# Patient Record
Sex: FEMALE | Race: BLACK OR AFRICAN AMERICAN | NOT HISPANIC OR LATINO | Employment: FULL TIME | URBAN - METROPOLITAN AREA
[De-identification: names, ages, dates, MRNs, and addresses within clinical notes are randomized per-mention and may not be internally consistent; named-entity substitution may affect disease eponyms.]

---

## 2017-11-06 ENCOUNTER — GENERIC CONVERSION - ENCOUNTER (OUTPATIENT)
Dept: OTHER | Facility: OTHER | Age: 52
End: 2017-11-06

## 2017-11-06 DIAGNOSIS — Z12.31 ENCOUNTER FOR SCREENING MAMMOGRAM FOR MALIGNANT NEOPLASM OF BREAST: ICD-10-CM

## 2017-11-06 DIAGNOSIS — I10 ESSENTIAL (PRIMARY) HYPERTENSION: ICD-10-CM

## 2017-11-06 DIAGNOSIS — E07.89 OTHER SPECIFIED DISORDERS OF THYROID: ICD-10-CM

## 2017-11-06 DIAGNOSIS — E78.5 HYPERLIPIDEMIA: ICD-10-CM

## 2017-11-06 DIAGNOSIS — Z12.4 ENCOUNTER FOR SCREENING FOR MALIGNANT NEOPLASM OF CERVIX: ICD-10-CM

## 2017-12-27 ENCOUNTER — ALLSCRIPTS OFFICE VISIT (OUTPATIENT)
Dept: OTHER | Facility: OTHER | Age: 52
End: 2017-12-27

## 2017-12-28 NOTE — PROGRESS NOTES
Assessment   1  Acute maxillary sinusitis (461 0) (J01 00)    Plan   Acute maxillary sinusitis    · Amoxicillin-Pot Clavulanate 875-125 MG Oral Tablet; TAKE 1 TABLET EVERY 12    HOURS DAILY    Discussion/Summary      Sinusitis- start antibiotics  Do not take medication on an empty stomach  Saline nasal spray  The patient was counseled regarding  Possible side effects of new medications were reviewed with the patient/guardian today  The treatment plan was reviewed with the patient/guardian  The patient/guardian understands and agrees with the treatment plan       Self Referrals: No      Chief Complaint   Patient is here for sinus infection  History of Present Illness   HPI: Pt is here because she is having sinus congestion for 3 weeks  She is reporting thick green mucous with an odor  She has been taking dayquil  Review of Systems        Constitutional: feeling poorly, but-- as noted in HPI       ENT: nasal congestion, but-- as noted in HPI  Respiratory: cough, but-- as noted in HPI  Gastrointestinal: no complaints of abdominal pain, no constipation, no nausea or diarrhea, no vomiting, no bloody stools  Active Problems   1  Benign essential hypertension (401 1) (I10)   2  Bipolar 1 disorder, depressed, moderate (296 52) (F31 32)   3  Depression (311) (F32 9)   4  Hyperlipidemia (272 4) (E78 5)   5  Lumbar radiculopathy (724 4) (M54 16)   6  Migraine headache (346 90) (G43 909)   7  Perimenopausal symptoms (627 2) (N95 1)   8  Thyroid pain (246 8) (E07 89)   9  Visit for screening mammogram (V76 12) (Z12 31)    Past Medical History   1  History of Acute pharyngitis (462) (J02 9)   2  History of Acute pharyngitis (462) (J02 9)   3  History of Dysfunctional uterine bleeding (626 8) (N93 8)   4  History of Galactorrhea (611 6) (O92 6)   5  History of Gastric ulcer (531 90) (K25 9)   6  History of Herpes zoster (053 9) (B02 9)   7  History of Hyperprolactinemia (253 1) (E22 1)   8   History of Irregular menstrual cycle (626 4) (N92 6)   9  History of Menorrhagia (626 2) (N92 0)   10  History of Routine Gynecological Exam With Cervical Pap Smear (V72 31)   11  History of Screening for human papillomavirus (HPV) (V73 81) (Z11 51)   12  History of Sorethroat (462) (J02 9)  Active Problems And Past Medical History Reviewed: The active problems and past medical history were reviewed and updated today  Family History   Mother    1  Family history of Ovarian cancer  Sister    2  Family history of malignant neoplasm of thyroid (V16 8) (Z80 8)   3  Family history of Ovarian cancer  Family History    4  Family history of Breast Cancer (V16 3)  Family History Reviewed: The family history was reviewed and updated today  Social History    · Denied: Alcohol Use (History)   · Caffeine Use   · Exercising Regularly   · Never a smoker   · Denied: History of Never Used Drugs  The social history was reviewed and updated today  The social history was reviewed and is unchanged  Surgical History   Surgical History Reviewed: The surgical history was reviewed and updated today  Current Meds    1  ARIPiprazole 15 MG Oral Tablet; 0 5 TABLET DAILY Recorded   2  ClonazePAM 0 5 MG Oral Tablet; Take 1 tablet daily Recorded   3  DULoxetine HCl - 60 MG Oral Capsule Delayed Release Particles Recorded   4  OXcarbazepine 300 MG Oral Tablet; Take 1 tablet twice daily Recorded   5  Propranolol HCl ER 80 MG Oral Capsule Extended Release 24 Hour; take 1 capsule by     mouth once daily; Therapy: [id number] to (Evaluate:[id number])  Requested for: 23IAK0099; Last     Rx:[id number] Ordered     The medication list was reviewed and updated today  Allergies   1  BuSpar TABS   2   Cymbalta CPEP    Vitals    Recorded: 21Lmf2852 01:04PM   Temperature 98 5 F   Heart Rate 68   Systolic 671   Diastolic 82   Height 5 ft 5 5 in   Weight 192 lb 4 00 oz   BMI Calculated 31 51   BSA Calculated 1 95   O2 Saturation 96 Physical Exam        Constitutional      General appearance: No acute distress, well appearing and well nourished  Eyes      Conjunctiva and lids: No swelling, erythema or discharge  Pupils and irises: Equal, round and reactive to light  Ears, Nose, Mouth, and Throat      External inspection of ears and nose: Normal        Otoscopic examination: Tympanic membranes translucent with normal light reflex  Canals patent without erythema  Nasal mucosa, septum, and turbinates: Abnormal  -- moderate sinus congestion and thick mucoid discharge  Oropharynx: Normal with no erythema, edema, exudate or lesions  Pulmonary      Respiratory effort: No increased work of breathing or signs of respiratory distress  Auscultation of lungs: Clear to auscultation  Cardiovascular      Auscultation of heart: Normal rate and rhythm, normal S1 and S2, without murmurs  Lymphatic      Palpation of lymph nodes in neck: No lymphadenopathy         Musculoskeletal      Gait and station: Normal        Psychiatric      Orientation to person, place, and time: Normal        Mood and affect: Normal           Signatures    Electronically signed by : Mouna Mohr NP; Dec 27 2017  1:30PM EST                       (Author)     Electronically signed by : Ludin Oliver MD; Dec 27 2017  1:36PM EST                       (Co-author)

## 2018-01-22 VITALS
BODY MASS INDEX: 30.9 KG/M2 | WEIGHT: 192.25 LBS | HEIGHT: 66 IN | HEART RATE: 68 BPM | TEMPERATURE: 98.5 F | DIASTOLIC BLOOD PRESSURE: 82 MMHG | SYSTOLIC BLOOD PRESSURE: 124 MMHG | OXYGEN SATURATION: 96 %

## 2018-01-22 VITALS
HEART RATE: 70 BPM | TEMPERATURE: 97.3 F | OXYGEN SATURATION: 94 % | BODY MASS INDEX: 30.57 KG/M2 | WEIGHT: 190.2 LBS | DIASTOLIC BLOOD PRESSURE: 86 MMHG | HEIGHT: 66 IN | SYSTOLIC BLOOD PRESSURE: 126 MMHG

## 2018-06-01 ENCOUNTER — OFFICE VISIT (OUTPATIENT)
Dept: FAMILY MEDICINE CLINIC | Facility: CLINIC | Age: 53
End: 2018-06-01
Payer: COMMERCIAL

## 2018-06-01 ENCOUNTER — TELEPHONE (OUTPATIENT)
Dept: FAMILY MEDICINE CLINIC | Facility: CLINIC | Age: 53
End: 2018-06-01

## 2018-06-01 VITALS
DIASTOLIC BLOOD PRESSURE: 92 MMHG | TEMPERATURE: 96.5 F | OXYGEN SATURATION: 100 % | SYSTOLIC BLOOD PRESSURE: 136 MMHG | HEART RATE: 82 BPM | HEIGHT: 66 IN | WEIGHT: 191.2 LBS | BODY MASS INDEX: 30.73 KG/M2

## 2018-06-01 DIAGNOSIS — Z11.59 NEED FOR HEPATITIS C SCREENING TEST: ICD-10-CM

## 2018-06-01 DIAGNOSIS — F31.70 BIPOLAR AFFECTIVE DISORDER IN REMISSION (HCC): ICD-10-CM

## 2018-06-01 DIAGNOSIS — E66.9 OBESITY (BMI 30.0-34.9): ICD-10-CM

## 2018-06-01 DIAGNOSIS — Z13.220 NEED FOR LIPID SCREENING: ICD-10-CM

## 2018-06-01 DIAGNOSIS — R23.2 HOT FLASHES: ICD-10-CM

## 2018-06-01 DIAGNOSIS — M54.31 SCIATICA, RIGHT SIDE: Primary | ICD-10-CM

## 2018-06-01 DIAGNOSIS — Z13.1 DIABETES MELLITUS SCREENING: ICD-10-CM

## 2018-06-01 DIAGNOSIS — M25.50 ARTHRALGIA, UNSPECIFIED JOINT: ICD-10-CM

## 2018-06-01 PROBLEM — E66.811 OBESITY (BMI 30.0-34.9): Status: ACTIVE | Noted: 2018-06-01

## 2018-06-01 PROCEDURE — 99214 OFFICE O/P EST MOD 30 MIN: CPT | Performed by: FAMILY MEDICINE

## 2018-06-01 RX ORDER — MELOXICAM 7.5 MG/1
7.5 TABLET ORAL DAILY
Qty: 30 TABLET | Refills: 2 | Status: SHIPPED | OUTPATIENT
Start: 2018-06-01 | End: 2019-03-26 | Stop reason: SDUPTHER

## 2018-06-01 RX ORDER — OXCARBAZEPINE 300 MG/1
300 TABLET, FILM COATED ORAL 2 TIMES DAILY
Refills: 0 | COMMUNITY
Start: 2018-05-23 | End: 2018-08-13 | Stop reason: SDUPTHER

## 2018-06-01 RX ORDER — LIDOCAINE 50 MG/G
1 PATCH TOPICAL DAILY
Qty: 30 PATCH | Refills: 0 | Status: SHIPPED | OUTPATIENT
Start: 2018-06-01 | End: 2019-04-10

## 2018-06-01 RX ORDER — DULOXETIN HYDROCHLORIDE 30 MG/1
30 CAPSULE, DELAYED RELEASE ORAL DAILY
Qty: 30 CAPSULE | Refills: 1 | Status: SHIPPED | OUTPATIENT
Start: 2018-06-01 | End: 2018-08-13 | Stop reason: SDUPTHER

## 2018-06-01 RX ORDER — PROPRANOLOL HYDROCHLORIDE 80 MG/1
CAPSULE, EXTENDED RELEASE ORAL
Refills: 0 | COMMUNITY
Start: 2018-03-24 | End: 2018-10-10

## 2018-06-01 RX ORDER — ALPRAZOLAM 0.25 MG/1
0.25 TABLET ORAL DAILY PRN
Refills: 0 | COMMUNITY
Start: 2018-05-23 | End: 2018-10-10

## 2018-06-01 RX ORDER — DULOXETIN HYDROCHLORIDE 60 MG/1
60 CAPSULE, DELAYED RELEASE ORAL
Refills: 0 | COMMUNITY
Start: 2018-05-23 | End: 2018-08-13

## 2018-06-01 RX ORDER — ARIPIPRAZOLE 5 MG/1
2.5 TABLET ORAL
Refills: 0 | COMMUNITY
Start: 2018-05-23 | End: 2019-03-26 | Stop reason: SDUPTHER

## 2018-06-01 NOTE — TELEPHONE ENCOUNTER
Cecil Winter calls her rx of BELVIQ 10mg needs a prior [facility]   Call [phone] #[phone] no substitutions were provided after approval call [email or web address]

## 2018-06-01 NOTE — PROGRESS NOTES
Assessment/Plan:    No problem-specific Assessment & Plan notes found for this encounter  Diagnoses and all orders for this visit:    Sciatica, right side  after discussing risks and benefits along with side effects will start meloxicam at this time  advised to re-start cymbalta at 30 mg po once daily dose and physical therapy and lidocaine patch  -     meloxicam (MOBIC) 7 5 mg tablet; Take 1 tablet (7 5 mg total) by mouth daily for 30 days  -     DULoxetine (CYMBALTA) 30 mg delayed release capsule; Take 1 capsule (30 mg total) by mouth daily for 30 days  -     Ambulatory referral to Physical Therapy; Future  -     lidocaine (LIDODERM) 5 %; Place 1 patch on the skin daily Remove & Discard patch within 12 hours or as directed by MD  -     Rheumatoid Arthritis Factor; Future  -     XR hip/pelv 2-3 vws right if performed; Future    Diabetes mellitus screening  -     Comprehensive metabolic panel; Future  -     HEMOGLOBIN A1C W/ EAG ESTIMATION; Future    Need for lipid screening  -     Lipid panel; Future    Need for hepatitis C screening test  -     Hepatitis C antibody; Future    Arthralgia, unspecified joint  -     meloxicam (MOBIC) 7 5 mg tablet; Take 1 tablet (7 5 mg total) by mouth daily for 30 days  -     DULoxetine (CYMBALTA) 30 mg delayed release capsule; Take 1 capsule (30 mg total) by mouth daily for 30 days  -     Ambulatory referral to Physical Therapy; Future  -     lidocaine (LIDODERM) 5 %; Place 1 patch on the skin daily Remove & Discard patch within 12 hours or as directed by MD  -     CBC and differential; Future  -     BERNARDINO Screen w/ Reflex to Titer/Pattern; Future  -     Rheumatoid Arthritis Factor; Future  -     XR hip/pelv 2-3 vws right if performed; Future    Obesity (BMI 30 0-34 9)  after discussing risks and benefits of medicating along with side effects will start Lorcaserin at this time    Also counseled in regards to diet and given a handout   -     Lorcaserin HCl 10 MG TABS; Take 1 tablet (10 mg total) by mouth 2 (two) times a day for 30 days    Bipolar affective disorder in remission [facility])  She was advised to resume her psychiatrist mood stabilizing medications and following up with Psychiatrist   She was counseled in regards to medication adherence and compliance  Other orders  -     ALPRAZolam (XANAX) 0 25 mg tablet; Take 0 25 mg by mouth daily as needed for anxiety  -     ARIPiprazole (ABILIFY) 5 mg tablet; Take 2 5 mg by mouth daily at bedtime  -     DULoxetine (CYMBALTA) 60 mg delayed release capsule; Take 60 mg by mouth daily at bedtime  -     propranolol (INDERAL LA) 80 mg 24 hr capsule;   -     OXcarbazepine (TRILEPTAL) 300 mg tablet; Take 300 mg by mouth 2 (two) times a day      Follow up in 1 month    Subjective:      Patient ID: Fabian Richards is a 46 y o  female  Patient is here due to low back pain that goes down her right leg she has daily symptoms pain is sharp and shooting  She has been taking ibuprofen 4-6 times daily without significant relief  Current is not doing physical therapy for it  She also has a history of Bipolar disorder and sees a psychiatrist who has patient on Cymbalta, Abilify, Xanax and Trileptal  She says that she stopped taking all her Psych meds recently given that she was gaining a lot of weigh  She is interested in losing weight at this time  In addition she has been having hot flashes symptoms she says that the last time she had a period was 1 year ago  The following portions of the patient's history were reviewed and updated as appropriate:   She  has no past medical history on file    She   Patient Active Problem List    Diagnosis Date Noted    Sciatica, right side 06/01/2018    Diabetes mellitus screening 06/01/2018    Need for lipid screening 06/01/2018    Need for hepatitis C screening test 06/01/2018    Arthralgia 06/01/2018    Obesity (BMI 30 0-34 9) 06/01/2018    Bipolar affective disorder in remission ([facility] [address] ) 06/01/2018     She  has no past surgical history on file  Her family history is not on file  She  reports that she has never smoked  She has never used smokeless tobacco  She reports that she does not drink alcohol or use drugs  Current Outpatient Prescriptions   Medication Sig Dispense Refill    propranolol (INDERAL LA) 80 mg 24 hr capsule   0    ALPRAZolam (XANAX) 0 25 mg tablet Take 0 25 mg by mouth daily as needed for anxiety  0    ARIPiprazole (ABILIFY) 5 mg tablet Take 2 5 mg by mouth daily at bedtime  0    DULoxetine (CYMBALTA) 30 mg delayed release capsule Take 1 capsule (30 mg total) by mouth daily for 30 days 30 capsule 1    DULoxetine (CYMBALTA) 60 mg delayed release capsule Take 60 mg by mouth daily at bedtime  0    lidocaine (LIDODERM) 5 % Place 1 patch on the skin daily Remove & Discard patch within 12 hours or as directed by MD 30 patch 0    Lorcaserin HCl 10 MG TABS Take 1 tablet (10 mg total) by mouth 2 (two) times a day for 30 days 60 tablet 1    meloxicam (MOBIC) 7 5 mg tablet Take 1 tablet (7 5 mg total) by mouth daily for 30 days 30 tablet 2    OXcarbazepine (TRILEPTAL) 300 mg tablet Take 300 mg by mouth 2 (two) times a day  0     No current facility-administered medications for this visit  No current outpatient prescriptions on file prior to visit  No current facility-administered medications on file prior to visit  She is allergic to pineapple       Review of Systems   Constitutional: Positive for unexpected weight change  Negative for activity change, appetite change, fatigue and fever  HENT: Negative for congestion and ear discharge  Respiratory: Negative for cough and shortness of breath  Cardiovascular: Negative for chest pain and palpitations  Gastrointestinal: Negative for diarrhea and nausea  Musculoskeletal: Positive for arthralgias and back pain  Skin: Negative for color change and rash  Neurological: Negative for dizziness and headaches  Psychiatric/Behavioral: Positive for agitation and behavioral problems  The patient is nervous/anxious  Objective:      /92   Pulse 82   Temp (!) 96 5 °F (35 8 °C)   Ht 5' 5 5" (1 664 m)   Wt 86 7 kg (191 lb 3 2 oz)   SpO2 100%   BMI 31 33 kg/m²          Physical Exam   Constitutional: She is oriented to person, place, and time  She appears well-developed and well-nourished  No distress  HENT:   Head: Normocephalic and atraumatic  Nose: Nose normal    Mouth/Throat: Oropharynx is clear and moist    Eyes: Conjunctivae are normal  Pupils are equal, round, and reactive to light  Cardiovascular: Normal rate, regular rhythm and normal heart sounds  No murmur heard  Pulmonary/Chest: Effort normal and breath sounds normal  No respiratory distress  She has no wheezes  Abdominal: Soft  Bowel sounds are normal  She exhibits no distension  There is no tenderness  Musculoskeletal: She exhibits tenderness  starlight leg raise produces low back pain on her right lower back,     Neurological: She is alert and oriented to person, place, and time  Skin: Skin is warm and dry  No rash noted  She is not diaphoretic  No erythema  Psychiatric: She has a normal mood and affect

## 2018-08-06 ENCOUNTER — TELEPHONE (OUTPATIENT)
Dept: FAMILY MEDICINE CLINIC | Facility: CLINIC | Age: 53
End: 2018-08-06

## 2018-08-06 DIAGNOSIS — I10 ESSENTIAL HYPERTENSION: Primary | ICD-10-CM

## 2018-08-06 RX ORDER — LISINOPRIL AND HYDROCHLOROTHIAZIDE 12.5; 1 MG/1; MG/1
1 TABLET ORAL DAILY
Qty: 30 TABLET | Refills: 1 | Status: SHIPPED | OUTPATIENT
Start: 2018-08-06 | End: 2018-10-10

## 2018-08-06 NOTE — TELEPHONE ENCOUNTER
Mally Luevano had gotten Lisinopril - HCTZ 10-12 5 from the Er where she [facility], it is working well for her  She is out of this and needs a refill sent to [facility]   Please advise when this is sent in

## 2018-08-13 ENCOUNTER — OFFICE VISIT (OUTPATIENT)
Dept: FAMILY MEDICINE CLINIC | Facility: CLINIC | Age: 53
End: 2018-08-13
Payer: COMMERCIAL

## 2018-08-13 VITALS
WEIGHT: 184 LBS | HEIGHT: 66 IN | DIASTOLIC BLOOD PRESSURE: 92 MMHG | HEART RATE: 114 BPM | TEMPERATURE: 97.3 F | OXYGEN SATURATION: 99 % | BODY MASS INDEX: 29.57 KG/M2 | SYSTOLIC BLOOD PRESSURE: 122 MMHG

## 2018-08-13 DIAGNOSIS — F31.60 BIPOLAR AFFECTIVE DISORDER, CURRENT EPISODE MIXED, CURRENT EPISODE SEVERITY UNSPECIFIED (HCC): ICD-10-CM

## 2018-08-13 DIAGNOSIS — M25.50 ARTHRALGIA, UNSPECIFIED JOINT: ICD-10-CM

## 2018-08-13 DIAGNOSIS — J02.9 ACUTE PHARYNGITIS, UNSPECIFIED ETIOLOGY: Primary | ICD-10-CM

## 2018-08-13 DIAGNOSIS — M54.31 SCIATICA, RIGHT SIDE: ICD-10-CM

## 2018-08-13 PROCEDURE — 99214 OFFICE O/P EST MOD 30 MIN: CPT | Performed by: FAMILY MEDICINE

## 2018-08-13 RX ORDER — OXCARBAZEPINE 300 MG/1
300 TABLET, FILM COATED ORAL 2 TIMES DAILY
Qty: 60 TABLET | Refills: 2 | Status: SHIPPED | OUTPATIENT
Start: 2018-08-13 | End: 2019-04-10

## 2018-08-13 RX ORDER — DULOXETIN HYDROCHLORIDE 30 MG/1
30 CAPSULE, DELAYED RELEASE ORAL DAILY
Qty: 30 CAPSULE | Refills: 2 | Status: SHIPPED | OUTPATIENT
Start: 2018-08-13 | End: 2019-03-26 | Stop reason: SDUPTHER

## 2018-08-13 RX ORDER — DEXTROMETHORPHAN HYDROBROMIDE AND PROMETHAZINE HYDROCHLORIDE 15; 6.25 MG/5ML; MG/5ML
5 SYRUP ORAL 4 TIMES DAILY PRN
Qty: 118 ML | Refills: 1 | Status: SHIPPED | OUTPATIENT
Start: 2018-08-13 | End: 2018-10-10

## 2018-08-13 RX ORDER — AMOXICILLIN AND CLAVULANATE POTASSIUM 875; 125 MG/1; MG/1
1 TABLET, FILM COATED ORAL EVERY 12 HOURS SCHEDULED
Qty: 10 TABLET | Refills: 0 | Status: SHIPPED | OUTPATIENT
Start: 2018-08-13 | End: 2018-08-18

## 2018-08-13 NOTE — PROGRESS NOTES
Assessment/Plan:    No problem-specific Assessment & Plan notes found for this encounter  Diagnoses and all orders for this visit:    Acute pharyngitis, unspecified etiology  After discussing risks and benefits of medication along with side effects  Will start the following medications  -     amoxicillin-clavulanate (AUGMENTIN) 875-125 mg per tablet; Take 1 tablet by mouth every 12 (twelve) hours for 5 days  -     promethazine-dextromethorphan (PHENERGAN-DM) 6 25-15 mg/5 mL oral syrup; Take 5 mL by mouth 4 (four) times a day as needed for cough      Follow-up in 1 week or as needed  Subjective:      Patient ID: Radha Caruso is a 46 y o  female  Patient is here for a sore throat that has been going on for the past several days  It feels like a tickle in the back of her throat  She denies any fever or body aches at this time however  She denies any shortness of breath wheezy this time  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  The following portions of the patient's history were reviewed and updated as appropriate:   She  has a past medical history of Gastric ulcer and Hyperproinsulinemia  She   Patient Active Problem List    Diagnosis Date Noted    Acute pharyngitis 08/13/2018    Sciatica, right side 06/01/2018    Diabetes mellitus screening 06/01/2018    Need for lipid screening 06/01/2018    Need for hepatitis C screening test 06/01/2018    Arthralgia 06/01/2018    Obesity (BMI 30 0-34 9) 06/01/2018    Bipolar affective disorder in remission ([facility] [address] ) 06/01/2018    Hot flashes 06/01/2018     She  has no past surgical history on file  Her family history includes Breast cancer in her family; Ovarian cancer in her mother and sister; Thyroid cancer in her sister  She  reports that she has never smoked  She has never used smokeless tobacco  She reports that she does not drink alcohol or use drugs    Current Outpatient Prescriptions   Medication Sig Dispense Refill    ALPRAZolam (XANAX) 0 25 mg tablet Take 0 25 mg by mouth daily as needed for anxiety  0    amoxicillin-clavulanate (AUGMENTIN) 875-125 mg per tablet Take 1 tablet by mouth every 12 (twelve) hours for 5 days 10 tablet 0    ARIPiprazole (ABILIFY) 5 mg tablet Take 2 5 mg by mouth daily at bedtime  0    DULoxetine (CYMBALTA) 30 mg delayed release capsule Take 1 capsule (30 mg total) by mouth daily for 30 days 30 capsule 2    lidocaine (LIDODERM) 5 % Place 1 patch on the skin daily Remove & Discard patch within 12 hours or as directed by MD 30 patch 0    lisinopril-hydrochlorothiazide (PRINZIDE,ZESTORETIC) 10-12 5 MG per tablet Take 1 tablet by mouth daily for 30 days 30 tablet 1    Lorcaserin HCl 10 MG TABS Take 1 tablet (10 mg total) by mouth 2 (two) times a day for 30 days 60 tablet 1    meloxicam (MOBIC) 7 5 mg tablet Take 1 tablet (7 5 mg total) by mouth daily for 30 days 30 tablet 2    OXcarbazepine (TRILEPTAL) 300 mg tablet Take 1 tablet (300 mg total) by mouth 2 (two) times a day for 30 days 60 tablet 2    promethazine-dextromethorphan (PHENERGAN-DM) 6 25-15 mg/5 mL oral syrup Take 5 mL by mouth 4 (four) times a day as needed for cough 118 mL 1    propranolol (INDERAL LA) 80 mg 24 hr capsule   0     No current facility-administered medications for this visit        Current Outpatient Prescriptions on File Prior to Visit   Medication Sig    ALPRAZolam (XANAX) 0 25 mg tablet Take 0 25 mg by mouth daily as needed for anxiety    ARIPiprazole (ABILIFY) 5 mg tablet Take 2 5 mg by mouth daily at bedtime    lidocaine (LIDODERM) 5 % Place 1 patch on the skin daily Remove & Discard patch within 12 hours or as directed by MD    lisinopril-hydrochlorothiazide (PRINZIDE,ZESTORETIC) 10-12 5 MG per tablet Take 1 tablet by mouth daily for 30 days    Lorcaserin HCl 10 MG TABS Take 1 tablet (10 mg total) by mouth 2 (two) times a day for 30 days    meloxicam (MOBIC) 7 5 mg tablet Take 1 tablet (7 5 mg total) by mouth daily for 30 days    propranolol (INDERAL LA) 80 mg 24 hr capsule     [DISCONTINUED] DULoxetine (CYMBALTA) 30 mg delayed release capsule Take 1 capsule (30 mg total) by mouth daily for 30 days    [DISCONTINUED] DULoxetine (CYMBALTA) 60 mg delayed release capsule Take 60 mg by mouth daily at bedtime    [DISCONTINUED] OXcarbazepine (TRILEPTAL) 300 mg tablet Take 300 mg by mouth 2 (two) times a day     No current facility-administered medications on file prior to visit  She is allergic to pineapple       Review of Systems   Constitutional: Negative for activity change, appetite change, fatigue and fever  HENT: Positive for sore throat  Negative for congestion and ear discharge  Respiratory: Positive for cough  Negative for shortness of breath  Cardiovascular: Negative for chest pain and palpitations  Gastrointestinal: Negative for diarrhea and nausea  Musculoskeletal: Negative for arthralgias and back pain  Skin: Negative for color change and rash  Neurological: Negative for dizziness and headaches  Psychiatric/Behavioral: Negative for agitation and behavioral problems  Objective:      /92   Pulse (!) 114   Temp (!) 97 3 °F (36 3 °C)   Ht 5' 5 5" (1 664 m)   Wt 83 5 kg (184 lb)   SpO2 99%   BMI 30 15 kg/m²          Physical Exam   Constitutional: She is oriented to person, place, and time  She appears well-developed and well-nourished  No distress  HENT:   Head: Normocephalic and atraumatic  Nose: Nose normal    Mouth/Throat: Oropharynx is clear and moist    Oropharynx erythematous no exudates  Eyes: Conjunctivae are normal  Pupils are equal, round, and reactive to light  Cardiovascular: Normal rate, regular rhythm and normal heart sounds  No murmur heard  Pulmonary/Chest: Effort normal and breath sounds normal  No respiratory distress  She has no wheezes  Abdominal: Soft  Bowel sounds are normal  She exhibits no distension   There is no tenderness  Neurological: She is alert and oriented to person, place, and time  Skin: Skin is warm and dry  No rash noted  She is not diaphoretic  No erythema  Psychiatric: She has a normal mood and affect

## 2018-10-10 ENCOUNTER — OFFICE VISIT (OUTPATIENT)
Dept: FAMILY MEDICINE CLINIC | Facility: CLINIC | Age: 53
End: 2018-10-10
Payer: COMMERCIAL

## 2018-10-10 VITALS
WEIGHT: 182.13 LBS | HEIGHT: 66 IN | SYSTOLIC BLOOD PRESSURE: 124 MMHG | OXYGEN SATURATION: 98 % | BODY MASS INDEX: 29.27 KG/M2 | HEART RATE: 79 BPM | RESPIRATION RATE: 18 BRPM | DIASTOLIC BLOOD PRESSURE: 88 MMHG

## 2018-10-10 DIAGNOSIS — R53.83 FATIGUE, UNSPECIFIED TYPE: ICD-10-CM

## 2018-10-10 DIAGNOSIS — Z12.11 SCREENING FOR COLON CANCER: ICD-10-CM

## 2018-10-10 DIAGNOSIS — F31.70 BIPOLAR AFFECTIVE DISORDER IN REMISSION (HCC): ICD-10-CM

## 2018-10-10 DIAGNOSIS — Z12.4 SCREENING FOR CERVICAL CANCER: ICD-10-CM

## 2018-10-10 DIAGNOSIS — L30.9 DERMATITIS: ICD-10-CM

## 2018-10-10 DIAGNOSIS — E66.9 OBESITY (BMI 30.0-34.9): ICD-10-CM

## 2018-10-10 DIAGNOSIS — I10 ESSENTIAL HYPERTENSION: Primary | ICD-10-CM

## 2018-10-10 DIAGNOSIS — R06.83 SNORING: ICD-10-CM

## 2018-10-10 DIAGNOSIS — Z13.220 LIPID SCREENING: ICD-10-CM

## 2018-10-10 DIAGNOSIS — M25.50 ARTHRALGIA, UNSPECIFIED JOINT: ICD-10-CM

## 2018-10-10 DIAGNOSIS — Z12.39 SCREENING FOR BREAST CANCER: ICD-10-CM

## 2018-10-10 PROBLEM — M54.31 SCIATICA, RIGHT SIDE: Status: RESOLVED | Noted: 2018-06-01 | Resolved: 2018-10-10

## 2018-10-10 PROBLEM — J02.9 ACUTE PHARYNGITIS: Status: RESOLVED | Noted: 2018-08-13 | Resolved: 2018-10-10

## 2018-10-10 PROCEDURE — 99214 OFFICE O/P EST MOD 30 MIN: CPT | Performed by: NURSE PRACTITIONER

## 2018-10-10 RX ORDER — LOSARTAN POTASSIUM AND HYDROCHLOROTHIAZIDE 12.5; 5 MG/1; MG/1
1 TABLET ORAL DAILY
Qty: 30 TABLET | Refills: 2 | Status: SHIPPED | OUTPATIENT
Start: 2018-10-10 | End: 2019-04-16 | Stop reason: SDUPTHER

## 2018-10-10 NOTE — PATIENT INSTRUCTIONS
Hypertension- tran medication  Monitor blood pressure and call on one week with readings  Goal is less than 140/90  Cough- may be due to ACE   Stop Lisinopril and monitor  Obtain mammogram  Pt declines colonoscopy but is agreeable to FIT testing  Refer for sleep study- snoring, excessive sleepiness, and fatigue  Dermatitis- start triamcinolone ointment  Arthritis- continue with meloxicam  Follow up in 6 months

## 2018-10-10 NOTE — PROGRESS NOTES
Assessment/Plan:    No problem-specific Assessment & Plan notes found for this encounter  Diagnoses and all orders for this visit:    Essential hypertension  -     losartan-hydrochlorothiazide (HYZAAR) 50-12 5 mg per tablet; Take 1 tablet by mouth daily for 30 days  -     CBC and differential; Future  -     Comprehensive metabolic panel; Future  -     Hemoglobin A1C; Future  -     Lipid panel; Future  -     TSH, 3rd generation; Future  -     Sleep F/U with mask fit - established patient; Future    Arthralgia, unspecified joint  -     CBC and differential; Future  -     Comprehensive metabolic panel; Future  -     Hemoglobin A1C; Future  -     Lipid panel; Future  -     TSH, 3rd generation; Future  -     Sleep F/U with mask fit - established patient; Future    Bipolar affective disorder in remission ([facility] [address] )  -     CBC and differential; Future  -     Comprehensive metabolic panel; Future  -     Hemoglobin A1C; Future  -     Lipid panel; Future  -     TSH, 3rd generation; Future  -     Sleep F/U with mask fit - established patient; Future    Obesity (BMI 30 0-34 9)  -     CBC and differential; Future  -     Comprehensive metabolic panel; Future  -     Hemoglobin A1C; Future  -     Lipid panel; Future  -     TSH, 3rd generation; Future  -     Sleep F/U with mask fit - established patient; Future    Screening for breast cancer  -     Mammo screening bilateral w 3d & cad; Future  -     CBC and differential; Future  -     Comprehensive metabolic panel; Future  -     Hemoglobin A1C; Future  -     Lipid panel; Future  -     TSH, 3rd generation; Future  -     Sleep F/U with mask fit - established patient; Future    Screening for cervical cancer  -     Ambulatory referral to Gynecology; Future  -     CBC and differential; Future  -     Comprehensive metabolic panel; Future  -     Hemoglobin A1C; Future  -     Lipid panel; Future  -     TSH, 3rd generation;  Future  -     Sleep F/U with mask fit - established patient; Future    Dermatitis  -     triamcinolone (KENALOG) 0 1 % ointment; Apply topically 2 (two) times a day for 15 days  -     CBC and differential; Future  -     Comprehensive metabolic panel; Future  -     Hemoglobin A1C; Future  -     Lipid panel; Future  -     TSH, 3rd generation; Future  -     Sleep F/U with mask fit - established patient; Future    Lipid screening  -     CBC and differential; Future  -     Comprehensive metabolic panel; Future  -     Hemoglobin A1C; Future  -     Lipid panel; Future  -     TSH, 3rd generation; Future  -     Sleep F/U with mask fit - established patient; Future    Fatigue, unspecified type  -     CBC and differential; Future  -     Comprehensive metabolic panel; Future  -     Hemoglobin A1C; Future  -     Lipid panel; Future  -     TSH, 3rd generation; Future  -     Sleep F/U with mask fit - established patient; Future    Screening for colon cancer  -     CBC and differential; Future  -     Comprehensive metabolic panel; Future  -     Hemoglobin A1C; Future  -     Lipid panel; Future  -     TSH, 3rd generation; Future  -     Sleep F/U with mask fit - established patient; Future  -     Occult Bloood,Fecal Immunochemical; Future    Snoring          Subjective:      Patient ID: Nika Kulkarni is a 46 y o  female  Pt is here for follow up   She has a few concerns as well  Cough- pt has had cough for 2 months  She initially was treated with abx and cough medicine  Cough is still present  Cough is dry and lasts about 5-6 minutes  This occurs every day and wakes her up at night  Incidentally, she started ACE 4 months ago   Pt has rash on the back of her neck  She is using her husbands elocon cream and it resolves, but then comes back  NO itching  She is due for mammogram and PAP  She is chronic joint pains  Stable on meloxicam at this time  Pt snores and has sleepiness  She does work nightshift   She would like sleep study        The following portions of the patient's history were reviewed and updated as appropriate:   She  has a past medical history of Gastric ulcer and Hyperproinsulinemia  She   Patient Active Problem List    Diagnosis Date Noted    Screening for cervical cancer 10/10/2018    Dermatitis 10/10/2018    Fatigue 10/10/2018    Essential hypertension 10/10/2018    Snoring 10/10/2018    Lipid screening 06/01/2018    Screening for colon cancer 06/01/2018    Need for hepatitis C screening test 06/01/2018    Arthralgia 06/01/2018    Obesity (BMI 30 0-34 9) 06/01/2018    Bipolar affective disorder in remission ([facility] [address] ) 06/01/2018    Hot flashes 06/01/2018     She  has no past surgical history on file  Her family history includes Breast cancer in her family; Ovarian cancer in her mother and sister; Thyroid cancer in her sister  She  reports that she has never smoked  She has never used smokeless tobacco  She reports that she does not drink alcohol or use drugs  Current Outpatient Prescriptions   Medication Sig Dispense Refill    ARIPiprazole (ABILIFY) 5 mg tablet Take 2 5 mg by mouth daily at bedtime  0    DULoxetine (CYMBALTA) 30 mg delayed release capsule Take 1 capsule (30 mg total) by mouth daily for 30 days 30 capsule 2    lidocaine (LIDODERM) 5 % Place 1 patch on the skin daily Remove & Discard patch within 12 hours or as directed by MD 30 patch 0    losartan-hydrochlorothiazide (HYZAAR) 50-12 5 mg per tablet Take 1 tablet by mouth daily for 30 days 30 tablet 2    meloxicam (MOBIC) 7 5 mg tablet Take 1 tablet (7 5 mg total) by mouth daily for 30 days 30 tablet 2    OXcarbazepine (TRILEPTAL) 300 mg tablet Take 1 tablet (300 mg total) by mouth 2 (two) times a day for 30 days 60 tablet 2    triamcinolone (KENALOG) 0 1 % ointment Apply topically 2 (two) times a day for 15 days 30 g 1     No current facility-administered medications for this visit        Current Outpatient Prescriptions on File Prior to Visit   Medication Sig    ARIPiprazole (ABILIFY) 5 mg tablet Take 2 5 mg by mouth daily at bedtime    DULoxetine (CYMBALTA) 30 mg delayed release capsule Take 1 capsule (30 mg total) by mouth daily for 30 days    lidocaine (LIDODERM) 5 % Place 1 patch on the skin daily Remove & Discard patch within 12 hours or as directed by MD    meloxicam (MOBIC) 7 5 mg tablet Take 1 tablet (7 5 mg total) by mouth daily for 30 days    OXcarbazepine (TRILEPTAL) 300 mg tablet Take 1 tablet (300 mg total) by mouth 2 (two) times a day for 30 days    [DISCONTINUED] ALPRAZolam (XANAX) 0 25 mg tablet Take 0 25 mg by mouth daily as needed for anxiety    [DISCONTINUED] lisinopril-hydrochlorothiazide (PRINZIDE,ZESTORETIC) 10-12 5 MG per tablet Take 1 tablet by mouth daily for 30 days    [DISCONTINUED] Lorcaserin HCl 10 MG TABS Take 1 tablet (10 mg total) by mouth 2 (two) times a day for 30 days    [DISCONTINUED] promethazine-dextromethorphan (PHENERGAN-DM) 6 25-15 mg/5 mL oral syrup Take 5 mL by mouth 4 (four) times a day as needed for cough    [DISCONTINUED] propranolol (INDERAL LA) 80 mg 24 hr capsule      No current facility-administered medications on file prior to visit  She is allergic to pineapple       Review of Systems   Constitutional: Negative for fatigue and fever  HENT: Negative for congestion, ear pain, postnasal drip, sinus pain, sinus pressure and sore throat  Respiratory: Positive for cough  Negative for chest tightness and shortness of breath  Cardiovascular: Negative for chest pain and palpitations  Gastrointestinal: Negative for abdominal pain, constipation and diarrhea  Endocrine: Negative for polydipsia and polyuria  Musculoskeletal: Negative for arthralgias, back pain and myalgias  Skin: Positive for rash  Negative for color change and pallor  Allergic/Immunologic: Negative for immunocompromised state  Neurological: Negative for headaches  Hematological: Negative for adenopathy  All other systems reviewed and are negative  Objective:      /88 (BP Location: Left arm, Patient Position: Sitting)   Pulse 79   Resp 18   Ht 5' 5 5" (1 664 m)   Wt 82 6 kg (182 lb 2 oz)   SpO2 98%   BMI 29 85 kg/m²          Physical Exam   Constitutional: She is oriented to person, place, and time  She appears well-developed and well-nourished  No distress  HENT:   Head: Normocephalic and atraumatic  Right Ear: External ear normal    Left Ear: External ear normal    Nose: Nose normal    Mouth/Throat: Oropharynx is clear and moist    Eyes: Pupils are equal, round, and reactive to light  Conjunctivae are normal  Right eye exhibits no discharge  Left eye exhibits no discharge  No scleral icterus  Neck: Normal range of motion  Neck supple  No JVD present  No thyromegaly present  Cardiovascular: Normal rate, regular rhythm, normal heart sounds and intact distal pulses  Exam reveals no gallop and no friction rub  No murmur heard  Pulmonary/Chest: Effort normal and breath sounds normal  No respiratory distress  She has no wheezes  Abdominal: Soft  Bowel sounds are normal  She exhibits no distension  There is no tenderness  Musculoskeletal: Normal range of motion  She exhibits no edema, tenderness or deformity  Lymphadenopathy:     She has no cervical adenopathy  Neurological: She is alert and oriented to person, place, and time  Skin: Skin is warm and dry  Rash noted  She is not diaphoretic  No pallor  Scaly rash on posterior neck   Psychiatric: She has a normal mood and affect  Her behavior is normal  Judgment and thought content normal    Nursing note and vitals reviewed

## 2018-10-16 DIAGNOSIS — R53.83 FATIGUE, UNSPECIFIED TYPE: Primary | ICD-10-CM

## 2018-10-16 DIAGNOSIS — R40.0 DAYTIME SLEEPINESS: ICD-10-CM

## 2019-03-26 ENCOUNTER — HOSPITAL ENCOUNTER (OUTPATIENT)
Dept: RADIOLOGY | Facility: HOSPITAL | Age: 54
Discharge: HOME | End: 2019-03-26
Attending: FAMILY MEDICINE
Payer: COMMERCIAL

## 2019-03-26 ENCOUNTER — OFFICE VISIT (OUTPATIENT)
Dept: FAMILY MEDICINE CLINIC | Facility: CLINIC | Age: 54
End: 2019-03-26
Payer: COMMERCIAL

## 2019-03-26 VITALS
WEIGHT: 186.4 LBS | BODY MASS INDEX: 29.96 KG/M2 | HEART RATE: 102 BPM | SYSTOLIC BLOOD PRESSURE: 122 MMHG | DIASTOLIC BLOOD PRESSURE: 86 MMHG | OXYGEN SATURATION: 98 % | HEIGHT: 66 IN | TEMPERATURE: 97.9 F

## 2019-03-26 DIAGNOSIS — M25.50 ARTHRALGIA, UNSPECIFIED JOINT: ICD-10-CM

## 2019-03-26 DIAGNOSIS — Z13.1 SCREENING FOR DIABETES MELLITUS: ICD-10-CM

## 2019-03-26 DIAGNOSIS — M54.31 SCIATICA, RIGHT SIDE: ICD-10-CM

## 2019-03-26 DIAGNOSIS — F31.70 BIPOLAR AFFECTIVE DISORDER IN REMISSION (HCC): ICD-10-CM

## 2019-03-26 DIAGNOSIS — M54.31 SCIATICA, RIGHT SIDE: Primary | ICD-10-CM

## 2019-03-26 DIAGNOSIS — Z12.11 SCREENING FOR COLON CANCER: ICD-10-CM

## 2019-03-26 DIAGNOSIS — Z13.220 LIPID SCREENING: ICD-10-CM

## 2019-03-26 DIAGNOSIS — Z12.39 SCREENING FOR BREAST CANCER: ICD-10-CM

## 2019-03-26 DIAGNOSIS — Z11.59 NEED FOR HEPATITIS C SCREENING TEST: ICD-10-CM

## 2019-03-26 PROCEDURE — 99214 OFFICE O/P EST MOD 30 MIN: CPT | Performed by: FAMILY MEDICINE

## 2019-03-26 PROCEDURE — 73521 X-RAY EXAM HIPS BI 2 VIEWS: CPT

## 2019-03-26 RX ORDER — ARIPIPRAZOLE 5 MG/1
2.5 TABLET ORAL
Qty: 15 TABLET | Refills: 1 | Status: SHIPPED | OUTPATIENT
Start: 2019-03-26 | End: 2020-06-01

## 2019-03-26 RX ORDER — DULOXETIN HYDROCHLORIDE 60 MG/1
60 CAPSULE, DELAYED RELEASE ORAL DAILY
Qty: 30 CAPSULE | Refills: 1 | Status: SHIPPED | OUTPATIENT
Start: 2019-03-26 | End: 2019-07-01 | Stop reason: SDUPTHER

## 2019-03-26 RX ORDER — MELOXICAM 15 MG/1
15 TABLET ORAL DAILY
Qty: 30 TABLET | Refills: 2 | Status: SHIPPED | OUTPATIENT
Start: 2019-03-26 | End: 2019-04-23 | Stop reason: ALTCHOICE

## 2019-03-26 NOTE — PROGRESS NOTES
Assessment/Plan:    No problem-specific Assessment & Plan notes found for this encounter  Diagnoses and all orders for this visit:    Sciatica, right side  After discussing risks and benefits of medication along with side effects I have recommended for her to start meloxicam 50 mg p o  Once daily at this time  After discussing risks and benefits with patient I also recommended for her to increase Cymbalta to 60 mg daily given that this can help for musculoskeletal pain as well  -     meloxicam (MOBIC) 15 mg tablet; Take 1 tablet (15 mg total) by mouth daily for 30 days  -     DULoxetine (CYMBALTA) 60 mg delayed release capsule; Take 1 capsule (60 mg total) by mouth daily for 30 days  -     XR hips bilateral 2 vw w pelvis if performed; Future    Arthralgia, unspecified joint  -     meloxicam (MOBIC) 15 mg tablet; Take 1 tablet (15 mg total) by mouth daily for 30 days  -     DULoxetine (CYMBALTA) 60 mg delayed release capsule; Take 1 capsule (60 mg total) by mouth daily for 30 days  -     XR hips bilateral 2 vw w pelvis if performed; Future    Bipolar affective disorder in remission [facility])  After discussing risks and benefits of medication along with side effects will also restart her medication Abilify  She was advised to follow up with her psychiatrist in regards to this medication and bipolar disorder  If she develops any suicidal or homicidal ideation recommend going to the ER immediately  -     ARIPiprazole (ABILIFY) 5 mg tablet; Take 0 5 tablets (2 5 mg total) by mouth daily at bedtime for 30 days    Lipid screening  -     Lipid panel; Future    Need for hepatitis C screening test  -     Hepatitis C antibody; Future    Screening for diabetes mellitus  -     Comprehensive metabolic panel; Future  -     Hemoglobin A1C; Future    Screening for breast cancer  -     Mammo screening bilateral w cad; Future    Screening for colon cancer  -     Cologuard;  Future      Follow up in 1 month    Subjective: Patient ID: Drew Olivier is a 53 y o  female  The patient is here to follow-up for bilateral sciatica pain  She said that she has daily pain that go down both of her legs  The pain starts on lower back and go down both legs at this time  She said that the pain occurs daily and its very sharp in nature  She said that walking and moving makes it worse  She had a lumbar MRI done about 4 years ago due to back pain which showed arthritic changes of her lumbar spine  She used to take meloxicam in the past which helped her symptoms however she stopped taking the medication at this time  She also has a history of bipolar affective disorder she used to be on medications for it however the only medication she is currently taking at this time for mood disorder is Cymbalta 30 mg p o  Once daily  She does plan to follow up with psychiatrist  She said that 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 and that she has been more stressed recently  She denies any suicidal or homicidal ideation this time however  The following portions of the patient's history were reviewed and updated as appropriate:   She  has a past medical history of Gastric ulcer and Hyperproinsulinemia  She   Patient Active Problem List    Diagnosis Date Noted    Sciatica, right side 03/26/2019    Screening for diabetes mellitus 03/26/2019    Screening for breast cancer 03/26/2019    Screening for cervical cancer 10/10/2018    Dermatitis 10/10/2018    Fatigue 10/10/2018    Essential hypertension 10/10/2018    Snoring 10/10/2018    Lipid screening 06/01/2018    Screening for colon cancer 06/01/2018    Need for hepatitis C screening test 06/01/2018    Arthralgia 06/01/2018    Obesity (BMI 30 0-34 9) 06/01/2018    Bipolar affective disorder in remission ([facility] [address] ) 06/01/2018    Hot flashes 06/01/2018     She  has no past surgical history on file  Her family history includes Breast cancer in her family;  Ovarian cancer in her mother and sister; Thyroid cancer in her sister  She  reports that she has never smoked  She has never used smokeless tobacco  She reports that she does not drink alcohol or use drugs  Current Outpatient Medications   Medication Sig Dispense Refill    ARIPiprazole (ABILIFY) 5 mg tablet Take 0 5 tablets (2 5 mg total) by mouth daily at bedtime for 30 days 15 tablet 1    DULoxetine (CYMBALTA) 60 mg delayed release capsule Take 1 capsule (60 mg total) by mouth daily for 30 days 30 capsule 1    losartan-hydrochlorothiazide (HYZAAR) 50-12 5 mg per tablet Take 1 tablet by mouth daily for 30 days 30 tablet 2    meloxicam (MOBIC) 15 mg tablet Take 1 tablet (15 mg total) by mouth daily for 30 days 30 tablet 2    lidocaine (LIDODERM) 5 % Place 1 patch on the skin daily Remove & Discard patch within 12 hours or as directed by MD (Patient not taking: Reported on 3/26/2019) 30 patch 0    OXcarbazepine (TRILEPTAL) 300 mg tablet Take 1 tablet (300 mg total) by mouth 2 (two) times a day for 30 days 60 tablet 2    triamcinolone (KENALOG) 0 1 % ointment Apply topically 2 (two) times a day for 15 days 30 g 1     No current facility-administered medications for this visit        Current Outpatient Medications on File Prior to Visit   Medication Sig    losartan-hydrochlorothiazide (HYZAAR) 50-12 5 mg per tablet Take 1 tablet by mouth daily for 30 days    [DISCONTINUED] ARIPiprazole (ABILIFY) 5 mg tablet Take 2 5 mg by mouth daily at bedtime    [DISCONTINUED] DULoxetine (CYMBALTA) 30 mg delayed release capsule Take 1 capsule (30 mg total) by mouth daily for 30 days    [DISCONTINUED] meloxicam (MOBIC) 7 5 mg tablet Take 1 tablet (7 5 mg total) by mouth daily for 30 days    lidocaine (LIDODERM) 5 % Place 1 patch on the skin daily Remove & Discard patch within 12 hours or as directed by MD (Patient not taking: Reported on 3/26/2019)    OXcarbazepine (TRILEPTAL) 300 mg tablet Take 1 tablet (300 mg total) by mouth 2 (two) times a day for 30 days    triamcinolone (KENALOG) 0 1 % ointment Apply topically 2 (two) times a day for 15 days     No current facility-administered medications on file prior to visit  She is allergic to pineapple       Review of Systems   Constitutional: Negative for activity change, appetite change, fatigue and fever  HENT: Negative for congestion and ear discharge  Respiratory: Negative for cough and shortness of breath  Cardiovascular: Negative for chest pain and palpitations  Gastrointestinal: Negative for diarrhea and nausea  Musculoskeletal: Positive for arthralgias, back pain and myalgias  Skin: Negative for color change and rash  Neurological: Negative for dizziness and headaches  Psychiatric/Behavioral: Positive for agitation and behavioral problems  The patient is nervous/anxious and is hyperactive  Objective:      /86   Pulse 102   Temp 97 9 °F (36 6 °C) (Tympanic)   Ht 5' 5 5" (1 664 m)   Wt 84 6 kg (186 lb 6 4 oz)   SpO2 98%   BMI 30 55 kg/m²          Physical Exam   Constitutional: She is oriented to person, place, and time  She appears well-developed and well-nourished  No distress  HENT:   Head: Normocephalic and atraumatic  Nose: Nose normal    Mouth/Throat: Oropharynx is clear and moist    Eyes: Pupils are equal, round, and reactive to light  Conjunctivae are normal    Cardiovascular: Normal rate, regular rhythm and normal heart sounds  No murmur heard  Pulmonary/Chest: Effort normal and breath sounds normal  No respiratory distress  She has no wheezes  Abdominal: Soft  Bowel sounds are normal  She exhibits no distension  There is no tenderness  Musculoskeletal: Normal range of motion  She exhibits tenderness  She exhibits no edema or deformity  Neurological: She is alert and oriented to person, place, and time  Skin: Skin is warm and dry  No rash noted  She is not diaphoretic  No erythema     Psychiatric: She has a normal mood and affect

## 2019-04-02 DIAGNOSIS — M16.10 ARTHRITIS, HIP: Primary | ICD-10-CM

## 2019-04-02 DIAGNOSIS — M54.31 SCIATICA, RIGHT SIDE: ICD-10-CM

## 2019-04-10 ENCOUNTER — OFFICE VISIT (OUTPATIENT)
Dept: OBGYN CLINIC | Facility: CLINIC | Age: 54
End: 2019-04-10
Payer: COMMERCIAL

## 2019-04-10 ENCOUNTER — APPOINTMENT (OUTPATIENT)
Dept: LAB | Facility: CLINIC | Age: 54
End: 2019-04-10
Payer: COMMERCIAL

## 2019-04-10 ENCOUNTER — APPOINTMENT (OUTPATIENT)
Dept: RADIOLOGY | Facility: CLINIC | Age: 54
End: 2019-04-10
Payer: COMMERCIAL

## 2019-04-10 ENCOUNTER — OFFICE VISIT (OUTPATIENT)
Dept: FAMILY MEDICINE CLINIC | Facility: CLINIC | Age: 54
End: 2019-04-10
Payer: COMMERCIAL

## 2019-04-10 VITALS
HEART RATE: 88 BPM | HEIGHT: 65 IN | WEIGHT: 186 LBS | RESPIRATION RATE: 18 BRPM | SYSTOLIC BLOOD PRESSURE: 118 MMHG | DIASTOLIC BLOOD PRESSURE: 90 MMHG | OXYGEN SATURATION: 98 % | BODY MASS INDEX: 30.99 KG/M2

## 2019-04-10 VITALS
WEIGHT: 187 LBS | HEART RATE: 83 BPM | HEIGHT: 65 IN | SYSTOLIC BLOOD PRESSURE: 123 MMHG | DIASTOLIC BLOOD PRESSURE: 91 MMHG | BODY MASS INDEX: 31.16 KG/M2

## 2019-04-10 DIAGNOSIS — I10 ESSENTIAL HYPERTENSION: ICD-10-CM

## 2019-04-10 DIAGNOSIS — M25.511 ACUTE PAIN OF RIGHT SHOULDER: ICD-10-CM

## 2019-04-10 DIAGNOSIS — M75.101 ROTATOR CUFF SYNDROME, RIGHT: ICD-10-CM

## 2019-04-10 DIAGNOSIS — Z13.1 SCREENING FOR DIABETES MELLITUS: ICD-10-CM

## 2019-04-10 DIAGNOSIS — R53.83 FATIGUE, UNSPECIFIED TYPE: ICD-10-CM

## 2019-04-10 DIAGNOSIS — M75.31 CALCIFIC TENDONITIS OF RIGHT SHOULDER: ICD-10-CM

## 2019-04-10 DIAGNOSIS — L30.9 DERMATITIS: ICD-10-CM

## 2019-04-10 DIAGNOSIS — M25.511 ACUTE PAIN OF RIGHT SHOULDER: Primary | ICD-10-CM

## 2019-04-10 DIAGNOSIS — M25.50 ARTHRALGIA, UNSPECIFIED JOINT: ICD-10-CM

## 2019-04-10 DIAGNOSIS — M62.838 TRAPEZIUS MUSCLE SPASM: ICD-10-CM

## 2019-04-10 DIAGNOSIS — Z12.39 SCREENING FOR BREAST CANCER: ICD-10-CM

## 2019-04-10 DIAGNOSIS — E66.9 OBESITY (BMI 30.0-34.9): ICD-10-CM

## 2019-04-10 DIAGNOSIS — Z13.1 DIABETES MELLITUS SCREENING: ICD-10-CM

## 2019-04-10 DIAGNOSIS — Z13.220 LIPID SCREENING: ICD-10-CM

## 2019-04-10 DIAGNOSIS — F31.70 BIPOLAR AFFECTIVE DISORDER IN REMISSION (HCC): ICD-10-CM

## 2019-04-10 DIAGNOSIS — Z13.220 NEED FOR LIPID SCREENING: ICD-10-CM

## 2019-04-10 DIAGNOSIS — Z12.11 SCREENING FOR COLON CANCER: ICD-10-CM

## 2019-04-10 DIAGNOSIS — M75.51 SUBACROMIAL BURSITIS OF RIGHT SHOULDER JOINT: Primary | ICD-10-CM

## 2019-04-10 DIAGNOSIS — Z11.59 NEED FOR HEPATITIS C SCREENING TEST: ICD-10-CM

## 2019-04-10 DIAGNOSIS — M62.838 CERVICAL PARASPINAL MUSCLE SPASM: ICD-10-CM

## 2019-04-10 DIAGNOSIS — Z12.4 SCREENING FOR CERVICAL CANCER: ICD-10-CM

## 2019-04-10 LAB
ALBUMIN SERPL BCP-MCNC: 3.6 G/DL (ref 3.5–5)
ALP SERPL-CCNC: 149 U/L (ref 46–116)
ALT SERPL W P-5'-P-CCNC: 47 U/L (ref 12–78)
ANION GAP SERPL CALCULATED.3IONS-SCNC: 2 MMOL/L (ref 4–13)
AST SERPL W P-5'-P-CCNC: 24 U/L (ref 5–45)
BASOPHILS # BLD AUTO: 0.04 THOUSANDS/ΜL (ref 0–0.1)
BASOPHILS NFR BLD AUTO: 1 % (ref 0–1)
BILIRUB SERPL-MCNC: 0.56 MG/DL (ref 0.2–1)
BUN SERPL-MCNC: 18 MG/DL (ref 5–25)
CALCIUM SERPL-MCNC: 9.1 MG/DL (ref 8.3–10.1)
CHLORIDE SERPL-SCNC: 107 MMOL/L (ref 100–108)
CHOLEST SERPL-MCNC: 208 MG/DL (ref 50–200)
CO2 SERPL-SCNC: 29 MMOL/L (ref 21–32)
CREAT SERPL-MCNC: 0.7 MG/DL (ref 0.6–1.3)
EOSINOPHIL # BLD AUTO: 0.17 THOUSAND/ΜL (ref 0–0.61)
EOSINOPHIL NFR BLD AUTO: 4 % (ref 0–6)
ERYTHROCYTE [DISTWIDTH] IN BLOOD BY AUTOMATED COUNT: 12.6 % (ref 11.6–15.1)
EST. AVERAGE GLUCOSE BLD GHB EST-MCNC: 114 MG/DL
GFR SERPL CREATININE-BSD FRML MDRD: 113 ML/MIN/1.73SQ M
GLUCOSE P FAST SERPL-MCNC: 94 MG/DL (ref 65–99)
HBA1C MFR BLD: 5.6 % (ref 4.2–6.3)
HCT VFR BLD AUTO: 40.8 % (ref 34.8–46.1)
HDLC SERPL-MCNC: 86 MG/DL (ref 40–60)
HGB BLD-MCNC: 13.2 G/DL (ref 11.5–15.4)
IMM GRANULOCYTES # BLD AUTO: 0.01 THOUSAND/UL (ref 0–0.2)
IMM GRANULOCYTES NFR BLD AUTO: 0 % (ref 0–2)
LDLC SERPL CALC-MCNC: 104 MG/DL (ref 0–100)
LYMPHOCYTES # BLD AUTO: 1.49 THOUSANDS/ΜL (ref 0.6–4.47)
LYMPHOCYTES NFR BLD AUTO: 34 % (ref 14–44)
MCH RBC QN AUTO: 29.5 PG (ref 26.8–34.3)
MCHC RBC AUTO-ENTMCNC: 32.4 G/DL (ref 31.4–37.4)
MCV RBC AUTO: 91 FL (ref 82–98)
MONOCYTES # BLD AUTO: 0.34 THOUSAND/ΜL (ref 0.17–1.22)
MONOCYTES NFR BLD AUTO: 8 % (ref 4–12)
NEUTROPHILS # BLD AUTO: 2.28 THOUSANDS/ΜL (ref 1.85–7.62)
NEUTS SEG NFR BLD AUTO: 53 % (ref 43–75)
NONHDLC SERPL-MCNC: 122 MG/DL
NRBC BLD AUTO-RTO: 0 /100 WBCS
PLATELET # BLD AUTO: 245 THOUSANDS/UL (ref 149–390)
PMV BLD AUTO: 10.1 FL (ref 8.9–12.7)
POTASSIUM SERPL-SCNC: 3.8 MMOL/L (ref 3.5–5.3)
PROT SERPL-MCNC: 7.4 G/DL (ref 6.4–8.2)
RBC # BLD AUTO: 4.48 MILLION/UL (ref 3.81–5.12)
SODIUM SERPL-SCNC: 138 MMOL/L (ref 136–145)
TRIGL SERPL-MCNC: 88 MG/DL
WBC # BLD AUTO: 4.33 THOUSAND/UL (ref 4.31–10.16)

## 2019-04-10 PROCEDURE — 83036 HEMOGLOBIN GLYCOSYLATED A1C: CPT

## 2019-04-10 PROCEDURE — 36415 COLL VENOUS BLD VENIPUNCTURE: CPT

## 2019-04-10 PROCEDURE — 99214 OFFICE O/P EST MOD 30 MIN: CPT | Performed by: NURSE PRACTITIONER

## 2019-04-10 PROCEDURE — 86803 HEPATITIS C AB TEST: CPT

## 2019-04-10 PROCEDURE — 99203 OFFICE O/P NEW LOW 30 MIN: CPT | Performed by: FAMILY MEDICINE

## 2019-04-10 PROCEDURE — 85025 COMPLETE CBC W/AUTO DIFF WBC: CPT

## 2019-04-10 PROCEDURE — 20610 DRAIN/INJ JOINT/BURSA W/O US: CPT | Performed by: FAMILY MEDICINE

## 2019-04-10 PROCEDURE — 73030 X-RAY EXAM OF SHOULDER: CPT

## 2019-04-10 PROCEDURE — 80053 COMPREHEN METABOLIC PANEL: CPT

## 2019-04-10 PROCEDURE — 80061 LIPID PANEL: CPT

## 2019-04-10 RX ORDER — TRIAMCINOLONE ACETONIDE 40 MG/ML
40 INJECTION, SUSPENSION INTRA-ARTICULAR; INTRAMUSCULAR
Status: COMPLETED | OUTPATIENT
Start: 2019-04-10 | End: 2019-04-10

## 2019-04-10 RX ORDER — NAPROXEN 500 MG/1
500 TABLET ORAL 2 TIMES DAILY WITH MEALS
Qty: 30 TABLET | Refills: 1 | Status: SHIPPED | OUTPATIENT
Start: 2019-04-10 | End: 2019-04-23 | Stop reason: ALTCHOICE

## 2019-04-10 RX ORDER — LIDOCAINE HYDROCHLORIDE 10 MG/ML
4 INJECTION, SOLUTION INFILTRATION; PERINEURAL
Status: COMPLETED | OUTPATIENT
Start: 2019-04-10 | End: 2019-04-10

## 2019-04-10 RX ORDER — LIDOCAINE HYDROCHLORIDE 10 MG/ML
2 INJECTION, SOLUTION INFILTRATION; PERINEURAL
Status: COMPLETED | OUTPATIENT
Start: 2019-04-10 | End: 2019-04-10

## 2019-04-10 RX ADMIN — LIDOCAINE HYDROCHLORIDE 4 ML: 10 INJECTION, SOLUTION INFILTRATION; PERINEURAL at 15:57

## 2019-04-10 RX ADMIN — TRIAMCINOLONE ACETONIDE 40 MG: 40 INJECTION, SUSPENSION INTRA-ARTICULAR; INTRAMUSCULAR at 15:57

## 2019-04-10 RX ADMIN — LIDOCAINE HYDROCHLORIDE 2 ML: 10 INJECTION, SOLUTION INFILTRATION; PERINEURAL at 15:57

## 2019-04-11 ENCOUNTER — HOSPITAL ENCOUNTER (OUTPATIENT)
Dept: MAMMOGRAPHY | Facility: CLINIC | Age: 54
Discharge: HOME | End: 2019-04-11
Payer: COMMERCIAL

## 2019-04-11 VITALS — WEIGHT: 187 LBS | BODY MASS INDEX: 31.16 KG/M2 | HEIGHT: 65 IN

## 2019-04-11 DIAGNOSIS — Z12.39 SCREENING FOR BREAST CANCER: ICD-10-CM

## 2019-04-11 LAB — HCV AB SER QL: NORMAL

## 2019-04-11 PROCEDURE — 77063 BREAST TOMOSYNTHESIS BI: CPT

## 2019-04-11 PROCEDURE — 77067 SCR MAMMO BI INCL CAD: CPT

## 2019-04-15 ENCOUNTER — TELEPHONE (OUTPATIENT)
Dept: OBGYN CLINIC | Facility: HOSPITAL | Age: 54
End: 2019-04-15

## 2019-04-16 ENCOUNTER — EVALUATION (OUTPATIENT)
Dept: PHYSICAL THERAPY | Facility: CLINIC | Age: 54
End: 2019-04-16
Payer: COMMERCIAL

## 2019-04-16 DIAGNOSIS — M75.51: ICD-10-CM

## 2019-04-16 DIAGNOSIS — M75.51 SUBACROMIAL BURSITIS OF RIGHT SHOULDER JOINT: Primary | ICD-10-CM

## 2019-04-16 DIAGNOSIS — M54.31 SCIATICA, RIGHT SIDE: Primary | ICD-10-CM

## 2019-04-16 DIAGNOSIS — I10 ESSENTIAL HYPERTENSION: ICD-10-CM

## 2019-04-16 DIAGNOSIS — M75.51 BURSITIS OF RIGHT SHOULDER: ICD-10-CM

## 2019-04-16 DIAGNOSIS — M75.101 ROTATOR CUFF SYNDROME, RIGHT: ICD-10-CM

## 2019-04-16 DIAGNOSIS — M62.838 TRAPEZIUS MUSCLE SPASM: ICD-10-CM

## 2019-04-16 DIAGNOSIS — M62.838 CERVICAL PARASPINAL MUSCLE SPASM: ICD-10-CM

## 2019-04-16 PROCEDURE — 97162 PT EVAL MOD COMPLEX 30 MIN: CPT | Performed by: PHYSICAL THERAPIST

## 2019-04-16 PROCEDURE — 97014 ELECTRIC STIMULATION THERAPY: CPT | Performed by: PHYSICAL THERAPIST

## 2019-04-16 PROCEDURE — 97110 THERAPEUTIC EXERCISES: CPT | Performed by: PHYSICAL THERAPIST

## 2019-04-16 RX ORDER — LIDOCAINE 50 MG/G
1 PATCH TOPICAL DAILY
Qty: 30 PATCH | Refills: 3 | Status: SHIPPED | OUTPATIENT
Start: 2019-04-16 | End: 2020-01-15 | Stop reason: ALTCHOICE

## 2019-04-16 RX ORDER — LIDOCAINE 50 MG/G
1 PATCH TOPICAL DAILY
COMMUNITY
End: 2019-04-16 | Stop reason: SDUPTHER

## 2019-04-16 RX ORDER — LOSARTAN POTASSIUM AND HYDROCHLOROTHIAZIDE 12.5; 5 MG/1; MG/1
1 TABLET ORAL DAILY
Qty: 90 TABLET | Refills: 2 | Status: SHIPPED | OUTPATIENT
Start: 2019-04-16 | End: 2020-06-01

## 2019-04-18 ENCOUNTER — OFFICE VISIT (OUTPATIENT)
Dept: PHYSICAL THERAPY | Facility: CLINIC | Age: 54
End: 2019-04-18
Payer: COMMERCIAL

## 2019-04-18 DIAGNOSIS — M25.511 ACUTE PAIN OF RIGHT SHOULDER: ICD-10-CM

## 2019-04-18 DIAGNOSIS — M75.51 SUBACROMIAL BURSITIS OF RIGHT SHOULDER JOINT: Primary | ICD-10-CM

## 2019-04-18 DIAGNOSIS — M62.838 TRAPEZIUS MUSCLE SPASM: ICD-10-CM

## 2019-04-18 DIAGNOSIS — M75.101 ROTATOR CUFF SYNDROME, RIGHT: ICD-10-CM

## 2019-04-18 DIAGNOSIS — M62.838 CERVICAL PARASPINAL MUSCLE SPASM: ICD-10-CM

## 2019-04-18 PROCEDURE — 97110 THERAPEUTIC EXERCISES: CPT

## 2019-04-18 PROCEDURE — 97014 ELECTRIC STIMULATION THERAPY: CPT

## 2019-04-18 PROCEDURE — 97035 APP MDLTY 1+ULTRASOUND EA 15: CPT

## 2019-04-18 PROCEDURE — 97140 MANUAL THERAPY 1/> REGIONS: CPT

## 2019-04-23 ENCOUNTER — OFFICE VISIT (OUTPATIENT)
Dept: FAMILY MEDICINE CLINIC | Facility: CLINIC | Age: 54
End: 2019-04-23
Payer: COMMERCIAL

## 2019-04-23 ENCOUNTER — OFFICE VISIT (OUTPATIENT)
Dept: PHYSICAL THERAPY | Facility: CLINIC | Age: 54
End: 2019-04-23
Payer: COMMERCIAL

## 2019-04-23 ENCOUNTER — APPOINTMENT (OUTPATIENT)
Dept: RADIOLOGY | Facility: CLINIC | Age: 54
End: 2019-04-23
Payer: COMMERCIAL

## 2019-04-23 VITALS
DIASTOLIC BLOOD PRESSURE: 88 MMHG | OXYGEN SATURATION: 98 % | BODY MASS INDEX: 31.02 KG/M2 | HEIGHT: 65 IN | WEIGHT: 186.2 LBS | HEART RATE: 76 BPM | TEMPERATURE: 97.5 F | SYSTOLIC BLOOD PRESSURE: 120 MMHG

## 2019-04-23 DIAGNOSIS — M25.511 ACUTE PAIN OF RIGHT SHOULDER: ICD-10-CM

## 2019-04-23 DIAGNOSIS — R20.0 NUMBNESS AND TINGLING OF RIGHT HAND: ICD-10-CM

## 2019-04-23 DIAGNOSIS — M54.2 CERVICALGIA: ICD-10-CM

## 2019-04-23 DIAGNOSIS — R20.2 NUMBNESS AND TINGLING OF RIGHT HAND: ICD-10-CM

## 2019-04-23 DIAGNOSIS — M75.101 ROTATOR CUFF SYNDROME, RIGHT: ICD-10-CM

## 2019-04-23 DIAGNOSIS — M54.2 CERVICALGIA: Primary | ICD-10-CM

## 2019-04-23 DIAGNOSIS — M75.51 SUBACROMIAL BURSITIS OF RIGHT SHOULDER JOINT: Primary | ICD-10-CM

## 2019-04-23 PROCEDURE — 97014 ELECTRIC STIMULATION THERAPY: CPT

## 2019-04-23 PROCEDURE — 99214 OFFICE O/P EST MOD 30 MIN: CPT | Performed by: FAMILY MEDICINE

## 2019-04-23 PROCEDURE — 97110 THERAPEUTIC EXERCISES: CPT

## 2019-04-23 PROCEDURE — 97035 APP MDLTY 1+ULTRASOUND EA 15: CPT

## 2019-04-23 PROCEDURE — 72050 X-RAY EXAM NECK SPINE 4/5VWS: CPT

## 2019-04-23 PROCEDURE — 97140 MANUAL THERAPY 1/> REGIONS: CPT

## 2019-04-23 RX ORDER — TRAMADOL HYDROCHLORIDE 50 MG/1
50 TABLET ORAL 2 TIMES DAILY PRN
Qty: 30 TABLET | Refills: 0 | Status: SHIPPED | OUTPATIENT
Start: 2019-04-23 | End: 2020-01-15 | Stop reason: ALTCHOICE

## 2019-04-25 ENCOUNTER — APPOINTMENT (OUTPATIENT)
Dept: PHYSICAL THERAPY | Facility: CLINIC | Age: 54
End: 2019-04-25
Payer: COMMERCIAL

## 2019-04-30 ENCOUNTER — OFFICE VISIT (OUTPATIENT)
Dept: FAMILY MEDICINE CLINIC | Facility: CLINIC | Age: 54
End: 2019-04-30
Payer: COMMERCIAL

## 2019-04-30 VITALS
WEIGHT: 184.4 LBS | SYSTOLIC BLOOD PRESSURE: 122 MMHG | DIASTOLIC BLOOD PRESSURE: 84 MMHG | BODY MASS INDEX: 30.72 KG/M2 | TEMPERATURE: 97.8 F | HEIGHT: 65 IN | HEART RATE: 92 BPM | OXYGEN SATURATION: 96 %

## 2019-04-30 DIAGNOSIS — R20.0 NUMBNESS AND TINGLING OF RIGHT HAND: Primary | ICD-10-CM

## 2019-04-30 DIAGNOSIS — R20.2 NUMBNESS AND TINGLING OF RIGHT HAND: Primary | ICD-10-CM

## 2019-04-30 PROCEDURE — 99213 OFFICE O/P EST LOW 20 MIN: CPT | Performed by: FAMILY MEDICINE

## 2019-05-13 ENCOUNTER — OFFICE VISIT (OUTPATIENT)
Dept: OBGYN CLINIC | Facility: CLINIC | Age: 54
End: 2019-05-13
Payer: COMMERCIAL

## 2019-05-13 VITALS
SYSTOLIC BLOOD PRESSURE: 133 MMHG | WEIGHT: 189 LBS | DIASTOLIC BLOOD PRESSURE: 89 MMHG | HEART RATE: 71 BPM | HEIGHT: 65 IN | BODY MASS INDEX: 31.49 KG/M2

## 2019-05-13 DIAGNOSIS — M75.41 SUBACROMIAL IMPINGEMENT OF RIGHT SHOULDER: Primary | ICD-10-CM

## 2019-05-13 DIAGNOSIS — R29.898 WEAKNESS OF SHOULDER: ICD-10-CM

## 2019-05-13 PROCEDURE — 99213 OFFICE O/P EST LOW 20 MIN: CPT | Performed by: FAMILY MEDICINE

## 2019-05-21 ENCOUNTER — HOSPITAL ENCOUNTER (OUTPATIENT)
Dept: MRI IMAGING | Facility: HOSPITAL | Age: 54
Discharge: HOME | End: 2019-05-21
Attending: FAMILY MEDICINE
Payer: COMMERCIAL

## 2019-05-21 ENCOUNTER — TELEPHONE (OUTPATIENT)
Dept: NEUROLOGY | Facility: CLINIC | Age: 54
End: 2019-05-21

## 2019-05-21 ENCOUNTER — PROCEDURE VISIT (OUTPATIENT)
Dept: NEUROLOGY | Facility: CLINIC | Age: 54
End: 2019-05-21
Payer: COMMERCIAL

## 2019-05-21 DIAGNOSIS — R20.2 NUMBNESS AND TINGLING OF RIGHT HAND: ICD-10-CM

## 2019-05-21 DIAGNOSIS — R29.898 WEAKNESS OF SHOULDER: ICD-10-CM

## 2019-05-21 DIAGNOSIS — R20.0 NUMBNESS AND TINGLING OF RIGHT HAND: ICD-10-CM

## 2019-05-21 DIAGNOSIS — M54.2 CERVICALGIA: ICD-10-CM

## 2019-05-21 DIAGNOSIS — M75.41 SUBACROMIAL IMPINGEMENT OF RIGHT SHOULDER: ICD-10-CM

## 2019-05-21 PROCEDURE — 95909 NRV CNDJ TST 5-6 STUDIES: CPT | Performed by: PHYSICAL MEDICINE & REHABILITATION

## 2019-05-21 PROCEDURE — 73221 MRI JOINT UPR EXTREM W/O DYE: CPT

## 2019-05-21 PROCEDURE — 95886 MUSC TEST DONE W/N TEST COMP: CPT | Performed by: PHYSICAL MEDICINE & REHABILITATION

## 2019-05-28 ENCOUNTER — OFFICE VISIT (OUTPATIENT)
Dept: FAMILY MEDICINE CLINIC | Facility: CLINIC | Age: 54
End: 2019-05-28
Payer: COMMERCIAL

## 2019-05-28 VITALS
TEMPERATURE: 97.8 F | HEART RATE: 79 BPM | SYSTOLIC BLOOD PRESSURE: 124 MMHG | OXYGEN SATURATION: 96 % | DIASTOLIC BLOOD PRESSURE: 84 MMHG | HEIGHT: 65 IN | BODY MASS INDEX: 30.66 KG/M2 | WEIGHT: 184 LBS

## 2019-05-28 DIAGNOSIS — G56.01 CARPAL TUNNEL SYNDROME, RIGHT: ICD-10-CM

## 2019-05-28 DIAGNOSIS — M75.31 CALCIFIC TENDONITIS OF RIGHT SHOULDER: Primary | ICD-10-CM

## 2019-05-28 PROCEDURE — 99213 OFFICE O/P EST LOW 20 MIN: CPT | Performed by: FAMILY MEDICINE

## 2019-05-28 PROCEDURE — 1036F TOBACCO NON-USER: CPT | Performed by: FAMILY MEDICINE

## 2019-05-28 PROCEDURE — 3008F BODY MASS INDEX DOCD: CPT | Performed by: FAMILY MEDICINE

## 2019-05-28 RX ORDER — IBUPROFEN 600 MG/1
600 TABLET ORAL EVERY 8 HOURS PRN
Qty: 30 TABLET | Refills: 1 | Status: SHIPPED | OUTPATIENT
Start: 2019-05-28 | End: 2020-01-15 | Stop reason: ALTCHOICE

## 2019-07-01 DIAGNOSIS — M25.50 ARTHRALGIA, UNSPECIFIED JOINT: ICD-10-CM

## 2019-07-01 DIAGNOSIS — M54.31 SCIATICA, RIGHT SIDE: ICD-10-CM

## 2019-07-01 RX ORDER — DULOXETIN HYDROCHLORIDE 60 MG/1
60 CAPSULE, DELAYED RELEASE ORAL DAILY
Qty: 30 CAPSULE | Refills: 1 | Status: SHIPPED | OUTPATIENT
Start: 2019-07-01 | End: 2020-08-10

## 2019-08-26 ENCOUNTER — APPOINTMENT (OUTPATIENT)
Dept: RADIOLOGY | Facility: MEDICAL CENTER | Age: 54
End: 2019-08-26

## 2019-08-26 ENCOUNTER — TRANSCRIBE ORDERS (OUTPATIENT)
Dept: URGENT CARE | Facility: MEDICAL CENTER | Age: 54
End: 2019-08-26

## 2019-08-26 DIAGNOSIS — Z00.00 PHYSICAL EXAM: ICD-10-CM

## 2019-08-26 DIAGNOSIS — Z00.00 PHYSICAL EXAM: Primary | ICD-10-CM

## 2019-08-26 PROCEDURE — 71046 X-RAY EXAM CHEST 2 VIEWS: CPT

## 2019-12-08 NOTE — PROGRESS NOTES
Assessment/Plan:       Diagnoses and all orders for this visit:    Routine gynecological examination  -     Ambulatory referral to Gynecology; Future    Arthralgia of both hands  -     methylPREDNISolone 4 MG tablet therapy pack; Use as directed on package  -     BERNARDINO Screen w/ Reflex to Titer/Pattern; Future  -     RF Screen w/ Reflex to Titer; Future  -     Uric acid; Future  -     Sedimentation rate, automated; Future  -     Ambulatory referral to Rheumatology; Future  -     diclofenac sodium (VOLTAREN) 1 %; Apply 2 g topically 4 (four) times a day    Swelling of finger joint of right hand  -     methylPREDNISolone 4 MG tablet therapy pack; Use as directed on package  -     BERNARDINO Screen w/ Reflex to Titer/Pattern; Future  -     RF Screen w/ Reflex to Titer; Future  -     Uric acid; Future  -     Sedimentation rate, automated; Future  -     Ambulatory referral to Rheumatology; Future  -     diclofenac sodium (VOLTAREN) 1 %; Apply 2 g topically 4 (four) times a day    Screening for HIV (human immunodeficiency virus)  -     Human Immunodeficiency Virus 1/2 Antigen / Antibody ( Fourth Generation) with Reflex Testing; Future        No problem-specific Assessment & Plan notes found for this encounter  Subjective:      Patient ID: Leonides Vides is a 53 y o  female  Patient is here with swelling and pain in both of her hands, especially her right hand  She does have h/o osteoarthritis of hips, spine and right shoulder  She was taking OTC ibuprofen but this started to bother her stomach  She has not taken any medication in two weeks  The following portions of the patient's history were reviewed and updated as appropriate:   She has a past medical history of Gastric ulcer and Hyperproinsulinemia ,  does not have any pertinent problems on file  ,   has a past surgical history that includes Appendectomy and Hernia repair  ,  family history includes Breast cancer in her family and maternal uncle; Ovarian cancer (age of onset: 48) in her mother; Thyroid cancer in her sister  ,   reports that she has never smoked  She has never used smokeless tobacco  She reports that she does not drink alcohol or use drugs  ,  is allergic to other and pineapple     Current Outpatient Medications   Medication Sig Dispense Refill    ARIPiprazole (ABILIFY) 5 mg tablet Take 0 5 tablets (2 5 mg total) by mouth daily at bedtime for 30 days 15 tablet 1    DULoxetine (CYMBALTA) 60 mg delayed release capsule Take 1 capsule (60 mg total) by mouth daily for 63 days 30 capsule 1    ibuprofen (MOTRIN) 600 mg tablet Take 1 tablet (600 mg total) by mouth every 8 (eight) hours as needed for mild pain or moderate pain 30 tablet 1    lidocaine (LIDODERM) 5 % Apply 1 patch topically daily Remove & Discard patch within 12 hours or as directed by MD 30 patch 3    losartan-hydrochlorothiazide (HYZAAR) 50-12 5 mg per tablet Take 1 tablet by mouth daily 90 tablet 2    diclofenac sodium (VOLTAREN) 1 % Apply 2 g topically 4 (four) times a day 240 g 3    Elastic Bandages & Supports (WRIST/THUMB SPLINT/RIGHT MED) MISC by Does not apply route daily Wear daily (Patient not taking: Reported on 12/10/2019) 1 each 1    methylPREDNISolone 4 MG tablet therapy pack Use as directed on package 21 each 0    traMADol (ULTRAM) 50 mg tablet Take 1 tablet (50 mg total) by mouth 2 (two) times a day as needed for moderate pain (Patient not taking: Reported on 12/10/2019) 30 tablet 0     No current facility-administered medications for this visit  Review of Systems   Constitutional: Negative  Negative for fatigue and fever  HENT: Negative  Negative for congestion  Eyes: Negative  Negative for visual disturbance  Respiratory: Negative for cough, chest tightness, shortness of breath and wheezing  Cardiovascular: Negative  Gastrointestinal: Negative  Negative for abdominal pain, blood in stool, diarrhea and nausea     Endocrine: Negative for polydipsia, polyphagia and polyuria  Genitourinary: Negative for difficulty urinating and flank pain  Musculoskeletal: Positive for arthralgias and joint swelling  Negative for back pain and myalgias  Swelling of hands   Skin: Negative  Negative for color change, pallor and rash  Allergic/Immunologic: Negative for immunocompromised state  Neurological: Negative  Negative for dizziness, weakness, light-headedness, numbness and headaches  Hematological: Negative for adenopathy  Psychiatric/Behavioral: Negative  Negative for confusion, decreased concentration and sleep disturbance  All other systems reviewed and are negative  Objective:  Vitals:    12/10/19 0743   BP: 132/84   BP Location: Left arm   Patient Position: Sitting   Cuff Size: Adult   Pulse: 72   Resp: 16   Temp: (!) 97 2 °F (36 2 °C)   TempSrc: Tympanic   SpO2: 98%   Weight: 78 2 kg (172 lb 6 4 oz)   Height: 5' 5" (1 651 m)     Body mass index is 28 69 kg/m²  Physical Exam   Constitutional: She is oriented to person, place, and time  She appears well-developed and well-nourished  No distress  HENT:   Head: Normocephalic and atraumatic  Nose: Nose normal    Mouth/Throat: No oropharyngeal exudate  Eyes: Pupils are equal, round, and reactive to light  Conjunctivae are normal    Neck: Normal range of motion  Neck supple  Cardiovascular: Normal rate, regular rhythm and normal heart sounds  Exam reveals no gallop and no friction rub  No murmur heard  Pulmonary/Chest: Effort normal and breath sounds normal  No respiratory distress  She has no wheezes  She has no rales  Abdominal: Soft  Musculoskeletal: Normal range of motion  She exhibits tenderness  Swelling of right hand and thumb with tenderness  NO erythema   Lymphadenopathy:     She has no cervical adenopathy  Neurological: She is alert and oriented to person, place, and time  Skin: Skin is warm and dry  No rash noted  She is not diaphoretic     Psychiatric: She has a normal mood and affect  Her behavior is normal  Judgment and thought content normal    Nursing note and vitals reviewed

## 2019-12-10 ENCOUNTER — OFFICE VISIT (OUTPATIENT)
Dept: FAMILY MEDICINE CLINIC | Facility: CLINIC | Age: 54
End: 2019-12-10
Payer: COMMERCIAL

## 2019-12-10 VITALS
RESPIRATION RATE: 16 BRPM | OXYGEN SATURATION: 98 % | BODY MASS INDEX: 28.72 KG/M2 | WEIGHT: 172.4 LBS | TEMPERATURE: 97.2 F | HEIGHT: 65 IN | HEART RATE: 72 BPM | DIASTOLIC BLOOD PRESSURE: 84 MMHG | SYSTOLIC BLOOD PRESSURE: 132 MMHG

## 2019-12-10 DIAGNOSIS — M25.441 SWELLING OF FINGER JOINT OF RIGHT HAND: ICD-10-CM

## 2019-12-10 DIAGNOSIS — Z11.4 SCREENING FOR HIV (HUMAN IMMUNODEFICIENCY VIRUS): ICD-10-CM

## 2019-12-10 DIAGNOSIS — M25.542 ARTHRALGIA OF BOTH HANDS: ICD-10-CM

## 2019-12-10 DIAGNOSIS — Z01.419 ROUTINE GYNECOLOGICAL EXAMINATION: Primary | ICD-10-CM

## 2019-12-10 DIAGNOSIS — M25.541 ARTHRALGIA OF BOTH HANDS: ICD-10-CM

## 2019-12-10 PROCEDURE — 1036F TOBACCO NON-USER: CPT | Performed by: NURSE PRACTITIONER

## 2019-12-10 PROCEDURE — 99214 OFFICE O/P EST MOD 30 MIN: CPT | Performed by: NURSE PRACTITIONER

## 2019-12-10 PROCEDURE — 3008F BODY MASS INDEX DOCD: CPT | Performed by: NURSE PRACTITIONER

## 2019-12-10 RX ORDER — METHYLPREDNISOLONE 4 MG/1
TABLET ORAL
Qty: 21 EACH | Refills: 0 | Status: SHIPPED | OUTPATIENT
Start: 2019-12-10 | End: 2020-01-15 | Stop reason: ALTCHOICE

## 2019-12-10 NOTE — PATIENT INSTRUCTIONS
Swelling and pain of both hands and thumbs- known OA of multiple joints  Unable to tolerate long term NSAIDS  Start voltaren gel  Medrol dose shayne for acute phase currently  Obtain labs to check inflammatory markers  Refer to Rheumatology  Our office will call with results   HIV screening ordered   Recommended for ages 23-80

## 2019-12-10 NOTE — LETTER
December 10, 2019     Patient: Paxton Pedro   YOB: 1965   Date of Visit: 12/10/2019       To Whom it May Concern:    Paxton Pedro is under my professional care  She was seen in my office on 12/10/2019  She may return to work on 12-16-19  If you have any questions or concerns, please don't hesitate to call           Sincerely,          MARVEL Pedro        CC: No Recipients

## 2019-12-12 ENCOUNTER — APPOINTMENT (OUTPATIENT)
Dept: LAB | Facility: CLINIC | Age: 54
End: 2019-12-12
Payer: COMMERCIAL

## 2019-12-12 DIAGNOSIS — M25.441 SWELLING OF FINGER JOINT OF RIGHT HAND: ICD-10-CM

## 2019-12-12 DIAGNOSIS — Z11.4 SCREENING FOR HIV (HUMAN IMMUNODEFICIENCY VIRUS): ICD-10-CM

## 2019-12-12 DIAGNOSIS — M25.542 ARTHRALGIA OF BOTH HANDS: ICD-10-CM

## 2019-12-12 DIAGNOSIS — M25.541 ARTHRALGIA OF BOTH HANDS: ICD-10-CM

## 2019-12-12 LAB — ERYTHROCYTE [SEDIMENTATION RATE] IN BLOOD: 11 MM/HOUR (ref 0–20)

## 2019-12-12 PROCEDURE — 86430 RHEUMATOID FACTOR TEST QUAL: CPT

## 2019-12-12 PROCEDURE — 85652 RBC SED RATE AUTOMATED: CPT

## 2019-12-12 PROCEDURE — 87389 HIV-1 AG W/HIV-1&-2 AB AG IA: CPT

## 2019-12-12 PROCEDURE — 36415 COLL VENOUS BLD VENIPUNCTURE: CPT

## 2019-12-12 PROCEDURE — 86038 ANTINUCLEAR ANTIBODIES: CPT

## 2019-12-13 LAB
HIV 1+2 AB+HIV1 P24 AG SERPL QL IA: NORMAL
RHEUMATOID FACT SER QL LA: NEGATIVE
RYE IGE QN: NEGATIVE

## 2020-01-15 ENCOUNTER — APPOINTMENT (OUTPATIENT)
Dept: RADIOLOGY | Facility: CLINIC | Age: 55
End: 2020-01-15
Payer: COMMERCIAL

## 2020-01-15 ENCOUNTER — OFFICE VISIT (OUTPATIENT)
Dept: FAMILY MEDICINE CLINIC | Facility: CLINIC | Age: 55
End: 2020-01-15
Payer: COMMERCIAL

## 2020-01-15 VITALS
OXYGEN SATURATION: 98 % | WEIGHT: 171 LBS | HEIGHT: 65 IN | DIASTOLIC BLOOD PRESSURE: 78 MMHG | BODY MASS INDEX: 28.49 KG/M2 | SYSTOLIC BLOOD PRESSURE: 128 MMHG | TEMPERATURE: 97.8 F | HEART RATE: 78 BPM

## 2020-01-15 DIAGNOSIS — M79.89 SWELLING OF RIGHT THUMB: ICD-10-CM

## 2020-01-15 DIAGNOSIS — M25.541 PAIN IN THUMB JOINT WITH MOVEMENT, RIGHT: Primary | ICD-10-CM

## 2020-01-15 DIAGNOSIS — Z12.11 SCREENING FOR COLON CANCER: ICD-10-CM

## 2020-01-15 DIAGNOSIS — M25.541 PAIN IN THUMB JOINT WITH MOVEMENT, RIGHT: ICD-10-CM

## 2020-01-15 PROBLEM — J01.00 ACUTE MAXILLARY SINUSITIS: Status: ACTIVE | Noted: 2017-12-27

## 2020-01-15 PROBLEM — Z11.4 SCREENING FOR HIV (HUMAN IMMUNODEFICIENCY VIRUS): Status: RESOLVED | Noted: 2019-03-26 | Resolved: 2020-01-15

## 2020-01-15 PROBLEM — Z11.59 NEED FOR HEPATITIS C SCREENING TEST: Status: RESOLVED | Noted: 2018-06-01 | Resolved: 2020-01-15

## 2020-01-15 PROBLEM — Z13.1 SCREENING FOR DIABETES MELLITUS: Status: RESOLVED | Noted: 2019-03-26 | Resolved: 2020-01-15

## 2020-01-15 PROBLEM — E07.89 THYROID PAIN: Status: ACTIVE | Noted: 2017-11-06

## 2020-01-15 PROBLEM — M25.511 ACUTE PAIN OF RIGHT SHOULDER: Status: RESOLVED | Noted: 2019-04-10 | Resolved: 2020-01-15

## 2020-01-15 PROBLEM — F31.32 BIPOLAR 1 DISORDER, DEPRESSED, MODERATE (HCC): Status: ACTIVE | Noted: 2017-11-06

## 2020-01-15 PROBLEM — M25.441 SWELLING OF FINGER JOINT OF RIGHT HAND: Status: RESOLVED | Noted: 2019-12-10 | Resolved: 2020-01-15

## 2020-01-15 PROBLEM — Z12.4 SCREENING FOR CERVICAL CANCER: Status: RESOLVED | Noted: 2018-10-10 | Resolved: 2020-01-15

## 2020-01-15 PROBLEM — Z13.220 LIPID SCREENING: Status: RESOLVED | Noted: 2018-06-01 | Resolved: 2020-01-15

## 2020-01-15 PROBLEM — J01.00 ACUTE MAXILLARY SINUSITIS: Status: RESOLVED | Noted: 2017-12-27 | Resolved: 2020-01-15

## 2020-01-15 PROCEDURE — 73140 X-RAY EXAM OF FINGER(S): CPT

## 2020-01-15 PROCEDURE — 99214 OFFICE O/P EST MOD 30 MIN: CPT | Performed by: FAMILY MEDICINE

## 2020-01-15 PROCEDURE — 3008F BODY MASS INDEX DOCD: CPT | Performed by: FAMILY MEDICINE

## 2020-01-15 PROCEDURE — 1036F TOBACCO NON-USER: CPT | Performed by: FAMILY MEDICINE

## 2020-01-15 NOTE — PROGRESS NOTES
Glenda Teague 1965 female MRN: [id number]      ASSESSMENT/PLAN  Problem List Items Addressed This Visit        Other    Pain in thumb joint with movement, right - Primary    Relevant Orders    XR thumb right first digit-min 2v    Swelling of right thumb      Other Visit Diagnoses     Screening for colon cancer        Relevant Orders    Ambulatory referral to Gastroenterology        Given length of symptoms, negative rheumatologic eval, and pt's work, will get XR to rule out fracture  If negative, likely OA related, but can consider Sports Med referral to further evaluation  CRC screening: GI referral provided   PAP: Pt previously given OBGYN referral -- reprinted     No future appointments  SUBJECTIVE  CC: right thumb very painfull and swollen      HPI:  Glenda Teague is a 53 y o  female who presents for an acute visit due to thumb swelling  Pt was seen on 12/10 for hand swelling B/L, at which time blood work, including an BERNARDINO, RF, and ESR were done, all of which were benign  Pt states the pain and swelling of R thumb has been going on for 2 months  Pain is located only in DIP of thumb  No injury known prior to onset, though 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  Pain not improved with previously prescribed Medrol pack  Pain is somewhat improved with voltaren gel  Review of Systems   Constitutional: Negative for fever  Musculoskeletal: Positive for arthralgias and joint swelling         Historical Information   The patient history was reviewed and updated as follows:    Past Medical History:   Diagnosis Date    Acute maxillary sinusitis 12/27/2017    Acute pain of right shoulder 4/10/2019    Gastric ulcer     Hyperproinsulinemia     [id number] resolved    Swelling of finger joint of right hand 12/10/2019     Past Surgical History:   Procedure Laterality Date    APPENDECTOMY      HERNIA REPAIR       Family History   Problem Relation Age of Onset    Ovarian cancer Mother 48    Thyroid cancer Sister     Breast cancer Family     Breast cancer Maternal Uncle       Social History   Social History     Substance and Sexual Activity   Alcohol Use No     Social History     Substance and Sexual Activity   Drug Use No     Social History     Tobacco Use   Smoking Status Never Smoker   Smokeless Tobacco Never Used       Medications:     Current Outpatient Medications:     ARIPiprazole (ABILIFY) 5 mg tablet, Take 0 5 tablets (2 5 mg total) by mouth daily at bedtime for 30 days, Disp: 15 tablet, Rfl: 1    diclofenac sodium (VOLTAREN) 1 %, Apply 2 g topically 4 (four) times a day, Disp: 240 g, Rfl: 3    DULoxetine (CYMBALTA) 60 mg delayed release capsule, Take 1 capsule (60 mg total) by mouth daily for 63 days, Disp: 30 capsule, Rfl: 1    losartan-hydrochlorothiazide (HYZAAR) 50-12 5 mg per tablet, Take 1 tablet by mouth daily, Disp: 90 tablet, Rfl: 2  Allergies   Allergen Reactions    Other      strawberry    Pineapple Hives       OBJECTIVE    Vitals:   Vitals:    01/15/20 1524   BP: 128/78   Pulse: 78   Temp: 97 8 °F (36 6 °C)   TempSrc: Tympanic   SpO2: 98%   Weight: 77 6 kg (171 lb)   Height: 5' 5" (1 651 m)           Physical Exam   Constitutional: She appears well-developed and well-nourished  No distress  HENT:   Head: Normocephalic and atraumatic  Pulmonary/Chest: Effort normal    Musculoskeletal:   R thumb non erythematous, but swollen in comparison to L  ROM intact  Capillary refill <2 seconds   Tender to palpation over DIP   Neurological: She is alert  Psychiatric: She has a normal mood and affect  Vitals reviewed                   DO [facility]   1/15/2020  3:44 PM

## 2020-01-15 NOTE — LETTER
January 15, 2020     Patient: Nikunj Figueroa   YOB: 1965   Date of Visit: 1/15/2020       To Whom it May Concern:    Nikunj Figueroa is under my professional care  She was seen in my office on 1/15/2020  She may return to work on 01/16/2020  If you have any questions or concerns, please don't hesitate to call           Sincerely,          Cheryl Trevino DO        CC: No Recipients

## 2020-02-05 ENCOUNTER — APPOINTMENT (OUTPATIENT)
Dept: LAB | Facility: CLINIC | Age: 55
End: 2020-02-05
Payer: COMMERCIAL

## 2020-02-05 DIAGNOSIS — Z11.1 SCREENING-PULMONARY TB: Primary | ICD-10-CM

## 2020-02-05 DIAGNOSIS — Z11.1 SCREENING-PULMONARY TB: ICD-10-CM

## 2020-02-05 PROCEDURE — 36415 COLL VENOUS BLD VENIPUNCTURE: CPT

## 2020-02-05 PROCEDURE — 86480 TB TEST CELL IMMUN MEASURE: CPT

## 2020-02-07 LAB
GAMMA INTERFERON BACKGROUND BLD IA-ACNC: >10 IU/ML
M TB IFN-G BLD-IMP: ABNORMAL
M TB IFN-G CD4+ BCKGRND COR BLD-ACNC: 0 IU/ML
M TB IFN-G CD4+ BCKGRND COR BLD-ACNC: 0 IU/ML
MITOGEN IGNF BCKGRD COR BLD-ACNC: <0.1 IU/ML

## 2020-02-10 ENCOUNTER — APPOINTMENT (OUTPATIENT)
Dept: RADIOLOGY | Facility: CLINIC | Age: 55
End: 2020-02-10
Payer: COMMERCIAL

## 2020-02-10 DIAGNOSIS — Z11.1 SCREENING-PULMONARY TB: Primary | ICD-10-CM

## 2020-02-10 DIAGNOSIS — I10 ESSENTIAL HYPERTENSION: Primary | ICD-10-CM

## 2020-02-10 DIAGNOSIS — Z11.1 SCREENING-PULMONARY TB: ICD-10-CM

## 2020-02-10 PROCEDURE — 71046 X-RAY EXAM CHEST 2 VIEWS: CPT

## 2020-02-10 RX ORDER — LOSARTAN POTASSIUM 50 MG/1
50 TABLET ORAL DAILY
Qty: 90 TABLET | Refills: 1 | Status: SHIPPED | OUTPATIENT
Start: 2020-02-10 | End: 2020-11-03 | Stop reason: SDUPTHER

## 2020-02-10 RX ORDER — HYDROCHLOROTHIAZIDE 12.5 MG/1
12.5 TABLET ORAL DAILY
Qty: 90 TABLET | Refills: 1 | Status: SHIPPED | OUTPATIENT
Start: 2020-02-10 | End: 2020-07-01 | Stop reason: DRUGHIGH

## 2020-04-30 ENCOUNTER — TELEMEDICINE (OUTPATIENT)
Dept: GASTROENTEROLOGY | Facility: CLINIC | Age: 55
End: 2020-04-30
Payer: COMMERCIAL

## 2020-04-30 DIAGNOSIS — Z12.11 SCREENING FOR COLON CANCER: ICD-10-CM

## 2020-04-30 PROCEDURE — 3078F DIAST BP <80 MM HG: CPT | Performed by: PHYSICIAN ASSISTANT

## 2020-04-30 PROCEDURE — 3074F SYST BP LT 130 MM HG: CPT | Performed by: PHYSICIAN ASSISTANT

## 2020-04-30 PROCEDURE — 1036F TOBACCO NON-USER: CPT | Performed by: PHYSICIAN ASSISTANT

## 2020-04-30 PROCEDURE — 99203 OFFICE O/P NEW LOW 30 MIN: CPT | Performed by: PHYSICIAN ASSISTANT

## 2020-05-27 ENCOUNTER — TELEPHONE (OUTPATIENT)
Dept: FAMILY MEDICINE CLINIC | Facility: CLINIC | Age: 55
End: 2020-05-27

## 2020-05-28 ENCOUNTER — OFFICE VISIT (OUTPATIENT)
Dept: FAMILY MEDICINE CLINIC | Facility: CLINIC | Age: 55
End: 2020-05-28
Payer: COMMERCIAL

## 2020-05-28 VITALS — TEMPERATURE: 99.7 F | HEART RATE: 89 BPM | OXYGEN SATURATION: 99 %

## 2020-05-28 DIAGNOSIS — Z20.828 EXPOSURE TO SARS-ASSOCIATED CORONAVIRUS: ICD-10-CM

## 2020-05-28 DIAGNOSIS — U07.1 2019 NOVEL CORONAVIRUS DISEASE (COVID-19): Primary | ICD-10-CM

## 2020-05-28 DIAGNOSIS — R19.7 DIARRHEA OF PRESUMED INFECTIOUS ORIGIN: ICD-10-CM

## 2020-05-28 DIAGNOSIS — R50.9 FEVER, UNSPECIFIED FEVER CAUSE: ICD-10-CM

## 2020-05-28 DIAGNOSIS — Z20.828 EXPOSURE TO SARS-ASSOCIATED CORONAVIRUS: Primary | ICD-10-CM

## 2020-05-28 PROCEDURE — U0003 INFECTIOUS AGENT DETECTION BY NUCLEIC ACID (DNA OR RNA); SEVERE ACUTE RESPIRATORY SYNDROME CORONAVIRUS 2 (SARS-COV-2) (CORONAVIRUS DISEASE [COVID-19]), AMPLIFIED PROBE TECHNIQUE, MAKING USE OF HIGH THROUGHPUT TECHNOLOGIES AS DESCRIBED BY CMS-2020-01-R: HCPCS

## 2020-05-28 PROCEDURE — 99213 OFFICE O/P EST LOW 20 MIN: CPT | Performed by: PHYSICIAN ASSISTANT

## 2020-05-30 ENCOUNTER — TELEMEDICINE (OUTPATIENT)
Dept: FAMILY MEDICINE CLINIC | Facility: CLINIC | Age: 55
End: 2020-05-30
Payer: COMMERCIAL

## 2020-05-30 DIAGNOSIS — U07.1 2019 NOVEL CORONAVIRUS DISEASE (COVID-19): Primary | ICD-10-CM

## 2020-05-30 LAB — SARS-COV-2 RNA SPEC QL NAA+PROBE: DETECTED

## 2020-05-30 PROCEDURE — 99442 PR PHYS/QHP TELEPHONE EVALUATION 11-20 MIN: CPT | Performed by: PHYSICIAN ASSISTANT

## 2020-06-01 ENCOUNTER — TELEMEDICINE (OUTPATIENT)
Dept: FAMILY MEDICINE CLINIC | Facility: CLINIC | Age: 55
End: 2020-06-01
Payer: COMMERCIAL

## 2020-06-01 DIAGNOSIS — U07.1 2019 NOVEL CORONAVIRUS DISEASE (COVID-19): Primary | ICD-10-CM

## 2020-06-01 DIAGNOSIS — I10 ESSENTIAL HYPERTENSION: ICD-10-CM

## 2020-06-01 PROCEDURE — 99442 PR PHYS/QHP TELEPHONE EVALUATION 11-20 MIN: CPT | Performed by: PHYSICIAN ASSISTANT

## 2020-06-02 ENCOUNTER — TELEMEDICINE (OUTPATIENT)
Dept: FAMILY MEDICINE CLINIC | Facility: CLINIC | Age: 55
End: 2020-06-02
Payer: COMMERCIAL

## 2020-06-02 DIAGNOSIS — U07.1 2019 NOVEL CORONAVIRUS DISEASE (COVID-19): Primary | ICD-10-CM

## 2020-06-02 PROCEDURE — 99213 OFFICE O/P EST LOW 20 MIN: CPT | Performed by: PHYSICIAN ASSISTANT

## 2020-06-03 ENCOUNTER — TELEPHONE (OUTPATIENT)
Dept: FAMILY MEDICINE CLINIC | Facility: CLINIC | Age: 55
End: 2020-06-03

## 2020-06-03 ENCOUNTER — APPOINTMENT (OUTPATIENT)
Dept: RADIOLOGY | Facility: CLINIC | Age: 55
End: 2020-06-03
Payer: COMMERCIAL

## 2020-06-03 ENCOUNTER — OFFICE VISIT (OUTPATIENT)
Dept: FAMILY MEDICINE CLINIC | Facility: CLINIC | Age: 55
End: 2020-06-03
Payer: COMMERCIAL

## 2020-06-03 DIAGNOSIS — R06.00 DOE (DYSPNEA ON EXERTION): ICD-10-CM

## 2020-06-03 DIAGNOSIS — R05.9 COUGH: ICD-10-CM

## 2020-06-03 DIAGNOSIS — U07.1 2019 NOVEL CORONAVIRUS DISEASE (COVID-19): Primary | ICD-10-CM

## 2020-06-03 DIAGNOSIS — U07.1 2019 NOVEL CORONAVIRUS DISEASE (COVID-19): ICD-10-CM

## 2020-06-03 DIAGNOSIS — J18.9 PNEUMONIA OF LEFT LUNG DUE TO INFECTIOUS ORGANISM, UNSPECIFIED PART OF LUNG: ICD-10-CM

## 2020-06-03 PROCEDURE — 99214 OFFICE O/P EST MOD 30 MIN: CPT | Performed by: PHYSICIAN ASSISTANT

## 2020-06-03 PROCEDURE — 1036F TOBACCO NON-USER: CPT | Performed by: PHYSICIAN ASSISTANT

## 2020-06-03 PROCEDURE — 71046 X-RAY EXAM CHEST 2 VIEWS: CPT

## 2020-06-03 PROCEDURE — 3074F SYST BP LT 130 MM HG: CPT | Performed by: PHYSICIAN ASSISTANT

## 2020-06-03 PROCEDURE — 3078F DIAST BP <80 MM HG: CPT | Performed by: PHYSICIAN ASSISTANT

## 2020-06-03 RX ORDER — AZITHROMYCIN 250 MG/1
TABLET, FILM COATED ORAL
Qty: 6 TABLET | Refills: 0 | Status: SHIPPED | OUTPATIENT
Start: 2020-06-03 | End: 2020-06-08

## 2020-06-04 ENCOUNTER — TELEPHONE (OUTPATIENT)
Dept: FAMILY MEDICINE CLINIC | Facility: CLINIC | Age: 55
End: 2020-06-04

## 2020-06-04 ENCOUNTER — TELEMEDICINE (OUTPATIENT)
Dept: FAMILY MEDICINE CLINIC | Facility: CLINIC | Age: 55
End: 2020-06-04
Payer: COMMERCIAL

## 2020-06-04 DIAGNOSIS — U07.1 2019 NOVEL CORONAVIRUS DISEASE (COVID-19): Primary | ICD-10-CM

## 2020-06-04 PROCEDURE — 99441 PR PHYS/QHP TELEPHONE EVALUATION 5-10 MIN: CPT | Performed by: PHYSICIAN ASSISTANT

## 2020-06-05 ENCOUNTER — TELEMEDICINE (OUTPATIENT)
Dept: FAMILY MEDICINE CLINIC | Facility: CLINIC | Age: 55
End: 2020-06-05
Payer: COMMERCIAL

## 2020-06-05 DIAGNOSIS — U07.1 2019 NOVEL CORONAVIRUS DISEASE (COVID-19): Primary | ICD-10-CM

## 2020-06-05 DIAGNOSIS — M54.16 LUMBAR RADICULOPATHY: ICD-10-CM

## 2020-06-05 PROCEDURE — 99442 PR PHYS/QHP TELEPHONE EVALUATION 11-20 MIN: CPT | Performed by: PHYSICIAN ASSISTANT

## 2020-06-08 ENCOUNTER — TELEMEDICINE (OUTPATIENT)
Dept: FAMILY MEDICINE CLINIC | Facility: CLINIC | Age: 55
End: 2020-06-08
Payer: COMMERCIAL

## 2020-06-08 DIAGNOSIS — U07.1 2019 NOVEL CORONAVIRUS DISEASE (COVID-19): Primary | ICD-10-CM

## 2020-06-08 PROCEDURE — 99442 PR PHYS/QHP TELEPHONE EVALUATION 11-20 MIN: CPT | Performed by: PHYSICIAN ASSISTANT

## 2020-06-16 ENCOUNTER — TELEPHONE (OUTPATIENT)
Dept: FAMILY MEDICINE CLINIC | Facility: CLINIC | Age: 55
End: 2020-06-16

## 2020-06-16 DIAGNOSIS — Z20.828 EXPOSURE TO SARS-ASSOCIATED CORONAVIRUS: Primary | ICD-10-CM

## 2020-06-17 DIAGNOSIS — Z20.828 EXPOSURE TO SARS-ASSOCIATED CORONAVIRUS: ICD-10-CM

## 2020-06-17 PROCEDURE — U0003 INFECTIOUS AGENT DETECTION BY NUCLEIC ACID (DNA OR RNA); SEVERE ACUTE RESPIRATORY SYNDROME CORONAVIRUS 2 (SARS-COV-2) (CORONAVIRUS DISEASE [COVID-19]), AMPLIFIED PROBE TECHNIQUE, MAKING USE OF HIGH THROUGHPUT TECHNOLOGIES AS DESCRIBED BY CMS-2020-01-R: HCPCS

## 2020-06-19 LAB — SARS-COV-2 RNA SPEC QL NAA+PROBE: NOT DETECTED

## 2020-07-01 ENCOUNTER — APPOINTMENT (OUTPATIENT)
Dept: RADIOLOGY | Facility: CLINIC | Age: 55
End: 2020-07-01
Payer: COMMERCIAL

## 2020-07-01 ENCOUNTER — OFFICE VISIT (OUTPATIENT)
Dept: FAMILY MEDICINE CLINIC | Facility: CLINIC | Age: 55
End: 2020-07-01
Payer: COMMERCIAL

## 2020-07-01 ENCOUNTER — APPOINTMENT (OUTPATIENT)
Dept: LAB | Facility: CLINIC | Age: 55
End: 2020-07-01
Payer: COMMERCIAL

## 2020-07-01 VITALS
OXYGEN SATURATION: 99 % | BODY MASS INDEX: 28.66 KG/M2 | TEMPERATURE: 97 F | SYSTOLIC BLOOD PRESSURE: 136 MMHG | HEART RATE: 82 BPM | WEIGHT: 172 LBS | HEIGHT: 65 IN | DIASTOLIC BLOOD PRESSURE: 92 MMHG

## 2020-07-01 DIAGNOSIS — F31.70 BIPOLAR AFFECTIVE DISORDER IN REMISSION (HCC): ICD-10-CM

## 2020-07-01 DIAGNOSIS — Z23 NEED FOR SHINGLES VACCINE: ICD-10-CM

## 2020-07-01 DIAGNOSIS — Z11.1 SCREENING-PULMONARY TB: ICD-10-CM

## 2020-07-01 DIAGNOSIS — Z13.220 SCREENING FOR LIPID DISORDERS: ICD-10-CM

## 2020-07-01 DIAGNOSIS — Z01.84 IMMUNITY STATUS TESTING: ICD-10-CM

## 2020-07-01 DIAGNOSIS — Z12.31 VISIT FOR SCREENING MAMMOGRAM: ICD-10-CM

## 2020-07-01 DIAGNOSIS — I83.813 VARICOSE VEINS OF BOTH LOWER EXTREMITIES WITH PAIN: ICD-10-CM

## 2020-07-01 DIAGNOSIS — I10 ESSENTIAL HYPERTENSION: ICD-10-CM

## 2020-07-01 DIAGNOSIS — Z12.4 CERVICAL CANCER SCREENING: Primary | ICD-10-CM

## 2020-07-01 DIAGNOSIS — E78.5 HYPERLIPIDEMIA, UNSPECIFIED HYPERLIPIDEMIA TYPE: ICD-10-CM

## 2020-07-01 LAB
ALBUMIN SERPL BCP-MCNC: 3.8 G/DL (ref 3.5–5)
ALP SERPL-CCNC: 129 U/L (ref 46–116)
ALT SERPL W P-5'-P-CCNC: 40 U/L (ref 12–78)
ANION GAP SERPL CALCULATED.3IONS-SCNC: 5 MMOL/L (ref 4–13)
AST SERPL W P-5'-P-CCNC: 23 U/L (ref 5–45)
BILIRUB SERPL-MCNC: 0.5 MG/DL (ref 0.2–1)
BUN SERPL-MCNC: 11 MG/DL (ref 5–25)
CALCIUM SERPL-MCNC: 9.9 MG/DL (ref 8.3–10.1)
CHLORIDE SERPL-SCNC: 108 MMOL/L (ref 100–108)
CO2 SERPL-SCNC: 27 MMOL/L (ref 21–32)
CREAT SERPL-MCNC: 0.65 MG/DL (ref 0.6–1.3)
GFR SERPL CREATININE-BSD FRML MDRD: 115 ML/MIN/1.73SQ M
GLUCOSE SERPL-MCNC: 74 MG/DL (ref 65–140)
POTASSIUM SERPL-SCNC: 3.5 MMOL/L (ref 3.5–5.3)
PROT SERPL-MCNC: 7.7 G/DL (ref 6.4–8.2)
RUBV IGG SERPL IA-ACNC: 48.6 IU/ML
SODIUM SERPL-SCNC: 140 MMOL/L (ref 136–145)

## 2020-07-01 PROCEDURE — 3080F DIAST BP >= 90 MM HG: CPT | Performed by: PHYSICIAN ASSISTANT

## 2020-07-01 PROCEDURE — 36415 COLL VENOUS BLD VENIPUNCTURE: CPT

## 2020-07-01 PROCEDURE — 86480 TB TEST CELL IMMUN MEASURE: CPT

## 2020-07-01 PROCEDURE — 99214 OFFICE O/P EST MOD 30 MIN: CPT | Performed by: PHYSICIAN ASSISTANT

## 2020-07-01 PROCEDURE — 3075F SYST BP GE 130 - 139MM HG: CPT | Performed by: PHYSICIAN ASSISTANT

## 2020-07-01 PROCEDURE — 1036F TOBACCO NON-USER: CPT | Performed by: PHYSICIAN ASSISTANT

## 2020-07-01 PROCEDURE — 3008F BODY MASS INDEX DOCD: CPT | Performed by: PHYSICIAN ASSISTANT

## 2020-07-01 PROCEDURE — 86762 RUBELLA ANTIBODY: CPT

## 2020-07-01 PROCEDURE — 71046 X-RAY EXAM CHEST 2 VIEWS: CPT

## 2020-07-01 PROCEDURE — 80053 COMPREHEN METABOLIC PANEL: CPT

## 2020-07-01 PROCEDURE — 86765 RUBEOLA ANTIBODY: CPT

## 2020-07-01 PROCEDURE — 86735 MUMPS ANTIBODY: CPT

## 2020-07-01 RX ORDER — HYDROCHLOROTHIAZIDE 25 MG/1
25 TABLET ORAL DAILY
Qty: 30 TABLET | Refills: 2 | Status: SHIPPED | OUTPATIENT
Start: 2020-07-01 | End: 2020-09-19 | Stop reason: ALTCHOICE

## 2020-07-01 RX ORDER — ARIPIPRAZOLE 5 MG/1
TABLET ORAL
COMMUNITY
Start: 2020-06-04 | End: 2020-08-10

## 2020-07-01 RX ORDER — OXCARBAZEPINE 300 MG/1
TABLET, FILM COATED ORAL
COMMUNITY
Start: 2020-06-04 | End: 2020-08-10

## 2020-07-01 NOTE — PROGRESS NOTES
Assessment/Plan:       Problem List Items Addressed This Visit        Cardiovascular and Mediastinum    Essential hypertension    Relevant Medications    hydrochlorothiazide (HYDRODIURIL) 25 mg tablet    Other Relevant Orders    Comprehensive metabolic panel    Varicose veins of both lower extremities with pain    Relevant Orders    Ambulatory referral to Vascular Surgery       Other    Bipolar affective disorder in remission ([facility] [address] )    Relevant Medications    ARIPiprazole (ABILIFY) 5 mg tablet    Hyperlipidemia    Relevant Orders    Lipid Panel with Direct LDL reflex      Other Visit Diagnoses     Cervical cancer screening    -  Primary    Relevant Orders    Ambulatory referral to Obstetrics / Gynecology    Visit for screening mammogram        Relevant Orders    Mammo screening bilateral w cad    Screening-pulmonary TB        Relevant Orders    Quantiferon TB Gold Plus    XR chest pa & lateral    Immunity status testing        Relevant Orders    Rubeola antibody IgG    Rubella antibody, IgG    Mumps antibody, IgG    Screening for lipid disorders        Relevant Orders    Lipid Panel with Direct LDL reflex    Need for shingles vaccine        Relevant Medications    Zoster Vac Recomb Adjuvanted (Shingrix) 50 MCG/0 5ML SUSR        Will increase HCTZ to 25mg for increased BP at home, monitor home BP  Immunity status testing  Shingrix  HM complete  CXR for indeterminate TBgold, repeat TB gold  Vascular surgery consult for varicosities    Subjective:      Patient ID: Annemarie Garvin is a 63 y o  female  Pt presents for check up   She got a new job and is in need of a physical exam       HTN: has been running high 140s/100s at home, denies sx of CP, SOB, HA, is on cozaar and hctz  Covid-19: negative test, resolved, doing well, denies ongoing cough SOB  Bipolar: on abilify and trilieptal, follows with psychiatry, no mood complaints today    Complains of increased varicosities on back of knees bilaterally which she is not happy with the appearance  They also itch and cause her discomfort  There is no significant lower extremity edema  She has no other acute complaints today      The following portions of the patient's history were reviewed and updated as appropriate:   She  has a past medical history of Acute maxillary sinusitis (12/27/2017), Acute pain of right shoulder (4/10/2019), Gastric ulcer, Hyperproinsulinemia, and Swelling of finger joint of right hand (12/10/2019)  She   Patient Active Problem List    Diagnosis Date Noted    Varicose veins of both lower extremities with pain 07/01/2020    2019 novel coronavirus disease (COVID-19) 05/30/2020    Pain in thumb joint with movement, right 01/15/2020    Swelling of right thumb 01/15/2020    Cervicalgia 04/23/2019    Carpal tunnel syndrome, right 04/23/2019    Calcific tendonitis of right shoulder 04/10/2019    Sciatica, right side 03/26/2019    Dermatitis 10/10/2018    Fatigue 10/10/2018    Essential hypertension 10/10/2018    Snoring 10/10/2018    Arthralgia of both hands 06/01/2018    Obesity (BMI 30 0-34 9) 06/01/2018    Bipolar affective disorder in remission ([facility] [address] ) 06/01/2018    Hot flashes 06/01/2018    Bipolar 1 disorder, depressed, moderate ([address] ) 11/06/2017    Thyroid pain 11/06/2017    Perimenopausal symptoms 08/19/2016    Hyperlipidemia 03/03/2016    Lumbar radiculopathy 11/18/2015    Migraine headache 11/19/2014    Depression 07/05/2012     She  has a past surgical history that includes Appendectomy and Hernia repair  Her family history includes Breast cancer in her family and maternal uncle; Ovarian cancer (age of onset: 48) in her mother; Thyroid cancer in her sister  She  reports that she has never smoked  She has never used smokeless tobacco  She reports that she does not drink alcohol or use drugs    Current Outpatient Medications   Medication Sig Dispense Refill    ARIPiprazole (ABILIFY) 5 mg tablet       DULoxetine (CYMBALTA) 60 mg delayed release capsule Take 1 capsule (60 mg total) by mouth daily for 63 days 30 capsule 1    hydrochlorothiazide (HYDRODIURIL) 25 mg tablet Take 1 tablet (25 mg total) by mouth daily 30 tablet 2    losartan (COZAAR) 50 mg tablet Take 1 tablet (50 mg total) by mouth daily 90 tablet 1    OXcarbazepine (TRILEPTAL) 300 mg tablet       Zoster Vac Recomb Adjuvanted (Shingrix) 50 MCG/0 5ML SUSR Inject 0 5 mL into a muscle once for 1 dose Repeat dose in 2 to 6 months 1 each 1     No current facility-administered medications for this visit  Current Outpatient Medications on File Prior to Visit   Medication Sig    ARIPiprazole (ABILIFY) 5 mg tablet     DULoxetine (CYMBALTA) 60 mg delayed release capsule Take 1 capsule (60 mg total) by mouth daily for 63 days    losartan (COZAAR) 50 mg tablet Take 1 tablet (50 mg total) by mouth daily    OXcarbazepine (TRILEPTAL) 300 mg tablet     [DISCONTINUED] hydrochlorothiazide (HYDRODIURIL) 12 5 mg tablet Take 1 tablet (12 5 mg total) by mouth daily     No current facility-administered medications on file prior to visit  She is allergic to other and pineapple       Review of Systems   Constitutional: Negative for chills, fatigue and fever  HENT: Negative for congestion, ear pain, hearing loss, nosebleeds, postnasal drip, rhinorrhea, sinus pressure, sinus pain, sneezing and sore throat  Eyes: Negative for pain, discharge, itching and visual disturbance  Respiratory: Negative for cough, chest tightness, shortness of breath and wheezing  Cardiovascular: Negative for chest pain, palpitations and leg swelling  Gastrointestinal: Negative for abdominal pain, blood in stool, constipation, diarrhea, nausea and vomiting  Genitourinary: Negative for frequency and urgency  Neurological: Negative for dizziness, light-headedness and numbness  Hematological:        Varicose veins bilateral posterior knees, itchy and discomfort   Psychiatric/Behavioral: Negative  Negative for dysphoric mood and hallucinations  The patient is not nervous/anxious  Objective:      /92   Pulse 82   Temp (!) 97 °F (36 1 °C)   Ht 5' 5" (1 651 m)   Wt 78 kg (172 lb)   SpO2 99%   BMI 28 62 kg/m²          Physical Exam   Constitutional: She is oriented to person, place, and time  She appears well-developed and well-nourished  HENT:   Head: Normocephalic and atraumatic  Right Ear: External ear normal    Left Ear: External ear normal    Nose: Nose normal    Mouth/Throat: Oropharynx is clear and moist    Eyes: Pupils are equal, round, and reactive to light  Neck: Normal range of motion  Neck supple  Cardiovascular: Normal rate, regular rhythm and normal heart sounds  Pulmonary/Chest: Effort normal and breath sounds normal  No respiratory distress  She has no wheezes  She has no rales  Abdominal: Soft  Bowel sounds are normal    Musculoskeletal: Normal range of motion  She exhibits no edema or deformity  Neurological: She is alert and oriented to person, place, and time  Skin: Skin is warm and dry  Bilateral varicosities noted on posterior knees, signs of itching causing some lichenification   Psychiatric: She has a normal mood and affect  Her behavior is normal    Nursing note reviewed

## 2020-07-02 ENCOUNTER — APPOINTMENT (OUTPATIENT)
Dept: LAB | Facility: CLINIC | Age: 55
End: 2020-07-02
Payer: COMMERCIAL

## 2020-07-02 DIAGNOSIS — Z13.220 SCREENING FOR LIPID DISORDERS: ICD-10-CM

## 2020-07-02 DIAGNOSIS — E78.5 HYPERLIPIDEMIA, UNSPECIFIED HYPERLIPIDEMIA TYPE: ICD-10-CM

## 2020-07-02 LAB
CHOLEST SERPL-MCNC: 260 MG/DL (ref 50–200)
HDLC SERPL-MCNC: 90 MG/DL
LDLC SERPL CALC-MCNC: 158 MG/DL (ref 0–100)
MEV IGG SER QL: NORMAL
MUV IGG SER QL: NORMAL
TRIGL SERPL-MCNC: 62 MG/DL

## 2020-07-02 PROCEDURE — 80061 LIPID PANEL: CPT

## 2020-07-02 PROCEDURE — 36415 COLL VENOUS BLD VENIPUNCTURE: CPT

## 2020-07-07 DIAGNOSIS — E78.2 MIXED HYPERLIPIDEMIA: Primary | ICD-10-CM

## 2020-07-07 RX ORDER — ATORVASTATIN CALCIUM 20 MG/1
20 TABLET, FILM COATED ORAL DAILY
Qty: 90 TABLET | Refills: 3 | Status: SHIPPED | OUTPATIENT
Start: 2020-07-07 | End: 2020-10-02 | Stop reason: SDUPTHER

## 2020-07-17 ENCOUNTER — TELEPHONE (OUTPATIENT)
Dept: ADMINISTRATIVE | Facility: HOSPITAL | Age: 55
End: 2020-07-17

## 2020-07-17 NOTE — TELEPHONE ENCOUNTER
COVID Pre-Visit Screening     1  Is this a family member screening? No  2  Have you traveled outside of your state in the past 2 weeks? No  3  Do you presently have a fever or flu-like symptoms? No  4  Do you have symptoms of an upper respiratory infection like runny nose, sore throat, or cough? No  5  Are you suffering from new headache that you have not had in the past?  No  6  Do you have/have you experienced any new shortness of breath recently? No  7  Do you have any new diarrhea, nausea or vomiting? No  8  Have you been in contact with anyone who has been sick or diagnosed with COVID-19? No  9  Do you have any new loss of taste or smell? No  10  Are you able to wear a mask without a valve for the entire visit? Yes    Patient had COVID back in May  She states it has since resolved    Last test was 3 weeks ago and it was negative

## 2020-07-20 ENCOUNTER — CONSULT (OUTPATIENT)
Dept: VASCULAR SURGERY | Facility: CLINIC | Age: 55
End: 2020-07-20
Payer: COMMERCIAL

## 2020-07-20 VITALS
BODY MASS INDEX: 28.32 KG/M2 | DIASTOLIC BLOOD PRESSURE: 70 MMHG | TEMPERATURE: 97.7 F | HEART RATE: 74 BPM | HEIGHT: 65 IN | SYSTOLIC BLOOD PRESSURE: 116 MMHG | WEIGHT: 170 LBS

## 2020-07-20 DIAGNOSIS — I83.813 VARICOSE VEINS OF BOTH LOWER EXTREMITIES WITH PAIN: Primary | ICD-10-CM

## 2020-07-20 DIAGNOSIS — I10 ESSENTIAL HYPERTENSION: ICD-10-CM

## 2020-07-20 PROCEDURE — 99243 OFF/OP CNSLTJ NEW/EST LOW 30: CPT | Performed by: PHYSICIAN ASSISTANT

## 2020-07-20 NOTE — LETTER
July 20, 2020     Song Stiles, [address]  [address] [place] [address] 16    Patient: Nani Velasquez   YOB: 1965   Date of Visit: 7/20/2020     Dear Dr Thania Becerra      Thank you for referring Nani Velasquez to me for evaluation  Below are the relevant portions of my assessment and plan of care  If you have questions, please do not hesitate to call me  I look forward to following Teresa Aguayo along with you  Sincerely,        Jeancarlos Owen PA-C        CC: Sriram Pang PA-C    Progress Notes:      Assessment/Plan:    Varicose veins   - Reticulars behind the knees with discomfort and itching    -varicose veins for over 30 years slowly worsening  -posterior knee discomfort and itching, right leg worse than left  -works at as [employer] and on feet all day; legs tired and heavy  -reticular veins present right greater than left  -no significant edema, no wounds  -new left foot pain on weight-bearing; no known injury; not vascular related    We had a detailed discussion regarding varicose veins and the treatment of venous disease  We discussed the role of compression and other conservative measures for relief of symptoms related to varicose veins  She agrees to trial, formal graduated compression stockings for the next 2-3 months   She does have symptoms leg fatigue so if sx continue despite regular usage of compression, we should check a venous reflux study      - Order for prescription graded compression stockings of 20-30 mm Hg  - Wear stocking EVERY day to help control swelling in legs and remove at night  - Elevate legs while at rest  - Continue healthy life-style changes; heart-healthy, low sodium diet; regular exercise  - Patient education for varicose veins  - May be candidate for sclera injections  -     Follow up in 3 months

## 2020-07-20 NOTE — PROGRESS NOTES
Assessment/Plan:    Varicose veins   - Reticulars behind the knees with discomfort and itching    -varicose veins for over 30 years slowly worsening  -posterior knee discomfort and itching, right leg worse than left  -works at as [employer] and on feet all day; legs tired and heavy  -reticular veins present right greater than left  -no significant edema, no wounds  -new left foot pain on weight-bearing; no known injury; not vascular related    We had a detailed discussion regarding varicose veins and the treatment of venous disease  We discussed the role of compression and other conservative measures for relief of symptoms related to varicose veins  She agrees to trial, formal graduated compression stockings for the next 2-3 months  She does have symptoms leg fatigue so if sx continue despite regular usage of compression, we should check a venous reflux study      - Order for prescription graded compression stockings of 20-30 mm Hg  - Wear stocking EVERY day to help control swelling in legs and remove at night  - Elevate legs while at rest  - Continue healthy life-style changes; heart-healthy, low sodium diet; regular exercise  - Patient education for varicose veins  - May be candidate for sclera injections  -     Follow up in 3 months       Diagnoses and all orders for this visit:    Varicose veins of both lower extremities with pain  -     Compression Stocking  -     Ambulatory referral to Vascular Surgery  -     Compression Stocking    Essential hypertension        Subjective:      Patient ID: Danni Lew is a 63 y o  female  New patient, referred by PCP for VV  Patient reports BLE VV w/ associated itching, fatigue and heaviness, R>L  She wears OTC compression on occasion  No testing and no h/o previous vein surgery  HPI    Danni Lew is a 63 y o  female  hypertension, hyperlipidemia, bipolar, lumbar radiculopathy, history of gastric ulcer who presents for further evaluation varicose veins    Patient has longstanding history of reticular varicosities behind the knees  Last month, she had increased discomfort and developed "dermatitis with temporary discoloration and new sx of itching behind the knees  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  She reports that the legs tend to be tired and heavy - especially at the end of the day  She gets very mild lower extremity edema in the R leg, but mostly in the knee  As a CNA, she wears what sounds like mild over-the-counter compression stockings, but she does not feel the stockings are helping the way her legs feel  She denies claudication, cramping in wounds  She has no history of bleeding veins  No history of deep vein thrombosis  She is a non-smoker      -varicose veins for over 30 years slowly worsening  -posterior knee discomfort and itching, right leg worse than left  -works at as [employer] and on feet all day; legs tired and heavy  -reticular veins present right greater than left  -no significant edema, no wounds  -new left foot pain on weight-bearing; no known injury; not vascular related    The following portions of the patient's history were reviewed and updated as appropriate: allergies, current medications, past family history, past medical history, past social history, past surgical history and problem list     Review of Systems   Constitutional: Negative  HENT: Negative  Eyes: Negative  Respiratory: Negative  Cardiovascular: Negative  Gastrointestinal: Negative  Endocrine: Negative  Genitourinary: Negative  Musculoskeletal: Negative  Skin: Negative  Allergic/Immunologic: Negative  Neurological: Negative  Hematological: Negative  Psychiatric/Behavioral: Negative            Objective:    /70 (BP Location: Left arm, Patient Position: Sitting)   Pulse 74   Temp 97 7 °F (36 5 °C) (Tympanic)   Ht 5' 5" (1 651 m)   Wt 77 1 kg (170 lb)   BMI 28 29 kg/m²               RIGHT posterior knee            LEFT posterior knee       Physical Exam   Constitutional: She is oriented to person, place, and time  She appears well-developed and well-nourished  She is cooperative  HENT:   Head: Normocephalic and atraumatic  Eyes: Pupils are equal, round, and reactive to light  EOM are normal    Neck: Trachea normal  Neck supple  No JVD present  No thyromegaly present  Cardiovascular: Normal rate, regular rhythm, S1 normal, S2 normal and normal heart sounds  No murmur heard  Pulses:       Carotid pulses are 2+ on the right side, and 2+ on the left side  Radial pulses are 2+ on the right side, and 2+ on the left side  Dorsalis pedis pulses are 2+ on the right side, and 2+ on the left side  Pulmonary/Chest: Effort normal and breath sounds normal  No accessory muscle usage  No respiratory distress  She has no wheezes  She has no rales  Abdominal: Soft  Bowel sounds are normal  She exhibits no distension  There is no hepatosplenomegaly  There is no tenderness  Musculoskeletal: Normal range of motion  She exhibits no deformity  Edema: R mild edema  Neurological: She is alert and oriented to person, place, and time  Grossly normal    Skin: Skin is warm and dry  No lesion and no rash noted  No cyanosis  Nails show no clubbing  Psychiatric: She has a normal mood and affect  Nursing note and vitals reviewed  I have reviewed and made appropriate changes to the review of systems input by the medical assistant  Vitals:    07/20/20 0855   BP: 116/70   BP Location: Left arm   Patient Position: Sitting   Pulse: 74   Temp: 97 7 °F (36 5 °C)   TempSrc: Tympanic   Weight: 77 1 kg (170 lb)   Height: 5' 5" (1 651 m)       Patient Active Problem List   Diagnosis    Arthralgia of both hands    Obesity (BMI 30 0-34  9)    Bipolar affective disorder in remission ([facility] [address] )    Hot flashes    Dermatitis    Fatigue    Essential hypertension    Snoring    Sciatica, right side    Calcific tendonitis of right shoulder    Cervicalgia    Carpal tunnel syndrome, right    Bipolar 1 disorder, depressed, moderate (HCC)    Depression    Hyperlipidemia    Lumbar radiculopathy    Migraine headache    Perimenopausal symptoms    Thyroid pain    Pain in thumb joint with movement, right    Swelling of right thumb    2019 novel coronavirus disease (COVID-19)    Varicose veins of both lower extremities with pain       Past Surgical History:   Procedure Laterality Date    APPENDECTOMY      HERNIA REPAIR         Family History   Problem Relation Age of Onset    Ovarian cancer Mother 48    Thyroid cancer Sister     Breast cancer Family     Breast cancer Maternal Uncle        Social History     Socioeconomic History    Marital status: /Civil Union     Spouse name: Not on file    Number of children: Not on file    Years of education: Not on file    Highest education level: Not on file   Occupational History    Not on file   Social Needs    Financial resource strain: Not on file    Food insecurity:     Worry: Not on file     Inability: Not on file    Transportation needs:     Medical: Not on file     Non-medical: Not on file   Tobacco Use    Smoking status: Never Smoker    Smokeless tobacco: Never Used   Substance and Sexual Activity    Alcohol use: No    Drug use: No    Sexual activity: Not on file   Lifestyle    Physical activity:     Days per week: Not on file     Minutes per session: Not on file    Stress: Not on file   Relationships    Social connections:     Talks on phone: Not on file     Gets together: Not on file     Attends Hoahaoism service: Not on file     Active member of club or organization: Not on file     Attends meetings of clubs or organizations: Not on file     Relationship status: Not on file    Intimate partner violence:     Fear of current or ex partner: Not on file     Emotionally abused: Not on file     Physically abused: Not on file     Forced sexual activity: Not on file Other Topics Concern    Not on file   Social History Narrative    Not on file       Allergies   Allergen Reactions    Other      strawberry    Pineapple Hives         Current Outpatient Medications:     ARIPiprazole (ABILIFY) 5 mg tablet, , Disp: , Rfl:     atorvastatin (LIPITOR) 20 mg tablet, Take 1 tablet (20 mg total) by mouth daily, Disp: 90 tablet, Rfl: 3    DULoxetine (CYMBALTA) 60 mg delayed release capsule, Take 1 capsule (60 mg total) by mouth daily for 63 days, Disp: 30 capsule, Rfl: 1    hydrochlorothiazide (HYDRODIURIL) 25 mg tablet, Take 1 tablet (25 mg total) by mouth daily, Disp: 30 tablet, Rfl: 2    losartan (COZAAR) 50 mg tablet, Take 1 tablet (50 mg total) by mouth daily, Disp: 90 tablet, Rfl: 1    OXcarbazepine (TRILEPTAL) 300 mg tablet, , Disp: , Rfl:

## 2020-07-20 NOTE — PATIENT INSTRUCTIONS
Varicose veins (reticulars behind the knees) with pain and itching    -varicose veins for over 30 years slowly worsening  -posterior knee discomfort and itching, right leg worse than left  -works at as [employer] and on feet all day; legs tired and heavy  -reticular veins present right greater than left  -no significant edema, no wounds  -new left foot pain on weight-bearing; no known injury; not vascular related      We had a detailed discussion regarding varicose veins and the treatment of venous disease  We discussed the role of compression and other conservative measures for relief of symptoms related to varicose veins      - Order for prescription graded compression stockings of 20-30 mm Hg THIGH HIGH  - Wear stocking EVERY day to help control swelling in legs and remove at night  - Elevate legs while at rest  - Continue healthy life-style changes; heart-healthy, low sodium diet; regular exercise  - Patient education for varicose veins  -     Follow up in 3 months              Varicose Veins   WHAT YOU NEED TO KNOW:   What are varicose veins? Varicose veins are veins that become large, twisted, and swollen  They are common on the back of the calves, knees, and thighs  Varicose veins are caused by valves in your veins that do not work properly  This causes blood to collect and increase pressure in the veins of your legs  The increased pressure causes your veins to stretch, get larger, swell, and twist        What increases my risk for varicose veins? · Pregnancy    · A family history of varicose veins    · Being overweight or obese    · Age 48 years or older    · Sitting or standing for long periods of time    · Wearing tight clothing  What are the signs and symptoms of varicose veins? Your symptoms may be worse after you stand or sit for long periods of time   You may have any of the following:  · Blue, purple, or bulging veins in your legs     · Pain, swelling, or muscle cramps in your legs    · Feeling of fatigue or heaviness in your legs  How are varicose veins diagnosed? Your healthcare provider will examine your legs and ask about your medical history  You may need tests, such as a Doppler ultrasound or duplex scan  These tests show your veins and valves, and how your blood is flowing through them  These tests may also show if there is a blockage or blood clot  How are varicose veins treated? The goal of treatment is to decrease symptoms, improve appearance, and prevent further problems  Treatment will depend on which veins are affected and how severe your condition is  You may need procedures to treat or remove your varicose veins  For example, your healthcare provider may inject a solution or use a laser to close the varicose veins  Surgery to remove long veins may also be done  Ask your healthcare provider for more information about procedures used to treat varicose veins  What can I do to manage my symptoms? · Do not sit or stand for long periods of time  This can cause the blood to collect in your legs and make your symptoms worse  Bend or rotate your ankles several times every hour  Walk around for a few minutes every hour to get blood moving in your legs  · Do not cross your legs when you sit  This decreases blood flow to your feet and can make your symptoms worse  · Do not wear tight clothing or shoes  Do not wear high-heeled shoes  Do not wear clothes that are tight around the waist or knees  · Maintain a healthy weight  Being overweight or obese can make your varicose veins worse  Ask your healthcare provider how much you should weigh  Ask him or her to help you create a weight loss plan if you are overweight  · Wear pressure stockings as directed  The stockings are tight and put pressure on your legs  They improve blood flow and help prevent clots  · Elevate your legs  Keep them above the level of your heart for 15 to 30 minutes several times a day   You can also prop the end of your bed up slightly to elevate your legs while you sleep  This will help blood to flow back to your heart  · Get regular exercise  Talk to your healthcare provider about the best exercise plan for you  Exercise can improve blood flow to your legs and feet  When should I seek immediate care? · You have a wound that does not heal or is infected  · You have an injury that has broken your skin and caused your varicose veins to bleed  · Your leg is swollen and hard  · You notice that your legs or feet are turning blue or black  · Your leg feels warm, tender, and painful  It may look swollen and red  When should I contact my healthcare provider? · You have pain in your leg that does not go away or gets worse  · You notice sudden large bruising on your legs  · You have a rash on your leg  · Your symptoms keep you from doing your daily activities  · You have questions or concerns about your condition or care  CARE AGREEMENT:   You have the right to help plan your care  Learn about your health condition and how it may be treated  Discuss treatment options with your caregivers to decide what care you want to receive  You always have the right to refuse treatment  The above information is an  only  It is not intended as medical advice for individual conditions or treatments  Talk to your doctor, nurse or pharmacist before following any medical regimen to see if it is safe and effective for you  © 2017 [address] [employer] Information is for End User's use only and may not be sold, redistributed or otherwise used for commercial purposes  All illustrations and images included in CareNotes® are the copyrighted property of [employer]  or [employer]

## 2020-07-24 ENCOUNTER — OFFICE VISIT (OUTPATIENT)
Dept: OBGYN CLINIC | Facility: CLINIC | Age: 55
End: 2020-07-24
Payer: COMMERCIAL

## 2020-07-24 VITALS
TEMPERATURE: 98.3 F | WEIGHT: 170 LBS | DIASTOLIC BLOOD PRESSURE: 86 MMHG | SYSTOLIC BLOOD PRESSURE: 122 MMHG | BODY MASS INDEX: 28.29 KG/M2

## 2020-07-24 DIAGNOSIS — Z01.419 ENCOUNTER FOR GYNECOLOGICAL EXAMINATION WITHOUT ABNORMAL FINDING: Primary | ICD-10-CM

## 2020-07-24 DIAGNOSIS — Z78.0 POST-MENOPAUSAL: ICD-10-CM

## 2020-07-24 PROCEDURE — G0124 SCREEN C/V THIN LAYER BY MD: HCPCS | Performed by: PATHOLOGY

## 2020-07-24 PROCEDURE — 99386 PREV VISIT NEW AGE 40-64: CPT | Performed by: NURSE PRACTITIONER

## 2020-07-24 PROCEDURE — 87624 HPV HI-RISK TYP POOLED RSLT: CPT | Performed by: NURSE PRACTITIONER

## 2020-07-24 PROCEDURE — G0145 SCR C/V CYTO,THINLAYER,RESCR: HCPCS | Performed by: PATHOLOGY

## 2020-07-24 NOTE — PATIENT INSTRUCTIONS
Menopause   WHAT YOU NEED TO KNOW:   What is menopause? Menopause is a normal stage in a woman's life when her monthly periods stop  Menopause starts when the ovaries slowly stop making the female hormones estrogen and progesterone  After menopause, a woman is no longer able to become pregnant  A woman who has not had a period for a full year after the age of 39 is considered to be in menopause  Perimenopause is a stage before menopause that may cause signs and symptoms similar to menopause  Perimenopause can last an average of 4 to 5 years  What are the signs and symptoms of menopause? The signs and symptoms of menopause can be different from woman to woman:  · Menstrual period changes such as skipped periods or periods that are closer together, or lighter or heavier than usual    · Hot flashes (feeling warm, flushed, and sweaty)     · Mood changes such as irritability or decreased desire to have sex    · Breast changes such as tenderness or pain    · Hair changes such as thinning hair or increased hair on your face    · Vaginal changes such as increased dryness     · Urinary changes such as increased urinary tract infections (UTIs) or urgency (feeling that you need to urinate right away)    · Other symptoms such as headaches, trouble sleeping, fatigue, or heart palpitations (strong, fast heartbeats)  What do I need to know about menopause? · You can still get pregnant while you have periods  Continue to use birth control if you do not want to get pregnant  You may need to use birth control until it has been 1 year since your periods stopped  Ask your healthcare provider when you can stop using birth control to prevent pregnancy  · Hormone replacement therapy can be used to treat symptoms of menopause  Hormone replacement therapy (HRT) is medicine that replaces your low hormone levels  HRT contains estrogen and sometimes progestin  HRT has benefits and risks   HRT decreases your risk for bone fractures by helping to prevent osteoporosis  HRT also protects you from colon cancer  HRT may increase your risk for breast cancer, blood clots, heart disease, and stroke  Ask your healthcare provider if HRT is right for you  How can I live a healthy lifestyle during and after menopause? After menopause, your risk for heart disease and bone loss increases  Ask about these and other ways to stay healthy:  · Exercise regularly  Exercise helps you maintain a healthy weight  Exercise can also help to control your blood pressure and cholesterol levels  Include weight-bearing exercise for strong bones  Ask your healthcare provider about the best exercise plan for you  · Eat a variety of healthy foods  Include fruits, vegetables, whole grains (whole-wheat bread, pasta, and cereals), low-fat dairy, and lean protein foods (beans, poultry, and fish)  Limit foods high in sodium (salt)  Ask your healthcare provider for more information about a meal plan that is right for you  · Maintain a healthy weight  Check with your healthcare provider before you start any weight loss program      · Take supplements as directed  You may need extra calcium and vitamin D to help prevent osteoporosis  · Limit alcohol and caffeine  Alcohol and caffeine may worsen your symptoms  · Do not smoke  If you smoke, it is never too late to quit  You are more likely to have a heart attack, lung disease, blood clots, and cancer if you smoke  Ask your healthcare provider for information if you need help quitting  When should I contact my healthcare provider? · You have vaginal bleeding after menopause  · You have questions or concerns about your condition or care  CARE AGREEMENT:   You have the right to help plan your care  Learn about your health condition and how it may be treated  Discuss treatment options with your caregivers to decide what care you want to receive  You always have the right to refuse treatment   The above information is an  only  It is not intended as medical advice for individual conditions or treatments  Talk to your doctor, nurse or pharmacist before following any medical regimen to see if it is safe and effective for you  © 2017 [address] [employer] Information is for End User's use only and may not be sold, redistributed or otherwise used for commercial purposes  All illustrations and images included in CareNotes® are the copyrighted property of [employer]  or [employer]

## 2020-07-24 NOTE — PROGRESS NOTES
Diagnoses and all orders for this visit:    1  Encounter for gynecological examination without abnormal finding/  -     Liquid-based pap, screening  Pap collected today, discussed new guidelines  Ok to use lubrication during intercourse or OTC vaginal moisturizers twice a week  Healthy eating and nutrition, daily weight bearing exercise discussed and SBE reinforced  2  Post-menopausal  Discussed calcium and vitamin D daily intake  All questions and concerns answered  Call with any questions  Pleasant 63 y o  postmenopausal female here for new patient annual exam  , vaginal deliveries  She denies any vaginal bleeding of any kind  Denies vaginal itching, discharge and odor  Denies pelvic pain, breast problems and urinary incontinence  Mild dyspareunia, still sexually active  Family history of significant with mother with ovarian cancer  She is up-to-date on mammogram, order reprinted for her today  Order also reprinted for her colonoscopy  Last pap was in , no history of abnormal paps  Based on new pap guidelines, patient will need pap today  Vitals:    20 0925   BP: 122/86   BP Location: Right arm   Patient Position: Sitting   Cuff Size: Standard   Temp: 98 3 °F (36 8 °C)   Weight: 77 1 kg (170 lb)     Body mass index is 28 29 kg/m²      Allergies   Allergen Reactions    Other      strawberry    Pineapple Hives       Past Medical History:   Diagnosis Date    Acute maxillary sinusitis 2017    Acute pain of right shoulder 4/10/2019    Gastric ulcer     Hyperproinsulinemia     [id number] resolved    Swelling of finger joint of right hand 12/10/2019     Past Surgical History:   Procedure Laterality Date    APPENDECTOMY      HERNIA REPAIR       Family History   Problem Relation Age of Onset    Ovarian cancer Mother 48    Thyroid cancer Sister     Breast cancer Family     Breast cancer Maternal Uncle      Social History     Tobacco Use    Smoking status: Never Smoker    Smokeless tobacco: Never Used   Substance Use Topics    Alcohol use: No    Drug use: No       Current Outpatient Medications:     ARIPiprazole (ABILIFY) 5 mg tablet, , Disp: , Rfl:     atorvastatin (LIPITOR) 20 mg tablet, Take 1 tablet (20 mg total) by mouth daily, Disp: 90 tablet, Rfl: 3    DULoxetine (CYMBALTA) 60 mg delayed release capsule, Take 1 capsule (60 mg total) by mouth daily for 63 days, Disp: 30 capsule, Rfl: 1    hydrochlorothiazide (HYDRODIURIL) 25 mg tablet, Take 1 tablet (25 mg total) by mouth daily, Disp: 30 tablet, Rfl: 2    losartan (COZAAR) 50 mg tablet, Take 1 tablet (50 mg total) by mouth daily, Disp: 90 tablet, Rfl: 1    OXcarbazepine (TRILEPTAL) 300 mg tablet, , Disp: , Rfl:     OB History    Para Term  AB Living   4 4 4         SAB TAB Ectopic Multiple Live Births                  # Outcome Date GA Lbr Maurice/2nd Weight Sex Delivery Anes PTL Lv   4 Term            3 Term            2 Term            1 Term                      Review of Systems   Constitutional: Negative for chills, fatigue, fever and unexpected weight change  Respiratory: Negative for shortness of breath  Gastrointestinal: Negative for anal bleeding, blood in stool, constipation and diarrhea  Genitourinary: Negative for difficulty urinating, dysuria and hematuria  OBGyn Exam     Physical Exam   Constitutional: She appears well-developed and well-nourished  No distress  Head: Normocephalic  Neck: Normal range of motion  Neck supple  Pulmonary: Effort normal   Breasts: bilateral without masses, skin changes or nipple discharge  Bilaterally soft and warm to touch  No areas of erythema or pain  Abdominal: Soft  Non-tender  Pelvic exam was performed with patient supine  No labial fusion, thinning or leukoplakia  There is no rash, tenderness, lesion or injury on the right labia  There is no rash, tenderness, lesion or injury on the left labia   Uterus is not deviated, not enlarged, not fixed and not tender  Ureathral meatus w/o pus or discharge during bladder palpation and urethral stripping  Cervix exhibits no motion tenderness, no discharge and no friability, stenotic os: no  Right adnexum displays no mass, no tenderness and no fullness  Left adnexum displays no mass, no tenderness and no fullness  No erythema , ++ tenderness in the vagina, R/T vaginal dryness  No signs of atrophy, erosion, shortening and/or tightening of vaginal canal  No foreign body in the vagina  No signs of injury around the vagina  No vaginal discharge found  PAP collected w/o difficulty  Prolapse: None  Lymphadenopathy:          Right: No inguinal adenopathy present  Left: No inguinal adenopathy present  Perineum and anal area w/o lesions hemorrhoids or noticeable bleeding

## 2020-07-30 LAB
HPV HR 12 DNA CVX QL NAA+PROBE: POSITIVE
HPV16 DNA CVX QL NAA+PROBE: NEGATIVE
HPV18 DNA CVX QL NAA+PROBE: NEGATIVE

## 2020-08-03 LAB
LAB AP GYN PRIMARY INTERPRETATION: ABNORMAL
Lab: ABNORMAL
PATH INTERP SPEC-IMP: ABNORMAL

## 2020-08-04 DIAGNOSIS — N76.0 ACUTE VAGINITIS: Primary | ICD-10-CM

## 2020-08-04 RX ORDER — METRONIDAZOLE 7.5 MG/G
1 GEL VAGINAL
Qty: 70 G | Refills: 0 | Status: SHIPPED | OUTPATIENT
Start: 2020-08-04 | End: 2020-08-09

## 2020-08-05 ENCOUNTER — OFFICE VISIT (OUTPATIENT)
Dept: GASTROENTEROLOGY | Facility: CLINIC | Age: 55
End: 2020-08-05
Payer: COMMERCIAL

## 2020-08-05 ENCOUNTER — PREP FOR PROCEDURE (OUTPATIENT)
Dept: GASTROENTEROLOGY | Facility: CLINIC | Age: 55
End: 2020-08-05

## 2020-08-05 VITALS
HEART RATE: 78 BPM | DIASTOLIC BLOOD PRESSURE: 84 MMHG | SYSTOLIC BLOOD PRESSURE: 120 MMHG | WEIGHT: 170 LBS | HEIGHT: 65 IN | BODY MASS INDEX: 28.32 KG/M2 | TEMPERATURE: 98 F

## 2020-08-05 DIAGNOSIS — Z12.11 SPECIAL SCREENING FOR MALIGNANT NEOPLASMS, COLON: Primary | ICD-10-CM

## 2020-08-05 DIAGNOSIS — Z12.11 SCREENING FOR COLON CANCER: Primary | ICD-10-CM

## 2020-08-05 PROCEDURE — 3079F DIAST BP 80-89 MM HG: CPT | Performed by: PHYSICIAN ASSISTANT

## 2020-08-05 PROCEDURE — 3008F BODY MASS INDEX DOCD: CPT | Performed by: PHYSICIAN ASSISTANT

## 2020-08-05 PROCEDURE — 99203 OFFICE O/P NEW LOW 30 MIN: CPT | Performed by: PHYSICIAN ASSISTANT

## 2020-08-05 PROCEDURE — 3074F SYST BP LT 130 MM HG: CPT | Performed by: PHYSICIAN ASSISTANT

## 2020-08-05 PROCEDURE — 1036F TOBACCO NON-USER: CPT | Performed by: PHYSICIAN ASSISTANT

## 2020-08-05 NOTE — PATIENT INSTRUCTIONS
Colonoscopy   AMBULATORY CARE:   What you need to know about a colonoscopy:  A colonoscopy is a procedure to examine the inside of your colon (intestine) with a scope  A scope is a flexible tube with a small light and camera on the end  Polyps or tissue growths may be removed during your colonoscopy  What you need to do the week before your colonoscopy: You will need to stop taking medicines that contain aspirin or iron for 7 days before your colonoscopy  If you take anticoagulants, such as warfarin, ask when you should stop taking it  Make plans for someone to drive you home after your procedure  How to prepare for your colonoscopy: Your healthcare provider will have you prepare your bowels before your procedure  Your bowels will need to be empty before your procedure to allow him to clearly see your colon  You will need to do the following the day before your procedure:  · Have only clear liquids  for the entire day before your colonoscopy  Clear liquid diet includes clear fruit juices and broths, clear flavored gelatin, and hard candy  It also includes coffee, tea, carbonated beverages, and clear sports drinks  · Follow your bowel prep as directed  There are many different preparations that can be given before a colonoscopy  Some are given over 2 hours and others over 6 hours  Some are given earlier in the afternoon the day before the colonoscopy  Others are given the day before and then the morning of the colonoscopy  With any bowel prep, stay close to the bathroom  This liquid will cause your bowels to move frequently  · An enema  may be needed  Your healthcare provider may tell you to use an enema to help clean out your bowels  · Do not eat or drink anything after midnight  This will help prevent problems that can happen if you vomit while under anesthesia  What will happen during your colonoscopy:   · You will be given medicine to help you relax   You will lie on your left side and raise one or both knees toward your chest  Your healthcare provider will examine your anus and use a finger to check your rectum  You may need another enema if your bowel is not empty  The scope will be lubricated and gently placed into your anus  It will then be passed through your rectum and into your colon  Water or air will be put into your colon to help clean or expand it  This is done so your healthcare provider can see your colon clearly  · Tissue samples may be taken from the walls of your bowel and sent to a lab for tests  If you have a polyp, your healthcare provider will pass a wire loop through the scope and use it to hold the polyp  The polyp is then burned or cut off the wall of your colon  Removed polyps are sent to a lab for tests  Pictures of your colon may be taken during the procedure  The scope will be removed when the procedure is done  What will happen after your colonoscopy:   · Rest after your procedure  You may feel bloated, have some gas and abdominal discomfort  You may need to lie on your right side with a heating pad on your abdomen  You may need to take short walks to help move the gas out  Eat small meals, if you feel bloated  Do not drive or make important decisions until the day after your procedure  · You may have polyps removed  Do not take aspirin or go on long car trips for 7 days after your procedure  Ask your healthcare provider about any other limits after your procedure  Risks of a colonoscopy: You may have pain or bleeding after the scope or polyps are removed  You may also have a slow heartbeat, decreased blood pressure, or increased sweating  Your colon may tear due to the increased pressure from the scope and other instruments  This may cause bowel contents to leak out of your colon and into your abdomen  If this happens, you will need to stay in the hospital and have surgery on your colon     Seek care immediately if:   · You have a large amount of bright red blood in your bowel movements  · Your abdomen is hard and firm and you have severe pain  · You have sudden trouble breathing  Contact your healthcare provider if:   · You develop a rash or hives  · You have a fever within 24 hours of your procedure  · You have not had a bowel movement for 3 days after your procedure  · You have questions or concerns about your condition or care  Activity:   · Do not lift, strain, or run  for 3 days after your procedure  · Rest after your procedure  You have been given medicine to relax you  Do not  drive or make important decisions until the day after your procedure  Return to your normal activity as directed  · Relieve gas and discomfort from bloating  by lying on your right side with a heating pad on your abdomen  You may need to take short walks to help the gas move out  Eat small meals until bloating is relieved  If you had polyps removed: For 7 days after your procedure:  · Do not  take aspirin  · Do not  go on long car rides  Help prevent constipation:   · Eat a variety of healthy foods  Healthy foods include fruit, vegetables, whole-grain breads, low-fat dairy products, beans, lean meat, and fish  Ask if you need to be on a special diet  Your healthcare provider may recommend that you eat high-fiber foods such as cooked beans  Fiber helps you have regular bowel movements  · Drink liquids as directed  Adults should drink between 9 and 13 eight-ounce cups of liquid every day  Ask what amount is best for you  For most people, good liquids to drink are water, juice, and milk  · Exercise as directed  Talk to your healthcare provider about the best exercise plan for you  Exercise can help prevent constipation, decrease your blood pressure and improve your health  Follow up with your healthcare provider as directed:  Write down your questions so you remember to ask them during your visits     © 2017 [employer] Information is for End User's use only and may not be sold, redistributed or otherwise used for commercial purposes  All illustrations and images included in CareNotes® are the copyrighted property of [employer]  or [employer]  The above information is an  only  It is not intended as medical advice for individual conditions or treatments  Talk to your doctor, nurse or pharmacist before following any medical regimen to see if it is safe and effective for you

## 2020-08-05 NOTE — PROGRESS NOTES
[facility] - Outpatient Consultation  Jessi Mary 63 y o  female MRN: [id number]  Encounter: [id number]          ASSESSMENT AND PLAN:      1  Special screening for malignant neoplasms, colon  Will plan colonoscopy     ______________________________________________________________________    HPI:   58-year-old female who is here for screening colonoscopy  This will be patient's 1st colonoscopy  Patient denies any family history of colon cancer  Patient denies any abdominal pain, diarrhea, constipation  Patient denies any melena or rectal bleeding  REVIEW OF SYSTEMS:    CONSTITUTIONAL: Denies any fever, chills, rigors, and weight loss  HEENT: No earache or tinnitus  Denies hearing loss or visual disturbances  CARDIOVASCULAR: No chest pain or palpitations  RESPIRATORY: Denies any cough, hemoptysis, shortness of breath or dyspnea on exertion  GASTROINTESTINAL: As noted in the History of Present Illness  GENITOURINARY: No problems with urination  Denies any hematuria or dysuria  NEUROLOGIC: No dizziness or vertigo, denies headaches  MUSCULOSKELETAL: Denies any muscle or joint pain  SKIN: Denies skin rashes or itching  ENDOCRINE: Denies excessive thirst  Denies intolerance to heat or cold  PSYCHOSOCIAL: Denies depression or anxiety  Denies any recent memory loss         Historical Information   Past Medical History:   Diagnosis Date    Acute maxillary sinusitis 12/27/2017    Acute pain of right shoulder 4/10/2019    Gastric ulcer     Hyperproinsulinemia     [id number] resolved    Swelling of finger joint of right hand 12/10/2019     Past Surgical History:   Procedure Laterality Date    APPENDECTOMY      HERNIA REPAIR       Social History   Social History     Substance and Sexual Activity   Alcohol Use No     Social History     Substance and Sexual Activity   Drug Use No     Social History     Tobacco Use   Smoking Status Never Smoker   Smokeless Tobacco Never Used     Family History   Problem Relation Age of Onset    Ovarian cancer Mother 48    Thyroid cancer Sister     Breast cancer Family     Breast cancer Maternal Uncle        Meds/Allergies       Current Outpatient Medications:     ARIPiprazole (ABILIFY) 5 mg tablet    atorvastatin (LIPITOR) 20 mg tablet    hydrochlorothiazide (HYDRODIURIL) 25 mg tablet    losartan (COZAAR) 50 mg tablet    metroNIDAZOLE (METROGEL) 0 75 % vaginal gel    OXcarbazepine (TRILEPTAL) 300 mg tablet    DULoxetine (CYMBALTA) 60 mg delayed release capsule    Allergies   Allergen Reactions    Other      strawberry    Pineapple Hives           Objective     Blood pressure 120/84, pulse 78, temperature 98 °F (36 7 °C), height 5' 5", weight 77 1 kg (170 lb)  Body mass index is 28 29 kg/m²  PHYSICAL EXAM:      General Appearance:   Alert, cooperative, no distress   HEENT:   Normocephalic, atraumatic, anicteric      Neck:  Supple, symmetrical, trachea midline   Lungs:   Clear to auscultation bilaterally; no rales, rhonchi or wheezing; respirations unlabored    Heart[de-identified]   Regular rate and rhythm; no murmur, rub, or gallop  Abdomen:   Soft, non-tender, non-distended; normal bowel sounds; no masses, no organomegaly    Genitalia:   Deferred    Rectal:   Deferred    Extremities:  No cyanosis, clubbing or edema    Pulses:  2+ and symmetric    Skin:  No jaundice, rashes, or lesions    Lymph nodes:  No palpable cervical lymphadenopathy        Lab Results:   No visits with results within 1 Day(s) from this visit     Latest known visit with results is:   Office Visit on 07/24/2020   Component Date Value    Case Report 07/24/2020                      Value:Gynecologic Cytology Report                       Case: [id number]                                  Authorizing Provider:  MARVEL Houser       Collected:           07/24/2020 0915              Ordering Location:     [facility] &     Received:            07/24/2020 [facility]                                                                               [facility]                                                                       First Screen:          Rosalio Olson, [address]                                                         Pathologist:           Eric Anderson MD                                                        Specimen:    LIQUID-BASED PAP, SCREENING, Cervix                                                        Primary Interpretation 07/24/2020 Epithelial cell abnormality     Interpretation 07/24/2020 Atypical squamous cells of undetermined significance  Shift in cecile suggestive of bacterial vaginosis     Specimen Adequacy 07/24/2020 Satisfactory for evaluation  Endocervical/transformation zone component present   Additional Information 07/24/2020                      Value: This result contains rich text formatting which cannot be displayed here   HPV Other HR 07/24/2020 Positive*    HPV16 07/24/2020 Negative     HPV18 07/24/2020 Negative          Radiology Results:   No results found

## 2020-08-05 NOTE — LETTER
August 5, 2020     Verneita Stain, [address]  [address] [place] [address]  [address]    Patient: Nika Kulkarni   YOB: 1965   Date of Visit: 8/5/2020       Dear Dr Rosas Hair:    Thank you for referring Nika Kulkarni to me for evaluation  Below are my notes for this consultation  If you have questions, please do not hesitate to call me  I look forward to following your patient along with you  Sincerely,        Tima Martin PA-C        CC: No Recipients  Isabella Mays  8/5/2020  9:23 AM  Sign when Signing Visit  [facility] - Outpatient Consultation  Nika Kulkarni 63 y o  female MRN: [id number]  Encounter: [id number]          ASSESSMENT AND PLAN:      1  Special screening for malignant neoplasms, colon  Will plan colonoscopy     ______________________________________________________________________    HPI:   48-year-old female who is here for screening colonoscopy  This will be patient's 1st colonoscopy  Patient denies any family history of colon cancer  Patient denies any abdominal pain, diarrhea, constipation  Patient denies any melena or rectal bleeding  REVIEW OF SYSTEMS:    CONSTITUTIONAL: Denies any fever, chills, rigors, and weight loss  HEENT: No earache or tinnitus  Denies hearing loss or visual disturbances  CARDIOVASCULAR: No chest pain or palpitations  RESPIRATORY: Denies any cough, hemoptysis, shortness of breath or dyspnea on exertion  GASTROINTESTINAL: As noted in the History of Present Illness  GENITOURINARY: No problems with urination  Denies any hematuria or dysuria  NEUROLOGIC: No dizziness or vertigo, denies headaches  MUSCULOSKELETAL: Denies any muscle or joint pain  SKIN: Denies skin rashes or itching  ENDOCRINE: Denies excessive thirst  Denies intolerance to heat or cold  PSYCHOSOCIAL: Denies depression or anxiety  Denies any recent memory loss         Historical Information   Past Medical History: Diagnosis Date    Acute maxillary sinusitis 12/27/2017    Acute pain of right shoulder 4/10/2019    Gastric ulcer     Hyperproinsulinemia     [id number] resolved    Swelling of finger joint of right hand 12/10/2019     Past Surgical History:   Procedure Laterality Date    APPENDECTOMY      HERNIA REPAIR       Social History   Social History     Substance and Sexual Activity   Alcohol Use No     Social History     Substance and Sexual Activity   Drug Use No     Social History     Tobacco Use   Smoking Status Never Smoker   Smokeless Tobacco Never Used     Family History   Problem Relation Age of Onset    Ovarian cancer Mother 48    Thyroid cancer Sister     Breast cancer Family     Breast cancer Maternal Uncle        Meds/Allergies       Current Outpatient Medications:     ARIPiprazole (ABILIFY) 5 mg tablet    atorvastatin (LIPITOR) 20 mg tablet    hydrochlorothiazide (HYDRODIURIL) 25 mg tablet    losartan (COZAAR) 50 mg tablet    metroNIDAZOLE (METROGEL) 0 75 % vaginal gel    OXcarbazepine (TRILEPTAL) 300 mg tablet    DULoxetine (CYMBALTA) 60 mg delayed release capsule    Allergies   Allergen Reactions    Other      strawberry    Pineapple Hives           Objective     Blood pressure 120/84, pulse 78, temperature 98 °F (36 7 °C), height 5' 5", weight 77 1 kg (170 lb)  Body mass index is 28 29 kg/m²  PHYSICAL EXAM:      General Appearance:   Alert, cooperative, no distress   HEENT:   Normocephalic, atraumatic, anicteric      Neck:  Supple, symmetrical, trachea midline   Lungs:   Clear to auscultation bilaterally; no rales, rhonchi or wheezing; respirations unlabored    Heart[de-identified]   Regular rate and rhythm; no murmur, rub, or gallop     Abdomen:   Soft, non-tender, non-distended; normal bowel sounds; no masses, no organomegaly    Genitalia:   Deferred    Rectal:   Deferred    Extremities:  No cyanosis, clubbing or edema    Pulses:  2+ and symmetric    Skin:  No jaundice, rashes, or lesions  Lymph nodes:  No palpable cervical lymphadenopathy        Lab Results:   No visits with results within 1 Day(s) from this visit  Latest known visit with results is:   Office Visit on 07/24/2020   Component Date Value    Case Report 07/24/2020                      Value:Gynecologic Cytology Report                       Case: [id number]                                  Authorizing Provider:  MARVEL Schneider       Collected:           07/24/2020 0915              Ordering Location:     On license of [facility] &     Received:            07/24/2020 0915                                     [facility]                                                                               [facility] Screen:          Radha Gunn, [address]                                                         Pathologist:           Renée Ordonez MD                                                        Specimen:    LIQUID-BASED PAP, SCREENING, Cervix                                                        Primary Interpretation 07/24/2020 Epithelial cell abnormality     Interpretation 07/24/2020 Atypical squamous cells of undetermined significance  Shift in cecile suggestive of bacterial vaginosis     Specimen Adequacy 07/24/2020 Satisfactory for evaluation  Endocervical/transformation zone component present   Additional Information 07/24/2020                      Value: This result contains rich text formatting which cannot be displayed here   HPV Other HR 07/24/2020 Positive*    HPV16 07/24/2020 Negative     HPV18 07/24/2020 Negative          Radiology Results:   No results found

## 2020-08-07 ENCOUNTER — HOSPITAL ENCOUNTER (OUTPATIENT)
Dept: MAMMOGRAPHY | Facility: CLINIC | Age: 55
Discharge: HOME | End: 2020-08-07
Payer: COMMERCIAL

## 2020-08-07 DIAGNOSIS — Z12.31 VISIT FOR SCREENING MAMMOGRAM: ICD-10-CM

## 2020-08-07 DIAGNOSIS — Z12.39 SCREENING FOR BREAST CANCER: ICD-10-CM

## 2020-08-07 PROCEDURE — 77063 BREAST TOMOSYNTHESIS BI: CPT

## 2020-08-07 PROCEDURE — 77067 SCR MAMMO BI INCL CAD: CPT

## 2020-08-10 ENCOUNTER — OFFICE VISIT (OUTPATIENT)
Dept: OBGYN CLINIC | Facility: CLINIC | Age: 55
End: 2020-08-10
Payer: COMMERCIAL

## 2020-08-10 VITALS
BODY MASS INDEX: 28.32 KG/M2 | RESPIRATION RATE: 18 BRPM | WEIGHT: 170 LBS | DIASTOLIC BLOOD PRESSURE: 88 MMHG | SYSTOLIC BLOOD PRESSURE: 127 MMHG | TEMPERATURE: 98.5 F | HEART RATE: 77 BPM | HEIGHT: 65 IN

## 2020-08-10 DIAGNOSIS — M19.011 ARTHRITIS OF RIGHT ACROMIOCLAVICULAR JOINT: ICD-10-CM

## 2020-08-10 DIAGNOSIS — G56.01 CARPAL TUNNEL SYNDROME ON RIGHT: ICD-10-CM

## 2020-08-10 DIAGNOSIS — M75.41 SUBACROMIAL IMPINGEMENT OF RIGHT SHOULDER: ICD-10-CM

## 2020-08-10 DIAGNOSIS — M54.12 RADICULOPATHY, CERVICAL REGION: ICD-10-CM

## 2020-08-10 DIAGNOSIS — M67.813 TENDINOSIS OF RIGHT SHOULDER: Primary | ICD-10-CM

## 2020-08-10 DIAGNOSIS — M50.30 DEGENERATIVE DISC DISEASE, CERVICAL: ICD-10-CM

## 2020-08-10 DIAGNOSIS — M47.812 FACET ARTHROPATHY, CERVICAL: ICD-10-CM

## 2020-08-10 PROCEDURE — 99213 OFFICE O/P EST LOW 20 MIN: CPT | Performed by: FAMILY MEDICINE

## 2020-08-10 PROCEDURE — 3079F DIAST BP 80-89 MM HG: CPT | Performed by: FAMILY MEDICINE

## 2020-08-10 PROCEDURE — 3008F BODY MASS INDEX DOCD: CPT | Performed by: FAMILY MEDICINE

## 2020-08-10 PROCEDURE — 20610 DRAIN/INJ JOINT/BURSA W/O US: CPT | Performed by: FAMILY MEDICINE

## 2020-08-10 PROCEDURE — 3074F SYST BP LT 130 MM HG: CPT | Performed by: FAMILY MEDICINE

## 2020-08-10 PROCEDURE — 1036F TOBACCO NON-USER: CPT | Performed by: FAMILY MEDICINE

## 2020-08-10 RX ORDER — NAPROXEN 500 MG/1
500 TABLET ORAL 2 TIMES DAILY WITH MEALS
Qty: 60 TABLET | Refills: 0 | Status: SHIPPED | OUTPATIENT
Start: 2020-08-10 | End: 2020-10-15

## 2020-08-10 RX ORDER — LIDOCAINE HYDROCHLORIDE 10 MG/ML
4 INJECTION, SOLUTION INFILTRATION; PERINEURAL
Status: COMPLETED | OUTPATIENT
Start: 2020-08-10 | End: 2020-08-10

## 2020-08-10 RX ORDER — TRIAMCINOLONE ACETONIDE 40 MG/ML
40 INJECTION, SUSPENSION INTRA-ARTICULAR; INTRAMUSCULAR
Status: COMPLETED | OUTPATIENT
Start: 2020-08-10 | End: 2020-08-10

## 2020-08-10 RX ORDER — LIDOCAINE HYDROCHLORIDE 10 MG/ML
2 INJECTION, SOLUTION INFILTRATION; PERINEURAL
Status: COMPLETED | OUTPATIENT
Start: 2020-08-10 | End: 2020-08-10

## 2020-08-10 RX ADMIN — TRIAMCINOLONE ACETONIDE 40 MG: 40 INJECTION, SUSPENSION INTRA-ARTICULAR; INTRAMUSCULAR at 08:24

## 2020-08-10 RX ADMIN — LIDOCAINE HYDROCHLORIDE 4 ML: 10 INJECTION, SOLUTION INFILTRATION; PERINEURAL at 08:24

## 2020-08-10 RX ADMIN — LIDOCAINE HYDROCHLORIDE 2 ML: 10 INJECTION, SOLUTION INFILTRATION; PERINEURAL at 08:24

## 2020-08-10 NOTE — PROGRESS NOTES
Assessment/Plan:  Assessment/Plan   Diagnoses and all orders for this visit:    Tendinosis of right shoulder  -     Ambulatory referral to Physical Therapy; Future  -     naproxen (NAPROSYN) 500 mg tablet; Take 1 tablet (500 mg total) by mouth 2 (two) times a day with meals    Arthritis of right acromioclavicular joint  -     Ambulatory referral to Physical Therapy; Future    Subacromial impingement of right shoulder  -     Large joint arthrocentesis: R subacromial bursa  -     Ambulatory referral to Physical Therapy; Future  -     naproxen (NAPROSYN) 500 mg tablet; Take 1 tablet (500 mg total) by mouth 2 (two) times a day with meals    Degenerative disc disease, cervical  -     Ambulatory referral to Physical Therapy; Future    Facet arthropathy, cervical  -     Ambulatory referral to Physical Therapy; Future    Radiculopathy, cervical region  -     Ambulatory referral to Physical Therapy; Future    Carpal tunnel syndrome on right  -     Ambulatory referral to Orthopedic Surgery; Future        79-year-old right-hand-dominant female with right shoulder pain of more than 15 months duration  Discussed with patient MRI results, impression and plan  MRI of the right shoulder is noted for mild supraspinatus and infraspinatus tendinosis without tear, and AC joint arthritis  Physical exam of the right shoulder noted for tenderness lateral subacromial aspect  She has range of motion limited to forward flexion 130°, abduction 90°, and internal rotation to lumbar spine  She has 4+/5 external rotation and supraspinatus  There is positive Johns maneuver  Clinical impression that she is symptomatic from combination of subacromial impingement and cervical radiculopathy  I discussed treatment regimen of anti-inflammatory, physical therapy, and corticosteroid injection  I administered mixture of 4 cc 1% lidocaine and 1 cc Kenalog to the right subacromial space without complication    She is to take naproxen 500 mg twice daily with food consistently for 2 weeks, and she is to start physical therapy as soon as possible and do home exercises as directed  She will follow up in 8 weeks at which point she will be re-evaluated  Subjective:   Patient ID: Renu Holt is a 63 y o  female  Chief Complaint   Patient presents with    Right Shoulder - Follow-up, Pain       39-year-old right-hand-dominant female following up for right shoulder pain of more than 15 months duration  She was last seen by me 15 months ago at which point she was referred for MRI of the right shoulder  She received right corticosteroid injection and states that pain was significantly improved until few months ago  Pain described generalized to the shoulder worse at the lateral aspect, intermittent, achy and sometimes sharp, radiating distally along the lateral aspect of the upper arm, worse with move the arm and elevating the arm above shoulder level, associated limited range of motion, associated with numbness, and improved with return to neutral position  Shoulder Pain   This is a chronic problem  The current episode started more than 1 year ago  The problem occurs daily  The problem has been gradually worsening  Associated symptoms include arthralgias and numbness  Pertinent negatives include no joint swelling or weakness  Exacerbated by: Arm movement  She has tried rest, NSAIDs and position changes for the symptoms  The treatment provided mild relief  Review of Systems   Musculoskeletal: Positive for arthralgias  Negative for joint swelling  Neurological: Positive for numbness  Negative for weakness  Objective:  Vitals:    08/10/20 0814   BP: 127/88   Pulse: 77   Resp: 18   Temp: 98 5 °F (36 9 °C)   TempSrc: Temporal   Weight: 77 1 kg (170 lb)   Height: 5' 5" (1 651 m)     Right Elbow Exam     Tenderness   The patient is experiencing no tenderness  Range of Motion   The patient has normal right elbow ROM      Muscle Strength   The patient has normal right elbow strength  Right Shoulder Exam     Tenderness   Right shoulder tenderness location: Lateral subacromial     Range of Motion   Active abduction: 90   Forward flexion: 120   Internal rotation 0 degrees: Lumbar     Muscle Strength   Right shoulder normal muscle strength: 4+/5 strength external rotation and supraspinatus  Abduction: 4/5   Internal rotation: 5/5     Tests   Johns test: positive    Comments:  Negative belly press  Negative push-off            Physical Exam  Vitals signs and nursing note reviewed  Constitutional:       General: She is not in acute distress  Appearance: She is well-developed  HENT:      Head: Normocephalic  Eyes:      Conjunctiva/sclera: Conjunctivae normal    Neck:      Trachea: No tracheal deviation  Cardiovascular:      Rate and Rhythm: Normal rate  Pulmonary:      Effort: Pulmonary effort is normal  No respiratory distress  Abdominal:      General: There is no distension  Skin:     General: Skin is warm and dry  Neurological:      Mental Status: She is alert and oriented to person, place, and time     Psychiatric:         Behavior: Behavior normal          Large joint arthrocentesis: R subacromial bursa  Date/Time: 8/10/2020 8:24 AM  Consent given by: patient  Site marked: site marked  Timeout: Immediately prior to procedure a time out was called to verify the correct patient, procedure, equipment, support staff and site/side marked as required   Supporting Documentation  Indications: pain   Procedure Details  Location: shoulder - R subacromial bursa  Preparation: Patient was prepped and draped in the usual sterile fashion  Needle size: 22 G  Ultrasound guidance: no  Approach: posterior  Medications administered: 2 mL lidocaine 1 %; 4 mL lidocaine 1 %; 40 mg triamcinolone acetonide 40 mg/mL    Patient tolerance: patient tolerated the procedure well with no immediate complications  Dressing:  Sterile dressing applied

## 2020-08-13 ENCOUNTER — OFFICE VISIT (OUTPATIENT)
Dept: OBGYN CLINIC | Facility: CLINIC | Age: 55
End: 2020-08-13
Payer: COMMERCIAL

## 2020-08-13 VITALS
HEIGHT: 65 IN | HEART RATE: 66 BPM | BODY MASS INDEX: 28.32 KG/M2 | DIASTOLIC BLOOD PRESSURE: 83 MMHG | SYSTOLIC BLOOD PRESSURE: 139 MMHG | WEIGHT: 170 LBS

## 2020-08-13 DIAGNOSIS — G56.01 CARPAL TUNNEL SYNDROME ON RIGHT: ICD-10-CM

## 2020-08-13 PROCEDURE — 3008F BODY MASS INDEX DOCD: CPT | Performed by: ORTHOPAEDIC SURGERY

## 2020-08-13 PROCEDURE — 3079F DIAST BP 80-89 MM HG: CPT | Performed by: ORTHOPAEDIC SURGERY

## 2020-08-13 PROCEDURE — 99213 OFFICE O/P EST LOW 20 MIN: CPT | Performed by: ORTHOPAEDIC SURGERY

## 2020-08-13 PROCEDURE — 3075F SYST BP GE 130 - 139MM HG: CPT | Performed by: ORTHOPAEDIC SURGERY

## 2020-08-13 PROCEDURE — 3008F BODY MASS INDEX DOCD: CPT | Performed by: STUDENT IN AN ORGANIZED HEALTH CARE EDUCATION/TRAINING PROGRAM

## 2020-08-13 PROCEDURE — 1036F TOBACCO NON-USER: CPT | Performed by: ORTHOPAEDIC SURGERY

## 2020-08-13 NOTE — PROGRESS NOTES
CHIEF COMPLAINT:  Chief Complaint   Patient presents with    Right Thumb - Pain       SUBJECTIVE:  Drew Olivier is a 63y o  year old female who presents to the office with 2 problem,s  Pt states that she has pain and swelling in her right thumb that increases with activities  Pt states that this has been going on for 4 months  Pt has been taking advil as needed for pain  Pt denies injury  Pt also complains of occasional numbness and tingling that affects all of the fingers on her right hand  Pt denies night time symptoms  Pt has not been wearing splints  Pt denies aggravating factors         PAST MEDICAL HISTORY:  Past Medical History:   Diagnosis Date    Acute maxillary sinusitis 12/27/2017    Acute pain of right shoulder 4/10/2019    BRCA1 negative 2015    BRCA2 negative 2015    Gastric ulcer     Hyperproinsulinemia     [id number] resolved    Swelling of finger joint of right hand 12/10/2019       PAST SURGICAL HISTORY:  Past Surgical History:   Procedure Laterality Date    APPENDECTOMY      HERNIA REPAIR         FAMILY HISTORY:  Family History   Problem Relation Age of Onset    Ovarian cancer Mother 48    Thyroid cancer Sister 52    Breast cancer Family     Breast cancer Maternal Uncle     No Known Problems Daughter     Cancer Maternal Grandmother         unknown type or age   [facility] No Known Problems Paternal Grandmother     No Known Problems Sister     No Known Problems Maternal Aunt        SOCIAL HISTORY:  Social History     Tobacco Use    Smoking status: Never Smoker    Smokeless tobacco: Never Used   Substance Use Topics    Alcohol use: No    Drug use: No       MEDICATIONS:    Current Outpatient Medications:     atorvastatin (LIPITOR) 20 mg tablet, Take 1 tablet (20 mg total) by mouth daily, Disp: 90 tablet, Rfl: 3    hydrochlorothiazide (HYDRODIURIL) 25 mg tablet, Take 1 tablet (25 mg total) by mouth daily, Disp: 30 tablet, Rfl: 2    losartan (COZAAR) 50 mg tablet, Take 1 tablet (50 mg total) by mouth daily, Disp: 90 tablet, Rfl: 1    naproxen (NAPROSYN) 500 mg tablet, Take 1 tablet (500 mg total) by mouth 2 (two) times a day with meals, Disp: 60 tablet, Rfl: 0    ALLERGIES:  Allergies   Allergen Reactions    Other      strawberry    Pineapple Hives       REVIEW OF SYSTEMS:  Review of Systems   Constitutional: Negative for chills, fever and unexpected weight change  HENT: Negative for hearing loss, nosebleeds and sore throat  Eyes: Negative for pain, redness and visual disturbance  Respiratory: Negative for cough, shortness of breath and wheezing  Cardiovascular: Negative for chest pain, palpitations and leg swelling  Gastrointestinal: Negative for abdominal pain, nausea and vomiting  Endocrine: Negative for polydipsia and polyuria  Genitourinary: Negative for dysuria and hematuria  Skin: Negative for rash and wound  Neurological: Negative for dizziness and headaches  Psychiatric/Behavioral: Negative for decreased concentration, dysphoric mood and suicidal ideas  The patient is not nervous/anxious          VITALS:  Vitals:    08/13/20 0750   BP: 139/83   Pulse: 66       LABS:  HgA1c:   Lab Results   Component Value Date    HGBA1C 5 6 04/10/2019     BMP:   Lab Results   Component Value Date    CALCIUM 9 9 07/01/2020    K 3 5 07/01/2020    CO2 27 07/01/2020     07/01/2020    BUN 11 07/01/2020    CREATININE 0 65 07/01/2020       _____________________________________________________  PHYSICAL EXAMINATION:  General: well developed and well nourished, alert, oriented times 3 and appears comfortable  Psychiatric: Normal  HEENT: Trachea Midline, No torticollis  Pulmonary: No audible wheezing or strider  Cardiovascular: No discernable arrhythmia   Skin: No masses, erythema, lacerations, fluctation, ulcerations  Neurovascular: Sensation Intact to the Median, Ulnar, Radial Nerve, Motor Intact to the Median, Ulnar, Radial Nerve and Pulses Intact    MUSCULOSKELETAL EXAMINATION:  Right thumb  No swelling erythema or ecchymosis  Palpable bone spur at the IP joint  No pain or crepitus with CMC grind test      ___________________________________________________  STUDIES REVIEWED:  EMG bilateral upper extremities performed 05/21/2019 shows 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 with demyelinating changes, mild chronic C6 radiculopathy    X-ray of the right thumb performed 01/15/2020 shows mild degenerative changes of the IP joint and CMC joints      PROCEDURES PERFORMED:  Procedures  No Procedures performed today    _____________________________________________________  ASSESSMENT/PLAN:  Right thumb IP joint OA  *CSI was administered today without complications  *Pt was advised that they may have increased pain for the next 24-36 hours from the CSI before feeling relief, during this time pt was advised to take NSAIDs and apply ice  *After the initial 36 hours pt is advised to apply heat and continue motion of the thumb to decrease discomfort  *Pt was advised that activity modification such as resting after increased activity or taking NSAIDs prior to increased activity may be necessary    *Pt was advised that these CSI should not be administered more frequently than 3 months apart  *Pt will follow up in 2 weeks      Right mild carpal tunnel  * Pt was provided with a wrist brace to wear for sleeping   * further evaluation will be performed at next visit      Follow Up:  No follow-ups on file        To Do Next Visit:  Re-evaluation of current issue- carpal tunnel exam         Scribe Attestation    I,:   Sebastian Mars am acting as a scribe while in the presence of the attending physician :        I,:   Piper Lee MD personally performed the services described in this documentation    as scribed in my presence :

## 2020-08-24 ENCOUNTER — TELEPHONE (OUTPATIENT)
Dept: OBGYN CLINIC | Facility: CLINIC | Age: 55
End: 2020-08-24

## 2020-08-24 NOTE — TELEPHONE ENCOUNTER
Pt said its been 2 yrs since her period ended, is bleeding lightly and the color is black", scheduled for a colpo in Sept  Will make apt sooner for evaluation

## 2020-08-25 ENCOUNTER — OFFICE VISIT (OUTPATIENT)
Dept: OBGYN CLINIC | Facility: MEDICAL CENTER | Age: 55
End: 2020-08-25
Payer: COMMERCIAL

## 2020-08-25 VITALS — WEIGHT: 168.4 LBS | BODY MASS INDEX: 28.02 KG/M2 | SYSTOLIC BLOOD PRESSURE: 128 MMHG | DIASTOLIC BLOOD PRESSURE: 86 MMHG

## 2020-08-25 DIAGNOSIS — N95.0 PMB (POSTMENOPAUSAL BLEEDING): Primary | ICD-10-CM

## 2020-08-25 PROCEDURE — 3079F DIAST BP 80-89 MM HG: CPT | Performed by: STUDENT IN AN ORGANIZED HEALTH CARE EDUCATION/TRAINING PROGRAM

## 2020-08-25 PROCEDURE — 99214 OFFICE O/P EST MOD 30 MIN: CPT | Performed by: STUDENT IN AN ORGANIZED HEALTH CARE EDUCATION/TRAINING PROGRAM

## 2020-08-25 PROCEDURE — 1036F TOBACCO NON-USER: CPT | Performed by: STUDENT IN AN ORGANIZED HEALTH CARE EDUCATION/TRAINING PROGRAM

## 2020-08-25 PROCEDURE — 3074F SYST BP LT 130 MM HG: CPT | Performed by: STUDENT IN AN ORGANIZED HEALTH CARE EDUCATION/TRAINING PROGRAM

## 2020-08-25 NOTE — PROGRESS NOTES
Assessment/Plan:     Problem List Items Addressed This Visit    Visit Diagnoses     PMB (postmenopausal bleeding)    -  Primary  Discussed option of proceeding with biopsy today vs obtain US and completing biopsy if endometrial stripe > 4 mm  Pt would like to complete US  If needs biopsy will complete at time of upcoming colposcopy  Will f/u by phone  US pelvis complete non OB          Subjective:     Patient ID: Gabby Caceres is a 63 y o  female  64 yo  presents for PMB which started yesterday  Reports yesterday was light spotting but today was heavier and bright red  No pelvic pain or cramping  Has never had PMB before  Became menopausal about 2 yrs go  Review of Systems   All other systems reviewed and are negative  Objective:     Physical Exam  Exam conducted with a chaperone present  Constitutional:       Appearance: Normal appearance  Pulmonary:      Effort: Pulmonary effort is normal    Abdominal:      General: Abdomen is flat  Palpations: Abdomen is soft  Genitourinary:     Labia:         Right: No rash  Left: No rash  Vagina: Bleeding present  Cervix: Cervical bleeding present  No cervical motion tenderness or lesion  Uterus: Normal  Not enlarged  Neurological:      Mental Status: She is oriented to person, place, and time     Psychiatric:         Mood and Affect: Mood normal

## 2020-09-01 ENCOUNTER — HOSPITAL ENCOUNTER (OUTPATIENT)
Dept: ULTRASOUND IMAGING | Facility: CLINIC | Age: 55
Discharge: HOME | End: 2020-09-01
Payer: COMMERCIAL

## 2020-09-01 DIAGNOSIS — N95.0 PMB (POSTMENOPAUSAL BLEEDING): ICD-10-CM

## 2020-09-01 PROCEDURE — 76856 US EXAM PELVIC COMPLETE: CPT

## 2020-09-01 PROCEDURE — 76830 TRANSVAGINAL US NON-OB: CPT

## 2020-09-03 ENCOUNTER — OFFICE VISIT (OUTPATIENT)
Dept: FAMILY MEDICINE CLINIC | Facility: CLINIC | Age: 55
End: 2020-09-03
Payer: COMMERCIAL

## 2020-09-03 VITALS
HEART RATE: 74 BPM | OXYGEN SATURATION: 98 % | TEMPERATURE: 98.2 F | BODY MASS INDEX: 27.79 KG/M2 | SYSTOLIC BLOOD PRESSURE: 118 MMHG | HEIGHT: 65 IN | WEIGHT: 166.8 LBS | DIASTOLIC BLOOD PRESSURE: 74 MMHG

## 2020-09-03 DIAGNOSIS — R20.0 LEFT ARM NUMBNESS: ICD-10-CM

## 2020-09-03 DIAGNOSIS — M54.2 CERVICALGIA: Primary | ICD-10-CM

## 2020-09-03 DIAGNOSIS — M79.602 LEFT ARM PAIN: ICD-10-CM

## 2020-09-03 DIAGNOSIS — E66.3 OVERWEIGHT: ICD-10-CM

## 2020-09-03 DIAGNOSIS — R79.82 ELEVATED C-REACTIVE PROTEIN (CRP): ICD-10-CM

## 2020-09-03 PROCEDURE — 99213 OFFICE O/P EST LOW 20 MIN: CPT | Performed by: PHYSICIAN ASSISTANT

## 2020-09-03 RX ORDER — METHOCARBAMOL 500 MG/1
500 TABLET, FILM COATED ORAL 3 TIMES DAILY PRN
Qty: 30 TABLET | Refills: 0 | Status: SHIPPED | OUTPATIENT
Start: 2020-09-03 | End: 2020-09-19 | Stop reason: ALTCHOICE

## 2020-09-03 NOTE — PROGRESS NOTES
Assessment/Plan:       Problem List Items Addressed This Visit        Other    Cervicalgia - Primary    Relevant Medications    methocarbamol (ROBAXIN) 500 mg tablet    Overweight    Elevated C-reactive protein (CRP)    Relevant Orders    C-reactive protein    Left arm pain    Relevant Medications    methocarbamol (ROBAXIN) 500 mg tablet    Left arm numbness    Relevant Medications    methocarbamol (ROBAXIN) 500 mg tablet        naproxen and robaxin for cervicalgia with L arm radiation  Very uncomfortable appearing and pain is reproducible on exam, limited neck ROM  Recheck CRP in one month, shared CRP is non-specific marker of inflammation  Follow up as needed for persisting or worsening of sx  Subjective:      Patient ID: Nani Velasquez is a 63 y o  female  Pt presents to follow up of life screening where she was told her CRP was 4 3 and elevated  She also has been having significant left sided neck pain and pain radiates down the L arm and sometimes her hand goes numb  She shares whenever she hangs her arm down the pain and shooting/stabbing sensation radiating down the arm is worse  She denies any injury to the neck, shoulder or arm  She works as a CNA  She also states she had a "spasm" on the left side of her upper chest 2 days ago which quickly resolved  It was not related to exertion, there was no associated SOB, palpitations, sweating, N/V, lightheadedness, dizziness  She has no hx of heart conditions  She feels her neck is very stiff today and appears uncomfortable  She has not taken anything for pain  The following portions of the patient's history were reviewed and updated as appropriate:   She  has a past medical history of Abnormal Pap smear of cervix, Acute maxillary sinusitis (12/27/2017), Acute pain of right shoulder (4/10/2019), BRCA1 negative (2015), BRCA2 negative (2015), Gastric ulcer, Hyperproinsulinemia, and Swelling of finger joint of right hand (12/10/2019)    She   Patient Active Problem List    Diagnosis Date Noted    Overweight 09/03/2020    Elevated C-reactive protein (CRP) 09/03/2020    Left arm pain 09/03/2020    Left arm numbness 09/03/2020    Encounter for gynecological examination without abnormal finding 07/24/2020    Post-menopausal 07/24/2020    Varicose veins of both lower extremities with pain 07/01/2020    2019 novel coronavirus disease (COVID-19) 05/30/2020    Pain in thumb joint with movement, right 01/15/2020    Swelling of right thumb 01/15/2020    Cervicalgia 04/23/2019    Carpal tunnel syndrome, right 04/23/2019    Calcific tendonitis of right shoulder 04/10/2019    Sciatica, right side 03/26/2019    Dermatitis 10/10/2018    Fatigue 10/10/2018    Essential hypertension 10/10/2018    Snoring 10/10/2018    Arthralgia of both hands 06/01/2018    Obesity (BMI 30 0-34 9) 06/01/2018    B[facility] disorder [facility] ) 06/01/2018    Hot flashes 06/01/2018    Bipolar 1 disorder, depressed, moderate ([facility] ) 11/06/2017    Thyroid pain 11/06/2017    Perimenopausal symptoms 08/19/2016    Hyperlipidemia 03/03/2016    Lumbar radiculopathy 11/18/2015    Migraine headache 11/19/2014    Depression 07/05/2012     She  has a past surgical history that includes Appendectomy and Hernia repair  Her family history includes Breast cancer in her family and maternal uncle; Cancer in her maternal grandmother; No Known Problems in her daughter, maternal aunt, paternal grandmother, and sister; Ovarian cancer (age of onset: 48) in her mother; Thyroid cancer (age of onset: 52) in her sister  She  reports that she has never smoked  She has never used smokeless tobacco  She reports that she does not drink alcohol or use drugs    Current Outpatient Medications   Medication Sig Dispense Refill    atorvastatin (LIPITOR) 20 mg tablet Take 1 tablet (20 mg total) by mouth daily 90 tablet 3    hydrochlorothiazide (HYDRODIURIL) 25 mg tablet Take 1 tablet (25 mg total) by mouth daily 30 tablet 2    losartan (COZAAR) 50 mg tablet Take 1 tablet (50 mg total) by mouth daily 90 tablet 1    methocarbamol (ROBAXIN) 500 mg tablet Take 1 tablet (500 mg total) by mouth 3 (three) times a day as needed for muscle spasms 30 tablet 0    naproxen (NAPROSYN) 500 mg tablet Take 1 tablet (500 mg total) by mouth 2 (two) times a day with meals (Patient not taking: Reported on 9/3/2020) 60 tablet 0     No current facility-administered medications for this visit  Current Outpatient Medications on File Prior to Visit   Medication Sig    atorvastatin (LIPITOR) 20 mg tablet Take 1 tablet (20 mg total) by mouth daily    hydrochlorothiazide (HYDRODIURIL) 25 mg tablet Take 1 tablet (25 mg total) by mouth daily    losartan (COZAAR) 50 mg tablet Take 1 tablet (50 mg total) by mouth daily    naproxen (NAPROSYN) 500 mg tablet Take 1 tablet (500 mg total) by mouth 2 (two) times a day with meals (Patient not taking: Reported on 9/3/2020)     No current facility-administered medications on file prior to visit  She is allergic to other and pineapple       Review of Systems   Constitutional: Negative for chills, fatigue and fever  HENT: Negative for congestion, ear pain, hearing loss, nosebleeds, postnasal drip, rhinorrhea, sinus pressure, sinus pain, sneezing and sore throat  Eyes: Negative for pain, discharge, itching and visual disturbance  Respiratory: Negative for cough, chest tightness, shortness of breath and wheezing  Cardiovascular: Negative for chest pain, palpitations and leg swelling  Gastrointestinal: Negative for abdominal pain, blood in stool, constipation, diarrhea, nausea and vomiting  Genitourinary: Negative for frequency and urgency  Musculoskeletal: Positive for neck pain and neck stiffness  Skin: Negative  Neurological: Positive for numbness  Negative for dizziness, syncope, facial asymmetry, weakness, light-headedness and headaches  Objective:      /74   Pulse 74   Temp 98 2 °F (36 8 °C)   Ht 5' 5" (1 651 m)   Wt 75 7 kg (166 lb 12 8 oz)   SpO2 98%   BMI 27 76 kg/m²          Physical Exam  Vitals signs and nursing note reviewed  Constitutional:       General: She is not in acute distress  Appearance: Normal appearance  HENT:      Head: Normocephalic and atraumatic  Nose: Nose normal       Mouth/Throat:      Mouth: Mucous membranes are moist       Pharynx: Oropharynx is clear  No oropharyngeal exudate or posterior oropharyngeal erythema  Eyes:      Pupils: Pupils are equal, round, and reactive to light  Neck:      Musculoskeletal: Neck supple  Decreased range of motion  Pain with movement and muscular tenderness present  Cardiovascular:      Rate and Rhythm: Normal rate and regular rhythm  Heart sounds: Normal heart sounds  Pulmonary:      Effort: Pulmonary effort is normal  No respiratory distress  Breath sounds: Normal breath sounds  Musculoskeletal:      Cervical back: She exhibits decreased range of motion, tenderness, pain and spasm  Back:    Skin:     General: Skin is warm and dry  Neurological:      Mental Status: She is alert and oriented to person, place, and time  Sensory: No sensory deficit  Psychiatric:         Mood and Affect: Mood and affect normal          BMI Counseling: Body mass index is 27 76 kg/m²  The BMI is above normal  Nutrition recommendations include reducing portion sizes

## 2020-09-04 ENCOUNTER — TELEPHONE (OUTPATIENT)
Dept: OBGYN CLINIC | Facility: CLINIC | Age: 55
End: 2020-09-04

## 2020-09-04 NOTE — TELEPHONE ENCOUNTER
----- Message from Aida Gallego MD sent at 9/4/2020  1:45 PM EDT -----  Please let Radha Nunez know that her pelvic US shows fibroids but lining of uterus is normal  If she has another episode of bleeding she should have an endometrial biopsy  She should still plan to present on 9/9 for colpo with ashwin almanza

## 2020-09-09 ENCOUNTER — PROCEDURE VISIT (OUTPATIENT)
Dept: OBGYN CLINIC | Facility: CLINIC | Age: 55
End: 2020-09-09
Payer: COMMERCIAL

## 2020-09-09 VITALS
DIASTOLIC BLOOD PRESSURE: 82 MMHG | BODY MASS INDEX: 27.66 KG/M2 | HEIGHT: 65 IN | WEIGHT: 166 LBS | SYSTOLIC BLOOD PRESSURE: 122 MMHG

## 2020-09-09 DIAGNOSIS — B97.7 HPV (HUMAN PAPILLOMA VIRUS) INFECTION: Primary | ICD-10-CM

## 2020-09-09 DIAGNOSIS — R87.610 ASCUS WITH POSITIVE HIGH RISK HPV CERVICAL: ICD-10-CM

## 2020-09-09 DIAGNOSIS — R87.810 ASCUS WITH POSITIVE HIGH RISK HPV CERVICAL: ICD-10-CM

## 2020-09-09 PROCEDURE — 88305 TISSUE EXAM BY PATHOLOGIST: CPT | Performed by: PATHOLOGY

## 2020-09-09 PROCEDURE — 57454 BX/CURETT OF CERVIX W/SCOPE: CPT | Performed by: NURSE PRACTITIONER

## 2020-09-09 NOTE — PATIENT INSTRUCTIONS
HPV (Human Papillomavirus)   WHAT YOU NEED TO KNOW:   What is human papillomavirus (HPV)? HPV is the most common infection spread by sexual contact  It can also be spread from a mother to her baby during delivery  HPV may cause oral and genital warts or tumors in your nose, mouth, throat, and lungs  HPV may also cause vaginal, penile, and anal cancers  You may not show symptoms of any of these conditions for several years after being exposed to HPV  What are the symptoms of HPV? · Painless warts    · Genital or anal discharge, bleeding, itching, or pain    · Pain when you urinate  How is HPV diagnosed and treated? Your healthcare provider may use a vinegar liquid to help diagnose HPV genital warts  He or she may take tissue samples to test for HPV infection  There is no cure for HPV  There is treatment for the conditions that are caused by HPV  You will need to be closely monitored for these conditions  Ask your healthcare provider for more information about monitoring, conditions caused by HPV, and treatments  How can HPV infection be prevented? Vaccinations can help stop the spread of HPV  The vaccine is most effective if given before sexual activity begins  This allows your body to build almost complete protection against HPV before having contact with the virus  The HPV vaccine is still effective up to the age of 32 if it is given after sexual activity has already begun  Who should get the HPV vaccine? The first dose of the vaccine may be given as early as 5years of age  The following should also get the vaccine:  · All females and males 6to 15years of age    · Females 15 through 32years of age, and males 15 through 24years of age, who have not been vaccinated     · Men up to 32years of age who have sex with other men, and have not been vaccinated     · Anyone with a weak immune system, including infection with HIV, up to 32years of age  Who should not get the vaccine or should wait to get it? Do not get a second dose if you had a severe allergic reaction to the first dose  Do not get the vaccine if you are pregnant  If you are sick, wait to get the vaccine until symptoms go away  How is the vaccine given? The HPV vaccine can be given with other vaccinations  The vaccine is given in 3 doses to adults:  · The first dose  is given at any time  · The second dose  is given 1 to 2 months after the first dose  · The third dose  is given 6 months after the first dose  What else do I need to know about how the vaccine is given? You may need 1 more dose of the HPV vaccine if any of the following is true:  · You only received 1 dose of the HPV vaccine before 13years of age    · You received 2 doses of the HPV vaccine less than 5 months apart before 13years of age  When should I contact my healthcare provider? · You have warts in your genital or anal area  · You have genital or anal discharge, bleeding, itching, or pain  · You have pain when you urinate  · You have questions or concerns about your condition or care  CARE AGREEMENT:   You have the right to help plan your care  Learn about your health condition and how it may be treated  Discuss treatment options with your caregivers to decide what care you want to receive  You always have the right to refuse treatment  The above information is an  only  It is not intended as medical advice for individual conditions or treatments  Talk to your doctor, nurse or pharmacist before following any medical regimen to see if it is safe and effective for you  © 2017 [employer]  Information is for End User's use only and may not be sold, redistributed or otherwise used for commercial purposes  All illustrations and images included in CareNotes® are the copyrighted property of [employer]  or [employer]

## 2020-09-09 NOTE — PROGRESS NOTES
Colposcopy    Date/Time: 9/9/2020 9:20 AM  Performed by: MARVEL Kincaid  Authorized by: MARVEL Kincaid     Consent:     Consent obtained:  Written    Consent given by:  Patient    Procedural risks discussed:  Bleeding, infection, possible continued pain and repeat procedure    Patient questions answered: yes      Patient agrees, verbalizes understanding, and wants to proceed: yes      Educational handouts given: yes      Instructions and paperwork completed: yes    Universal protocol:     Patient states understanding of procedure being performed: yes      Relevant documents present and verified: yes      Site marked: yes    Pre-procedure:     Pre-procedure timeout performed: yes      Prepped with: acetic acid and Lugol    Indication:     Indication:  ASC-US (HPV NON 16/18 + ON 2020 PAP)  Procedure:     Procedure: Colposcopy w/ cervical biopsy and ECC      Under satisfactory analgesia the patient was prepped and draped in the dorsal lithotomy position: yes      Canisteo speculum was placed in the vagina: yes      Under colposcopic examination the transition zone was seen in entirety: yes      Intracervical block was performed: no      Endocervix was curetted using a Kevorkian curette: yes      Cervical biopsy performed with a cervical biopsy punch: yes      Tampon inserted: no      Monsel's solution was applied: yes      Biopsy(s): yes      Location:  3,6 AND ECC    Specimen to pathology: yes    Post-procedure:     Findings: White epithelium      Impression: Low grade cervical dysplasia      Patient tolerance of procedure: Tolerated well, no immediate complications  Comments:      Repap in 1 yr if biopsies are low level abnormalities  All questions answered  Post procedure handout given

## 2020-09-11 ENCOUNTER — TELEPHONE (OUTPATIENT)
Dept: OBGYN CLINIC | Facility: CLINIC | Age: 55
End: 2020-09-11

## 2020-09-11 NOTE — TELEPHONE ENCOUNTER
----- Message from Jhonny Kincaid sent at 9/11/2020 11:18 AM EDT -----  Please let patient know her colposcopy biopsy results were normal so we can repeat pap smear in 1 year  Thanks

## 2020-09-11 NOTE — TELEPHONE ENCOUNTER
Pt  Called back and message was stated to her  She is aware everything is normal and she should book another pap in 1 year

## 2020-09-19 ENCOUNTER — ANESTHESIA (OUTPATIENT)
Dept: GASTROENTEROLOGY | Facility: HOSPITAL | Age: 55
End: 2020-09-19

## 2020-09-19 ENCOUNTER — ANESTHESIA EVENT (OUTPATIENT)
Dept: GASTROENTEROLOGY | Facility: HOSPITAL | Age: 55
End: 2020-09-19

## 2020-09-19 ENCOUNTER — HOSPITAL ENCOUNTER (OUTPATIENT)
Dept: GASTROENTEROLOGY | Facility: HOSPITAL | Age: 55
Setting detail: OUTPATIENT SURGERY
Discharge: HOME | End: 2020-09-19
Attending: INTERNAL MEDICINE | Admitting: INTERNAL MEDICINE
Payer: COMMERCIAL

## 2020-09-19 VITALS
TEMPERATURE: 97.8 F | DIASTOLIC BLOOD PRESSURE: 74 MMHG | HEART RATE: 54 BPM | SYSTOLIC BLOOD PRESSURE: 155 MMHG | WEIGHT: 162.26 LBS | BODY MASS INDEX: 27.03 KG/M2 | RESPIRATION RATE: 18 BRPM | HEIGHT: 65 IN | OXYGEN SATURATION: 100 %

## 2020-09-19 VITALS — HEART RATE: 63 BPM

## 2020-09-19 DIAGNOSIS — Z12.11 SCREENING FOR COLON CANCER: ICD-10-CM

## 2020-09-19 PROCEDURE — 88305 TISSUE EXAM BY PATHOLOGIST: CPT | Performed by: PATHOLOGY

## 2020-09-19 PROCEDURE — 45380 COLONOSCOPY AND BIOPSY: CPT | Performed by: INTERNAL MEDICINE

## 2020-09-19 RX ORDER — PROPOFOL 10 MG/ML
INJECTION, EMULSION INTRAVENOUS AS NEEDED
Status: DISCONTINUED | OUTPATIENT
Start: 2020-09-19 | End: 2020-09-19

## 2020-09-19 RX ORDER — SODIUM CHLORIDE, SODIUM LACTATE, POTASSIUM CHLORIDE, CALCIUM CHLORIDE 600; 310; 30; 20 MG/100ML; MG/100ML; MG/100ML; MG/100ML
125 INJECTION, SOLUTION INTRAVENOUS CONTINUOUS
Status: DISCONTINUED | OUTPATIENT
Start: 2020-09-19 | End: 2020-09-23 | Stop reason: HOSPADM

## 2020-09-19 RX ORDER — SODIUM CHLORIDE, SODIUM LACTATE, POTASSIUM CHLORIDE, CALCIUM CHLORIDE 600; 310; 30; 20 MG/100ML; MG/100ML; MG/100ML; MG/100ML
INJECTION, SOLUTION INTRAVENOUS CONTINUOUS PRN
Status: DISCONTINUED | OUTPATIENT
Start: 2020-09-19 | End: 2020-09-19

## 2020-09-19 RX ORDER — LIDOCAINE HYDROCHLORIDE 10 MG/ML
INJECTION, SOLUTION EPIDURAL; INFILTRATION; INTRACAUDAL; PERINEURAL AS NEEDED
Status: DISCONTINUED | OUTPATIENT
Start: 2020-09-19 | End: 2020-09-19

## 2020-09-19 RX ORDER — HYDROCHLOROTHIAZIDE 25 MG/1
25 TABLET ORAL DAILY
COMMUNITY
End: 2020-10-12 | Stop reason: SDUPTHER

## 2020-09-19 RX ADMIN — PROPOFOL 50 MG: 10 INJECTION, EMULSION INTRAVENOUS at 11:09

## 2020-09-19 RX ADMIN — SODIUM CHLORIDE, SODIUM LACTATE, POTASSIUM CHLORIDE, AND CALCIUM CHLORIDE: .6; .31; .03; .02 INJECTION, SOLUTION INTRAVENOUS at 10:54

## 2020-09-19 RX ADMIN — PROPOFOL 50 MG: 10 INJECTION, EMULSION INTRAVENOUS at 11:05

## 2020-09-19 RX ADMIN — LIDOCAINE HYDROCHLORIDE 50 MG: 10 INJECTION, SOLUTION EPIDURAL; INFILTRATION; INTRACAUDAL; PERINEURAL at 11:01

## 2020-09-19 RX ADMIN — PROPOFOL 100 MG: 10 INJECTION, EMULSION INTRAVENOUS at 11:01

## 2020-09-19 RX ADMIN — PROPOFOL 50 MG: 10 INJECTION, EMULSION INTRAVENOUS at 11:03

## 2020-09-19 NOTE — ANESTHESIA POSTPROCEDURE EVALUATION
Post-Op Assessment Note    CV Status:  Stable  Pain Score: 0    Pain management: adequate     Mental Status:  Arousable   Hydration Status:  Stable   PONV Controlled:  None   Airway Patency:  Patent      Post Op Vitals Reviewed: Yes      Staff: Anesthesiologist, CRNA         No complications documented      /73 (09/19/20 1117)    Temp 97 8 °F (36 6 °C) (09/19/20 1117)    Pulse 64 (09/19/20 1117)   Resp 15 (09/19/20 1117)    SpO2 99 % (09/19/20 1117)

## 2020-09-19 NOTE — ANESTHESIA PREPROCEDURE EVALUATION
Procedure:  COLONOSCOPY    Relevant Problems   CARDIO   (+) Essential hypertension   (+) Hyperlipidemia      MUSCULOSKELETAL   (+) Sciatica, right side      NEURO/PSYCH   (+) Depression        Physical Exam    Airway    Mallampati score: II  TM Distance: >3 FB  Neck ROM: full     Dental   No notable dental hx     Cardiovascular      Pulmonary      Other Findings        Anesthesia Plan  ASA Score- 2     Anesthesia Type- IV sedation with anesthesia with ASA Monitors  Additional Monitors:   Airway Plan:           Plan Factors-Exercise tolerance (METS): >4 METS  Chart reviewed  Patient summary reviewed  Patient is not a current smoker  Obstructive sleep apnea risk education given perioperatively  Induction- intravenous  Postoperative Plan-     Informed Consent- Anesthetic plan and risks discussed with patient  I personally reviewed this patient with the CRNA  Discussed and agreed on the Anesthesia Plan with the CRNA  Silvia Ahuja

## 2020-09-19 NOTE — H&P
History and Physical -  [facility]  Fidel Gandhi 63 y o  female MRN: [id number]      HPI: Fidel Gandhi is a 63y o  year old female who presents for screening colonoscopy      REVIEW OF SYSTEMS: Per the HPI, and otherwise unremarkable  Historical Information   Past Medical History:   Diagnosis Date    Abnormal Pap smear of cervix     Acute maxillary sinusitis 12/27/2017    Acute pain of right shoulder 4/10/2019    BRCA1 negative 2015    BRCA2 negative 2015    Gastric ulcer     Hyperproinsulinemia     [id number] resolved    Swelling of finger joint of right hand 12/10/2019     Past Surgical History:   Procedure Laterality Date    APPENDECTOMY      HERNIA REPAIR       Social History   Social History     Substance and Sexual Activity   Alcohol Use No     Social History     Substance and Sexual Activity   Drug Use No     Social History     Tobacco Use   Smoking Status Never Smoker   Smokeless Tobacco Never Used     Family History   Problem Relation Age of Onset    Ovarian cancer Mother 48    Thyroid cancer Sister 52    Breast cancer Family     Breast cancer Maternal Uncle     No Known Problems Daughter     Cancer Maternal Grandmother         unknown type or age   Clifm Lj No Known Problems Paternal Grandmother     No Known Problems Sister     No Known Problems Maternal Aunt        Meds/Allergies     (Not in a hospital admission)      Allergies   Allergen Reactions    Other      strawberry    Pineapple Hives       Objective     not currently breastfeeding  PHYSICAL EXAM    Gen: NAD  CV: RRR  CHEST: Clear  ABD: soft, NT/ND  EXT: no edema      ASSESSMENT/PLAN:  This is a 63y o  year old female here for screening colonoscopy, and she is stable and optimized for her procedure

## 2020-09-25 ENCOUNTER — TELEPHONE (OUTPATIENT)
Dept: GASTROENTEROLOGY | Facility: CLINIC | Age: 55
End: 2020-09-25

## 2020-09-25 NOTE — TELEPHONE ENCOUNTER
----- Message from Jean Hernandez DO sent at 9/24/2020  7:32 PM EDT -----  Please call the patient with these biopsy results  The polyp of the rectum was a hyperplastic polyp which is benign and it was completely removed    The patient will be due for repeat colonoscopy in 5 years

## 2020-10-02 DIAGNOSIS — E78.2 MIXED HYPERLIPIDEMIA: ICD-10-CM

## 2020-10-02 RX ORDER — ATORVASTATIN CALCIUM 20 MG/1
20 TABLET, FILM COATED ORAL DAILY
Qty: 90 TABLET | Refills: 3 | Status: SHIPPED | OUTPATIENT
Start: 2020-10-02

## 2020-10-12 ENCOUNTER — APPOINTMENT (OUTPATIENT)
Dept: RADIOLOGY | Facility: CLINIC | Age: 55
End: 2020-10-12
Payer: COMMERCIAL

## 2020-10-12 ENCOUNTER — OFFICE VISIT (OUTPATIENT)
Dept: OBGYN CLINIC | Facility: CLINIC | Age: 55
End: 2020-10-12
Payer: COMMERCIAL

## 2020-10-12 ENCOUNTER — TELEPHONE (OUTPATIENT)
Dept: FAMILY MEDICINE CLINIC | Facility: CLINIC | Age: 55
End: 2020-10-12

## 2020-10-12 VITALS
BODY MASS INDEX: 27.32 KG/M2 | SYSTOLIC BLOOD PRESSURE: 117 MMHG | HEART RATE: 64 BPM | WEIGHT: 164 LBS | HEIGHT: 65 IN | DIASTOLIC BLOOD PRESSURE: 82 MMHG | TEMPERATURE: 98.5 F

## 2020-10-12 DIAGNOSIS — M54.12 RADICULOPATHY, CERVICAL REGION: Primary | ICD-10-CM

## 2020-10-12 DIAGNOSIS — I10 ESSENTIAL HYPERTENSION: Primary | ICD-10-CM

## 2020-10-12 DIAGNOSIS — M25.512 ACUTE PAIN OF LEFT SHOULDER: ICD-10-CM

## 2020-10-12 PROCEDURE — 99213 OFFICE O/P EST LOW 20 MIN: CPT | Performed by: FAMILY MEDICINE

## 2020-10-12 PROCEDURE — 73030 X-RAY EXAM OF SHOULDER: CPT

## 2020-10-12 RX ORDER — HYDROCHLOROTHIAZIDE 25 MG/1
25 TABLET ORAL DAILY
Qty: 30 TABLET | Refills: 2 | Status: SHIPPED | OUTPATIENT
Start: 2020-10-12 | End: 2021-02-15 | Stop reason: SDUPTHER

## 2020-10-14 ENCOUNTER — OFFICE VISIT (OUTPATIENT)
Dept: URGENT CARE | Facility: CLINIC | Age: 55
End: 2020-10-14
Payer: COMMERCIAL

## 2020-10-14 VITALS
BODY MASS INDEX: 27.32 KG/M2 | HEART RATE: 73 BPM | RESPIRATION RATE: 18 BRPM | HEIGHT: 65 IN | SYSTOLIC BLOOD PRESSURE: 153 MMHG | DIASTOLIC BLOOD PRESSURE: 78 MMHG | TEMPERATURE: 97.1 F | WEIGHT: 164 LBS | OXYGEN SATURATION: 100 %

## 2020-10-14 DIAGNOSIS — F43.0 PANIC ATTACK AS REACTION TO STRESS: Primary | ICD-10-CM

## 2020-10-14 DIAGNOSIS — F41.0 PANIC ATTACK AS REACTION TO STRESS: Primary | ICD-10-CM

## 2020-10-14 PROCEDURE — G0382 LEV 3 HOSP TYPE B ED VISIT: HCPCS | Performed by: PHYSICIAN ASSISTANT

## 2020-10-15 ENCOUNTER — OFFICE VISIT (OUTPATIENT)
Dept: OBGYN CLINIC | Facility: CLINIC | Age: 55
End: 2020-10-15
Payer: COMMERCIAL

## 2020-10-15 VITALS
HEART RATE: 69 BPM | BODY MASS INDEX: 27.32 KG/M2 | DIASTOLIC BLOOD PRESSURE: 85 MMHG | HEIGHT: 65 IN | SYSTOLIC BLOOD PRESSURE: 130 MMHG | WEIGHT: 164 LBS

## 2020-10-15 DIAGNOSIS — M77.9 TENDONITIS: Primary | ICD-10-CM

## 2020-10-15 PROCEDURE — 20550 NJX 1 TENDON SHEATH/LIGAMENT: CPT | Performed by: ORTHOPAEDIC SURGERY

## 2020-10-15 PROCEDURE — 1036F TOBACCO NON-USER: CPT | Performed by: ORTHOPAEDIC SURGERY

## 2020-10-15 PROCEDURE — 99213 OFFICE O/P EST LOW 20 MIN: CPT | Performed by: ORTHOPAEDIC SURGERY

## 2020-10-15 RX ADMIN — TRIAMCINOLONE ACETONIDE 20 MG: 40 INJECTION, SUSPENSION INTRA-ARTICULAR; INTRAMUSCULAR at 14:54

## 2020-10-15 RX ADMIN — LIDOCAINE HYDROCHLORIDE 0.5 ML: 10 INJECTION, SOLUTION INFILTRATION; PERINEURAL at 14:54

## 2020-10-16 RX ORDER — TRIAMCINOLONE ACETONIDE 40 MG/ML
20 INJECTION, SUSPENSION INTRA-ARTICULAR; INTRAMUSCULAR
Status: COMPLETED | OUTPATIENT
Start: 2020-10-15 | End: 2020-10-15

## 2020-10-16 RX ORDER — LIDOCAINE HYDROCHLORIDE 10 MG/ML
0.5 INJECTION, SOLUTION INFILTRATION; PERINEURAL
Status: COMPLETED | OUTPATIENT
Start: 2020-10-15 | End: 2020-10-15

## 2020-10-23 ENCOUNTER — HOSPITAL ENCOUNTER (OUTPATIENT)
Dept: MRI IMAGING | Facility: HOSPITAL | Age: 55
Discharge: HOME | End: 2020-10-23
Attending: FAMILY MEDICINE
Payer: COMMERCIAL

## 2020-10-23 DIAGNOSIS — M54.12 RADICULOPATHY, CERVICAL REGION: ICD-10-CM

## 2020-10-23 PROCEDURE — G1004 CDSM NDSC: HCPCS

## 2020-10-23 PROCEDURE — 72141 MRI NECK SPINE W/O DYE: CPT

## 2020-10-29 ENCOUNTER — OFFICE VISIT (OUTPATIENT)
Dept: OBGYN CLINIC | Facility: CLINIC | Age: 55
End: 2020-10-29
Payer: COMMERCIAL

## 2020-10-29 VITALS
DIASTOLIC BLOOD PRESSURE: 86 MMHG | SYSTOLIC BLOOD PRESSURE: 122 MMHG | HEART RATE: 65 BPM | TEMPERATURE: 97.3 F | BODY MASS INDEX: 27.49 KG/M2 | WEIGHT: 165 LBS | HEIGHT: 65 IN

## 2020-10-29 DIAGNOSIS — M47.812 FACET ARTHROPATHY, CERVICAL: ICD-10-CM

## 2020-10-29 DIAGNOSIS — M62.838 CERVICAL PARASPINAL MUSCLE SPASM: ICD-10-CM

## 2020-10-29 DIAGNOSIS — M50.30 DEGENERATIVE DISC DISEASE, CERVICAL: ICD-10-CM

## 2020-10-29 DIAGNOSIS — M54.12 RADICULOPATHY, CERVICAL REGION: Primary | ICD-10-CM

## 2020-10-29 DIAGNOSIS — M62.838 TRAPEZIUS MUSCLE SPASM: ICD-10-CM

## 2020-10-29 PROCEDURE — 3008F BODY MASS INDEX DOCD: CPT | Performed by: FAMILY MEDICINE

## 2020-10-29 PROCEDURE — 3074F SYST BP LT 130 MM HG: CPT | Performed by: FAMILY MEDICINE

## 2020-10-29 PROCEDURE — 99213 OFFICE O/P EST LOW 20 MIN: CPT | Performed by: FAMILY MEDICINE

## 2020-10-29 PROCEDURE — 3079F DIAST BP 80-89 MM HG: CPT | Performed by: FAMILY MEDICINE

## 2020-10-29 RX ORDER — DICLOFENAC SODIUM 75 MG/1
75 TABLET, DELAYED RELEASE ORAL 2 TIMES DAILY
Qty: 60 TABLET | Refills: 1 | Status: SHIPPED | OUTPATIENT
Start: 2020-10-29 | End: 2022-02-21 | Stop reason: ALTCHOICE

## 2020-11-03 DIAGNOSIS — I10 ESSENTIAL HYPERTENSION: ICD-10-CM

## 2020-11-03 RX ORDER — LOSARTAN POTASSIUM 50 MG/1
50 TABLET ORAL DAILY
Qty: 90 TABLET | Refills: 1 | Status: SHIPPED | OUTPATIENT
Start: 2020-11-03 | End: 2021-07-02 | Stop reason: SDUPTHER

## 2021-02-15 DIAGNOSIS — I10 ESSENTIAL HYPERTENSION: ICD-10-CM

## 2021-02-15 NOTE — TELEPHONE ENCOUNTER
Pt has an appt with you on 02/23 and needs a refill on hydrochlorothiazide sent to [facility] on [address] in [place]   Thank you

## 2021-02-16 RX ORDER — HYDROCHLOROTHIAZIDE 25 MG/1
25 TABLET ORAL DAILY
Qty: 30 TABLET | Refills: 2 | Status: SHIPPED | OUTPATIENT
Start: 2021-02-16 | End: 2021-05-21 | Stop reason: SDUPTHER

## 2021-02-22 ENCOUNTER — OFFICE VISIT (OUTPATIENT)
Dept: OBGYN CLINIC | Facility: CLINIC | Age: 56
End: 2021-02-22
Payer: COMMERCIAL

## 2021-02-22 VITALS
SYSTOLIC BLOOD PRESSURE: 125 MMHG | HEIGHT: 65 IN | WEIGHT: 169 LBS | HEART RATE: 65 BPM | BODY MASS INDEX: 28.16 KG/M2 | DIASTOLIC BLOOD PRESSURE: 86 MMHG

## 2021-02-22 DIAGNOSIS — M62.838 TRAPEZIUS MUSCLE SPASM: ICD-10-CM

## 2021-02-22 DIAGNOSIS — M54.12 RADICULOPATHY, CERVICAL REGION: Primary | ICD-10-CM

## 2021-02-22 DIAGNOSIS — M47.812 FACET ARTHROPATHY, CERVICAL: ICD-10-CM

## 2021-02-22 DIAGNOSIS — M62.838 CERVICAL PARASPINAL MUSCLE SPASM: ICD-10-CM

## 2021-02-22 DIAGNOSIS — M50.30 DEGENERATIVE DISC DISEASE, CERVICAL: ICD-10-CM

## 2021-02-22 PROCEDURE — 99213 OFFICE O/P EST LOW 20 MIN: CPT | Performed by: FAMILY MEDICINE

## 2021-02-22 RX ORDER — DICLOFENAC SODIUM 75 MG/1
75 TABLET, DELAYED RELEASE ORAL 2 TIMES DAILY
Qty: 60 TABLET | Refills: 5 | Status: SHIPPED | OUTPATIENT
Start: 2021-02-22 | End: 2022-01-10 | Stop reason: ALTCHOICE

## 2021-02-22 NOTE — PROGRESS NOTES
Assessment/Plan:  Assessment/Plan   Diagnoses and all orders for this visit:    Radiculopathy, cervical region  -     diclofenac (VOLTAREN) 75 mg EC tablet; Take 1 tablet (75 mg total) by mouth 2 (two) times a day  -     Ambulatory referral to Pain Management; Future    Facet arthropathy, cervical  -     diclofenac (VOLTAREN) 75 mg EC tablet; Take 1 tablet (75 mg total) by mouth 2 (two) times a day  -     Ambulatory referral to Pain Management; Future    Degenerative disc disease, cervical    Cervical paraspinal muscle spasm    Trapezius muscle spasm           50-year-old right-hand-dominant female with pain of neck and both upper extremities nearly 2 years duration  Discussed with patient physical exam, impression and plan  Cervical spine noted for bilateral paraspinal tenderness  She has limited range of motion with side bending to both sides  She has weakness both upper extremities with finger abduction and hand   Clinical impression that she is symptomatic from pathology in the cervical spine  She may continue with diclofenac 75 mg twice daily  She was advised that due to her  demanding work schedule she may consider virtual PT sessions  I will also refer her to pain management for further evaluation and treatment  Subjective:   Patient ID: Leonides Vides is a 61 y o  female  Chief Complaint   Patient presents with    Neck - Follow-up, Pain       50-year-old right-hand-dominant female following up for neck pain and pain both upper extremities are nearly 2 years duration  She was last seen by me nearly 4 months ago at which point MRI of cervical spine reviewed with her was noted for facet arthropathy  She was prescribed diclofenac 75 mg twice daily and referred to formal physical therapy  She denies any changes in her symptoms since her last visit  She reports that while she is taking diclofenac symptoms more tolerable    She experiences pain described as generalized to the neck and both upper arms, radiating to the hand, throbbing and sometimes sharp, worse with prolonged sitting, associated with weakness  in the hands, and improved with changing position  Neck Pain  This is a chronic problem  The current episode started more than 1 year ago  The problem occurs daily  The problem has been waxing and waning  Associated symptoms include arthralgias, neck pain, numbness and weakness  Exacerbated by: Arm use  She has tried rest and NSAIDs for the symptoms  The treatment provided mild relief  Review of Systems   Musculoskeletal: Positive for arthralgias and neck pain  Neurological: Positive for weakness and numbness  Objective:  Vitals:    02/22/21 0856   BP: 125/86   Pulse: 65   Weight: 76 7 kg (169 lb)   Height: 5' 5" (1 651 m)     Back Exam     Tenderness   The patient is experiencing tenderness in the cervical     Comments:     cervical spine  -Bilateral paraspinal hypertonicity   -limited range of motion with side bending to both sides          Strength/Myotome Testing     Left Wrist/Hand      (2nd hand position)     Trial 1: 4    Right Wrist/Hand      (2nd hand position)     Trial 1: 4      Physical Exam  Vitals signs and nursing note reviewed  Constitutional:       General: She is not in acute distress  Appearance: She is well-developed  HENT:      Head: Normocephalic  Right Ear: External ear normal       Left Ear: External ear normal    Eyes:      Conjunctiva/sclera: Conjunctivae normal    Neck:      Trachea: No tracheal deviation  Cardiovascular:      Rate and Rhythm: Normal rate  Pulmonary:      Effort: Pulmonary effort is normal  No respiratory distress  Abdominal:      General: There is no distension  Musculoskeletal:         General: Tenderness present  Skin:     General: Skin is warm and dry  Neurological:      Mental Status: She is alert and oriented to person, place, and time     Psychiatric:         Behavior: Behavior normal

## 2021-02-26 ENCOUNTER — CONSULT (OUTPATIENT)
Dept: PAIN MEDICINE | Facility: CLINIC | Age: 56
End: 2021-02-26
Payer: COMMERCIAL

## 2021-02-26 VITALS
DIASTOLIC BLOOD PRESSURE: 81 MMHG | RESPIRATION RATE: 16 BRPM | HEIGHT: 65 IN | SYSTOLIC BLOOD PRESSURE: 149 MMHG | HEART RATE: 57 BPM | WEIGHT: 168 LBS | BODY MASS INDEX: 27.99 KG/M2

## 2021-02-26 DIAGNOSIS — M47.812 FACET ARTHROPATHY, CERVICAL: ICD-10-CM

## 2021-02-26 DIAGNOSIS — M54.12 RADICULOPATHY, CERVICAL REGION: Primary | ICD-10-CM

## 2021-02-26 DIAGNOSIS — M54.16 LUMBAR RADICULOPATHY: ICD-10-CM

## 2021-02-26 PROCEDURE — 1036F TOBACCO NON-USER: CPT | Performed by: STUDENT IN AN ORGANIZED HEALTH CARE EDUCATION/TRAINING PROGRAM

## 2021-02-26 PROCEDURE — 99204 OFFICE O/P NEW MOD 45 MIN: CPT | Performed by: STUDENT IN AN ORGANIZED HEALTH CARE EDUCATION/TRAINING PROGRAM

## 2021-02-26 PROCEDURE — 3008F BODY MASS INDEX DOCD: CPT | Performed by: STUDENT IN AN ORGANIZED HEALTH CARE EDUCATION/TRAINING PROGRAM

## 2021-02-26 NOTE — PROGRESS NOTES
Assessment  1  Radiculopathy, cervical region    2  Facet arthropathy, cervical    3  Lumbar radiculopathy        Plan    This is a 79-year-old female who presents to our office with chief complaint of neck pain and bilateral upper extremity radiculopathy, left greater than right in an  Unclear dermatomal distribution  She has pain and paresthesias that goes down usually the left lower extremity on the outside of the arm and involves all the fingers of her hand  She additionally has left lower extremity radiculopathy  On neck exam today she has good range of motion with her neck and only pain in the neck with extremes of neck extension and leftwards rotation and left lateral flexion  Discussed multimodal treatment strategy  She is currently being prescribed NSAID s by Dr Fabrice Aguirre  I offered patient gabapentin to help with radicular symptoms, however patient declined saying that she does not want to take pills on a consistent basis  On imaging she does have mild bilateral foraminal narrowing at C5-6 with previous EMG findings of mild chronic C6 radiculopathy  Discussed interventional treatment options with the patient  She would like to 1st see how she does with physical therapy that was ordered by Dr Fabrice Aguirre  And will call us to schedule injection should she not have significant improvement in her symptoms  Given the patient's symptoms, examination findings and imaging results noted above, I discussed the utility of proceeding with a C6-7 interlaminar epidural steroid injection under fluoroscopic guidance  Using an anatomical model, the procedure, as well as its potential risks, benefits, and reasonable alternatives were discussed in detail  Discussed risks of the procedure included but are not limited to bleeding, infection, allergic reaction, nerve damage, hematoma fomation, abscess formation, failure of the pain to improve and potential worsening of the pain        Since Ms Milton Marinelli has failed at least 6 weeks of conservative measures including over-the-counter pain medications and prescription medications it is reasonable to proceed with the above injection  The patient verbalized understanding to the potential risks, benefits, and reasonable alternatives to the above injection and wishes to proceed  Her response will help to further determine a treatment plan  [place] Prescription Drug Monitoring Program report was reviewed and was appropriate     My impressions and treatment recommendations were discussed in detail with the patient who verbalized understanding and had no further questions  Discharge instructions were provided  I personally saw and examined the patient and I agree with the above discussed plan of care  Orders Placed This Encounter   Procedures    FL spine and pain procedure     Standing Status:   Future     Standing Expiration Date:   2/26/2025     Scheduling Instructions:      wILL CALL TO SCHEDULE     Order Specific Question:   Reason for Exam:     Answer:   C6-7 MORENA     Order Specific Question:   Is the patient pregnant? Answer:   Unknown     Order Specific Question:   Anticoagulant hold needed? Answer:   NSAID, ASA     No orders of the defined types were placed in this encounter  History of Present Illness    Referring Provider: Self, Referral    Felix Ley is a 61 y o  female who presents with a chief complaint of   Widespread joint pain as well as left lower extremity radiculopathy, and mostly chief complaint of chronic neck pain with bilateral upper extremity radiculopathy which is usually more predominant in the left side  These issues have been more notable for over a year  She attributes her joint symptoms to arthritis  Over the past month the intensity of the pain has been moderate to severe  Current pain is 5/10 and intermittent  Pain is the worse in the morning and night  She wakes up with morning stiffness in her hands and wrists    She endorses left upper extremity radiculopathy going down the outside of the left arm, forearm and into the hand  She has involvement of all the fingers in her hand  Therefore, dermatomal distribution is a bit unclear  She has increased symptoms with lying down, standing, bending, sitting, walking  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   At work and spends a lot of time lifting patients  She does have recent cervical MRI demonstrating mild cervical degenerative changes at C4-5 and C5-6  At C5-6 she notably has bilateral mild foraminal narrowing  In April 2019 she also went EMG of the right upper extremity which showed chronic C6 radiculopathy  She has a past medical history of high cholesterol, hypertension, and osteoarthritis  She is currently taking Tylenol and diclofenac orally intermittently  She does not smoke tobacco or marijuana  She does not drink alcohol  She was referred to physical therapy by Dr Oscar Ley  But has not started any visits yet  I have personally reviewed and/or updated the patient's past medical history, past surgical history, family history, social history, current medications, allergies, and vital signs today  Review of Systems   Constitutional: Negative for fever and unexpected weight change  HENT: Negative for trouble swallowing  Eyes: Negative for visual disturbance  Respiratory: Negative for shortness of breath and wheezing  Cardiovascular: Negative for chest pain and palpitations  Gastrointestinal: Negative for constipation, diarrhea, nausea and vomiting  Endocrine: Negative for cold intolerance, heat intolerance and polydipsia  Genitourinary: Negative for difficulty urinating and frequency  Musculoskeletal: Positive for arthralgias, joint swelling and myalgias  Negative for gait problem  Skin: Negative for rash  Neurological: Positive for numbness  Negative for dizziness, seizures, syncope, weakness and headaches  Hematological: Does not bruise/bleed easily  Psychiatric/Behavioral: Negative for dysphoric mood  All other systems reviewed and are negative  Patient Active Problem List   Diagnosis    Arthralgia of both hands    Obesity (BMI 30 0-34  9)    [facility]ipolar affective disorder in remission ([facility] [address] )    Hot flashes    Dermatitis    Fatigue    Essential hypertension    Snoring    Sciatica, right side    Calcific tendonitis of right shoulder    Cervicalgia    Carpal tunnel syndrome, right    Bipolar 1 disorder, depressed, moderate (HCC)    Depression    Hyperlipidemia    Lumbar radiculopathy    Migraine headache    Perimenopausal symptoms    Thyroid pain    Pain in thumb joint with movement, right    Swelling of right thumb    2019 novel coronavirus disease (COVID-19)    Varicose veins of both lower extremities with pain    Encounter for gynecological examination without abnormal finding    Post-menopausal    Overweight    Elevated C-reactive protein (CRP)    Left arm pain    Left arm numbness    ASCUS with positive high risk HPV cervical    HPV (human papilloma virus) infection       Past Medical History:   Diagnosis Date    Abnormal Pap smear of cervix     Acute maxillary sinusitis 12/27/2017    Acute pain of right shoulder 4/10/2019    BRCA1 negative 2015    BRCA2 negative 2015    Gastric ulcer     Hyperproinsulinemia     [id number] resolved    Swelling of finger joint of right hand 12/10/2019       Past Surgical History:   Procedure Laterality Date    APPENDECTOMY      HERNIA REPAIR         Family History   Problem Relation Age of Onset    Ovarian cancer Mother 48    Thyroid cancer Sister 52    Breast cancer Family     Breast cancer Maternal Uncle     No Known Problems Daughter     Cancer Maternal Grandmother         unknown type or age   [facility] No Known Problems Paternal Grandmother     No Known Problems Sister     No Known Problems Maternal Aunt        Social History Occupational History    Not on file   Tobacco Use    Smoking status: Never Smoker    Smokeless tobacco: Never Used   Substance and Sexual Activity    Alcohol use: Not Currently     Frequency: Monthly or less     Drinks per session: 1 or 2     Binge frequency: Never    Drug use: No    Sexual activity: Yes     Partners: Male       Current Outpatient Medications on File Prior to Visit   Medication Sig    atorvastatin (LIPITOR) 20 mg tablet Take 1 tablet (20 mg total) by mouth daily    diclofenac (VOLTAREN) 75 mg EC tablet Take 1 tablet (75 mg total) by mouth 2 (two) times a day    diclofenac (VOLTAREN) 75 mg EC tablet Take 1 tablet (75 mg total) by mouth 2 (two) times a day    hydrochlorothiazide (HYDRODIURIL) 25 mg tablet Take 1 tablet (25 mg total) by mouth daily    losartan (Cozaar) 50 mg tablet Take 1 tablet (50 mg total) by mouth daily     No current facility-administered medications on file prior to visit  Allergies   Allergen Reactions    Other      strawberry    Pineapple Hives       Physical Exam    /81   Pulse 57   Resp 16   Ht 5' 5" (1 651 m)   Wt 76 2 kg (168 lb)   BMI 27 96 kg/m²     Constitutional: normal, well developed, well nourished, alert, in no distress and non-toxic and no overt pain behavior    Eyes: anicteric  HEENT: grossly intact  Neck: supple, symmetric, trachea midline and no masses   Pulmonary:even and unlabored  Cardiovascular:No edema or pitting edema present  Skin:Normal without rashes or lesions and well hydrated  Psychiatric:Mood and affect appropriate  Neurologic:Cranial Nerves II-XII grossly intact  Musculoskeletal:normal     Cervical Spine Exam    Appearance:  Normal lordosis  Palpation/Tenderness:  no tenderness or spasm  Sensory:  no sensory deficits noted  Range of Motion:  Full range of motion with no pain or limitations in flexion, extension, lateral flexion and rotation  Motor Strength:  Left Arm Flexion  5/5  Left Arm Extension  5/5  Right Arm Flexion  5/5  Right Arm Extension  5/5  Left Wrist Flexion  5/5  Left Wrist Extension  5/5  Left Finger Abduction  5/5  Right Finger Abduction  5/5  Left Pincer Grasp  5/5  Right Pincer Grasp  5/5  Left    5/5  Right   5/5  Reflexes:  Left Biceps:  2+   Right Biceps:  2+   Left Brachioradialis:  2+   Right Brachioradialis:  2+   Left Triceps:  2+   Right Triceps:  2+   Special Tests:  Left Spurlings:  negative  Right Spurlings  negative     Cervical Spine Exam    Appearance:  Reversal of lordosis  Palpation/Tenderness:  no tenderness or spasm  Sensory:  no sensory deficits noted  Range of Motion:  Full range of motion with no pain or limitations in flexion, extension, lateral flexion and rotation except slight pain on left with max leftward rotation and max extension  Motor Strength:  Left Arm Flexion  5/5  Left Arm Extension  5/5  Right Arm Flexion  5/5  Right Arm Extension  5/5  Left Wrist Flexion  5/5  Left Wrist Extension  5/5  Left Finger Abduction  5/5  Right Finger Abduction  5/5  Left Pincer Grasp  5/5  Right Pincer Grasp  5/5  Left    5/5  Right   5/5  Reflexes:  Left Biceps:  2+   Right Biceps:  2+   Left Brachioradialis:  2+   Right Brachioradialis:  2+   Left Triceps:  2+   Right Triceps:  2+   Special Tests:  Left Spurlings:  negative  Right Spurlings  negative      DIAGNOSTIC IMAGING AND TEST RESULTS:    MRI CERVICAL SPINE WITHOUT CONTRAST     INDICATION: M54 12: Radiculopathy, cervical region      COMPARISON:  None      TECHNIQUE:  Sagittal T1, sagittal T2, sagittal inversion recovery, axial T2, axial  2D merge     IMAGE QUALITY:  Diagnostic     FINDINGS:     ALIGNMENT:  Reversal of the normal cervical lordosis with slight kyphotic angulation at the C5 level  No compression fracture  No scoliosis  Minimal subluxation of C4 upon C5      MARROW SIGNAL:  Normal marrow signal is identified within the visualized bony structures    No discrete marrow lesion      CERVICAL AND VISUALIZED THORACIC CORD:  Normal signal within the visualized cord      PREVERTEBRAL AND PARASPINAL SOFT TISSUES:  Normal      VISUALIZED POSTERIOR FOSSA:  The visualized posterior fossa demonstrates no abnormal signal      CERVICAL DISC SPACES:     C2-C3:  Normal      C3-C4:  Normal      C4-C5:  Normal disc height and signal   Mild facet degenerative change  No disc herniation, canal stenosis or foraminal narrowing      C5-C6:  Slight loss of disc height with minimal annular bulging and uncinate joint hypertrophic change  No canal stenosis  Mild foraminal narrowing without nerve impingement      C6-C7:  Normal      C7-T1:  Normal      UPPER THORACIC DISC SPACES:  Normal      IMPRESSION:     Mild mid cervical degenerative change at C4-5 and C5-C6 without disc herniation  No canal stenosis or cord compression  Only mild foraminal narrowing is present without discrete nerve impingement  There is resulting reversal of the normal cervical   lordosis at the C5 level similar to prior x-rays  EMG RUE 4/23/19     1  Mild median nerve compression neuropathy at the wrist with demyelinating changes, consistent with a diagnosis of carpal tunnel syndrome      2  Mild chronic C6 radiculopathy as evidenced by the decreased recruitment and chronic denervation changes in the above-mentioned muscles

## 2021-02-26 NOTE — PATIENT INSTRUCTIONS
Epidural Steroid Injection   AMBULATORY CARE:   What you need to know about an epidural steroid injection (DILAN):  An DILAN is a procedure to inject steroid medicine into the epidural space  The epidural space is between your spinal cord and vertebrae  Steroids reduce inflammation and fluid buildup in your spine that may be causing pain  You may be given pain medicine along with the steroids  How to prepare for an DILAN:  Your healthcare provider will talk to you about how to prepare for your procedure  He or she will tell you what medicines to take or not take on the day of your procedure  You may need to stop taking blood thinners or other medicines several days before your procedure  You may need to adjust any diabetes medicine you take on the day of your procedure  Steroid medicine can increase your blood sugar level  Arrange for someone to drive you home when you are discharged  What will happen during an DILAN:   · You will be given medicine to numb the procedure area  You will be awake for the procedure, but you will not feel pain  You may also be given medicine to help you relax  Contrast liquid will be used to help your healthcare provider see the area better  Tell the healthcare provider if you have ever had an allergic reaction to contrast liquid  · Your healthcare provider may place the needle into your neck area, middle of your back, or tailbone area  He may inject the medicine next to the nerves that are causing your pain  He may instead inject the medicine into a larger area of the epidural space  This helps the medicine spread to more nerves  Your healthcare provider will use a fluoroscope to help guide the needle to the right place  A fluoroscope is a type of x-ray  After the procedure, a bandage will be placed over the injection site to prevent infection  What will happen after an DILAN:  You will have a bandage over the injection site to prevent infection   Your healthcare provider will tell you when you can bathe and any activity guidelines  You will be able to go home  Risks of an DILAN:  You may have temporary or permanent nerve damage or paralysis  You may have bleeding or develop a serious infection, such as meningitis (swelling of the brain coverings)  An abscess may also develop  An abscess is a pus-filled area under the skin  You may need surgery to fix the abscess  You may have a seizure, anxiety, or trouble sleeping  If you are a man, you may have temporary erectile dysfunction (not able to have an erection)  Call your local emergency number (911 in the [address] [place] [address],[address]) if:   · You have a seizure  · You have trouble moving your legs  Seek care immediately if:   · Blood soaks through your bandage  · You have a fever or chills, severe back pain, and the procedure area is sensitive to the touch  · You cannot control when you urinate or have a bowel movement  Call your doctor if:   · You have weakness or numbness in your legs  · Your wound is red, swollen, or draining pus  · You have nausea or are vomiting  · Your face or neck is red and you feel warm  · You have more pain than you had before the procedure  · You have swelling in your hands or feet  · You have questions or concerns about your condition or care  Care for your wound as directed: You may remove the bandage before you go to bed the day of your procedure  You may take a shower, but do not take a bath for at least 24 hours  Self-care:   · Do not drive,  use machines, or do strenuous activity for 24 hours after your procedure or as directed  · Continue other treatments  as directed  Steroid injections alone will not control your pain  The injections are meant to be used with other treatments, such as physical therapy  Follow up with your doctor as directed:  Write down your questions so you remember to ask them during your visits     © Copyright [employer] 2020 Information is for End User's use only and may not be sold, redistributed or otherwise used for commercial purposes  All illustrations and images included in CareNotes® are the copyrighted property of [employer]  or Rachel Delgado  The above information is an  only  It is not intended as medical advice for individual conditions or treatments  Talk to your doctor, nurse or pharmacist before following any medical regimen to see if it is safe and effective for you

## 2021-03-17 ENCOUNTER — OFFICE VISIT (OUTPATIENT)
Dept: FAMILY MEDICINE CLINIC | Facility: CLINIC | Age: 56
End: 2021-03-17
Payer: COMMERCIAL

## 2021-03-17 VITALS — DIASTOLIC BLOOD PRESSURE: 86 MMHG | SYSTOLIC BLOOD PRESSURE: 134 MMHG | TEMPERATURE: 96.2 F

## 2021-03-17 DIAGNOSIS — S82.64XD CLOSED NONDISPLACED FRACTURE OF LATERAL MALLEOLUS OF RIGHT FIBULA WITH ROUTINE HEALING, SUBSEQUENT ENCOUNTER: Primary | ICD-10-CM

## 2021-03-17 DIAGNOSIS — Z13.1 SCREENING FOR DIABETES MELLITUS: ICD-10-CM

## 2021-03-17 DIAGNOSIS — E78.5 HYPERLIPIDEMIA, UNSPECIFIED HYPERLIPIDEMIA TYPE: ICD-10-CM

## 2021-03-17 PROBLEM — S82.64XA CLOSED NONDISPLACED FRACTURE OF LATERAL MALLEOLUS OF RIGHT FIBULA: Status: ACTIVE | Noted: 2021-03-17

## 2021-03-17 PROCEDURE — 99214 OFFICE O/P EST MOD 30 MIN: CPT | Performed by: FAMILY MEDICINE

## 2021-03-17 RX ORDER — GABAPENTIN 100 MG/1
100 CAPSULE ORAL EVERY 8 HOURS PRN
Qty: 60 CAPSULE | Refills: 0 | Status: SHIPPED | OUTPATIENT
Start: 2021-03-17 | End: 2022-01-10 | Stop reason: ALTCHOICE

## 2021-03-17 RX ORDER — TRAMADOL HYDROCHLORIDE 50 MG/1
50 TABLET ORAL EVERY 6 HOURS PRN
Qty: 60 TABLET | Refills: 0 | Status: SHIPPED | OUTPATIENT
Start: 2021-03-17 | End: 2021-09-24

## 2021-03-17 RX ORDER — ASPIRIN 81 MG/1
81 TABLET, COATED ORAL 2 TIMES DAILY
COMMUNITY
Start: 2021-03-05 | End: 2022-01-10 | Stop reason: ALTCHOICE

## 2021-03-17 NOTE — PROGRESS NOTES
Assessment/Plan:    No problem-specific Assessment & Plan notes found for this encounter  Diagnoses and all orders for this visit:    Closed nondisplaced fracture of lateral malleolus of right fibula with routine healing, subsequent encounter  After discussing risks and benefits of medication along with side effects will start the following:  -     gabapentin (NEURONTIN) 100 mg capsule; Take 1 capsule (100 mg total) by mouth every 8 (eight) hours as needed (pain, S/P right ankle surgery)  -     traMADol (ULTRAM) 50 mg tablet; Take 1 tablet (50 mg total) by mouth every 6 (six) hours as needed for moderate pain  Controlled substance agreement signed today    Hyperlipidemia, unspecified hyperlipidemia type  -     Lipid panel; Future    Screening for diabetes mellitus  -     Comprehensive metabolic panel; Future    Other orders  -     Aspirin Low Dose 81 MG EC tablet; Take 81 mg by mouth 2 (two) times a day      Follow up in 1 month    Subjective:      Patient ID: Oumar Woods is a 61 y o  female  Patient is here for a follow up  She had a right malleolus fibular fracture S/O ORIF done March 5th at [facility]  She has been having pain on her right ankle associated with pins and needles sensation  Has been taking diclofenac for pain which helps some however also takes aspirin daily  Has a follow up appt with Orthopedic next week  The following portions of the patient's history were reviewed and updated as appropriate: She  has a past medical history of Abnormal Pap smear of cervix, Acute maxillary sinusitis (12/27/2017), Acute pain of right shoulder (4/10/2019), BRCA1 negative (2015), BRCA2 negative (2015), Gastric ulcer, Hyperproinsulinemia, and Swelling of finger joint of right hand (12/10/2019)    She   Patient Active Problem List    Diagnosis Date Noted    Closed nondisplaced fracture of lateral malleolus of right fibula 03/17/2021    ASCUS with positive high risk HPV cervical 09/09/2020    HPV (human papilloma virus) infection 09/09/2020    Overweight 09/03/2020    Elevated C-reactive protein (CRP) 09/03/2020    Left arm pain 09/03/2020    Left arm numbness 09/03/2020    Encounter for gynecological examination without abnormal finding 07/24/2020    Post-menopausal 07/24/2020    Varicose veins of both lower extremities with pain 07/01/2020    2019 novel coronavirus disease (COVID-19) 05/30/2020    Pain in thumb joint with movement, right 01/15/2020    Swelling of right thumb 01/15/2020    Cervicalgia 04/23/2019    Carpal tunnel syndrome, right 04/23/2019    Calcific tendonitis of right shoulder 04/10/2019    Sciatica, right side 03/26/2019    Screening for diabetes mellitus 03/26/2019    Dermatitis 10/10/2018    Fatigue 10/10/2018    Essential hypertension 10/10/2018    Snoring 10/10/2018    Arthralgia of both hands 06/01/2018    Obesity (BMI 30 0-34 9) 06/01/2018    B[facility] ) 06/01/2018    Hot flashes 06/01/2018    Bipolar 1 disorder, depressed, moderate ([facility] [address] ) 11/06/2017    Thyroid pain 11/06/2017    Perimenopausal symptoms 08/19/2016    Hyperlipidemia 03/03/2016    Lumbar radiculopathy 11/18/2015    Migraine headache 11/19/2014    Depression 07/05/2012     She  has a past surgical history that includes Appendectomy and Hernia repair  Her family history includes Breast cancer in her family and maternal uncle; Cancer in her maternal grandmother; No Known Problems in her daughter, maternal aunt, paternal grandmother, and sister; Ovarian cancer (age of onset: 48) in her mother; Thyroid cancer (age of onset: 52) in her sister  She  reports that she has never smoked  She has never used smokeless tobacco  She reports previous alcohol use  She reports that she does not use drugs    Current Outpatient Medications   Medication Sig Dispense Refill    atorvastatin (LIPITOR) 20 mg tablet Take 1 tablet (20 mg total) by mouth daily 90 tablet 3    diclofenac (VOLTAREN) 75 mg EC tablet Take 1 tablet (75 mg total) by mouth 2 (two) times a day 60 tablet 5    hydrochlorothiazide (HYDRODIURIL) 25 mg tablet Take 1 tablet (25 mg total) by mouth daily 30 tablet 2    losartan (Cozaar) 50 mg tablet Take 1 tablet (50 mg total) by mouth daily 90 tablet 1    Aspirin Low Dose 81 MG EC tablet Take 81 mg by mouth 2 (two) times a day      diclofenac (VOLTAREN) 75 mg EC tablet Take 1 tablet (75 mg total) by mouth 2 (two) times a day 60 tablet 1    gabapentin (NEURONTIN) 100 mg capsule Take 1 capsule (100 mg total) by mouth every 8 (eight) hours as needed (pain, S/P right ankle surgery) 60 capsule 0    traMADol (ULTRAM) 50 mg tablet Take 1 tablet (50 mg total) by mouth every 6 (six) hours as needed for moderate pain 60 tablet 0     No current facility-administered medications for this visit  Current Outpatient Medications on File Prior to Visit   Medication Sig    atorvastatin (LIPITOR) 20 mg tablet Take 1 tablet (20 mg total) by mouth daily    diclofenac (VOLTAREN) 75 mg EC tablet Take 1 tablet (75 mg total) by mouth 2 (two) times a day    hydrochlorothiazide (HYDRODIURIL) 25 mg tablet Take 1 tablet (25 mg total) by mouth daily    losartan (Cozaar) 50 mg tablet Take 1 tablet (50 mg total) by mouth daily    Aspirin Low Dose 81 MG EC tablet Take 81 mg by mouth 2 (two) times a day    diclofenac (VOLTAREN) 75 mg EC tablet Take 1 tablet (75 mg total) by mouth 2 (two) times a day     No current facility-administered medications on file prior to visit  She is allergic to other and pineapple       Review of Systems   Constitutional: Negative for activity change, appetite change, fatigue and fever  HENT: Negative for congestion and ear discharge  Respiratory: Negative for cough and shortness of breath  Cardiovascular: Negative for chest pain and palpitations  Gastrointestinal: Negative for diarrhea and nausea     Musculoskeletal: Positive for arthralgias, joint swelling and myalgias  Negative for back pain  Skin: Negative for color change and rash  Neurological: Negative for dizziness and headaches  Psychiatric/Behavioral: Negative for agitation and behavioral problems  Objective:      /86   Temp (!) 96 2 °F (35 7 °C)          Physical Exam  Constitutional:       General: She is not in acute distress  Appearance: She is well-developed  She is not diaphoretic  HENT:      Head: Normocephalic and atraumatic  Nose: Nose normal    Eyes:      Conjunctiva/sclera: Conjunctivae normal       Pupils: Pupils are equal, round, and reactive to light  Cardiovascular:      Rate and Rhythm: Normal rate and regular rhythm  Heart sounds: Normal heart sounds  No murmur  Pulmonary:      Effort: Pulmonary effort is normal  No respiratory distress  Breath sounds: Normal breath sounds  No wheezing  Abdominal:      General: Bowel sounds are normal  There is no distension  Palpations: Abdomen is soft  Tenderness: There is no abdominal tenderness  Musculoskeletal:         General: Swelling and tenderness present  Right lower leg: Edema present  Comments: Right ankle in a cast   Skin:     General: Skin is warm and dry  Findings: No erythema or rash  Neurological:      Mental Status: She is alert and oriented to person, place, and time

## 2021-03-24 ENCOUNTER — TELEPHONE (OUTPATIENT)
Dept: VASCULAR SURGERY | Facility: CLINIC | Age: 56
End: 2021-03-24

## 2021-03-24 NOTE — TELEPHONE ENCOUNTER
Spoke with patient  Scheduled appointment(s) listed below  Provided patient with call back number [phone] option 3, should they need to reschedule  Patient's appointment(s) are past due, expected ASAP      Dopplers  [] Carotid (CV)   [] Abdominal Aorta Iliac (AOIL)  [] Lower Limb Arterial (BAKARI)  [] Renal Artery  [] Upper Limb (UEA)  [] EVAR Duplex    Advanced Imaging   [] CT abdomen with/ without contrast  [] CT abdomen with contrast  [] CT abdomen without contrast    [] CT abdomen & pelvis with/ without contrast  [] CT abdomen & pelvis with contrast  [] CT abdomen & pelvis without contrast    [] CTA abdomen    [] CTA abdomen & pelvis    Office Visit   [] New patient, patient last seen over 3 years ago  [x] Risk factor modification    Pt refused office visit at this time do to shattering ankle, she will call back if she needs any follow up

## 2021-05-21 DIAGNOSIS — I10 ESSENTIAL HYPERTENSION: ICD-10-CM

## 2021-05-21 RX ORDER — HYDROCHLOROTHIAZIDE 25 MG/1
25 TABLET ORAL DAILY
Qty: 30 TABLET | Refills: 2 | Status: SHIPPED | OUTPATIENT
Start: 2021-05-21 | End: 2021-07-02 | Stop reason: SDUPTHER

## 2021-06-01 ENCOUNTER — APPOINTMENT (OUTPATIENT)
Dept: RADIOLOGY | Facility: CLINIC | Age: 56
End: 2021-06-01
Payer: COMMERCIAL

## 2021-06-01 ENCOUNTER — APPOINTMENT (OUTPATIENT)
Dept: LAB | Facility: CLINIC | Age: 56
End: 2021-06-01
Payer: COMMERCIAL

## 2021-06-01 ENCOUNTER — CLINICAL SUPPORT (OUTPATIENT)
Dept: FAMILY MEDICINE CLINIC | Facility: CLINIC | Age: 56
End: 2021-06-01
Payer: COMMERCIAL

## 2021-06-01 DIAGNOSIS — Z23 ENCOUNTER FOR IMMUNIZATION: ICD-10-CM

## 2021-06-01 DIAGNOSIS — E83.52 HYPERCALCEMIA: Primary | ICD-10-CM

## 2021-06-01 DIAGNOSIS — R79.82 ELEVATED C-REACTIVE PROTEIN (CRP): ICD-10-CM

## 2021-06-01 DIAGNOSIS — E78.5 HYPERLIPIDEMIA, UNSPECIFIED HYPERLIPIDEMIA TYPE: ICD-10-CM

## 2021-06-01 DIAGNOSIS — Z13.1 SCREENING FOR DIABETES MELLITUS: ICD-10-CM

## 2021-06-01 DIAGNOSIS — Z11.1 SCREENING-PULMONARY TB: Primary | ICD-10-CM

## 2021-06-01 DIAGNOSIS — Z11.1 SCREENING-PULMONARY TB: ICD-10-CM

## 2021-06-01 LAB
ALBUMIN SERPL BCP-MCNC: 3.6 G/DL (ref 3.5–5)
ALP SERPL-CCNC: 119 U/L (ref 46–116)
ALT SERPL W P-5'-P-CCNC: 43 U/L (ref 12–78)
ANION GAP SERPL CALCULATED.3IONS-SCNC: 6 MMOL/L (ref 4–13)
AST SERPL W P-5'-P-CCNC: 27 U/L (ref 5–45)
BILIRUB SERPL-MCNC: 0.43 MG/DL (ref 0.2–1)
BUN SERPL-MCNC: 33 MG/DL (ref 5–25)
CALCIUM SERPL-MCNC: 10.2 MG/DL (ref 8.3–10.1)
CHLORIDE SERPL-SCNC: 108 MMOL/L (ref 100–108)
CHOLEST SERPL-MCNC: 182 MG/DL (ref 50–200)
CO2 SERPL-SCNC: 28 MMOL/L (ref 21–32)
CREAT SERPL-MCNC: 0.57 MG/DL (ref 0.6–1.3)
CRP SERPL QL: 4.4 MG/L
GFR SERPL CREATININE-BSD FRML MDRD: 119 ML/MIN/1.73SQ M
GLUCOSE P FAST SERPL-MCNC: 87 MG/DL (ref 65–99)
HDLC SERPL-MCNC: 83 MG/DL
LDLC SERPL CALC-MCNC: 87 MG/DL (ref 0–100)
NONHDLC SERPL-MCNC: 99 MG/DL
POTASSIUM SERPL-SCNC: 3.6 MMOL/L (ref 3.5–5.3)
PROT SERPL-MCNC: 7.1 G/DL (ref 6.4–8.2)
SODIUM SERPL-SCNC: 142 MMOL/L (ref 136–145)
TRIGL SERPL-MCNC: 59 MG/DL

## 2021-06-01 PROCEDURE — 36415 COLL VENOUS BLD VENIPUNCTURE: CPT

## 2021-06-01 PROCEDURE — 90746 HEPB VACCINE 3 DOSE ADULT IM: CPT

## 2021-06-01 PROCEDURE — 86140 C-REACTIVE PROTEIN: CPT

## 2021-06-01 PROCEDURE — 71046 X-RAY EXAM CHEST 2 VIEWS: CPT

## 2021-06-01 PROCEDURE — 86480 TB TEST CELL IMMUN MEASURE: CPT

## 2021-06-01 PROCEDURE — 80061 LIPID PANEL: CPT

## 2021-06-01 PROCEDURE — 90471 IMMUNIZATION ADMIN: CPT

## 2021-06-01 PROCEDURE — 80053 COMPREHEN METABOLIC PANEL: CPT

## 2021-06-03 LAB
GAMMA INTERFERON BACKGROUND BLD IA-ACNC: 8.25 IU/ML
M TB IFN-G BLD-IMP: ABNORMAL
M TB IFN-G CD4+ BCKGRND COR BLD-ACNC: 0.11 IU/ML
M TB IFN-G CD4+ BCKGRND COR BLD-ACNC: 0.12 IU/ML
MITOGEN IGNF BCKGRD COR BLD-ACNC: >10 IU/ML

## 2021-07-02 ENCOUNTER — OFFICE VISIT (OUTPATIENT)
Dept: FAMILY MEDICINE CLINIC | Facility: CLINIC | Age: 56
End: 2021-07-02
Payer: COMMERCIAL

## 2021-07-02 ENCOUNTER — APPOINTMENT (OUTPATIENT)
Dept: LAB | Facility: CLINIC | Age: 56
End: 2021-07-02
Payer: COMMERCIAL

## 2021-07-02 VITALS
HEART RATE: 60 BPM | WEIGHT: 168 LBS | BODY MASS INDEX: 27.99 KG/M2 | SYSTOLIC BLOOD PRESSURE: 122 MMHG | HEIGHT: 65 IN | TEMPERATURE: 97.5 F | OXYGEN SATURATION: 99 % | DIASTOLIC BLOOD PRESSURE: 74 MMHG

## 2021-07-02 DIAGNOSIS — Z12.31 ENCOUNTER FOR SCREENING MAMMOGRAM FOR MALIGNANT NEOPLASM OF BREAST: ICD-10-CM

## 2021-07-02 DIAGNOSIS — Z00.00 HEALTH MAINTENANCE EXAMINATION: Primary | ICD-10-CM

## 2021-07-02 DIAGNOSIS — I10 ESSENTIAL HYPERTENSION: ICD-10-CM

## 2021-07-02 DIAGNOSIS — E83.52 HYPERCALCEMIA: ICD-10-CM

## 2021-07-02 LAB
ALBUMIN SERPL BCP-MCNC: 3.8 G/DL (ref 3.5–5)
ALP SERPL-CCNC: 116 U/L (ref 46–116)
ALT SERPL W P-5'-P-CCNC: 36 U/L (ref 12–78)
ANION GAP SERPL CALCULATED.3IONS-SCNC: 5 MMOL/L (ref 4–13)
AST SERPL W P-5'-P-CCNC: 22 U/L (ref 5–45)
BILIRUB SERPL-MCNC: 0.63 MG/DL (ref 0.2–1)
BUN SERPL-MCNC: 17 MG/DL (ref 5–25)
CA-I BLD-SCNC: 1.26 MMOL/L (ref 1.12–1.32)
CALCIUM SERPL-MCNC: 9.6 MG/DL (ref 8.3–10.1)
CHLORIDE SERPL-SCNC: 111 MMOL/L (ref 100–108)
CO2 SERPL-SCNC: 26 MMOL/L (ref 21–32)
CREAT SERPL-MCNC: 0.58 MG/DL (ref 0.6–1.3)
GFR SERPL CREATININE-BSD FRML MDRD: 118 ML/MIN/1.73SQ M
GLUCOSE P FAST SERPL-MCNC: 85 MG/DL (ref 65–99)
POTASSIUM SERPL-SCNC: 3.9 MMOL/L (ref 3.5–5.3)
PROT SERPL-MCNC: 7 G/DL (ref 6.4–8.2)
PTH-INTACT SERPL-MCNC: 69.8 PG/ML (ref 18.4–80.1)
SODIUM SERPL-SCNC: 142 MMOL/L (ref 136–145)

## 2021-07-02 PROCEDURE — 82330 ASSAY OF CALCIUM: CPT

## 2021-07-02 PROCEDURE — 99396 PREV VISIT EST AGE 40-64: CPT | Performed by: PHYSICIAN ASSISTANT

## 2021-07-02 PROCEDURE — 80053 COMPREHEN METABOLIC PANEL: CPT

## 2021-07-02 PROCEDURE — 83970 ASSAY OF PARATHORMONE: CPT

## 2021-07-02 PROCEDURE — 36415 COLL VENOUS BLD VENIPUNCTURE: CPT

## 2021-07-02 RX ORDER — HYDROCHLOROTHIAZIDE 25 MG/1
25 TABLET ORAL DAILY
Qty: 90 TABLET | Refills: 1 | Status: SHIPPED | OUTPATIENT
Start: 2021-07-02 | End: 2022-03-22 | Stop reason: SDUPTHER

## 2021-07-02 RX ORDER — LOSARTAN POTASSIUM 50 MG/1
50 TABLET ORAL DAILY
Qty: 90 TABLET | Refills: 1 | Status: SHIPPED | OUTPATIENT
Start: 2021-07-02 | End: 2022-03-22 | Stop reason: SDUPTHER

## 2021-07-02 NOTE — PROGRESS NOTES
Assessment/Plan:       Problem List Items Addressed This Visit        Cardiovascular and Mediastinum    Essential hypertension    Relevant Medications    losartan (Cozaar) 50 mg tablet    hydrochlorothiazide (HYDRODIURIL) 25 mg tablet      Other Visit Diagnoses     Health maintenance examination    -  Primary    Encounter for screening mammogram for malignant neoplasm of breast        Relevant Orders    Mammo screening bilateral w cad        hx reviewed and updated, unremarkable exam  Labs from 6/1 reviewed  Continue current medication regimens  BP well controlled, lipids controlled  Continue routine follow ups     Subjective:      Patient ID: Pia Teresa is a 61 y o  female  Pt presents for annual wellness exam  She has a hx of HTN, controlled on losartan and hctz  BP today is 122/74  She is on a statin with lipids total 182, LDL 87  She has no acute concerns today  She did have a R ankle fracture s/o ORIF in 3/2021  Otherwise no major health changes  Non smoker, no alcohol or drug abuse  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  She does have some chronic pains in her wrist and hands with swelling, previous autoimmune workup was negative  The following portions of the patient's history were reviewed and updated as appropriate:   She  has a past medical history of Abnormal Pap smear of cervix, Acute maxillary sinusitis (12/27/2017), Acute pain of right shoulder (4/10/2019), BRCA1 negative (2015), BRCA2 negative (2015), Gastric ulcer, Hyperproinsulinemia, and Swelling of finger joint of right hand (12/10/2019)    She   Patient Active Problem List    Diagnosis Date Noted    Closed nondisplaced fracture of lateral malleolus of right fibula 03/17/2021    ASCUS with positive high risk HPV cervical 09/09/2020    HPV (human papilloma virus) infection 09/09/2020    Overweight 09/03/2020    Elevated C-reactive protein (CRP) 09/03/2020    Left arm pain 09/03/2020    Left arm numbness 09/03/2020    Encounter for gynecological examination without abnormal finding 07/24/2020    Post-menopausal 07/24/2020    Varicose veins of both lower extremities with pain 07/01/2020    2019 novel coronavirus disease (COVID-19) 05/30/2020    Pain in thumb joint with movement, right 01/15/2020    Swelling of right thumb 01/15/2020    Cervicalgia 04/23/2019    Carpal tunnel syndrome, right 04/23/2019    Calcific tendonitis of right shoulder 04/10/2019    Sciatica, right side 03/26/2019    Screening for diabetes mellitus 03/26/2019    Dermatitis 10/10/2018    Fatigue 10/10/2018    Essential hypertension 10/10/2018    Snoring 10/10/2018    Arthralgia of both hands 06/01/2018    Obesity (BMI 30 0-34 9) 06/01/2018    Bipolar affective disorder in [facility] ([facility] ) 06/01/2018    Hot flashes 06/01/2018    Bipolar 1 disorder, depressed, moderate ([facility] ) 11/06/2017    Thyroid pain 11/06/2017    Perimenopausal symptoms 08/19/2016    Hyperlipidemia 03/03/2016    Lumbar radiculopathy 11/18/2015    Migraine headache 11/19/2014    Depression 07/05/2012     She  has a past surgical history that includes Appendectomy and Hernia repair  Her family history includes Breast cancer in her family and maternal uncle; Cancer in her maternal grandmother; No Known Problems in her daughter, maternal aunt, paternal grandmother, and sister; Ovarian cancer (age of onset: 48) in her mother; Thyroid cancer (age of onset: 52) in her sister  She  reports that she has never smoked  She has never used smokeless tobacco  She reports previous alcohol use  She reports that she does not use drugs    Current Outpatient Medications   Medication Sig Dispense Refill    atorvastatin (LIPITOR) 20 mg tablet Take 1 tablet (20 mg total) by mouth daily 90 tablet 3    diclofenac (VOLTAREN) 75 mg EC tablet Take 1 tablet (75 mg total) by mouth 2 (two) times a day 60 tablet 1    hydrochlorothiazide (HYDRODIURIL) 25 mg tablet Take 1 tablet (25 mg total) by mouth daily 90 tablet 1    losartan (Cozaar) 50 mg tablet Take 1 tablet (50 mg total) by mouth daily 90 tablet 1    Aspirin Low Dose 81 MG EC tablet Take 81 mg by mouth 2 (two) times a day (Patient not taking: Reported on 7/2/2021)      diclofenac (VOLTAREN) 75 mg EC tablet Take 1 tablet (75 mg total) by mouth 2 (two) times a day (Patient not taking: Reported on 7/2/2021) 60 tablet 5    gabapentin (NEURONTIN) 100 mg capsule Take 1 capsule (100 mg total) by mouth every 8 (eight) hours as needed (pain, S/P right ankle surgery) (Patient not taking: Reported on 7/2/2021) 60 capsule 0    traMADol (ULTRAM) 50 mg tablet Take 1 tablet (50 mg total) by mouth every 6 (six) hours as needed for moderate pain (Patient not taking: Reported on 7/2/2021) 60 tablet 0     No current facility-administered medications for this visit       Current Outpatient Medications on File Prior to Visit   Medication Sig    atorvastatin (LIPITOR) 20 mg tablet Take 1 tablet (20 mg total) by mouth daily    diclofenac (VOLTAREN) 75 mg EC tablet Take 1 tablet (75 mg total) by mouth 2 (two) times a day    [DISCONTINUED] hydrochlorothiazide (HYDRODIURIL) 25 mg tablet Take 1 tablet (25 mg total) by mouth daily    [DISCONTINUED] losartan (Cozaar) 50 mg tablet Take 1 tablet (50 mg total) by mouth daily    Aspirin Low Dose 81 MG EC tablet Take 81 mg by mouth 2 (two) times a day (Patient not taking: Reported on 7/2/2021)    diclofenac (VOLTAREN) 75 mg EC tablet Take 1 tablet (75 mg total) by mouth 2 (two) times a day (Patient not taking: Reported on 7/2/2021)    gabapentin (NEURONTIN) 100 mg capsule Take 1 capsule (100 mg total) by mouth every 8 (eight) hours as needed (pain, S/P right ankle surgery) (Patient not taking: Reported on 7/2/2021)    traMADol (ULTRAM) 50 mg tablet Take 1 tablet (50 mg total) by mouth every 6 (six) hours as needed for moderate pain (Patient not taking: Reported on 7/2/2021)     No current facility-administered medications on file prior to visit  She is allergic to other and pineapple - food allergy       Review of Systems   Constitutional: Negative for chills, fatigue and fever  HENT: Negative for congestion, ear pain, hearing loss, nosebleeds, postnasal drip, rhinorrhea, sinus pressure, sinus pain, sneezing and sore throat  Eyes: Negative for pain, discharge, itching and visual disturbance  Respiratory: Negative for cough, chest tightness, shortness of breath and wheezing  Cardiovascular: Negative for chest pain, palpitations and leg swelling  Gastrointestinal: Negative for abdominal pain, blood in stool, constipation, diarrhea, nausea and vomiting  Genitourinary: Negative for frequency and urgency  Musculoskeletal: Positive for arthralgias  Neurological: Negative for dizziness, light-headedness and numbness  Objective:      /74 (BP Location: Left arm, Patient Position: Sitting, Cuff Size: Large)   Pulse 60   Temp 97 5 °F (36 4 °C)   Ht 5' 5" (1 651 m)   Wt 76 2 kg (168 lb)   SpO2 99%   BMI 27 96 kg/m²          Physical Exam  Vitals and nursing note reviewed  Constitutional:       General: She is not in acute distress  Appearance: Normal appearance  HENT:      Head: Normocephalic and atraumatic  Nose: Nose normal       Mouth/Throat:      Mouth: Mucous membranes are moist       Pharynx: Oropharynx is clear  No oropharyngeal exudate or posterior oropharyngeal erythema  Eyes:      Pupils: Pupils are equal, round, and reactive to light  Cardiovascular:      Rate and Rhythm: Normal rate and regular rhythm  Heart sounds: Normal heart sounds  Pulmonary:      Effort: Pulmonary effort is normal  No respiratory distress  Breath sounds: Normal breath sounds  No wheezing or rales  Abdominal:      General: Bowel sounds are normal       Palpations: Abdomen is soft  Musculoskeletal:         General: Normal range of motion  Cervical back: Normal range of motion and neck supple  Skin:     General: Skin is warm and dry  Neurological:      Mental Status: She is alert and oriented to person, place, and time     Psychiatric:         Mood and Affect: Mood and affect normal

## 2021-07-23 ENCOUNTER — OFFICE VISIT (OUTPATIENT)
Dept: PAIN MEDICINE | Facility: CLINIC | Age: 56
End: 2021-07-23
Payer: COMMERCIAL

## 2021-07-23 ENCOUNTER — APPOINTMENT (OUTPATIENT)
Dept: RADIOLOGY | Facility: CLINIC | Age: 56
End: 2021-07-23
Payer: COMMERCIAL

## 2021-07-23 VITALS
DIASTOLIC BLOOD PRESSURE: 89 MMHG | BODY MASS INDEX: 27.66 KG/M2 | SYSTOLIC BLOOD PRESSURE: 138 MMHG | WEIGHT: 166 LBS | HEIGHT: 65 IN | HEART RATE: 77 BPM

## 2021-07-23 DIAGNOSIS — G89.4 CHRONIC PAIN SYNDROME: Primary | ICD-10-CM

## 2021-07-23 DIAGNOSIS — M25.541 PAIN IN THUMB JOINT WITH MOVEMENT, RIGHT: ICD-10-CM

## 2021-07-23 DIAGNOSIS — M54.2 CERVICALGIA: ICD-10-CM

## 2021-07-23 DIAGNOSIS — M54.12 CERVICAL RADICULOPATHY: ICD-10-CM

## 2021-07-23 PROCEDURE — 99214 OFFICE O/P EST MOD 30 MIN: CPT | Performed by: NURSE PRACTITIONER

## 2021-07-23 PROCEDURE — 73130 X-RAY EXAM OF HAND: CPT

## 2021-07-23 NOTE — PROGRESS NOTES
Pain Medicine Follow-Up Note    Assessment:  1  Chronic pain syndrome    2  Pain in thumb joint with movement, right    3  Cervicalgia    4  Cervical radiculopathy        Plan:  Orders Placed This Encounter   Procedures    XR hand 3+ vw right     Standing Status:   Future     Standing Expiration Date:   7/23/2025     Scheduling Instructions:      Bring along any outside films relating to this procedure  Order Specific Question:   Is the patient pregnant? Answer:   No      An x-ray of the right hand was ordered for the patient  I discussed with the patient will follow-up with her with results of the x-ray; however, encouraged the patient to schedule with orthopedics to evaluate her right thumb and wrist   The patient was agreeable and verbalized understanding  Follow-up is planned as needed  as warranted  Discharge instructions were provided  I personally saw and examined the patient and I agree with the above discussed plan of care  My impressions and treatment recommendations were discussed in detail with the patient who verbalized understanding and had no further questions  History of Present Illness:    Mita Bear is a 61 y o  female who presents to [facility] for interval re-evaluation of the above stated pain complaints  The patient has a past medical and chronic pain history as outlined in the assessment section  She was last seen on 2/26/2021 in regards to chronic pain syndrome secondary to cervical radiculopathy  At that visit, an order was placed for cervical epidural steroid injection; however, the patient failed and broke her ankle the next day and did not move forward with the injection  The patient report her neck pain has improved  At this time, the patient presents with left thumb pain that is constant pain that is worse in the morning    The patient reports swelling in her right thumb for approximately 2 weeks that radiates into her wrist  The patient reports pain extension and flexion of her wrist as well  Other than as stated above, the patient denies any interval changes in medications, medical condition, mental condition, symptoms, or allergies since the last office visit  Review of Systems:    Review of Systems   Respiratory: Negative for shortness of breath  Cardiovascular: Negative for chest pain  Gastrointestinal: Negative for constipation, diarrhea, nausea and vomiting  Musculoskeletal: Negative for arthralgias, gait problem, joint swelling and myalgias  Joint Stiffness   Skin: Negative for rash  Neurological: Negative for dizziness, seizures and weakness  All other systems reviewed and are negative  Patient Active Problem List   Diagnosis    Arthralgia of both hands    Obesity (BMI 30 0-34  9)    Bipolar affective disorder in remission ([facility] )    Hot flashes    Dermatitis    Fatigue    Essential hypertension    Snoring    Sciatica, right side    Screening for diabetes mellitus    Calcific tendonitis of right shoulder    Cervicalgia    Carpal tunnel syndrome, right    Bipolar 1 disorder, depressed, moderate (HCC)    Depression    Hyperlipidemia    Lumbar radiculopathy    Migraine headache    Perimenopausal symptoms    Thyroid pain    Pain in thumb joint with movement, right    Swelling of right thumb    2019 novel coronavirus disease (COVID-19)    Varicose veins of both lower extremities with pain    Encounter for gynecological examination without abnormal finding    Post-menopausal    Overweight    Elevated C-reactive protein (CRP)    Left arm pain    Left arm numbness    ASCUS with positive high risk HPV cervical    HPV (human papilloma virus) infection    Closed nondisplaced fracture of lateral malleolus of right fibula       Past Medical History:   Diagnosis Date    Abnormal Pap smear of cervix     Acute maxillary sinusitis 12/27/2017    Acute pain of right shoulder 4/10/2019    BRCA1 negative 2015    BRCA2 negative 2015    Gastric ulcer     Hyperproinsulinemia     [id number] resolved    Swelling of finger joint of right hand 12/10/2019       Past Surgical History:   Procedure Laterality Date    APPENDECTOMY      HERNIA REPAIR         Family History   Problem Relation Age of Onset    Ovarian cancer Mother 48    Thyroid cancer Sister 52    Breast cancer Family     Breast cancer Maternal Uncle     No Known Problems Daughter     Cancer Maternal Grandmother         unknown type or age   [facility] No Known Problems Paternal Grandmother     No Known Problems Sister     No Known Problems Maternal Aunt        Social History     Occupational History    Not on file   Tobacco Use    Smoking status: Never Smoker    Smokeless tobacco: Never Used   Vaping Use    Vaping Use: Never used   Substance and Sexual Activity    Alcohol use: Not Currently    Drug use: No    Sexual activity: Yes     Partners: Male         Current Outpatient Medications:     atorvastatin (LIPITOR) 20 mg tablet, Take 1 tablet (20 mg total) by mouth daily, Disp: 90 tablet, Rfl: 3    diclofenac (VOLTAREN) 75 mg EC tablet, Take 1 tablet (75 mg total) by mouth 2 (two) times a day, Disp: 60 tablet, Rfl: 1    hydrochlorothiazide (HYDRODIURIL) 25 mg tablet, Take 1 tablet (25 mg total) by mouth daily, Disp: 90 tablet, Rfl: 1    losartan (Cozaar) 50 mg tablet, Take 1 tablet (50 mg total) by mouth daily, Disp: 90 tablet, Rfl: 1    Aspirin Low Dose 81 MG EC tablet, Take 81 mg by mouth 2 (two) times a day (Patient not taking: Reported on 7/2/2021), Disp: , Rfl:     diclofenac (VOLTAREN) 75 mg EC tablet, Take 1 tablet (75 mg total) by mouth 2 (two) times a day (Patient not taking: Reported on 7/2/2021), Disp: 60 tablet, Rfl: 5    gabapentin (NEURONTIN) 100 mg capsule, Take 1 capsule (100 mg total) by mouth every 8 (eight) hours as needed (pain, S/P right ankle surgery) (Patient not taking: Reported on 7/2/2021), Disp: 60 capsule, Rfl: 0    traMADol (ULTRAM) 50 mg tablet, Take 1 tablet (50 mg total) by mouth every 6 (six) hours as needed for moderate pain (Patient not taking: Reported on 7/2/2021), Disp: 60 tablet, Rfl: 0    Allergies   Allergen Reactions    Other      strawberry    Pineapple - Food Allergy Hives       Physical Exam:    /89   Pulse 77   Ht 5' 5" (1 651 m)   Wt 75 3 kg (166 lb)   Breastfeeding No   BMI 27 62 kg/m²     Constitutional:normal, well developed, well nourished, alert, in no distress and non-toxic and no overt pain behavior    Eyes:anicteric  HEENT:grossly intact  Neck:supple, symmetric, trachea midline and no masses   Pulmonary:even and unlabored  Cardiovascular:No edema or pitting edema present  Skin:Normal without rashes or lesions and well hydrated  Psychiatric:Mood and affect appropriate  Neurologic:Cranial Nerves II-XII grossly intact  Musculoskeletal:normal      Imaging  XR hand 3+ vw right    (Results Pending)         Orders Placed This Encounter   Procedures    XR hand 3+ vw right

## 2021-07-23 NOTE — PATIENT INSTRUCTIONS
Swollen Joint   AMBULATORY CARE:   Joint swelling  may occur in one or more joints  You may have other symptoms, such as pain, tenderness, or stiffness  A swollen joint may be caused by a variety of conditions such as arthritis, pseudogout, gout, tendinitis, or injury  Seek care immediately if:   · You cannot move your joint at all  · You have severe pain that does not get better with medicine  Contact your healthcare provider if:   · You have a fever  · You have redness or warmth over the joint  · The swelling does not decrease with treatment  · You have questions or concerns about your condition or care  Treatment for a swollen joint  depends on the cause of your swollen joint  Your healthcare provider may recommend any of the following:  · Rest  your swollen joint  Avoid activities that make the swelling or pain worse  You may need to avoid putting weight on your joint while you have pain  Crutches or a walker can be used to avoid putting weight on joints in your lower body  · Apply ice  on your swollen joint for 15 to 20 minutes every hour or as directed  Use an ice pack, or put crushed ice in a plastic bag  Cover it with a towel  Ice helps prevent tissue damage and decreases swelling and pain  · Apply heat  on your swollen joint for 20 to 30 minutes every 2 hours for as many days as directed  Heat helps decrease pain  · Elevate  your swollen joint above the level of your heart as often as you can  This will help decrease swelling and pain  Prop your joint on pillows or blankets to keep it elevated comfortably  · NSAIDs , such as ibuprofen, help decrease swelling, pain, and fever  This medicine is available with or without a doctor's order  NSAIDs can cause stomach bleeding or kidney problems in certain people  If you take blood thinner medicine, always ask your healthcare provider if NSAIDs are safe for you  Always read the medicine label and follow directions      Follow up with your healthcare provider as directed:  Write down your questions so you remember to ask them during your visits  © Copyright [employer] 2021 Information is for End User's use only and may not be sold, redistributed or otherwise used for commercial purposes  All illustrations and images included in CareNotes® are the copyrighted property of [employer]  or Rachel Ozuna   The above information is an  only  It is not intended as medical advice for individual conditions or treatments  Talk to your doctor, nurse or pharmacist before following any medical regimen to see if it is safe and effective for you

## 2021-08-03 ENCOUNTER — CLINICAL SUPPORT (OUTPATIENT)
Dept: FAMILY MEDICINE CLINIC | Facility: CLINIC | Age: 56
End: 2021-08-03
Payer: COMMERCIAL

## 2021-08-03 DIAGNOSIS — Z23 ENCOUNTER FOR IMMUNIZATION: Primary | ICD-10-CM

## 2021-08-03 PROCEDURE — 90471 IMMUNIZATION ADMIN: CPT

## 2021-08-03 PROCEDURE — 90746 HEPB VACCINE 3 DOSE ADULT IM: CPT

## 2021-09-10 ENCOUNTER — OFFICE VISIT (OUTPATIENT)
Dept: FAMILY MEDICINE CLINIC | Facility: CLINIC | Age: 56
End: 2021-09-10
Payer: COMMERCIAL

## 2021-09-10 ENCOUNTER — LAB (OUTPATIENT)
Dept: LAB | Facility: CLINIC | Age: 56
End: 2021-09-10
Payer: COMMERCIAL

## 2021-09-10 VITALS
HEART RATE: 63 BPM | HEIGHT: 65 IN | TEMPERATURE: 97.6 F | DIASTOLIC BLOOD PRESSURE: 86 MMHG | OXYGEN SATURATION: 63 % | BODY MASS INDEX: 27.49 KG/M2 | WEIGHT: 165 LBS | SYSTOLIC BLOOD PRESSURE: 137 MMHG

## 2021-09-10 DIAGNOSIS — M25.50 ARTHRALGIA, UNSPECIFIED JOINT: Primary | ICD-10-CM

## 2021-09-10 DIAGNOSIS — M25.50 ARTHRALGIA, UNSPECIFIED JOINT: ICD-10-CM

## 2021-09-10 LAB
ALBUMIN SERPL BCP-MCNC: 3.4 G/DL (ref 3.5–5)
ALP SERPL-CCNC: 126 U/L (ref 46–116)
ALT SERPL W P-5'-P-CCNC: 41 U/L (ref 12–78)
ANION GAP SERPL CALCULATED.3IONS-SCNC: 6 MMOL/L (ref 4–13)
AST SERPL W P-5'-P-CCNC: 29 U/L (ref 5–45)
BILIRUB SERPL-MCNC: 0.81 MG/DL (ref 0.2–1)
BUN SERPL-MCNC: 10 MG/DL (ref 5–25)
CALCIUM ALBUM COR SERPL-MCNC: 9.2 MG/DL (ref 8.3–10.1)
CALCIUM SERPL-MCNC: 8.7 MG/DL (ref 8.3–10.1)
CHLORIDE SERPL-SCNC: 111 MMOL/L (ref 100–108)
CK SERPL-CCNC: 98 U/L (ref 26–192)
CO2 SERPL-SCNC: 24 MMOL/L (ref 21–32)
CREAT SERPL-MCNC: 0.48 MG/DL (ref 0.6–1.3)
GFR SERPL CREATININE-BSD FRML MDRD: 126 ML/MIN/1.73SQ M
GLUCOSE P FAST SERPL-MCNC: 89 MG/DL (ref 65–99)
POTASSIUM SERPL-SCNC: 3.7 MMOL/L (ref 3.5–5.3)
PROT SERPL-MCNC: 6.8 G/DL (ref 6.4–8.2)
SODIUM SERPL-SCNC: 141 MMOL/L (ref 136–145)
TSH SERPL DL<=0.05 MIU/L-ACNC: 2.45 UIU/ML (ref 0.36–3.74)
VIT B12 SERPL-MCNC: 1311 PG/ML (ref 100–900)

## 2021-09-10 PROCEDURE — 36415 COLL VENOUS BLD VENIPUNCTURE: CPT

## 2021-09-10 PROCEDURE — 99213 OFFICE O/P EST LOW 20 MIN: CPT | Performed by: PHYSICIAN ASSISTANT

## 2021-09-10 PROCEDURE — 82550 ASSAY OF CK (CPK): CPT

## 2021-09-10 PROCEDURE — 86430 RHEUMATOID FACTOR TEST QUAL: CPT

## 2021-09-10 PROCEDURE — 82607 VITAMIN B-12: CPT

## 2021-09-10 PROCEDURE — 86618 LYME DISEASE ANTIBODY: CPT

## 2021-09-10 PROCEDURE — 84443 ASSAY THYROID STIM HORMONE: CPT

## 2021-09-10 PROCEDURE — 80053 COMPREHEN METABOLIC PANEL: CPT

## 2021-09-10 PROCEDURE — 86038 ANTINUCLEAR ANTIBODIES: CPT

## 2021-09-10 RX ORDER — PREDNISONE 20 MG/1
40 TABLET ORAL DAILY
Qty: 10 TABLET | Refills: 0 | Status: SHIPPED | OUTPATIENT
Start: 2021-09-10 | End: 2021-09-15

## 2021-09-10 NOTE — PROGRESS NOTES
Assessment/Plan:    No problem-specific Assessment & Plan notes found for this encounter  Problem List Items Addressed This Visit     None      Visit Diagnoses     Arthralgia, unspecified joint    -  Primary    Relevant Medications    predniSONE 20 mg tablet    Other Relevant Orders    Lyme Antibody Profile with reflex to WB    RF Screen w/ Reflex to Titer    Ambulatory referral to Rheumatology    Vitamin B12    Comprehensive metabolic panel    TSH, 3rd generation with Free T4 reflex    Lyme Total Antibody Profile with reflex to WB    BERNARDINO Screen w/ Reflex to Titer/Pattern    CK          Steroid trial  Labs ordered- will discuss in f/u in 2 weeks    Subjective:      Patient ID: Jacqueline Cobb is a 61 y o  female  60 y/o female presents for evaluation of arthralgias  Patient has been suffering from joint pain for several years  Patient saw pain management this July for chronic pain syndrome  Has chronic pain in her neck and mostly her right wrist and thumb  Pt was seen by orthopedics and referred to pain management  Taking diclofenac for the pain without any relief  States pain is worse in the morning and eases up a little throughout the day  Describe the pain as stiffness  Denies hx of lyme  Denies fever or chills  The following portions of the patient's history were reviewed and updated as appropriate: allergies, current medications, past family history, past social history, past surgical history and problem list     Review of Systems   Constitutional: Negative for chills, fatigue and fever  HENT: Negative for congestion, ear pain, sinus pain, sore throat and trouble swallowing  Eyes: Negative for pain, discharge and redness  Respiratory: Negative for cough, chest tightness, shortness of breath and wheezing  Cardiovascular: Negative for chest pain, palpitations and leg swelling  Gastrointestinal: Negative for abdominal pain, diarrhea, nausea and vomiting     Musculoskeletal: Positive for arthralgias and myalgias  Negative for joint swelling  Skin: Negative for rash  Neurological: Negative for dizziness, weakness, numbness and headaches  Objective:      /86   Pulse 63   Temp 97 6 °F (36 4 °C)   Ht 5' 5" (1 651 m)   Wt 74 8 kg (165 lb)   SpO2 (!) 63%   BMI 27 46 kg/m²          Physical Exam  Vitals and nursing note reviewed  Constitutional:       General: She is not in acute distress  Appearance: Normal appearance  She is well-developed  Cardiovascular:      Rate and Rhythm: Normal rate and regular rhythm  Pulmonary:      Effort: Pulmonary effort is normal       Breath sounds: Normal breath sounds  Abdominal:      General: Bowel sounds are normal       Palpations: Abdomen is soft  Abdomen is not rigid  Tenderness: There is no abdominal tenderness  There is no guarding or rebound  Negative signs include Ren's sign and McBurney's sign  Hernia: No hernia is present  Musculoskeletal:      Right wrist: Tenderness present  Right hand: Tenderness (first metacarpal joint tenderness) present  Skin:     General: Skin is warm and dry

## 2021-09-11 LAB — B BURGDOR IGG+IGM SER-ACNC: 14

## 2021-09-13 LAB
RHEUMATOID FACT SER QL LA: NEGATIVE
RYE IGE QN: NEGATIVE

## 2021-09-24 ENCOUNTER — OFFICE VISIT (OUTPATIENT)
Dept: FAMILY MEDICINE CLINIC | Facility: CLINIC | Age: 56
End: 2021-09-24
Payer: COMMERCIAL

## 2021-09-24 VITALS
DIASTOLIC BLOOD PRESSURE: 89 MMHG | HEART RATE: 80 BPM | SYSTOLIC BLOOD PRESSURE: 128 MMHG | TEMPERATURE: 97.1 F | HEIGHT: 65 IN | OXYGEN SATURATION: 96 % | BODY MASS INDEX: 26.82 KG/M2 | WEIGHT: 161 LBS

## 2021-09-24 DIAGNOSIS — L70.9 ACNE, UNSPECIFIED ACNE TYPE: ICD-10-CM

## 2021-09-24 DIAGNOSIS — M54.2 NECK PAIN: Primary | ICD-10-CM

## 2021-09-24 DIAGNOSIS — G47.00 INSOMNIA, UNSPECIFIED TYPE: ICD-10-CM

## 2021-09-24 DIAGNOSIS — M25.541 ARTHRALGIA OF BOTH HANDS: ICD-10-CM

## 2021-09-24 DIAGNOSIS — M25.542 ARTHRALGIA OF BOTH HANDS: ICD-10-CM

## 2021-09-24 PROCEDURE — 99214 OFFICE O/P EST MOD 30 MIN: CPT | Performed by: PHYSICIAN ASSISTANT

## 2021-09-24 RX ORDER — TRETINOIN 0.5 MG/G
CREAM TOPICAL
Qty: 45 G | Refills: 1 | Status: SHIPPED | OUTPATIENT
Start: 2021-09-24

## 2021-09-24 RX ORDER — TRAZODONE HYDROCHLORIDE 50 MG/1
50 TABLET ORAL
Qty: 30 TABLET | Refills: 0 | Status: SHIPPED | OUTPATIENT
Start: 2021-09-24 | End: 2022-01-10 | Stop reason: ALTCHOICE

## 2021-09-24 NOTE — PROGRESS NOTES
Assessment/Plan:    No problem-specific Assessment & Plan notes found for this encounter  Problem List Items Addressed This Visit        Other    Arthralgia of both hands    Relevant Orders    Ambulatory referral to Hand Surgery      Other Visit Diagnoses     Neck pain    -  Primary    Relevant Orders    Ambulatory referral to Physical Therapy    Insomnia, unspecified type        Relevant Medications    traZODone (DESYREL) 50 mg tablet    Acne, unspecified acne type        Relevant Medications    tretinoin (REFISSA) 0 05 % cream            Subjective:      Patient ID: Neeru Osman is a 61 y o  female  60 y/o female presents for f/u on labs  Pt continues to have arthralgias  Worse in her hands and her neck  Reviewed labs with patient- all within normal range  Negative lyme  Negative RF  Likely pain attributed to degenerative changes  Pt would like to start PT for her neck  Has been having trouble sleeping due to pain as well as work/stress  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      The following portions of the patient's history were reviewed and updated as appropriate: allergies, current medications, past family history, past medical history, past surgical history and problem list     Review of Systems   Constitutional: Negative for chills, fatigue and fever  HENT: Negative for congestion, ear pain, sinus pain, sore throat and trouble swallowing  Eyes: Negative for pain, discharge and redness  Respiratory: Negative for cough, chest tightness, shortness of breath and wheezing  Cardiovascular: Negative for chest pain, palpitations and leg swelling  Gastrointestinal: Negative for abdominal pain, diarrhea, nausea and vomiting  Musculoskeletal: Negative for arthralgias, joint swelling and myalgias  Skin: Positive for rash (acne)  Neurological: Negative for dizziness, weakness, numbness and headaches           Objective:      /89   Pulse 80   Temp (!) 97 1 °F (36 2 °C)   Ht 5' 5" (1 651 m)   Wt 73 kg (161 lb)   SpO2 96%   BMI 26 79 kg/m²          Physical Exam  Vitals and nursing note reviewed  Constitutional:       General: She is not in acute distress  Appearance: Normal appearance  She is well-developed  Cardiovascular:      Rate and Rhythm: Normal rate and regular rhythm  Pulmonary:      Effort: Pulmonary effort is normal       Breath sounds: Normal breath sounds  Abdominal:      General: Bowel sounds are normal       Palpations: Abdomen is soft  Abdomen is not rigid  Tenderness: There is no abdominal tenderness  There is no guarding or rebound  Negative signs include Ren's sign and McBurney's sign  Hernia: No hernia is present  Musculoskeletal:      Right hand: Deformity (1st MCP joint) present  No tenderness  Decreased range of motion  Left hand: Decreased range of motion  Skin:     General: Skin is warm and dry

## 2021-10-11 ENCOUNTER — EVALUATION (OUTPATIENT)
Dept: PHYSICAL THERAPY | Facility: CLINIC | Age: 56
End: 2021-10-11
Payer: COMMERCIAL

## 2021-10-11 DIAGNOSIS — M54.2 NECK PAIN: ICD-10-CM

## 2021-10-11 PROCEDURE — 97163 PT EVAL HIGH COMPLEX 45 MIN: CPT | Performed by: PHYSICAL THERAPIST

## 2021-10-11 PROCEDURE — 97110 THERAPEUTIC EXERCISES: CPT | Performed by: PHYSICAL THERAPIST

## 2021-10-11 PROCEDURE — 97140 MANUAL THERAPY 1/> REGIONS: CPT | Performed by: PHYSICAL THERAPIST

## 2021-10-13 ENCOUNTER — OFFICE VISIT (OUTPATIENT)
Dept: PHYSICAL THERAPY | Facility: CLINIC | Age: 56
End: 2021-10-13
Payer: COMMERCIAL

## 2021-10-13 DIAGNOSIS — M54.2 NECK PAIN: Primary | ICD-10-CM

## 2021-10-13 PROCEDURE — 97110 THERAPEUTIC EXERCISES: CPT

## 2021-10-13 PROCEDURE — 97140 MANUAL THERAPY 1/> REGIONS: CPT

## 2021-10-18 ENCOUNTER — OFFICE VISIT (OUTPATIENT)
Dept: PHYSICAL THERAPY | Facility: CLINIC | Age: 56
End: 2021-10-18
Payer: COMMERCIAL

## 2021-10-18 DIAGNOSIS — M54.2 NECK PAIN: Primary | ICD-10-CM

## 2021-10-18 PROCEDURE — 97110 THERAPEUTIC EXERCISES: CPT | Performed by: PHYSICAL THERAPIST

## 2021-10-18 PROCEDURE — 97112 NEUROMUSCULAR REEDUCATION: CPT | Performed by: PHYSICAL THERAPIST

## 2021-10-18 PROCEDURE — 97140 MANUAL THERAPY 1/> REGIONS: CPT | Performed by: PHYSICAL THERAPIST

## 2021-10-22 ENCOUNTER — OFFICE VISIT (OUTPATIENT)
Dept: PHYSICAL THERAPY | Facility: CLINIC | Age: 56
End: 2021-10-22
Payer: COMMERCIAL

## 2021-10-22 DIAGNOSIS — M25.642 JOINT STIFFNESS OF HAND, LEFT: ICD-10-CM

## 2021-10-22 DIAGNOSIS — M54.2 NECK PAIN: Primary | ICD-10-CM

## 2021-10-22 DIAGNOSIS — M25.641 STIFFNESS OF HAND JOINT, RIGHT: ICD-10-CM

## 2021-10-22 PROCEDURE — 97140 MANUAL THERAPY 1/> REGIONS: CPT | Performed by: PHYSICAL THERAPIST

## 2021-10-22 PROCEDURE — 97110 THERAPEUTIC EXERCISES: CPT | Performed by: PHYSICAL THERAPIST

## 2021-10-22 PROCEDURE — 97161 PT EVAL LOW COMPLEX 20 MIN: CPT | Performed by: PHYSICAL THERAPIST

## 2021-10-25 ENCOUNTER — OFFICE VISIT (OUTPATIENT)
Dept: PHYSICAL THERAPY | Facility: CLINIC | Age: 56
End: 2021-10-25
Payer: COMMERCIAL

## 2021-10-25 DIAGNOSIS — M54.2 NECK PAIN: Primary | ICD-10-CM

## 2021-10-25 PROCEDURE — 97110 THERAPEUTIC EXERCISES: CPT

## 2021-10-25 PROCEDURE — 97112 NEUROMUSCULAR REEDUCATION: CPT

## 2021-10-25 PROCEDURE — 97140 MANUAL THERAPY 1/> REGIONS: CPT

## 2021-10-27 ENCOUNTER — OFFICE VISIT (OUTPATIENT)
Dept: PHYSICAL THERAPY | Facility: CLINIC | Age: 56
End: 2021-10-27
Payer: COMMERCIAL

## 2021-10-27 DIAGNOSIS — M54.2 NECK PAIN: Primary | ICD-10-CM

## 2021-10-27 DIAGNOSIS — M25.641 STIFFNESS OF HAND JOINT, RIGHT: ICD-10-CM

## 2021-10-27 DIAGNOSIS — M25.642 JOINT STIFFNESS OF HAND, LEFT: ICD-10-CM

## 2021-10-27 PROCEDURE — 97140 MANUAL THERAPY 1/> REGIONS: CPT | Performed by: PHYSICAL THERAPIST

## 2021-10-27 PROCEDURE — 97530 THERAPEUTIC ACTIVITIES: CPT | Performed by: PHYSICAL THERAPIST

## 2021-10-27 PROCEDURE — 97110 THERAPEUTIC EXERCISES: CPT | Performed by: PHYSICAL THERAPIST

## 2021-10-27 PROCEDURE — 97112 NEUROMUSCULAR REEDUCATION: CPT | Performed by: PHYSICAL THERAPIST

## 2021-11-01 ENCOUNTER — APPOINTMENT (OUTPATIENT)
Dept: PHYSICAL THERAPY | Facility: CLINIC | Age: 56
End: 2021-11-01
Payer: COMMERCIAL

## 2021-11-05 ENCOUNTER — HOSPITAL ENCOUNTER (OUTPATIENT)
Dept: MAMMOGRAPHY | Facility: CLINIC | Age: 56
Discharge: HOME | End: 2021-11-05
Payer: COMMERCIAL

## 2021-11-05 ENCOUNTER — OFFICE VISIT (OUTPATIENT)
Dept: PHYSICAL THERAPY | Facility: CLINIC | Age: 56
End: 2021-11-05
Payer: COMMERCIAL

## 2021-11-05 VITALS — HEIGHT: 65 IN | WEIGHT: 161 LBS | BODY MASS INDEX: 26.82 KG/M2

## 2021-11-05 DIAGNOSIS — Z12.31 ENCOUNTER FOR SCREENING MAMMOGRAM FOR MALIGNANT NEOPLASM OF BREAST: ICD-10-CM

## 2021-11-05 DIAGNOSIS — M25.641 STIFFNESS OF HAND JOINT, RIGHT: ICD-10-CM

## 2021-11-05 DIAGNOSIS — M25.642 JOINT STIFFNESS OF HAND, LEFT: ICD-10-CM

## 2021-11-05 DIAGNOSIS — M54.2 NECK PAIN: Primary | ICD-10-CM

## 2021-11-05 PROCEDURE — 77067 SCR MAMMO BI INCL CAD: CPT

## 2021-11-05 PROCEDURE — 77063 BREAST TOMOSYNTHESIS BI: CPT

## 2021-11-05 PROCEDURE — 97530 THERAPEUTIC ACTIVITIES: CPT | Performed by: PHYSICAL THERAPIST

## 2021-11-05 PROCEDURE — 97110 THERAPEUTIC EXERCISES: CPT | Performed by: PHYSICAL THERAPIST

## 2021-11-05 PROCEDURE — 97112 NEUROMUSCULAR REEDUCATION: CPT | Performed by: PHYSICAL THERAPIST

## 2021-11-05 PROCEDURE — 97140 MANUAL THERAPY 1/> REGIONS: CPT | Performed by: PHYSICAL THERAPIST

## 2021-11-08 RX ORDER — CYCLOBENZAPRINE HCL 5 MG
TABLET ORAL
COMMUNITY
Start: 2021-11-04 | End: 2022-01-10 | Stop reason: ALTCHOICE

## 2021-11-09 ENCOUNTER — OFFICE VISIT (OUTPATIENT)
Dept: PHYSICAL THERAPY | Facility: CLINIC | Age: 56
End: 2021-11-09
Payer: COMMERCIAL

## 2021-11-09 DIAGNOSIS — M25.641 STIFFNESS OF HAND JOINT, RIGHT: ICD-10-CM

## 2021-11-09 DIAGNOSIS — M54.2 NECK PAIN: Primary | ICD-10-CM

## 2021-11-09 DIAGNOSIS — M25.642 JOINT STIFFNESS OF HAND, LEFT: ICD-10-CM

## 2021-11-09 PROCEDURE — 97110 THERAPEUTIC EXERCISES: CPT | Performed by: PHYSICAL THERAPIST

## 2021-11-09 PROCEDURE — 97112 NEUROMUSCULAR REEDUCATION: CPT | Performed by: PHYSICAL THERAPIST

## 2021-11-09 PROCEDURE — 97140 MANUAL THERAPY 1/> REGIONS: CPT | Performed by: PHYSICAL THERAPIST

## 2021-11-10 ENCOUNTER — ANNUAL EXAM (OUTPATIENT)
Dept: OBGYN CLINIC | Facility: CLINIC | Age: 56
End: 2021-11-10
Payer: COMMERCIAL

## 2021-11-10 VITALS
WEIGHT: 160 LBS | SYSTOLIC BLOOD PRESSURE: 118 MMHG | DIASTOLIC BLOOD PRESSURE: 76 MMHG | HEIGHT: 65 IN | BODY MASS INDEX: 26.66 KG/M2

## 2021-11-10 DIAGNOSIS — Z01.419 ENCOUNTER FOR ANNUAL ROUTINE GYNECOLOGICAL EXAMINATION: Primary | ICD-10-CM

## 2021-11-10 DIAGNOSIS — Z11.3 SCREENING FOR STDS (SEXUALLY TRANSMITTED DISEASES): ICD-10-CM

## 2021-11-10 PROCEDURE — 87491 CHLMYD TRACH DNA AMP PROBE: CPT | Performed by: OBSTETRICS & GYNECOLOGY

## 2021-11-10 PROCEDURE — G0476 HPV COMBO ASSAY CA SCREEN: HCPCS | Performed by: OBSTETRICS & GYNECOLOGY

## 2021-11-10 PROCEDURE — 99396 PREV VISIT EST AGE 40-64: CPT | Performed by: OBSTETRICS & GYNECOLOGY

## 2021-11-10 PROCEDURE — G0145 SCR C/V CYTO,THINLAYER,RESCR: HCPCS | Performed by: OBSTETRICS & GYNECOLOGY

## 2021-11-10 PROCEDURE — 87591 N.GONORRHOEAE DNA AMP PROB: CPT | Performed by: OBSTETRICS & GYNECOLOGY

## 2021-11-11 ENCOUNTER — APPOINTMENT (OUTPATIENT)
Dept: PHYSICAL THERAPY | Facility: CLINIC | Age: 56
End: 2021-11-11
Payer: COMMERCIAL

## 2021-11-12 ENCOUNTER — APPOINTMENT (OUTPATIENT)
Dept: PHYSICAL THERAPY | Facility: CLINIC | Age: 56
End: 2021-11-12
Payer: COMMERCIAL

## 2021-11-13 LAB
HPV HR 12 DNA CVX QL NAA+PROBE: POSITIVE
HPV16 DNA CVX QL NAA+PROBE: POSITIVE
HPV18 DNA CVX QL NAA+PROBE: NEGATIVE

## 2021-11-14 LAB
C TRACH DNA SPEC QL NAA+PROBE: NEGATIVE
N GONORRHOEA DNA SPEC QL NAA+PROBE: NEGATIVE

## 2021-11-15 ENCOUNTER — OFFICE VISIT (OUTPATIENT)
Dept: PHYSICAL THERAPY | Facility: CLINIC | Age: 56
End: 2021-11-15
Payer: COMMERCIAL

## 2021-11-15 DIAGNOSIS — M25.641 STIFFNESS OF HAND JOINT, RIGHT: ICD-10-CM

## 2021-11-15 DIAGNOSIS — M25.642 JOINT STIFFNESS OF HAND, LEFT: ICD-10-CM

## 2021-11-15 DIAGNOSIS — M54.2 NECK PAIN: Primary | ICD-10-CM

## 2021-11-15 LAB
LAB AP GYN PRIMARY INTERPRETATION: NORMAL
Lab: NORMAL
PATH INTERP SPEC-IMP: NORMAL

## 2021-11-15 PROCEDURE — 97110 THERAPEUTIC EXERCISES: CPT | Performed by: PHYSICAL THERAPIST

## 2021-11-15 PROCEDURE — 97140 MANUAL THERAPY 1/> REGIONS: CPT | Performed by: PHYSICAL THERAPIST

## 2021-11-16 ENCOUNTER — TELEPHONE (OUTPATIENT)
Dept: OBGYN CLINIC | Facility: CLINIC | Age: 56
End: 2021-11-16

## 2021-11-24 ENCOUNTER — OFFICE VISIT (OUTPATIENT)
Dept: OBGYN CLINIC | Facility: HOSPITAL | Age: 56
End: 2021-11-24
Payer: COMMERCIAL

## 2021-11-24 VITALS
HEART RATE: 83 BPM | SYSTOLIC BLOOD PRESSURE: 147 MMHG | WEIGHT: 162.2 LBS | BODY MASS INDEX: 27.02 KG/M2 | DIASTOLIC BLOOD PRESSURE: 83 MMHG | HEIGHT: 65 IN

## 2021-11-24 DIAGNOSIS — M25.541 ARTHRALGIA OF BOTH HANDS: ICD-10-CM

## 2021-11-24 DIAGNOSIS — M25.542 ARTHRALGIA OF BOTH HANDS: ICD-10-CM

## 2021-11-24 DIAGNOSIS — M65.311 TRIGGER THUMB OF RIGHT HAND: Primary | ICD-10-CM

## 2021-11-24 PROCEDURE — 20550 NJX 1 TENDON SHEATH/LIGAMENT: CPT | Performed by: ORTHOPAEDIC SURGERY

## 2021-11-24 PROCEDURE — 99213 OFFICE O/P EST LOW 20 MIN: CPT | Performed by: ORTHOPAEDIC SURGERY

## 2021-11-24 RX ORDER — BETAMETHASONE SODIUM PHOSPHATE AND BETAMETHASONE ACETATE 3; 3 MG/ML; MG/ML
6 INJECTION, SUSPENSION INTRA-ARTICULAR; INTRALESIONAL; INTRAMUSCULAR; SOFT TISSUE
Status: COMPLETED | OUTPATIENT
Start: 2021-11-24 | End: 2021-11-24

## 2021-11-24 RX ORDER — LIDOCAINE HYDROCHLORIDE 10 MG/ML
1 INJECTION, SOLUTION INFILTRATION; PERINEURAL
Status: COMPLETED | OUTPATIENT
Start: 2021-11-24 | End: 2021-11-24

## 2021-11-24 RX ADMIN — LIDOCAINE HYDROCHLORIDE 1 ML: 10 INJECTION, SOLUTION INFILTRATION; PERINEURAL at 15:03

## 2021-11-24 RX ADMIN — BETAMETHASONE SODIUM PHOSPHATE AND BETAMETHASONE ACETATE 6 MG: 3; 3 INJECTION, SUSPENSION INTRA-ARTICULAR; INTRALESIONAL; INTRAMUSCULAR; SOFT TISSUE at 15:03

## 2021-12-02 ENCOUNTER — CLINICAL SUPPORT (OUTPATIENT)
Dept: FAMILY MEDICINE CLINIC | Facility: CLINIC | Age: 56
End: 2021-12-02
Payer: COMMERCIAL

## 2021-12-02 DIAGNOSIS — Z23 NEED FOR HEPATITIS B VACCINATION: Primary | ICD-10-CM

## 2021-12-02 PROCEDURE — 90471 IMMUNIZATION ADMIN: CPT | Performed by: FAMILY MEDICINE

## 2021-12-02 PROCEDURE — 90746 HEPB VACCINE 3 DOSE ADULT IM: CPT | Performed by: FAMILY MEDICINE

## 2022-01-02 PROCEDURE — U0003 INFECTIOUS AGENT DETECTION BY NUCLEIC ACID (DNA OR RNA); SEVERE ACUTE RESPIRATORY SYNDROME CORONAVIRUS 2 (SARS-COV-2) (CORONAVIRUS DISEASE [COVID-19]), AMPLIFIED PROBE TECHNIQUE, MAKING USE OF HIGH THROUGHPUT TECHNOLOGIES AS DESCRIBED BY CMS-2020-01-R: HCPCS | Performed by: FAMILY MEDICINE

## 2022-01-02 PROCEDURE — U0005 INFEC AGEN DETEC AMPLI PROBE: HCPCS | Performed by: FAMILY MEDICINE

## 2022-01-06 NOTE — PROGRESS NOTES
Assessment and Plan:   Patient is a 59-year-old female who presents for rheumatology consult regarding polyarthralgia  She describes joint pain in her hands and knees which has been ongoing for over 1 year  She also has issues with cervical radiculopathy and is improving with PT for this  She does not describe typical inflammatory features for her joint pain and I do not see signs of inflammatory arthritis on exam today which I discussed with her  She previously did have negative rheumatologic lab workup which is reassuring  We will complete her workup by checking on CCP and Sjogren's antibodies but given lack of typical features and finding suspicion is low for something like rheumatoid arthritis  If her workup is negative she will not need additional rheumatology follow-up  Plan:  Diagnoses and all orders for this visit:    Polyarthralgia  -     Cyclic citrul peptide antibody, IgG  -     C-reactive protein  -     Sjogren's Antibodies  -     Sedimentation rate, automated    Arthralgia, unspecified joint  -     Ambulatory referral to Rheumatology        Follow-up plan: no rheumatology follow-up needed if work-up negative       HPI  Clary Tovar is a 61 y o   female with HLD, HTN, cervical radiculopathy who presents for rheumatology consult by request of Biju Burgos PA-C for polyarthralgia  Never saw rheum  Reports R thumb swelling since Sept last year  She had trigger finger injection by ortho there in Nov last year  On a regular basis she has joint pain in her hands, wrists, knees  This is since 2020 and progressively worsening  Morning stiffness in these joints for about 30 minutes, but has pain throughout the whole day  Has not seen obvious swelling in her joints  Takes tylenol in the morning, uses the diclofenac as needed  She broke the R ankle previously and has hardware in there which aches sometimes with weather changes     Previously saw pain management for cervical radiculopathy and is doing PT which has been helping with the symptoms  She has noticed dry throat lately which is new for her  No photosensitivity, rashes, psoriasis, sicca symptoms, oral or nasal ulcers, alopecia, Raynaud's, h/o pericarditis or pleurisy, h/o blood clots  Review of Systems  Review of Systems   Constitutional: Negative for fatigue  HENT: Negative for mouth sores  Respiratory: Negative for cough and shortness of breath  Cardiovascular: Negative for chest pain and leg swelling  Gastrointestinal: Negative for abdominal pain, constipation and diarrhea  Musculoskeletal: Positive for arthralgias  Negative for back pain, joint swelling and myalgias  Skin: Negative for color change and rash  Neurological: Negative for weakness  Hematological: Negative for adenopathy  Psychiatric/Behavioral: Negative for sleep disturbance         Allergies  Allergies   Allergen Reactions    Other      strawberry    Pineapple - Food Allergy Hives       Home Medications    Current Outpatient Medications:     atorvastatin (LIPITOR) 20 mg tablet, Take 1 tablet (20 mg total) by mouth daily, Disp: 90 tablet, Rfl: 3    diclofenac (VOLTAREN) 75 mg EC tablet, Take 1 tablet (75 mg total) by mouth 2 (two) times a day, Disp: 60 tablet, Rfl: 1    hydrochlorothiazide (HYDRODIURIL) 25 mg tablet, Take 1 tablet (25 mg total) by mouth daily, Disp: 90 tablet, Rfl: 1    losartan (Cozaar) 50 mg tablet, Take 1 tablet (50 mg total) by mouth daily, Disp: 90 tablet, Rfl: 1    tretinoin (REFISSA) 0 05 % cream, Apply topically daily at bedtime, Disp: 45 g, Rfl: 1    Past Medical History  Past Medical History:   Diagnosis Date    Abnormal Pap smear of cervix     Acute maxillary sinusitis 12/27/2017    Acute pain of right shoulder 4/10/2019    BRCA1 negative 2015    BRCA2 negative 2015    Gastric ulcer     Hyperproinsulinemia     [id number] resolved    Swelling of finger joint of right hand 12/10/2019       Past Surgical History   Past Surgical History:   Procedure Laterality Date    APPENDECTOMY      HERNIA REPAIR         Family History  No known family history of autoimmune or inflammatory diseases  Family History   Problem Relation Age of Onset    Ovarian cancer Mother 48    Thyroid cancer Sister 52    Breast cancer Maternal Uncle         52's    No Known Problems Daughter     Cancer Maternal Grandmother         unknown type or age   Galileo Hawkins No Known Problems Paternal Grandmother     No Known Problems Sister     No Known Problems Maternal Aunt     No Known Problems Father        Social History  Occupation: CNA   Social History     Substance and Sexual Activity   Alcohol Use Not Currently     Social History     Substance and Sexual Activity   Drug Use No     Social History     Tobacco Use   Smoking Status Never Smoker   Smokeless Tobacco Never Used       Objective:    Vitals:    01/10/22 1106   BP: 112/72   Pulse: 80   Weight: 72 6 kg (160 lb)   Height: 5' 5" (1 651 m)       Physical Exam  Constitutional:       General: She is not in acute distress  HENT:      Head: Normocephalic and atraumatic  Eyes:      Conjunctiva/sclera: Conjunctivae normal    Pulmonary:      Effort: Pulmonary effort is normal  No respiratory distress  Musculoskeletal:      Cervical back: Neck supple  Comments: No reproducible soft tissue or joint tenderness  No joint swelling or synovitis anywhere  Skin:     Coloration: Skin is not pale  Neurological:      Mental Status: She is alert  Mental status is at baseline  Psychiatric:         Mood and Affect: Mood normal          Behavior: Behavior normal          Imaging:   MRI cervical 10/2020:  IMPRESSION:     Mild mid cervical degenerative change at C4-5 and C5-C6 without disc herniation  No canal stenosis or cord compression  Only mild foraminal narrowing is present without discrete nerve impingement    There is resulting reversal of the normal cervical lordosis at the C5 level similar to prior x-rays  XR R hands 7/23/21:  Images personally reviewed in PACS and my impression is:  No erosive or inflammatory changes noted        Labs:   Component      Latest Ref Rng & Units 9/10/2021   Sodium      136 - 145 mmol/L 141   Potassium      3 5 - 5 3 mmol/L 3 7   Chloride      100 - 108 mmol/L 111 (H)   CO2      21 - 32 mmol/L 24   Anion Gap      4 - 13 mmol/L 6   BUN      5 - 25 mg/dL 10   Creatinine      0 60 - 1 30 mg/dL 0 48 (L)   GLUCOSE FASTING      65 - 99 mg/dL 89   Calcium      8 3 - 10 1 mg/dL 8 7   CORRECTED CALCIUM      8 3 - 10 1 mg/dL 9 2   AST      5 - 45 U/L 29   ALT      12 - 78 U/L 41   Alkaline Phosphatase      46 - 116 U/L 126 (H)   Total Protein      6 4 - 8 2 g/dL 6 8   Albumin      3 5 - 5 0 g/dL 3 4 (L)   TOTAL BILIRUBIN      0 20 - 1 00 mg/dL 0 81   eGFR      ml/min/1 73sq m 126   Lyme total antibody      0 00 - 119 14   RHEUMATOID FACTOR      Negative Negative   Vitamin B-12      100 - 900 pg/mL 1,311 (H)   TSH 3RD GENERATON      0 358 - 3 740 uIU/mL 2 450   ANTI-NUCLEAR ANTIBODY (BERNARDINO)      Negative Negative   Total CK      26 - 192 U/L 98     Component      Latest Ref Rng & Units 12/12/2019   ANTI-NUCLEAR ANTIBODY (BERNARDINO)      Negative Negative   RHEUMATOID FACTOR      Negative Negative   Sed Rate      0 - 20 mm/hour 11   HIV-1/2 AB-AG      Non-Reactive Non-Reactive

## 2022-01-10 ENCOUNTER — OFFICE VISIT (OUTPATIENT)
Dept: RHEUMATOLOGY | Facility: CLINIC | Age: 57
End: 2022-01-10
Payer: COMMERCIAL

## 2022-01-10 VITALS
DIASTOLIC BLOOD PRESSURE: 72 MMHG | HEART RATE: 80 BPM | WEIGHT: 160 LBS | SYSTOLIC BLOOD PRESSURE: 112 MMHG | HEIGHT: 65 IN | BODY MASS INDEX: 26.66 KG/M2

## 2022-01-10 DIAGNOSIS — M25.50 ARTHRALGIA, UNSPECIFIED JOINT: ICD-10-CM

## 2022-01-10 DIAGNOSIS — M25.50 POLYARTHRALGIA: Primary | ICD-10-CM

## 2022-01-10 PROCEDURE — 99245 OFF/OP CONSLTJ NEW/EST HI 55: CPT | Performed by: INTERNAL MEDICINE

## 2022-01-11 ENCOUNTER — TELEPHONE (OUTPATIENT)
Dept: FAMILY MEDICINE CLINIC | Facility: CLINIC | Age: 57
End: 2022-01-11

## 2022-01-11 DIAGNOSIS — R06.02 SHORTNESS OF BREATH: Primary | ICD-10-CM

## 2022-01-11 NOTE — TELEPHONE ENCOUNTER
If she is having SOB the most important thing to check is her oxygen has she checked at home  Is she able to come to the parking lot to check her oxygen  I ordered a Chest X-ray however she has to be at least 5 days out from diagnosis since she can be contagious to X-ray staff

## 2022-01-11 NOTE — TELEPHONE ENCOUNTER
Pt tested positive for covid on 1/8  Pt is requesting a chest xray because of shortness of breath  Please order and notify pt- thank you!

## 2022-01-14 ENCOUNTER — APPOINTMENT (OUTPATIENT)
Dept: LAB | Facility: CLINIC | Age: 57
End: 2022-01-14
Payer: COMMERCIAL

## 2022-01-14 ENCOUNTER — APPOINTMENT (OUTPATIENT)
Dept: RADIOLOGY | Facility: CLINIC | Age: 57
End: 2022-01-14
Payer: COMMERCIAL

## 2022-01-14 DIAGNOSIS — R06.02 SHORTNESS OF BREATH: ICD-10-CM

## 2022-01-14 LAB
CRP SERPL QL: 8.8 MG/L
ERYTHROCYTE [SEDIMENTATION RATE] IN BLOOD: 29 MM/HOUR (ref 0–29)

## 2022-01-14 PROCEDURE — 85652 RBC SED RATE AUTOMATED: CPT | Performed by: INTERNAL MEDICINE

## 2022-01-14 PROCEDURE — 86235 NUCLEAR ANTIGEN ANTIBODY: CPT | Performed by: INTERNAL MEDICINE

## 2022-01-14 PROCEDURE — 86200 CCP ANTIBODY: CPT | Performed by: INTERNAL MEDICINE

## 2022-01-14 PROCEDURE — 86140 C-REACTIVE PROTEIN: CPT | Performed by: INTERNAL MEDICINE

## 2022-01-14 PROCEDURE — 36415 COLL VENOUS BLD VENIPUNCTURE: CPT | Performed by: INTERNAL MEDICINE

## 2022-01-14 PROCEDURE — 71046 X-RAY EXAM CHEST 2 VIEWS: CPT

## 2022-01-15 LAB
ENA SS-A AB SER-ACNC: <0.2 AI (ref 0–0.9)
ENA SS-B AB SER-ACNC: <0.2 AI (ref 0–0.9)

## 2022-01-18 ENCOUNTER — TELEPHONE (OUTPATIENT)
Dept: FAMILY MEDICINE CLINIC | Facility: CLINIC | Age: 57
End: 2022-01-18

## 2022-01-18 LAB — CCP AB SER IA-ACNC: 3.1

## 2022-01-18 NOTE — TELEPHONE ENCOUNTER
Tested COVID  positive at work on 1/7  Needs a note to return to work and would like to return tomorrow      Can this note be written

## 2022-01-19 ENCOUNTER — TELEPHONE (OUTPATIENT)
Dept: FAMILY MEDICINE CLINIC | Facility: CLINIC | Age: 57
End: 2022-01-19

## 2022-01-19 DIAGNOSIS — Z20.822 CLOSE EXPOSURE TO COVID-19 VIRUS: Primary | ICD-10-CM

## 2022-01-19 PROCEDURE — U0003 INFECTIOUS AGENT DETECTION BY NUCLEIC ACID (DNA OR RNA); SEVERE ACUTE RESPIRATORY SYNDROME CORONAVIRUS 2 (SARS-COV-2) (CORONAVIRUS DISEASE [COVID-19]), AMPLIFIED PROBE TECHNIQUE, MAKING USE OF HIGH THROUGHPUT TECHNOLOGIES AS DESCRIBED BY CMS-2020-01-R: HCPCS | Performed by: FAMILY MEDICINE

## 2022-01-19 PROCEDURE — U0005 INFEC AGEN DETEC AMPLI PROBE: HCPCS | Performed by: FAMILY MEDICINE

## 2022-01-19 NOTE — TELEPHONE ENCOUNTER
Tom Siddiqui tested positive for covid with a rapid at her work ( they test twice a week) she is not experiencing any sx  She needs  to be tested and a note to clear her and to state when she can get the booster  Please disregard the task from yesterday  Call when ready

## 2022-01-19 NOTE — TELEPHONE ENCOUNTER
Ok to write the letter to get booster once her quarantine period is over granted she did not received any monoclonal ab treatmetn

## 2022-01-28 ENCOUNTER — OFFICE VISIT (OUTPATIENT)
Dept: FAMILY MEDICINE CLINIC | Facility: CLINIC | Age: 57
End: 2022-01-28
Payer: COMMERCIAL

## 2022-01-28 VITALS
OXYGEN SATURATION: 98 % | DIASTOLIC BLOOD PRESSURE: 92 MMHG | WEIGHT: 160 LBS | HEIGHT: 65 IN | BODY MASS INDEX: 26.66 KG/M2 | TEMPERATURE: 96.5 F | SYSTOLIC BLOOD PRESSURE: 134 MMHG | HEART RATE: 68 BPM

## 2022-01-28 DIAGNOSIS — I10 ESSENTIAL HYPERTENSION: Primary | ICD-10-CM

## 2022-01-28 DIAGNOSIS — E28.39 MENOPAUSE OVARIAN FAILURE: ICD-10-CM

## 2022-01-28 DIAGNOSIS — Z78.0 POSTMENOPAUSAL ESTROGEN DEFICIENCY: ICD-10-CM

## 2022-01-28 PROBLEM — R20.0 LEFT ARM NUMBNESS: Status: RESOLVED | Noted: 2020-09-03 | Resolved: 2022-01-28

## 2022-01-28 PROBLEM — L30.9 DERMATITIS: Status: RESOLVED | Noted: 2018-10-10 | Resolved: 2022-01-28

## 2022-01-28 PROBLEM — M79.602 LEFT ARM PAIN: Status: RESOLVED | Noted: 2020-09-03 | Resolved: 2022-01-28

## 2022-01-28 PROCEDURE — 99213 OFFICE O/P EST LOW 20 MIN: CPT | Performed by: FAMILY MEDICINE

## 2022-01-28 NOTE — PROGRESS NOTES
BMI Counseling: Body mass index is 26 63 kg/m²  The BMI is above normal  Nutrition recommendations include 3-5 servings of fruits/vegetables daily, consuming healthier snacks and moderation in carbohydrate intake  Exercise recommendations include exercising 3-5 times per week

## 2022-01-28 NOTE — PROGRESS NOTES
Assessment/Plan:    No problem-specific Assessment & Plan notes found for this encounter  Diagnoses and all orders for this visit:    Essential hypertension  Stable controlled    Menopause ovarian failure  -     DXA bone density spine hip and pelvis; Future    Postmenopausal estrogen deficiency  -     DXA bone density spine hip and pelvis; Future  -     DXA bone density spine hip and pelvis; Future      Follow up in 6 months or 1 year    Subjective:      Patient ID: Jessi Mary is a 61 y o  female  Patient is here to follow up for hypertension  She did not take her medications today  Also is here for a work physical  Tests posiitve for TB test, PPD received a BCG vaccine  Had a Chest X-ray done recently normal       The following portions of the patient's history were reviewed and updated as appropriate:   She  has a past medical history of Abnormal Pap smear of cervix, Acute maxillary sinusitis (12/27/2017), Acute pain of right shoulder (4/10/2019), BRCA1 negative (2015), BRCA2 negative (2015), Gastric ulcer, Hyperproinsulinemia, and Swelling of finger joint of right hand (12/10/2019)    She   Patient Active Problem List    Diagnosis Date Noted    Menopause ovarian failure 01/28/2022    Closed nondisplaced fracture of lateral malleolus of right fibula 03/17/2021    ASCUS with positive high risk HPV cervical 09/09/2020    HPV (human papilloma virus) infection 09/09/2020    Overweight 09/03/2020    Elevated C-reactive protein (CRP) 09/03/2020    Encounter for gynecological examination without abnormal finding 07/24/2020    Post-menopausal 07/24/2020    Varicose veins of both lower extremities with pain 07/01/2020    2019 novel coronavirus disease (COVID-19) 05/30/2020    Pain in thumb joint with movement, right 01/15/2020    Swelling of right thumb 01/15/2020    Cervicalgia 04/23/2019    Carpal tunnel syndrome, right 04/23/2019    Calcific tendonitis of right shoulder 04/10/2019    Sciatica, right side 03/26/2019    Screening for diabetes mellitus 03/26/2019    Fatigue 10/10/2018    Essential hypertension 10/10/2018    Snoring 10/10/2018    Arthralgia of both hands 06/01/2018    Obesity (BMI 30 0-34 9) 06/01/2018    B[facility] 06/01/2018    Hot flashes 06/01/2018    Bipolar 1 disorder, depressed, moderate ([facility] ) 11/06/2017    Thyroid pain 11/06/2017    Perimenopausal symptoms 08/19/2016    Hyperlipidemia 03/03/2016    Lumbar radiculopathy 11/18/2015    Migraine headache 11/19/2014    Depression 07/05/2012     She  has a past surgical history that includes Appendectomy and Hernia repair  Her family history includes Breast cancer in her maternal uncle; Cancer in her maternal grandmother; No Known Problems in her daughter, father, maternal aunt, paternal grandmother, and sister; Ovarian cancer (age of onset: 48) in her mother; Thyroid cancer (age of onset: 52) in her sister  She  reports that she has never smoked  She has never used smokeless tobacco  She reports previous alcohol use  She reports that she does not use drugs  Current Outpatient Medications   Medication Sig Dispense Refill    atorvastatin (LIPITOR) 20 mg tablet Take 1 tablet (20 mg total) by mouth daily 90 tablet 3    diclofenac (VOLTAREN) 75 mg EC tablet Take 1 tablet (75 mg total) by mouth 2 (two) times a day 60 tablet 1    hydrochlorothiazide (HYDRODIURIL) 25 mg tablet Take 1 tablet (25 mg total) by mouth daily 90 tablet 1    losartan (Cozaar) 50 mg tablet Take 1 tablet (50 mg total) by mouth daily 90 tablet 1    tretinoin (REFISSA) 0 05 % cream Apply topically daily at bedtime 45 g 1     No current facility-administered medications for this visit       Current Outpatient Medications on File Prior to Visit   Medication Sig    atorvastatin (LIPITOR) 20 mg tablet Take 1 tablet (20 mg total) by mouth daily    diclofenac (VOLTAREN) 75 mg EC tablet Take 1 tablet (75 mg total) by mouth 2 (two) times a day    hydrochlorothiazide (HYDRODIURIL) 25 mg tablet Take 1 tablet (25 mg total) by mouth daily    losartan (Cozaar) 50 mg tablet Take 1 tablet (50 mg total) by mouth daily    tretinoin (REFISSA) 0 05 % cream Apply topically daily at bedtime     No current facility-administered medications on file prior to visit  She is allergic to other and pineapple - food allergy       Review of Systems   Constitutional: Negative for activity change, appetite change, fatigue and fever  HENT: Negative for congestion and ear discharge  Respiratory: Negative for cough and shortness of breath  Cardiovascular: Negative for chest pain and palpitations  Gastrointestinal: Negative for diarrhea and nausea  Musculoskeletal: Negative for arthralgias and back pain  Skin: Negative for color change and rash  Neurological: Negative for dizziness and headaches  Psychiatric/Behavioral: Negative for agitation and behavioral problems  Objective:      /92   Pulse 68   Temp (!) 96 5 °F (35 8 °C)   Ht 5' 5" (1 651 m)   Wt 72 6 kg (160 lb)   SpO2 98%   BMI 26 63 kg/m²          Physical Exam  Constitutional:       General: She is not in acute distress  Appearance: She is well-developed  She is not diaphoretic  HENT:      Head: Normocephalic and atraumatic  Nose: Nose normal    Eyes:      Conjunctiva/sclera: Conjunctivae normal       Pupils: Pupils are equal, round, and reactive to light  Cardiovascular:      Rate and Rhythm: Normal rate and regular rhythm  Heart sounds: Normal heart sounds  No murmur heard  Pulmonary:      Effort: Pulmonary effort is normal  No respiratory distress  Breath sounds: Normal breath sounds  No wheezing  Abdominal:      General: Bowel sounds are normal  There is no distension  Palpations: Abdomen is soft  Tenderness: There is no abdominal tenderness  Skin:     General: Skin is warm and dry        Findings: No erythema or rash  Neurological:      Mental Status: She is alert and oriented to person, place, and time

## 2022-02-21 ENCOUNTER — PROCEDURE VISIT (OUTPATIENT)
Dept: OBGYN CLINIC | Facility: CLINIC | Age: 57
End: 2022-02-21
Payer: COMMERCIAL

## 2022-02-21 VITALS
HEIGHT: 65 IN | DIASTOLIC BLOOD PRESSURE: 82 MMHG | SYSTOLIC BLOOD PRESSURE: 118 MMHG | BODY MASS INDEX: 27.16 KG/M2 | WEIGHT: 163 LBS

## 2022-02-21 DIAGNOSIS — B97.7 HPV (HUMAN PAPILLOMA VIRUS) INFECTION: Primary | ICD-10-CM

## 2022-02-21 PROBLEM — Z13.1 SCREENING FOR DIABETES MELLITUS: Status: RESOLVED | Noted: 2019-03-26 | Resolved: 2022-02-21

## 2022-02-21 PROBLEM — U07.1 2019 NOVEL CORONAVIRUS DISEASE (COVID-19): Status: RESOLVED | Noted: 2020-05-30 | Resolved: 2022-02-21

## 2022-02-21 PROBLEM — M79.89 SWELLING OF RIGHT THUMB: Status: RESOLVED | Noted: 2020-01-15 | Resolved: 2022-02-21

## 2022-02-21 PROBLEM — M25.541 PAIN IN THUMB JOINT WITH MOVEMENT, RIGHT: Status: RESOLVED | Noted: 2020-01-15 | Resolved: 2022-02-21

## 2022-02-21 PROBLEM — Z01.419 ENCOUNTER FOR GYNECOLOGICAL EXAMINATION WITHOUT ABNORMAL FINDING: Status: RESOLVED | Noted: 2020-07-24 | Resolved: 2022-02-21

## 2022-02-21 PROBLEM — F31.32 BIPOLAR 1 DISORDER, DEPRESSED, MODERATE (HCC): Status: RESOLVED | Noted: 2017-11-06 | Resolved: 2022-02-21

## 2022-02-21 PROBLEM — E66.811 OBESITY (BMI 30.0-34.9): Status: RESOLVED | Noted: 2018-06-01 | Resolved: 2022-02-21

## 2022-02-21 PROBLEM — E28.39 MENOPAUSE OVARIAN FAILURE: Status: RESOLVED | Noted: 2022-01-28 | Resolved: 2022-02-21

## 2022-02-21 PROBLEM — R23.2 HOT FLASHES: Status: RESOLVED | Noted: 2018-06-01 | Resolved: 2022-02-21

## 2022-02-21 PROBLEM — F31.70 BIPOLAR AFFECTIVE DISORDER IN REMISSION (HCC): Status: RESOLVED | Noted: 2018-06-01 | Resolved: 2022-02-21

## 2022-02-21 PROBLEM — E66.9 OBESITY (BMI 30.0-34.9): Status: RESOLVED | Noted: 2018-06-01 | Resolved: 2022-02-21

## 2022-02-21 PROBLEM — G56.01 CARPAL TUNNEL SYNDROME, RIGHT: Status: RESOLVED | Noted: 2019-04-23 | Resolved: 2022-02-21

## 2022-02-21 PROBLEM — E07.89 THYROID PAIN: Status: RESOLVED | Noted: 2017-11-06 | Resolved: 2022-02-21

## 2022-02-21 PROCEDURE — 57454 BX/CURETT OF CERVIX W/SCOPE: CPT | Performed by: NURSE PRACTITIONER

## 2022-02-21 PROCEDURE — 88305 TISSUE EXAM BY PATHOLOGIST: CPT | Performed by: PATHOLOGY

## 2022-02-21 NOTE — PATIENT INSTRUCTIONS
HPV (Human Papillomavirus)   WHAT YOU NEED TO KNOW:   What is human papillomavirus (HPV)? HPV is the most common infection spread by sexual contact  It can also be spread from a mother to her baby during delivery  HPV may cause oral and genital warts or tumors in your nose, mouth, throat, and lungs  HPV may also cause vaginal, penile, and anal cancers  You may not show symptoms of any of these conditions for several years after being exposed to HPV  What are the symptoms of HPV? · Painless warts    · Genital or anal discharge, bleeding, itching, or pain    · Pain when you urinate    How is HPV diagnosed? Your healthcare provider may use a vinegar liquid to help diagnose HPV genital warts  Women 27to 72years old can be checked for HPV during regular cervical cancer screenings  An HPV test checks for certain types of HPV that can cause changes in cervical cells  Without treatment, the changed cells can become cancer  An HPV test can be done every 5 years if the results show no infection  The test can be done with or without a Pap smear  A Pap smear checks for cancer or for abnormal cells that can become cancer  You may be tested for HPV if you are diagnosed with a mouth or throat cancer  How is HPV treated? HPV cannot be cured  Conditions that are caused by HPV can be treated  You will need to be monitored closely for these conditions  Ask your healthcare provider for more information about monitoring, conditions caused by HPV, and available treatments  How can HPV infection be prevented? · Ask about the HPV vaccine  The vaccine can help protect against HPV infection  Females and males can receive the vaccine  It is most effective if given before sexual activity begins  This allows the body to build almost complete protection against HPV before contact with the virus  The vaccine is usually given at 6or 15years of age but may be given as early as 5 years   The vaccine can be given through age 45     · Always use a condom during intercourse  A condom will not completely protect you from HPV infection, but it will help lower your risk  Use a new condom or latex barrier each time you have sex  This includes oral, vaginal, and anal sex  Make sure the condom fits and is put on correctly  Rubber latex sheets or dental dams can be used for oral sex  If you are allergic to latex, use a nonlatex product such as polyurethane  When should I call my doctor? · You have warts in your genital or anal area  · You have genital or anal discharge, bleeding, itching, or pain  · You have pain when you urinate  · You have questions or concerns about your condition or care  CARE AGREEMENT:   You have the right to help plan your care  Learn about your health condition and how it may be treated  Discuss treatment options with your healthcare providers to decide what care you want to receive  You always have the right to refuse treatment  The above information is an  only  It is not intended as medical advice for individual conditions or treatments  Talk to your doctor, nurse or pharmacist before following any medical regimen to see if it is safe and effective for you  © Copyright [employer] 2021 Information is for End User's use only and may not be sold, redistributed or otherwise used for commercial purposes   All illustrations and images included in CareNotes® are the copyrighted property of [employer]  or [address] [place] [address] [employer]

## 2022-02-21 NOTE — PROGRESS NOTES
Colposcopy    Date/Time: 2/21/2022 10:50 AM  Performed by: MARVEL Aguilar  Authorized by: MARVEL Aguilar     Consent:     Consent obtained:  Written    Consent given by:  Patient    Procedural risks discussed:  Bleeding, infection, possible continued pain and repeat procedure    Patient questions answered: yes      Patient agrees, verbalizes understanding, and wants to proceed: yes      Educational handouts given: yes      Instructions and paperwork completed: yes    Universal protocol:     Patient states understanding of procedure being performed: yes      Relevant documents present and verified: yes      Site marked: yes    Pre-procedure:     Pre-procedure timeout performed: yes      Prepped with: acetic acid and Lugol    Indication:     Indications: Nml pap but HPV 16 and non 16/18 positive in 2021, ASCUS HPV + in 2020  Procedure:     Procedure: Colposcopy w/ cervical biopsy and ECC      Under satisfactory analgesia the patient was prepped and draped in the dorsal lithotomy position: yes      Campbell speculum was placed in the vagina: yes      Under colposcopic examination the transition zone was seen in entirety: yes      Intracervical block was performed: no      Endocervix was curetted using a Kevorkian curette: yes      Cervical biopsy performed with a cervical biopsy punch: yes      Tampon inserted: no      Monsel's solution was applied: yes      Biopsy(s): yes      Location:  1,9 and ECC    Specimen to pathology: yes    Post-procedure:     Findings: White epithelium      Impression: Low grade cervical dysplasia      Patient tolerance of procedure: Tolerated well, no immediate complications  Comments:      Repap in 1 yr if biopsies are low level abnormalities  All questions answered  Post procedure handout given  Briefly discussed LEEP

## 2022-03-22 ENCOUNTER — TELEPHONE (OUTPATIENT)
Dept: OBGYN CLINIC | Facility: CLINIC | Age: 57
End: 2022-03-22

## 2022-03-22 DIAGNOSIS — I10 ESSENTIAL HYPERTENSION: ICD-10-CM

## 2022-03-22 RX ORDER — LOSARTAN POTASSIUM 50 MG/1
50 TABLET ORAL DAILY
Qty: 90 TABLET | Refills: 1 | Status: SHIPPED | OUTPATIENT
Start: 2022-03-22

## 2022-03-22 RX ORDER — HYDROCHLOROTHIAZIDE 25 MG/1
25 TABLET ORAL DAILY
Qty: 90 TABLET | Refills: 1 | Status: SHIPPED | OUTPATIENT
Start: 2022-03-22 | End: 2022-09-18

## 2022-04-28 ENCOUNTER — APPOINTMENT (OUTPATIENT)
Dept: RADIOLOGY | Facility: CLINIC | Age: 57
End: 2022-04-28
Payer: COMMERCIAL

## 2022-04-28 ENCOUNTER — APPOINTMENT (OUTPATIENT)
Dept: LAB | Facility: CLINIC | Age: 57
End: 2022-04-28
Payer: COMMERCIAL

## 2022-04-28 ENCOUNTER — OFFICE VISIT (OUTPATIENT)
Dept: FAMILY MEDICINE CLINIC | Facility: CLINIC | Age: 57
End: 2022-04-28
Payer: COMMERCIAL

## 2022-04-28 VITALS
OXYGEN SATURATION: 98 % | HEART RATE: 60 BPM | SYSTOLIC BLOOD PRESSURE: 122 MMHG | DIASTOLIC BLOOD PRESSURE: 84 MMHG | TEMPERATURE: 97.8 F | HEIGHT: 65 IN | WEIGHT: 165.2 LBS | BODY MASS INDEX: 27.52 KG/M2

## 2022-04-28 DIAGNOSIS — M54.50 ACUTE BILATERAL LOW BACK PAIN WITHOUT SCIATICA: ICD-10-CM

## 2022-04-28 DIAGNOSIS — R55 NEAR SYNCOPE: Primary | ICD-10-CM

## 2022-04-28 DIAGNOSIS — R55 NEAR SYNCOPE: ICD-10-CM

## 2022-04-28 LAB
ALBUMIN SERPL BCP-MCNC: 3.6 G/DL (ref 3.5–5)
ALP SERPL-CCNC: 108 U/L (ref 46–116)
ALT SERPL W P-5'-P-CCNC: 39 U/L (ref 12–78)
AMORPH URATE CRY URNS QL MICRO: ABNORMAL
ANION GAP SERPL CALCULATED.3IONS-SCNC: 4 MMOL/L (ref 4–13)
AST SERPL W P-5'-P-CCNC: 21 U/L (ref 5–45)
BACTERIA UR QL AUTO: ABNORMAL /HPF
BASOPHILS # BLD AUTO: 0.05 THOUSANDS/ΜL (ref 0–0.1)
BASOPHILS NFR BLD AUTO: 1 % (ref 0–1)
BILIRUB SERPL-MCNC: 0.44 MG/DL (ref 0.2–1)
BILIRUB UR QL STRIP: NEGATIVE
BUN SERPL-MCNC: 15 MG/DL (ref 5–25)
CALCIUM SERPL-MCNC: 9.7 MG/DL (ref 8.3–10.1)
CHLORIDE SERPL-SCNC: 109 MMOL/L (ref 100–108)
CLARITY UR: ABNORMAL
CO2 SERPL-SCNC: 27 MMOL/L (ref 21–32)
COLOR UR: ABNORMAL
CREAT SERPL-MCNC: 0.65 MG/DL (ref 0.6–1.3)
EOSINOPHIL # BLD AUTO: 0.19 THOUSAND/ΜL (ref 0–0.61)
EOSINOPHIL NFR BLD AUTO: 5 % (ref 0–6)
ERYTHROCYTE [DISTWIDTH] IN BLOOD BY AUTOMATED COUNT: 12.6 % (ref 11.6–15.1)
GFR SERPL CREATININE-BSD FRML MDRD: 98 ML/MIN/1.73SQ M
GLUCOSE P FAST SERPL-MCNC: 104 MG/DL (ref 65–99)
GLUCOSE UR STRIP-MCNC: NEGATIVE MG/DL
HCT VFR BLD AUTO: 42.2 % (ref 34.8–46.1)
HGB BLD-MCNC: 13.4 G/DL (ref 11.5–15.4)
HGB UR QL STRIP.AUTO: ABNORMAL
IMM GRANULOCYTES # BLD AUTO: 0.01 THOUSAND/UL (ref 0–0.2)
IMM GRANULOCYTES NFR BLD AUTO: 0 % (ref 0–2)
KETONES UR STRIP-MCNC: NEGATIVE MG/DL
LEUKOCYTE ESTERASE UR QL STRIP: NEGATIVE
LYMPHOCYTES # BLD AUTO: 1.71 THOUSANDS/ΜL (ref 0.6–4.47)
LYMPHOCYTES NFR BLD AUTO: 41 % (ref 14–44)
MCH RBC QN AUTO: 28.8 PG (ref 26.8–34.3)
MCHC RBC AUTO-ENTMCNC: 31.8 G/DL (ref 31.4–37.4)
MCV RBC AUTO: 91 FL (ref 82–98)
MONOCYTES # BLD AUTO: 0.35 THOUSAND/ΜL (ref 0.17–1.22)
MONOCYTES NFR BLD AUTO: 9 % (ref 4–12)
MUCOUS THREADS UR QL AUTO: ABNORMAL
NEUTROPHILS # BLD AUTO: 1.83 THOUSANDS/ΜL (ref 1.85–7.62)
NEUTS SEG NFR BLD AUTO: 44 % (ref 43–75)
NITRITE UR QL STRIP: NEGATIVE
NON-SQ EPI CELLS URNS QL MICRO: ABNORMAL /HPF
NRBC BLD AUTO-RTO: 0 /100 WBCS
PH UR STRIP.AUTO: 5.5 [PH]
PLATELET # BLD AUTO: 222 THOUSANDS/UL (ref 149–390)
PMV BLD AUTO: 10.3 FL (ref 8.9–12.7)
POTASSIUM SERPL-SCNC: 3.9 MMOL/L (ref 3.5–5.3)
PROT SERPL-MCNC: 7.3 G/DL (ref 6.4–8.2)
PROT UR STRIP-MCNC: ABNORMAL MG/DL
RBC # BLD AUTO: 4.66 MILLION/UL (ref 3.81–5.12)
RBC #/AREA URNS AUTO: ABNORMAL /HPF
SODIUM SERPL-SCNC: 140 MMOL/L (ref 136–145)
SP GR UR STRIP.AUTO: 1.02 (ref 1–1.03)
TSH SERPL DL<=0.05 MIU/L-ACNC: 1.83 UIU/ML (ref 0.45–4.5)
UROBILINOGEN UR STRIP-ACNC: <2 MG/DL
WBC # BLD AUTO: 4.14 THOUSAND/UL (ref 4.31–10.16)
WBC #/AREA URNS AUTO: ABNORMAL /HPF

## 2022-04-28 PROCEDURE — 85025 COMPLETE CBC W/AUTO DIFF WBC: CPT

## 2022-04-28 PROCEDURE — 36415 COLL VENOUS BLD VENIPUNCTURE: CPT

## 2022-04-28 PROCEDURE — 72100 X-RAY EXAM L-S SPINE 2/3 VWS: CPT

## 2022-04-28 PROCEDURE — 80053 COMPREHEN METABOLIC PANEL: CPT

## 2022-04-28 PROCEDURE — 81001 URINALYSIS AUTO W/SCOPE: CPT | Performed by: PHYSICIAN ASSISTANT

## 2022-04-28 PROCEDURE — 84443 ASSAY THYROID STIM HORMONE: CPT

## 2022-04-28 PROCEDURE — 99214 OFFICE O/P EST MOD 30 MIN: CPT | Performed by: PHYSICIAN ASSISTANT

## 2022-04-28 RX ORDER — NAPROXEN 250 MG/1
250 TABLET ORAL 2 TIMES DAILY WITH MEALS
Qty: 30 TABLET | Refills: 1 | Status: SHIPPED | OUTPATIENT
Start: 2022-04-28 | End: 2022-05-19 | Stop reason: ALTCHOICE

## 2022-04-28 NOTE — PROGRESS NOTES
Assessment/Plan:    No problem-specific Assessment & Plan notes found for this encounter  Diagnoses and all orders for this visit:    Near syncope  -     CBC and differential; Future  -     Comprehensive metabolic panel; Future  -     TSH, 3rd generation; Future  -     UA (URINE) with reflex to Scope    Acute bilateral low back pain without sciatica  -     XR spine lumbar 2 or 3 views injury; Future  -     Ambulatory Referral to Physical Therapy; Future  -     naproxen (Naprosyn) 250 mg tablet; Take 1 tablet (250 mg total) by mouth 2 (two) times a day with meals        Low back pain- xray today, anti-inflammatory and PT     Near syncopal episodes- update labs  F/u next week and may benefit from holter monitor      Subjective:      Patient ID: Makayla Desouza is a 61 y o  female  Patient presents for evaluation of low back pain for 2 months that is constant   Worse with movement   Pain feels stiff along lower back wrapping around   She denies any injuries  Denies radiation of symptoms down the legs  Denies saddle anesthesia or loss of bowel or bladder function   Denies fever or chills   No history of kidney stones  Denies urinary symptoms    In addition c/o near syncopal episodes occurring over the past year  Most recent episode was yesterday when she went to the mall  States she feels like everything is closing in on her and if she doesn't sit down she feels she may pass out  This also occurs while driving  The following portions of the patient's history were reviewed and updated as appropriate: allergies, current medications, past family history, past social history, past surgical history and problem list     Review of Systems   Constitutional: Negative for chills, fatigue and fever  HENT: Negative for congestion, ear pain, sinus pain, sore throat and trouble swallowing  Eyes: Negative for pain, discharge and redness     Respiratory: Negative for cough, chest tightness, shortness of breath and wheezing  Cardiovascular: Negative for chest pain, palpitations and leg swelling  Gastrointestinal: Negative for abdominal pain, diarrhea, nausea and vomiting  Musculoskeletal: Positive for back pain  Negative for arthralgias, joint swelling and myalgias  Skin: Negative for rash  Neurological: Positive for syncope (near syncopal episodes)  Negative for dizziness, weakness, numbness and headaches  Objective:      /84 (BP Location: Right arm, Patient Position: Sitting)   Pulse 60   Temp 97 8 °F (36 6 °C) (Temporal)   Ht 5' 5" (1 651 m)   Wt 74 9 kg (165 lb 3 2 oz)   SpO2 98%   BMI 27 49 kg/m²          Physical Exam  Vitals and nursing note reviewed  Constitutional:       General: She is not in acute distress  Appearance: Normal appearance  She is well-developed  Cardiovascular:      Rate and Rhythm: Normal rate and regular rhythm  Heart sounds: Normal heart sounds  Pulmonary:      Effort: Pulmonary effort is normal       Breath sounds: Normal breath sounds  Abdominal:      General: Bowel sounds are normal       Palpations: Abdomen is soft  Abdomen is not rigid  Tenderness: There is no abdominal tenderness  There is no guarding or rebound  Negative signs include Ren's sign and McBurney's sign  Hernia: No hernia is present  Musculoskeletal:      Cervical back: Normal       Thoracic back: Normal       Lumbar back: Tenderness present  Positive right straight leg raise test    Skin:     General: Skin is warm and dry  Neurological:      General: No focal deficit present  Mental Status: She is alert and oriented to person, place, and time  Cranial Nerves: Cranial nerves are intact  Sensory: Sensation is intact  Motor: Motor function is intact  Coordination: Coordination is intact

## 2022-05-19 ENCOUNTER — OFFICE VISIT (OUTPATIENT)
Dept: FAMILY MEDICINE CLINIC | Facility: CLINIC | Age: 57
End: 2022-05-19
Payer: COMMERCIAL

## 2022-05-19 VITALS
DIASTOLIC BLOOD PRESSURE: 74 MMHG | WEIGHT: 161 LBS | HEART RATE: 71 BPM | HEIGHT: 65 IN | SYSTOLIC BLOOD PRESSURE: 124 MMHG | TEMPERATURE: 97.9 F | BODY MASS INDEX: 26.82 KG/M2 | OXYGEN SATURATION: 98 %

## 2022-05-19 DIAGNOSIS — L30.9 ECZEMA, UNSPECIFIED TYPE: ICD-10-CM

## 2022-05-19 DIAGNOSIS — Z01.818 PREOP EXAM FOR INTERNAL MEDICINE: ICD-10-CM

## 2022-05-19 DIAGNOSIS — M54.50 ACUTE BILATERAL LOW BACK PAIN WITHOUT SCIATICA: ICD-10-CM

## 2022-05-19 DIAGNOSIS — H25.013 CORTICAL AGE-RELATED CATARACT OF BOTH EYES: Primary | ICD-10-CM

## 2022-05-19 PROCEDURE — 99214 OFFICE O/P EST MOD 30 MIN: CPT | Performed by: NURSE PRACTITIONER

## 2022-05-19 RX ORDER — TRIAMCINOLONE ACETONIDE 5 MG/G
CREAM TOPICAL 2 TIMES DAILY
Qty: 30 G | Refills: 1 | Status: SHIPPED | OUTPATIENT
Start: 2022-05-19

## 2022-05-19 RX ORDER — DICLOFENAC SODIUM 75 MG/1
75 TABLET, DELAYED RELEASE ORAL 2 TIMES DAILY
Qty: 60 TABLET | Refills: 1 | Status: SHIPPED | OUTPATIENT
Start: 2022-05-19 | End: 2022-06-18

## 2022-05-19 NOTE — PROGRESS NOTES
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 - Primary     DW patient cleared for her upcoming surgery cataracts              Other Visit Diagnoses     Acute bilateral low back pain without sciatica        Relevant Medications    diclofenac (VOLTAREN) 75 mg EC tablet    Eczema, unspecified type        Relevant Medications    triamcinolone (KENALOG) 0 5 % cream          High Risk Surgery: no  CAD: no  CHF: no  CVD: no  DM2 on insulin: no  Cr>2: no        Nikunj Figueroa is undergoing a Minimal risk surgery  She is at [address] for major adverse cardiac event (MACE)  She may proceed with surgery as planned without further workup  SUBJECTIVE  CC: Pre-op Exam      HPI:  Nikunj Figueroa is a 61 y o  female who is planning to undergo cataract bilateral under local by Dr Amy De La Torre  on 5/26 and 6/16  Patient has not had complications with anesthesia in the past   Functional status: full    Review of Systems   Constitutional: Negative for activity change, appetite change, chills, diaphoresis, fatigue, fever and unexpected weight change  HENT: Negative for congestion, ear pain, hearing loss, postnasal drip, sinus pressure, sinus pain, sneezing and sore throat  Eyes: Negative for pain, redness and visual disturbance  Respiratory: Negative for cough and shortness of breath  Cardiovascular: Negative for chest pain and leg swelling  Gastrointestinal: Negative for abdominal pain, diarrhea, nausea and vomiting  Endocrine: Negative  Genitourinary: Negative  Musculoskeletal: Negative for arthralgias  Allergic/Immunologic: Negative  Neurological: Negative for dizziness and light-headedness  Hematological: Negative  Psychiatric/Behavioral: Negative for behavioral problems and dysphoric mood           Historical Information   The patient history was reviewed as follows:    Past Medical History:   Diagnosis Date    2019 novel coronavirus disease (COVID-19) 5/30/2020    Abnormal Pap smear of cervix     Acute maxillary sinusitis 12/27/2017    Acute pain of right shoulder 4/10/2019    BRCA1 negative 2015    BRCA2 negative 2015    Gastric ulcer     Hyperproinsulinemia     [id number] resolved    Swelling of finger joint of right hand 12/10/2019     Past Surgical History:   Procedure Laterality Date    APPENDECTOMY      HERNIA REPAIR       Family History   Problem Relation Age of Onset    Ovarian cancer Mother 48    Thyroid cancer Sister 52    Breast cancer Maternal Uncle         52's    No Known Problems Daughter     Cancer Maternal Grandmother         unknown type or age   Celio Bones No Known Problems Paternal Grandmother     No Known Problems Sister     No Known Problems Maternal Aunt     No Known Problems Father       Social History       Medications:     Current Outpatient Medications:     atorvastatin (LIPITOR) 20 mg tablet, Take 1 tablet (20 mg total) by mouth daily, Disp: 90 tablet, Rfl: 3    diclofenac (VOLTAREN) 75 mg EC tablet, Take 1 tablet (75 mg total) by mouth in the morning and 1 tablet (75 mg total) in the evening , Disp: 60 tablet, Rfl: 1    hydrochlorothiazide (HYDRODIURIL) 25 mg tablet, Take 1 tablet (25 mg total) by mouth daily, Disp: 90 tablet, Rfl: 1    losartan (Cozaar) 50 mg tablet, Take 1 tablet (50 mg total) by mouth daily, Disp: 90 tablet, Rfl: 1    tretinoin (REFISSA) 0 05 % cream, Apply topically daily at bedtime, Disp: 45 g, Rfl: 1    triamcinolone (KENALOG) 0 5 % cream, Apply topically 2 (two) times a day, Disp: 30 g, Rfl: 1  Allergies   Allergen Reactions    Other      strawberry    Pineapple - Food Allergy Hives       OBJECTIVE    Vitals:   Vitals:    05/19/22 1033   BP: 124/74   BP Location: Left arm   Patient Position: Sitting   Pulse: 71   Temp: 97 9 °F (36 6 °C)   TempSrc: Temporal   SpO2: 98% Weight: 73 kg (161 lb)   Height: 5' 5" (1 651 m)           Physical Exam  Vitals and nursing note reviewed  Constitutional:       General: She is not in acute distress  Appearance: She is well-developed  HENT:      Head: Normocephalic and atraumatic  Right Ear: Tympanic membrane normal       Left Ear: Tympanic membrane normal       Nose: Nose normal       Mouth/Throat:      Mouth: Mucous membranes are moist    Eyes:      Pupils: Pupils are equal, round, and reactive to light  Neck:      Thyroid: No thyromegaly  Cardiovascular:      Rate and Rhythm: Normal rate and regular rhythm  Heart sounds: Normal heart sounds  No murmur heard  Pulmonary:      Effort: Pulmonary effort is normal  No respiratory distress  Breath sounds: Normal breath sounds  No wheezing  Abdominal:      General: Bowel sounds are normal       Palpations: Abdomen is soft  Musculoskeletal:         General: Normal range of motion  Cervical back: Normal range of motion  Skin:     General: Skin is warm and dry  Neurological:      General: No focal deficit present  Mental Status: She is alert and oriented to person, place, and time  Psychiatric:         Mood and Affect: Mood normal          Behavior: Behavior normal          Thought Content:  Thought content normal          Judgment: Judgment normal                 Zuleyma Ng [facility]   5/19/2022  11:06 AM

## 2022-05-27 ENCOUNTER — TELEPHONE (OUTPATIENT)
Dept: OBGYN CLINIC | Facility: CLINIC | Age: 57
End: 2022-05-27

## 2022-09-02 ENCOUNTER — OFFICE VISIT (OUTPATIENT)
Dept: FAMILY MEDICINE CLINIC | Facility: CLINIC | Age: 57
End: 2022-09-02
Payer: COMMERCIAL

## 2022-09-02 VITALS
HEART RATE: 74 BPM | OXYGEN SATURATION: 98 % | DIASTOLIC BLOOD PRESSURE: 84 MMHG | HEIGHT: 65 IN | WEIGHT: 162 LBS | BODY MASS INDEX: 26.99 KG/M2 | SYSTOLIC BLOOD PRESSURE: 120 MMHG

## 2022-09-02 DIAGNOSIS — Z13.1 SCREENING FOR DIABETES MELLITUS: ICD-10-CM

## 2022-09-02 DIAGNOSIS — Z13.220 SCREENING FOR LIPID DISORDERS: ICD-10-CM

## 2022-09-02 DIAGNOSIS — M54.6 ACUTE MIDLINE THORACIC BACK PAIN: Primary | ICD-10-CM

## 2022-09-02 PROCEDURE — 99214 OFFICE O/P EST MOD 30 MIN: CPT | Performed by: PHYSICIAN ASSISTANT

## 2022-09-02 RX ORDER — METHOCARBAMOL 750 MG/1
750 TABLET, FILM COATED ORAL EVERY 6 HOURS PRN
Qty: 30 TABLET | Refills: 0 | Status: SHIPPED | OUTPATIENT
Start: 2022-09-02

## 2022-09-02 NOTE — PROGRESS NOTES
Assessment/Plan:           Problem List Items Addressed This Visit    None     Visit Diagnoses     Acute midline thoracic back pain    -  Primary    Relevant Medications    methocarbamol (Robaxin-750) 750 mg tablet    Other Relevant Orders    XR spine thoracic 3 vw    Screening for diabetes mellitus        Relevant Orders    Comprehensive metabolic panel    HEMOGLOBIN A1C W/ EAG ESTIMATION    Screening for lipid disorders        Relevant Orders    Lipid Panel with Direct LDL reflex        suspect MSK etiology given reproduction with twisting  Recommend tylenol, robaxin  Check XR  No CP, SOB  Not exertional  Recommend rest, heat  If no improvement recommend PT  Return precautions provided  Follow up prn     Subjective:      Patient ID: Jetty Olszewski is a 61 y o  female  Pt presents with 2 weeks of constant mid back pain  No injuries  She denies CP, SOB, palpitations  Seems to get worse when driving and twisting  No cough, fevers, chills  Constant dull pain, occasionally sharp  No exertional worsening  Can seem to radiate around the scapular area/lateral torso at times  No arm weakness/numbness  The following portions of the patient's history were reviewed and updated as appropriate:   She  has a past medical history of 2019 novel coronavirus disease (COVID-19) (5/30/2020), Abnormal Pap smear of cervix, Acute maxillary sinusitis (12/27/2017), Acute pain of right shoulder (4/10/2019), BRCA1 negative (2015), BRCA2 negative (2015), Gastric ulcer, Hyperproinsulinemia, and Swelling of finger joint of right hand (12/10/2019)    She   Patient Active Problem List    Diagnosis Date Noted    Preop exam for internal medicine 05/19/2022    Cortical age-related cataract of both eyes 05/19/2022    Closed nondisplaced fracture of lateral malleolus of right fibula 03/17/2021    ASCUS with positive high risk HPV cervical 09/09/2020    HPV (human papilloma virus) infection 09/09/2020    Overweight 09/03/2020    Elevated C-reactive protein (CRP) 09/03/2020    Post-menopausal 07/24/2020    Varicose veins of both lower extremities with pain 07/01/2020    Cervicalgia 04/23/2019    Calcific tendonitis of right shoulder 04/10/2019    Sciatica, right side 03/26/2019    Fatigue 10/10/2018    Essential hypertension 10/10/2018    Snoring 10/10/2018    Arthralgia of both hands 06/01/2018    Lumbar radiculopathy 11/18/2015    Migraine headache 11/19/2014     She  has a past surgical history that includes Appendectomy and Hernia repair  Her family history includes Breast cancer in her maternal uncle; Cancer in her maternal grandmother; No Known Problems in her daughter, father, maternal aunt, paternal grandmother, and sister; Ovarian cancer (age of onset: 48) in her mother; Thyroid cancer (age of onset: 52) in her sister  She  reports that she has never smoked  She has never used smokeless tobacco  She reports previous alcohol use  She reports that she does not use drugs  Current Outpatient Medications   Medication Sig Dispense Refill    atorvastatin (LIPITOR) 20 mg tablet Take 1 tablet (20 mg total) by mouth daily 90 tablet 3    hydrochlorothiazide (HYDRODIURIL) 25 mg tablet Take 1 tablet (25 mg total) by mouth daily 90 tablet 1    losartan (Cozaar) 50 mg tablet Take 1 tablet (50 mg total) by mouth daily 90 tablet 1    methocarbamol (Robaxin-750) 750 mg tablet Take 1 tablet (750 mg total) by mouth every 6 (six) hours as needed for muscle spasms 30 tablet 0    tretinoin (REFISSA) 0 05 % cream Apply topically daily at bedtime 45 g 1    triamcinolone (KENALOG) 0 5 % cream Apply topically 2 (two) times a day 30 g 1    diclofenac (VOLTAREN) 75 mg EC tablet Take 1 tablet (75 mg total) by mouth in the morning and 1 tablet (75 mg total) in the evening  60 tablet 1     No current facility-administered medications for this visit       Current Outpatient Medications on File Prior to Visit   Medication Sig    atorvastatin (LIPITOR) 20 mg tablet Take 1 tablet (20 mg total) by mouth daily    hydrochlorothiazide (HYDRODIURIL) 25 mg tablet Take 1 tablet (25 mg total) by mouth daily    losartan (Cozaar) 50 mg tablet Take 1 tablet (50 mg total) by mouth daily    tretinoin (REFISSA) 0 05 % cream Apply topically daily at bedtime    triamcinolone (KENALOG) 0 5 % cream Apply topically 2 (two) times a day    diclofenac (VOLTAREN) 75 mg EC tablet Take 1 tablet (75 mg total) by mouth in the morning and 1 tablet (75 mg total) in the evening  No current facility-administered medications on file prior to visit  She is allergic to other and pineapple - food allergy       Review of Systems   Constitutional: Negative for chills, fatigue and fever  HENT: Negative for congestion, ear pain, hearing loss, nosebleeds, postnasal drip, rhinorrhea, sinus pressure, sinus pain, sneezing and sore throat  Eyes: Negative for pain, discharge, itching and visual disturbance  Respiratory: Negative for cough, chest tightness, shortness of breath and wheezing  Cardiovascular: Negative for chest pain, palpitations and leg swelling  Gastrointestinal: Negative for abdominal pain, blood in stool, constipation, diarrhea, nausea and vomiting  Genitourinary: Negative for frequency and urgency  Musculoskeletal: Positive for back pain  Neurological: Negative for dizziness, light-headedness and numbness  Objective:      /84   Pulse 74   Ht 5' 5" (1 651 m)   Wt 73 5 kg (162 lb)   SpO2 98%   BMI 26 96 kg/m²          Physical Exam  Vitals and nursing note reviewed  Constitutional:       General: She is not in acute distress  Appearance: Normal appearance  HENT:      Head: Normocephalic and atraumatic  Nose: Nose normal       Mouth/Throat:      Mouth: Mucous membranes are moist       Pharynx: Oropharynx is clear  No oropharyngeal exudate or posterior oropharyngeal erythema     Eyes:      Pupils: Pupils are equal, round, and reactive to light  Cardiovascular:      Rate and Rhythm: Normal rate and regular rhythm  Heart sounds: Normal heart sounds  No murmur heard  Pulmonary:      Effort: Pulmonary effort is normal  No respiratory distress  Breath sounds: Normal breath sounds  No wheezing, rhonchi or rales  Abdominal:      General: Bowel sounds are normal       Palpations: Abdomen is soft  Musculoskeletal:      Right shoulder: Normal       Left shoulder: Normal         Arms:       Cervical back: Neck supple  No tenderness or bony tenderness  No pain with movement  Decreased range of motion  Thoracic back: No tenderness or bony tenderness  Decreased range of motion  Skin:     General: Skin is warm and dry  Neurological:      Mental Status: She is alert and oriented to person, place, and time     Psychiatric:         Mood and Affect: Mood and affect normal

## 2022-09-09 ENCOUNTER — APPOINTMENT (OUTPATIENT)
Dept: RADIOLOGY | Facility: CLINIC | Age: 57
End: 2022-09-09
Payer: COMMERCIAL

## 2022-09-09 ENCOUNTER — APPOINTMENT (OUTPATIENT)
Dept: LAB | Facility: CLINIC | Age: 57
End: 2022-09-09
Payer: COMMERCIAL

## 2022-09-09 DIAGNOSIS — M54.6 ACUTE MIDLINE THORACIC BACK PAIN: ICD-10-CM

## 2022-09-09 DIAGNOSIS — Z13.1 SCREENING FOR DIABETES MELLITUS: ICD-10-CM

## 2022-09-09 DIAGNOSIS — Z13.220 SCREENING FOR LIPID DISORDERS: ICD-10-CM

## 2022-09-09 LAB
ALBUMIN SERPL BCP-MCNC: 3.6 G/DL (ref 3.5–5)
ALP SERPL-CCNC: 150 U/L (ref 46–116)
ALT SERPL W P-5'-P-CCNC: 87 U/L (ref 12–78)
ANION GAP SERPL CALCULATED.3IONS-SCNC: 3 MMOL/L (ref 4–13)
AST SERPL W P-5'-P-CCNC: 45 U/L (ref 5–45)
BILIRUB SERPL-MCNC: 0.48 MG/DL (ref 0.2–1)
BUN SERPL-MCNC: 17 MG/DL (ref 5–25)
CALCIUM SERPL-MCNC: 9.4 MG/DL (ref 8.3–10.1)
CHLORIDE SERPL-SCNC: 108 MMOL/L (ref 96–108)
CHOLEST SERPL-MCNC: 230 MG/DL
CO2 SERPL-SCNC: 27 MMOL/L (ref 21–32)
CREAT SERPL-MCNC: 0.64 MG/DL (ref 0.6–1.3)
EST. AVERAGE GLUCOSE BLD GHB EST-MCNC: 120 MG/DL
GFR SERPL CREATININE-BSD FRML MDRD: 98 ML/MIN/1.73SQ M
GLUCOSE P FAST SERPL-MCNC: 96 MG/DL (ref 65–99)
HBA1C MFR BLD: 5.8 %
HDLC SERPL-MCNC: 79 MG/DL
LDLC SERPL CALC-MCNC: 139 MG/DL (ref 0–100)
POTASSIUM SERPL-SCNC: 4.1 MMOL/L (ref 3.5–5.3)
PROT SERPL-MCNC: 7.4 G/DL (ref 6.4–8.4)
SODIUM SERPL-SCNC: 138 MMOL/L (ref 135–147)
TRIGL SERPL-MCNC: 59 MG/DL

## 2022-09-09 PROCEDURE — 80053 COMPREHEN METABOLIC PANEL: CPT

## 2022-09-09 PROCEDURE — 72072 X-RAY EXAM THORAC SPINE 3VWS: CPT

## 2022-09-09 PROCEDURE — 83036 HEMOGLOBIN GLYCOSYLATED A1C: CPT

## 2022-09-09 PROCEDURE — 36415 COLL VENOUS BLD VENIPUNCTURE: CPT

## 2022-09-09 PROCEDURE — 80061 LIPID PANEL: CPT

## 2022-09-11 DIAGNOSIS — R79.89 ELEVATED LFTS: Primary | ICD-10-CM

## 2022-09-14 DIAGNOSIS — I10 ESSENTIAL HYPERTENSION: ICD-10-CM

## 2022-09-14 RX ORDER — HYDROCHLOROTHIAZIDE 25 MG/1
TABLET ORAL
Qty: 90 TABLET | Refills: 1 | Status: SHIPPED | OUTPATIENT
Start: 2022-09-14

## 2022-09-14 RX ORDER — LOSARTAN POTASSIUM 50 MG/1
TABLET ORAL
Qty: 90 TABLET | Refills: 1 | Status: SHIPPED | OUTPATIENT
Start: 2022-09-14

## 2022-10-10 ENCOUNTER — APPOINTMENT (OUTPATIENT)
Dept: LAB | Facility: CLINIC | Age: 57
End: 2022-10-10
Payer: COMMERCIAL

## 2022-10-10 DIAGNOSIS — R79.89 ELEVATED LFTS: ICD-10-CM

## 2022-10-10 LAB
ALBUMIN SERPL BCP-MCNC: 3.7 G/DL (ref 3.5–5)
ALP SERPL-CCNC: 102 U/L (ref 46–116)
ALT SERPL W P-5'-P-CCNC: 31 U/L (ref 12–78)
ANION GAP SERPL CALCULATED.3IONS-SCNC: 2 MMOL/L (ref 4–13)
AST SERPL W P-5'-P-CCNC: 20 U/L (ref 5–45)
BILIRUB SERPL-MCNC: 0.6 MG/DL (ref 0.2–1)
BUN SERPL-MCNC: 16 MG/DL (ref 5–25)
CALCIUM SERPL-MCNC: 9.9 MG/DL (ref 8.3–10.1)
CHLORIDE SERPL-SCNC: 109 MMOL/L (ref 96–108)
CO2 SERPL-SCNC: 27 MMOL/L (ref 21–32)
CREAT SERPL-MCNC: 0.66 MG/DL (ref 0.6–1.3)
GFR SERPL CREATININE-BSD FRML MDRD: 97 ML/MIN/1.73SQ M
GLUCOSE P FAST SERPL-MCNC: 97 MG/DL (ref 65–99)
POTASSIUM SERPL-SCNC: 4 MMOL/L (ref 3.5–5.3)
PROT SERPL-MCNC: 7.3 G/DL (ref 6.4–8.4)
SODIUM SERPL-SCNC: 138 MMOL/L (ref 135–147)

## 2022-10-10 PROCEDURE — 36415 COLL VENOUS BLD VENIPUNCTURE: CPT

## 2022-10-10 PROCEDURE — 80053 COMPREHEN METABOLIC PANEL: CPT

## 2022-11-02 ENCOUNTER — RA CDI HCC (OUTPATIENT)
Dept: OTHER | Facility: HOSPITAL | Age: 57
End: 2022-11-02

## 2022-11-02 NOTE — PROGRESS NOTES
[facility]  coding opportunities       Chart reviewed, no opportunity found: CHART REVIEWED, NO OPPORTUNITY FOUND        Patients Insurance        Commercial Insurance: [employer]

## 2022-11-07 ENCOUNTER — OFFICE VISIT (OUTPATIENT)
Dept: FAMILY MEDICINE CLINIC | Facility: CLINIC | Age: 57
End: 2022-11-07

## 2022-11-07 ENCOUNTER — TELEPHONE (OUTPATIENT)
Dept: OBGYN CLINIC | Facility: HOSPITAL | Age: 57
End: 2022-11-07

## 2022-11-07 VITALS
HEIGHT: 65 IN | BODY MASS INDEX: 26.99 KG/M2 | HEART RATE: 64 BPM | OXYGEN SATURATION: 98 % | DIASTOLIC BLOOD PRESSURE: 64 MMHG | SYSTOLIC BLOOD PRESSURE: 102 MMHG | WEIGHT: 162 LBS

## 2022-11-07 DIAGNOSIS — L30.9 ECZEMA, UNSPECIFIED TYPE: ICD-10-CM

## 2022-11-07 DIAGNOSIS — Z12.31 ENCOUNTER FOR SCREENING MAMMOGRAM FOR MALIGNANT NEOPLASM OF BREAST: ICD-10-CM

## 2022-11-07 DIAGNOSIS — L70.9 ACNE, UNSPECIFIED ACNE TYPE: ICD-10-CM

## 2022-11-07 DIAGNOSIS — I10 ESSENTIAL HYPERTENSION: ICD-10-CM

## 2022-11-07 DIAGNOSIS — R79.89 ELEVATED LFTS: ICD-10-CM

## 2022-11-07 DIAGNOSIS — Z01.419 ENCOUNTER FOR WELL WOMAN EXAM: ICD-10-CM

## 2022-11-07 DIAGNOSIS — M54.32 LEFT SIDED SCIATICA: ICD-10-CM

## 2022-11-07 DIAGNOSIS — Z80.41 FAMILY HISTORY OF OVARIAN CANCER: ICD-10-CM

## 2022-11-07 DIAGNOSIS — M25.542 ARTHRALGIA OF BOTH HANDS: ICD-10-CM

## 2022-11-07 DIAGNOSIS — E78.2 MIXED HYPERLIPIDEMIA: ICD-10-CM

## 2022-11-07 DIAGNOSIS — M25.541 ARTHRALGIA OF BOTH HANDS: ICD-10-CM

## 2022-11-07 DIAGNOSIS — M54.50 ACUTE BILATERAL LOW BACK PAIN WITHOUT SCIATICA: Primary | ICD-10-CM

## 2022-11-07 DIAGNOSIS — R10.32 LEFT LOWER QUADRANT ABDOMINAL PAIN: ICD-10-CM

## 2022-11-07 PROBLEM — Z01.818 PREOP EXAM FOR INTERNAL MEDICINE: Status: RESOLVED | Noted: 2022-05-19 | Resolved: 2022-11-07

## 2022-11-07 RX ORDER — TRETINOIN 0.5 MG/G
CREAM TOPICAL
Qty: 45 G | Refills: 1 | Status: SHIPPED | OUTPATIENT
Start: 2022-11-07

## 2022-11-07 RX ORDER — LOSARTAN POTASSIUM 50 MG/1
50 TABLET ORAL DAILY
Qty: 90 TABLET | Refills: 1 | Status: SHIPPED | OUTPATIENT
Start: 2022-11-07

## 2022-11-07 RX ORDER — TRIAMCINOLONE ACETONIDE 5 MG/G
CREAM TOPICAL 2 TIMES DAILY
Qty: 30 G | Refills: 1 | Status: SHIPPED | OUTPATIENT
Start: 2022-11-07

## 2022-11-07 RX ORDER — DICLOFENAC SODIUM 75 MG/1
75 TABLET, DELAYED RELEASE ORAL 2 TIMES DAILY
Qty: 60 TABLET | Refills: 3 | Status: SHIPPED | OUTPATIENT
Start: 2022-11-07 | End: 2022-12-07

## 2022-11-07 RX ORDER — ATORVASTATIN CALCIUM 20 MG/1
20 TABLET, FILM COATED ORAL DAILY
Qty: 90 TABLET | Refills: 3 | Status: SHIPPED | OUTPATIENT
Start: 2022-11-07

## 2022-11-07 RX ORDER — HYDROCHLOROTHIAZIDE 25 MG/1
25 TABLET ORAL DAILY
Qty: 90 TABLET | Refills: 1 | Status: SHIPPED | OUTPATIENT
Start: 2022-11-07

## 2022-11-08 NOTE — TELEPHONE ENCOUNTER
Patient is being referred to a hand specialist  Please schedule accordingly      [address]   [phone]

## 2022-11-10 PROBLEM — M25.542 ARTHRALGIA OF BOTH HANDS: Status: RESOLVED | Noted: 2018-06-01 | Resolved: 2022-11-10

## 2022-11-10 PROBLEM — M25.541 ARTHRALGIA OF BOTH HANDS: Status: RESOLVED | Noted: 2018-06-01 | Resolved: 2022-11-10

## 2022-11-10 NOTE — PROGRESS NOTES
Diagnoses and all orders for this visit:    Encounter for gynecological examination without abnormal finding  -     Liquid-based pap, screening    Encounter for screening mammogram for malignant neoplasm of breast  -     Mammo screening bilateral w 3d & cad; Future    Vaginal atrophy  -     estradiol (ESTRACE) 0 1 mg/g vaginal cream; Insert 0 5 g into the vagina 2 (two) times a week Apply a pea sized amount to external vaginal opening and urethra area twice weekly      We discussed estrogen replacement therapy for dyspareunia it patient would like to try  We discussed benefits, side effects, instructions for use  Will return to the office in 8-12 weeks for recheck  Advised to call with any Post Menopausal bleeding  Calcium/ Vit D dietary requirements discussed,   Weight bearing exercises minium of 150 mins/weekly advised  Kegel exercises advised   Reviewed perineal hygiene and vaginal dryness post menopause  SBE and yearly mammography advised  ASCCP guidelines reviewed  Will discontinue PAP's according to recommendations  Condoms encouraged with all sexual activity to prevent STI's  Health maintenance encouraged with PMD, remain current with Colonoscopy, Dexa scan, and recommended vaccines  Advised to call with any issues, all concerns & questions addressed     See after visit summary for further information    F/U annually or Biannual if Medicare       Health Maintenance:    Last PAP: 11/11/2022 Neg/ +HPV HR & 16 , follow up Colpo with benign findings  Next PAP Due: collected today     Last Mammogram:11/05/2021  Life time Razia García % 3 38, Density A almost entirely fatty, Bi-Rads 1 Negative  Next Mammogram: Order given    Last Colonoscopy:09/19/2020 RTO 5 y    Last DEXA: Not on file    Pleasant 61 y o  , female [id number]  postmenopausal here for annual exam    GYN hx includes: No personal hx of breast, cervical, ovarian or colon CA  Family Hx: Mother - Ovarian cancer   Sister - Lymphoma cancer Maternal uncle - Breast cancer (D)   MGM- Ovarian Cancer   Pt had genetic testing done, BRCA 1 &2 Neg    Recent visit with primary, she mentioned left lower pelvic pain ultrasound was ordered patient needs to call and schedule  She is taking Tylenol to manage the discomfort  Denies any pain today Medically stable     Reports history of abnormal pap smears  Denies abnormal Mammograms   Reports postmenopausal bleeding  With ultrasound follow-up patient declined EMB last year  Wanted to watch and wait  Reports no further episodes of postmenopausal bleeding  Denies vasomotor symptoms  Reports vaginal dryness and dyspareunia  Reports low libido  Reports pelvic pain that was reported to her family doctor  She is following up with an ultrasound  She was just concerned because of her mother's history of ovarian cancer  Denies symptoms of pelvic organ prolapse, urinary, or fecal incontinence  Is rarely sexually active  Monogamous relationship  Denies STI concerns   declines STD testing   Denies intimate partner violence    Past Medical History:   Diagnosis Date   • 2019 novel coronavirus disease (COVID-19) 05/30/2020   • Abnormal Pap smear of cervix    • Acute maxillary sinusitis 12/27/2017   • Acute pain of right shoulder 04/10/2019   • BRCA1 negative 2015   • BRCA2 negative 2015   • Gastric ulcer    • Hyperproinsulinemia     [id number] resolved   • Swelling of finger joint of right hand 12/10/2019   • Varicella      Past Surgical History:   Procedure Laterality Date   • APPENDECTOMY     • FOOT SURGERY Right    • HERNIA REPAIR        Family History   Problem Relation Age of Onset   • Ovarian cancer Mother 48   • No Known Problems Father    • Thyroid cancer Sister 52   • No Known Problems Sister    • No Known Problems Daughter    • No Known Problems Son    • No Known Problems Son    • No Known Problems Son    • Cancer Maternal Grandmother         unknown type or age   • No Known Problems Paternal Grandmother    • No Known Problems Maternal Aunt    • Breast cancer Maternal Uncle         52's     Social History     Tobacco Use   • Smoking status: Never Smoker   • Smokeless tobacco: Never Used   Vaping Use   • Vaping Use: Never used   Substance Use Topics   • Alcohol use: Not Currently   • Drug use: No       Current Outpatient Medications:   •  atorvastatin (LIPITOR) 20 mg tablet, Take 1 tablet (20 mg total) by mouth daily, Disp: 90 tablet, Rfl: 3  •  diclofenac (VOLTAREN) 75 mg EC tablet, Take 1 tablet (75 mg total) by mouth 2 (two) times a day, Disp: 60 tablet, Rfl: 3  •  [START ON 11/14/2022] estradiol (ESTRACE) 0 1 mg/g vaginal cream, Insert 0 5 g into the vagina 2 (two) times a week Apply a pea sized amount to external vaginal opening and urethra area twice weekly, Disp: 42 5 g, Rfl: 0  •  hydrochlorothiazide (HYDRODIURIL) 25 mg tablet, Take 1 tablet (25 mg total) by mouth daily, Disp: 90 tablet, Rfl: 1  •  losartan (COZAAR) 50 mg tablet, Take 1 tablet (50 mg total) by mouth daily, Disp: 90 tablet, Rfl: 1  •  tretinoin (REFISSA) 0 05 % cream, Apply topically daily at bedtime, Disp: 45 g, Rfl: 1  •  triamcinolone (KENALOG) 0 5 % cream, Apply topically 2 (two) times a day, Disp: 30 g, Rfl: 1  •  methocarbamol (Robaxin-750) 750 mg tablet, Take 1 tablet (750 mg total) by mouth every 6 (six) hours as needed for muscle spasms (Patient not taking: Reported on 11/11/2022), Disp: 30 tablet, Rfl: 0  Patient Active Problem List    Diagnosis Date Noted   • Acute bilateral low back pain without sciatica 11/07/2022   • Left lower quadrant abdominal pain 11/07/2022   • Family history of ovarian cancer 11/07/2022   • Cortical age-related cataract of both eyes 05/19/2022   • Closed nondisplaced fracture of lateral malleolus of right fibula 03/17/2021   • ASCUS with positive high risk HPV cervical 09/09/2020   • HPV (human papilloma virus) infection 09/09/2020   • Overweight 09/03/2020   • Elevated C-reactive protein (CRP) 09/03/2020   • Post-menopausal 2020   • Varicose veins of both lower extremities with pain 2020   • Cervicalgia 2019   • Calcific tendonitis of right shoulder 04/10/2019   • Sciatica, right side 2019   • Fatigue 10/10/2018   • Essential hypertension 10/10/2018   • Snoring 10/10/2018   • Lumbar radiculopathy 2015   • Migraine headache 2014       Allergies   Allergen Reactions   • Kiwi Extract - Food Allergy Hives   • Other      strawberry   • Pineapple - Food Allergy Hives       OB History    Para Term  AB Living   4 4 4     4   SAB IAB Ectopic Multiple Live Births           4      # Outcome Date GA Lbr Maurice/2nd Weight Sex Delivery Anes PTL Lv   4 Term            3 Term            2 Term            1 Term               Obstetric Comments   4 vaginal deliveries        Vitals:    22 0932   BP: 122/80   BP Location: Left arm   Patient Position: Sitting   Cuff Size: Standard   Weight: 72 6 kg (160 lb)   Height: 5' 5" (1 651 m)     Body mass index is 26 63 kg/m²  Review of Systems     Constitutional: Negative for chills, fatigue, fever, headaches, visual disturbances, and unexpected weight change  Respiratory: Negative for cough, & shortness of breath  Cardiovascular: Negative for chest pain       Gastrointestinal: Negative for Abd pain, nausea & vomiting, constipation and diarrhea  Genitourinary: Negative for difficulty urinating, dysuria, hematuria, , unusual vaginal bleeding or discharge  dyspareunia  Skin: Negative skin changes    Physical Exam     Constitutional: Alert & Oriented x3, well-developed and well-nourished  No distress  HENT: Atraumatic, Normocephalic, Conjunctivae clear  Neck: Normal range of motion  Neck supple  No thyromegaly, mass, nodules or tenderness  Pulmonary: Effort normal    Abdominal: Soft   No tenderness or masses  Musculoskeletal: Normal ROM  Skin: Warm & Dry  Psychological: Normal mood, thought content, behavior & judgement     Breasts: Right: tissue soft without masses, tenderness, skin changes or nipple discharge  No areas of erythema or pain  No subclavicular, axillary, pectoral adenopathy  Left:  tissue soft without masses, tenderness, skin changes or nipple discharge  No areas of erythema or pain  No subclavicular, axillary, pectoral adenopathy    Pelvic exam was performed with patient supine, lithotomy position  Exam is consistent with  atrophic changes  Vulva/ Vestibule: Right: Negative rash, tenderness, lesion or injury                               Left: Negative rash, tenderness, lesion or injury   Urethral meatus: Negative for  tenderness, inflammation or discharge  Uterus: not deviated, enlarged, fixed or tender  Cervix: No CMT, no discharge or friability  Right adnexa: no mass, no tenderness and no fullness  Left adnexa: no mass, no tenderness and no fullness  Vagina: Consistent with  atrophic changes  No erythema, tenderness, masses, or foreign body in the vagina  No signs of injury around the vagina  No unusual vaginal discharge   Perineum without lesions, signs of injury, erythema or swelling  Inguinal Canal:        Right: No inguinal adenopathy or hernia present  Left: No inguinal adenopathy or hernia present

## 2022-11-10 NOTE — PATIENT INSTRUCTIONS
Breast Self Exam for Women   AMBULATORY CARE:   A breast self-exam (BSE)  is a way to check your breasts for lumps and other changes  Regular BSEs can help you know how your breasts normally look and feel  Most breast lumps or changes are not cancer, but you should always have them checked by a healthcare provider  Why you should do a BSE:  Breast cancer is the most common type of cancer in women  Even if you have mammograms, you may still want to do a BSE regularly  If you know how your breasts normally feel and look, it may help you know when to contact your healthcare provider  Mammograms can miss some cancers  You may find a lump during a BSE that did not show up on a mammogram   When you should do a BSE:  If you have periods, you may want to do your BSE 1 week after your period ends  This is the time when your breasts may be the least swollen, lumpy, or tender  You can do regular BSEs even if you are breastfeeding or have breast implants  Call your doctor if:   You find any lumps or changes in your breasts  You have breast pain or fluid coming from your nipples  You have questions or concerns about your condition or care  How to do a BSE:       Look at your breasts in a mirror  Look at the size and shape of each breast and nipple  Check for swelling, lumps, dimpling, scaly skin, or other skin changes  Look for nipple changes, such as a nipple that is painful or beginning to pull inward  Gently squeeze both nipples and check to see if fluid (that is not breast milk) comes out of them  If you find any of these or other breast changes, contact your healthcare provider  Check your breasts while you sit or  the following 3 positions:    [place] your arms down at your sides  Raise your hands and join them behind your head  Put firm pressure with your hands on your hips  Bend slightly forward while you look at your breasts in the mirror  Lie down and feel your breasts    When you lie down, your breast tissue spreads out evenly over your chest  This makes it easier for you to feel for lumps and anything that may not be normal for your breasts  Do a BSE on one breast at a time  Place a small pillow or towel under your left shoulder  Put your left arm behind your head  Use the 3 middle fingers of your right hand  Use your fingertip pads, on the top of your fingers  Your fingertip pad is the most sensitive part of your finger  Use small circles to feel your breast tissue  Use your fingertip pads to make dime-sized, overlapping circles on your breast and armpits  Use light, medium, and firm pressure  First, press lightly  Second, press with medium pressure to feel a little deeper into the breast  Last, use firm pressure to feel deep within your breast     Examine your entire breast area  Examine the breast area from above the breast to below the breast where you feel only ribs  Make small circles with your fingertips, starting in the middle of your armpit  Make circles going up and down the breast area  Continue toward your breast and all the way across it  Examine the area from your armpit all the way over to the middle of your chest (breastbone)  Stop at the middle of your chest     Move the pillow or towel to your right shoulder, and put your right arm behind your head  Use the 3 fingertip pads of your left hand, and repeat the above steps to do a BSE on your right breast     What else you can do to check for breast problems or cancer:  Talk to your healthcare provider about mammograms  A mammogram is an x-ray of your breasts to screen for breast cancer or other problems  Your provider can tell you the benefits and risks of mammograms  The first mammogram is usually at age 39 or 48  Your provider may recommend you start at 36 or younger if your risk for breast cancer is high  Mammograms usually continue every 1 to 2 years until age 76         Follow up with your doctor as directed:  Write down your questions so you remember to ask them during your visits  © Copyright [employer] 2022 Information is for End User's use only and may not be sold, redistributed or otherwise used for commercial purposes  All illustrations and images included in CareNotes® are the copyrighted property of A D A M , Inc  or Rachel Delgado  The above information is an  only  It is not intended as medical advice for individual conditions or treatments  Talk to your doctor, nurse or pharmacist before following any medical regimen to see if it is safe and effective for you  Wellness Visit for Adults   AMBULATORY CARE:   A wellness visit  is when you see your healthcare provider to get screened for health problems  Your healthcare provider will also give you advice on how to stay healthy  Write down your questions so you remember to ask them  Ask your healthcare provider how often you should have a wellness visit  What happens at a wellness visit:  Your healthcare provider will ask about your health, and your family history of health problems  This includes high blood pressure, heart disease, and cancer  He or she will ask if you have symptoms that concern you, if you smoke, and about your mood  You may also be asked about your intake of medicines, supplements, food, and alcohol  Any of the following may be done: Your weight  will be checked  Your height may also be checked so your body mass index (BMI) can be calculated  Your BMI shows if you are at a healthy weight  Your blood pressure  and heart rate will be checked  Your temperature may also be checked  Blood and urine tests  may be done  Blood tests may be done to check your cholesterol levels  Abnormal cholesterol levels increase your risk for heart disease and stroke  You may also need a blood or urine test to check for diabetes if you are at increased risk  Urine tests may be done to look for signs of an infection or kidney disease      A physical exam  includes checking your heartbeat and lungs with a stethoscope  Your healthcare provider may also check your skin to look for sun damage  Screening tests  may be recommended  A screening test is done to check for diseases that may not cause symptoms  The screening tests you may need depend on your age, gender, family history, and lifestyle habits  For example, colorectal screening may be recommended if you are 48years old or older  Screening tests you need if you are a woman:   A Pap smear  is used to screen for cervical cancer  Pap smears are usually done every 3 to 5 years depending on your age  You may need them more often if you have had abnormal Pap smear test results in the past  Ask your healthcare provider how often you should have a Pap smear  A mammogram  is an x-ray of your breasts to screen for breast cancer  Experts recommend mammograms every 2 years starting at age 48 years  You may need a mammogram at age 52 years or younger if you have an increased risk for breast cancer  Talk to your healthcare provider about when you should start having mammograms and how often you need them  Vaccines you may need:   Get an influenza vaccine  every year  The influenza vaccine protects you from the flu  Several types of viruses cause the flu  The viruses change over time, so new vaccines are made each year  Get a tetanus-diphtheria (Td) booster vaccine  every 10 years  This vaccine protects you against tetanus and diphtheria  Tetanus is a severe infection that may cause painful muscle spasms and lockjaw  Diphtheria is a severe bacterial infection that causes a thick covering in the back of your mouth and throat  Get a human papillomavirus (HPV) vaccine  if you are female and aged 23 to 32 or male 23 to 24 and never received it  This vaccine protects you from HPV infection  HPV is the most common infection spread by sexual contact   HPV may also cause vaginal, penile, and anal cancers  Get a pneumococcal vaccine  if you are aged 72 years or older  The pneumococcal vaccine is an injection given to protect you from pneumococcal disease  Pneumococcal disease is an infection caused by pneumococcal bacteria  The infection may cause pneumonia, meningitis, or an ear infection  Get a shingles vaccine  if you are 60 or older, even if you have had shingles before  The shingles vaccine is an injection to protect you from the varicella-zoster virus  This is the same virus that causes chickenpox  Shingles is a painful rash that develops in people who had chickenpox or have been exposed to the virus  How to eat healthy:  My Plate is a model for planning healthy meals  It shows the types and amounts of foods that should go on your plate  Fruits and vegetables make up about half of your plate, and grains and protein make up the other half  A serving of dairy is included on the side of your plate  The amount of calories and serving sizes you need depends on your age, gender, weight, and height  Examples of healthy foods are listed below:  Eat a variety of vegetables  such as dark green, red, and orange vegetables  You can also include canned vegetables low in sodium (salt) and frozen vegetables without added butter or sauces  Eat a variety of fresh fruits , canned fruit in 100% juice, frozen fruit, and dried fruit  Include whole grains  At least half of the grains you eat should be whole grains  Examples include whole-wheat bread, wheat pasta, brown rice, and whole-grain cereals such as oatmeal     Eat a variety of protein foods such as seafood (fish and shellfish), lean meat, and poultry without skin (turkey and chicken)  Examples of lean meats include pork leg, shoulder, or tenderloin, and beef round, sirloin, tenderloin, and extra lean ground beef  Other protein foods include eggs and egg substitutes, beans, peas, soy products, nuts, and seeds      Choose low-fat dairy products such as skim or 1% milk or low-fat yogurt, cheese, and cottage cheese  Limit unhealthy fats  such as butter, hard margarine, and shortening  Exercise:  Exercise at least 30 minutes per day on most days of the week  Some examples of exercise include walking, biking, dancing, and swimming  You can also fit in more physical activity by taking the stairs instead of the elevator or parking farther away from stores  Include muscle strengthening activities 2 days each week  Regular exercise provides many health benefits  It helps you manage your weight, and decreases your risk for type 2 diabetes, heart disease, stroke, and high blood pressure  Exercise can also help improve your mood  Ask your healthcare provider about the best exercise plan for you  General health and safety guidelines:   Do not smoke  Nicotine and other chemicals in cigarettes and cigars can cause lung damage  Ask your healthcare provider for information if you currently smoke and need help to quit  E-cigarettes or smokeless tobacco still contain nicotine  Talk to your healthcare provider before you use these products  Limit alcohol  A drink of alcohol is 12 ounces of beer, 5 ounces of wine, or 1½ ounces of liquor  Lose weight, if needed  Being overweight increases your risk of certain health conditions  These include heart disease, high blood pressure, type 2 diabetes, and certain types of cancer  Protect your skin  Do not sunbathe or use tanning beds  Use sunscreen with a SPF 15 or higher  Apply sunscreen at least 15 minutes before you go outside  Reapply sunscreen every 2 hours  Wear protective clothing, hats, and sunglasses when you are outside  Drive safely  Always wear your seatbelt  Make sure everyone in your car wears a seatbelt  A seatbelt can save your life if you are in an accident  Do not use your cell phone when you are driving  This could distract you and cause an accident   Pull over if you need to make a call or send a text message  Practice safe sex  Use latex condoms if are sexually active and have more than one partner  Your healthcare provider may recommend screening tests for sexually transmitted infections (STIs)  Wear helmets, lifejackets, and protective gear  Always wear a helmet when you ride a bike or motorcycle, go skiing, or play sports that could cause a head injury  Wear protective equipment when you play sports  Wear a lifejacket when you are on a boat or doing water sports  © Copyright [employer] 2022 Information is for End User's use only and may not be sold, redistributed or otherwise used for commercial purposes  All illustrations and images included in CareNotes® are the copyrighted property of A D A M , Inc  or [facility] Yasmany Ozuna   The above information is an  only  It is not intended as medical advice for individual conditions or treatments  Talk to your doctor, nurse or pharmacist before following any medical regimen to see if it is safe and effective for you  Kegel Exercises for Women   AMBULATORY CARE:   Kegel exercises  help strengthen your pelvic muscles  Pelvic muscles hold your pelvic organs, such as your bladder and uterus, in place  Kegel exercises help prevent or control problems with urine incontinence (leakage)  Incontinence may be caused by pregnancy, childbirth, or menopause  Contact your healthcare provider if:   You cannot feel your muscles tighten or relax  You continue to leak urine  You have questions or concerns about your condition or care  Use the correct muscles:  Pelvic muscles are the muscles you use to control urine flow  To target these muscles, stop and start the flow of urine several times  This will help you become familiar with how it feels to tighten and relax these muscles  How to do Kegel exercises:   Empty your bladder  You may lie down, stand up, or sit down to do these exercises   When you first try to do these exercises, it may be easier if you lie down  Tighten or squeeze your pelvic muscles slowly  It may feel like you are trying to hold back urine or gas  Hold this position for 3 seconds  Relax for 3 seconds  Repeat this cycle 10 times  Do 10 sets of Kegel exercises, at least 3 times a day  Do not hold your breath when you do Kegel exercises  Keep your stomach, back, and leg muscles relaxed  As your muscles get stronger, you will be able to hold the squeeze longer  Your healthcare provider may ask that you increase your pelvic muscle squeeze to 10 seconds  After you squeeze for 10 seconds, relax for 10 seconds  What else you should know:   Once you know how to do Kegel exercises, use different positions  You can do these exercises while you lie on the floor, sit at your desk or watch TV, and while you stand  You may notice improved bladder control within about 6 weeks  Tighten your pelvic muscles before you sneeze, cough, or lift to prevent urine leakage  Follow up with your doctor as directed:  Write down your questions so you remember to ask them during your visits  © Copyright [employer] 2022 Information is for End User's use only and may not be sold, redistributed or otherwise used for commercial purposes  All illustrations and images included in CareNotes® are the copyrighted property of A D A M , Inc  or [address] [place] [address]  The above information is an  only  It is not intended as medical advice for individual conditions or treatments  Talk to your doctor, nurse or pharmacist before following any medical regimen to see if it is safe and effective for you  Perineal Hygiene      Your vaginal naturally takes care of its self, it is a self washing system, the less you mess the healthier it will be     No soaps or feminine wash to the vulva, these products can cause dermitis, bacterial infections and other vulvar problems     Use only water to cleanse, or water with [facility] or Dove Sensitive Skin Bar soap if necessary  No scented lotions or products are advised in or near your vulva  Use only coconut oil for moisture if needed  No douching this may cause imbalance in your vaginal PH and further issues  If you wear panty liners, you may apply a thin coating of Vaseline, A&D ointment or coconut oil to the vulvar tissues as a skin barrier     [place], loose fitting clothing  Only perfume-free, dye-free laundry detergent, use a second rinse cycle   Avoid fabric softeners/dryer sheets  Partner should avoid the same products as well  Over the counter probiotic to restore vaginal cecile may be helpful as well, take daily  You may also look into Boric Acid vaginal suppositories to restore vaginal PH balance for up to 2 weeks as directed on the box  You may not use these if you are pregnant      For vaginal dryness: You may use:     Coconut oil (organic, pure, unscented) as needed for moisture or lubrication  ( Do not use if allergic)       Replens moisture restore external comfort gel daily ( use as directed on the box)        Replens long lasting vaginal moisturizer  ( use as directed on the box)         For Vaginal Lubrication:          You may use:     Coconut oil (organic, pure, unscented) as a lubricant or another scent-free lubricant (Astroglide, Uberlube) if needed  Do not use coconut oil or silicone if using a condom as this may break down the integrity of the condom and cause an unplanned pregnancy              Do not use coconut oil if allergic               Replens silky smooth lubricant, premium silicone based lubricant for intercourse  ( use as directed, a small amount will provide an enhanced natural feeling)     Any premium over the counter vaginal lubricant water or silicone based  Silicone based will have more staying power  Menopause   WHAT YOU NEED TO KNOW:   What is menopause?   Menopause is a normal stage in a woman's life when her monthly periods stop  You are considered to be in menopause when you have not had a period for a full year after the age of 36  Menopause usually occurs between ages 52 to 48  Perimenopause is a stage before menopause that may cause signs and symptoms similar to menopause  Perimenopause may start about 4 years before menopause  What causes menopause? Menopause starts when the ovaries stop making the female hormones estrogen and progesterone  After menopause, you are no longer able to become pregnant  Any of the following may trigger menopause or early menopause:  Surgery, including a hysterectomy or oophorectomy    Family history of early menopause    Smoking    Chemotherapy or pelvic radiation    Chromosome abnormalities, including Friend syndrome and Fragile X syndrome    Premature ovarian insufficiency (the ovaries stop producing eggs before age 36)    What are the signs and symptoms of menopause? You may have any of the following:  Irregular menstrual cycles with heavy vaginal bleeding followed by decreased bleeding until it stops    Hot flashes (feeling warm, flushed, and sweaty)    Vaginal changes such as increased dryness    Mood changes such as anxiety, depression, or decreased desire to have sex    Trouble sleeping, joint pain, headaches    Brittle nails, hair on chin or chest where it is normally absent    Decrease in breast size and change in skin texture    Weight gain    How is menopause treated or managed? Hormone replacement therapy (HRT) is medicine that replaces your low hormone levels  HRT contains estrogen and sometimes progestin  HRT has several benefits  HRT helps prevent osteoporosis, which decreases your risk for bone fractures  HRT also protects you from colorectal cancer  HRT also has some risks  HRT increases your risk for breast cancer, blood clots, heart disease, a heart attack, or a stroke  If you are 72 years or older, HRT can also increase your risk for dementia  Your risk for uterine or endometrial cancer is higher if you take estrogen-only HRT  Manage hot flashes  Hot flashes are brief periods of feeling very warm, flushed, and sweaty  Hot flashes can last from a few seconds to several minutes  They may happen many times during the day, and are common at night  Layer your clothing so that you can easily remove some clothing and cool yourself during a hot flash  Cold drinks may also be helpful  Non-hormone medicines can help relieve or prevent hot flashes  Examples include certain antidepressants, nerve medicines, and high blood pressure medicines  Reduce vaginal dryness by using over-the-counter vaginal creams  Vaginal dryness may cause you to have pain or discomfort during sex  Only use creams that are made for vaginal use  Do  not  use petroleum jelly  You may put an estrogen cream in and around your vagina  Estrogen cream may help decrease vaginal dryness and lower your risk of vaginal infections  Continue to use birth control during perimenopause if you do not want to get pregnant  You may need to use birth control until it has been 1 year since your periods stopped  Ask your healthcare provider when you can stop using birth control to prevent pregnancy  How can I live a healthy lifestyle during and after menopause? After menopause, your risk for heart disease and bone loss increases  Ask about these and other ways to stay healthy:  Exercise regularly  Exercise helps you maintain a healthy weight  Exercise can also help to control your blood pressure and cholesterol levels  Include weight-bearing exercise for strong bones  Weight bearing exercise is recommended for at least 30 minutes, 3 times a week  Ask your healthcare provider about the best exercise plan for you  Eat a variety of healthy foods  Include fruits, vegetables, whole grains (whole-wheat bread, pasta, and cereals), low-fat dairy, and lean protein foods (beans, poultry, and fish)  Limit foods high in sodium (salt)  Ask your healthcare provider for more information about a meal plan that is right for you  Do not smoke  If you smoke, it is never too late to quit  You are more likely to have a heart attack, lung disease, blood clots, and cancer if you smoke  Ask your healthcare provider for information if you need help quitting  Take supplements as directed  You may need extra calcium and vitamin D to help prevent osteoporosis  Limit alcohol and caffeine  Alcohol and caffeine may worsen your symptoms  When should I call my doctor? You have vaginal bleeding after menopause  You have questions or concerns about your condition or care  CARE AGREEMENT:   You have the right to help plan your care  Learn about your health condition and how it may be treated  Discuss treatment options with your healthcare providers to decide what care you want to receive  You always have the right to refuse treatment  The above information is an  only  It is not intended as medical advice for individual conditions or treatments  Talk to your doctor, nurse or pharmacist before following any medical regimen to see if it is safe and effective for you  © Copyright [employer] 2022 Information is for End User's use only and may not be sold, redistributed or otherwise used for commercial purposes  All illustrations and images included in CareNotes® are the copyrighted property of [employer]  or [facility] Yasmany Ozuna   Vaginal Atrophy   AMBULATORY CARE:   Vaginal atrophy  is a condition that causes thinning, drying, and inflammation of vaginal tissue  This condition is caused by decreased levels of estrogen (a female sex hormone)  Vaginal atrophy can increase your risk for vaginal and urinary tract infections  Vaginal atrophy can worsen over time if not treated     Common signs and symptoms include the following:   Vaginal dryness, itching, and burning    Vaginal discharge    Pain or discomfort during sex    Light bleeding after sex    Burning during urination    Frequent, sudden, strong urges to urinate    Urinary incontinence (loss of control of your bladder)    Contact your healthcare provider if:   You have a foul-smelling odor coming from your vagina  You have a thick, cheese-like discharge from your vagina  You have itching, swelling, or redness in your vagina  You have pain or burning when you urinate  Your urine smells bad  Your symptoms do not improve, or they get worse  You have questions or concerns about your condition or care  Treatment:   Over-the counter vaginal moisturizers  can help reduce dryness  Your healthcare provider may recommend that you use a vaginal moisturizer several times each week and during sex  Only use creams that are made for vaginal use  Do  not  use petroleum jelly  Lubricants can be used during sex to decrease pain and discomfort  Estrogen  may help decrease dryness  It may also lower your risk of vaginal infections if you are going through menopause  It can also help to relieve urinary symptoms  Estrogen may be prescribed in the form of a cream, tablet, or ring  These medicines can be applied or inserted into the vagina  Estrogen can also be prescribed in the form of a pill  Follow up with your doctor as directed:  Write down your questions so you remember to ask them during your visits  © Copyright [employer] 2022 Information is for End User's use only and may not be sold, redistributed or otherwise used for commercial purposes  All illustrations and images included in CareNotes® are the copyrighted property of A D A M , Inc  or Rachel Ozuna   The above information is an  only  It is not intended as medical advice for individual conditions or treatments   Talk to your doctor, nurse or pharmacist before following any medical regimen to see if it is safe and effective for you     Vaginal Estrogen Replacement Therapy    What is Estrogen vaginal cream?   Estrogen is a female hormone which controls many processes in the female   body  During menopause, estrogen production slows down and then stops  Estrogen vaginal cream is supplied as a brand name of either Estrace or   Premarin    What is Estrogen cream used for? Estrogen vaginal cream is used to decrease menopause symptoms such as   vaginal dryness, burning, and itching of the vaginal area  It also may reduce   symptoms of urinary urgency and irritation with urination  How do I apply the Estrogen cream?   Estrogen cream comes in a tube with an applicator  The applicator is used to   insert the cream into your vagina  Your doctor will tell you how often to use the   cream  Generally you will use the cream daily for several weeks and then   decrease use to 3 times a week  It is best to use at bedtime so that there is less   leakage of the cream  The packaging will have specific instructions for applying   the cream and pictures to help you locate the vagina  General instructions are   as follows:   ? Wash your hands with warm water and soap  ? Remove the cap from the tube and attach the end of the applicator onto   the tube  ? Squeeze from the bottom of the tube to get the prescribed amount of   cream into the applicator  Use the measurement markings on the   applicator to make sure you have the correct dose  ? Remove the applicator from the tube  ? Locate the opening to your vagina  ? Insert the applicator into the vagina (like using a tampon), and push on   the plunger of the applicator to insert the medication  Then slowly   withdraw the applicator  ? Clean the applicator with warm water and soap  It is best to use liquid   soap as bar soap can leave a film on the applicator  ? Wash hands with warm water and soap       What are side effects of estrogen vaginal cream?   Side effects may include headache, breast pain, irregular vaginal bleeding or   spotting, stomach cramps or bloating, nausea and vomiting, or hair loss  Some   women may also notice vaginal burning, irritation, itching, or discharge  If you   experience any of these side effects, you should call your doctor  Safety Concerns   If you have a history of breast cancer, please ask your oncologist before   starting estrogen therapy  Although topical application of estrogen has less   absorption than oral supplements, there may be a slight increased risk of blood   clots with this medication

## 2022-11-11 ENCOUNTER — ANNUAL EXAM (OUTPATIENT)
Dept: OBGYN CLINIC | Facility: CLINIC | Age: 57
End: 2022-11-11

## 2022-11-11 ENCOUNTER — TELEPHONE (OUTPATIENT)
Dept: GENETICS | Facility: CLINIC | Age: 57
End: 2022-11-11

## 2022-11-11 VITALS
BODY MASS INDEX: 26.66 KG/M2 | HEIGHT: 65 IN | SYSTOLIC BLOOD PRESSURE: 122 MMHG | DIASTOLIC BLOOD PRESSURE: 80 MMHG | WEIGHT: 160 LBS

## 2022-11-11 DIAGNOSIS — Z11.3 SCREENING FOR STDS (SEXUALLY TRANSMITTED DISEASES): ICD-10-CM

## 2022-11-11 DIAGNOSIS — Z12.31 ENCOUNTER FOR SCREENING MAMMOGRAM FOR MALIGNANT NEOPLASM OF BREAST: ICD-10-CM

## 2022-11-11 DIAGNOSIS — Z01.419 ENCOUNTER FOR GYNECOLOGICAL EXAMINATION WITHOUT ABNORMAL FINDING: Primary | ICD-10-CM

## 2022-11-11 DIAGNOSIS — N95.2 VAGINAL ATROPHY: ICD-10-CM

## 2022-11-11 RX ORDER — ESTRADIOL 0.1 MG/G
0.5 CREAM VAGINAL 2 TIMES WEEKLY
Qty: 42.5 G | Refills: 0 | Status: SHIPPED | OUTPATIENT
Start: 2022-11-14 | End: 2022-12-14

## 2022-11-11 NOTE — TELEPHONE ENCOUNTER
I called Clotilda Duverney to schedule a new patient appointment with the [facility]       Outcome:   I left a voice message encouraging the patient to call the genetics team at [phone] to schedule this appointment  Follow-up:   At this time the referral will be closed and we will wait to hear back from the patient regarding scheduling this appointment

## 2022-11-11 NOTE — PROGRESS NOTES
[id number]  VAGINAL   LMP:   PMB: NONE   SA: YES   HPV:NONE   Birth control:    Last pap: 11/10/2021 [facility]- US HPV other & 16 were POSITIVE HPV 18 was Negative   Last mammo: 11/05/2021: negative   Last colonoscopy: 09/19/2020 RTO in 5 years   Last Dexa: Not on file  Family History:   Mother - Ovarian cancer   Sister -  Lymphoma cancer  Maternal uncle - Breast cancer (D)  MGM- Ovarian  Cancer

## 2022-11-15 ENCOUNTER — TELEPHONE (OUTPATIENT)
Dept: HEMATOLOGY ONCOLOGY | Facility: CLINIC | Age: 57
End: 2022-11-15

## 2022-11-15 NOTE — TELEPHONE ENCOUNTER
I contacted patient to advise her that her Consult with Donny Benavidez from Medical Oncology had been scheduled incorrectly  I let her know she should have been scheduled with GynOnc as she has a history of Ovarian Cancer  I let her know someone from that department would reach out to her to schedule her appointment with the appropriate provider  She voiced understanding and appreciation for the call

## 2022-11-16 LAB
C TRACH DNA SPEC QL NAA+PROBE: NEGATIVE
N GONORRHOEA DNA SPEC QL NAA+PROBE: NEGATIVE

## 2022-11-17 LAB
LAB AP GYN PRIMARY INTERPRETATION: NORMAL
Lab: NORMAL
PATH INTERP SPEC-IMP: NORMAL

## 2022-11-23 ENCOUNTER — HOSPITAL ENCOUNTER (OUTPATIENT)
Dept: ULTRASOUND IMAGING | Facility: HOSPITAL | Age: 57
Discharge: HOME | End: 2022-11-23
Attending: FAMILY MEDICINE

## 2022-11-23 DIAGNOSIS — R10.32 LEFT LOWER QUADRANT ABDOMINAL PAIN: ICD-10-CM

## 2022-12-05 ENCOUNTER — EVALUATION (OUTPATIENT)
Dept: PHYSICAL THERAPY | Facility: CLINIC | Age: 57
End: 2022-12-05

## 2022-12-05 DIAGNOSIS — M54.32 LEFT SIDED SCIATICA: Primary | ICD-10-CM

## 2022-12-05 NOTE — PROGRESS NOTES
PT Evaluation     Today's date: 2022  Patient name: Ana Rosa Slater  : 1965  MRN: [id number]  Referring provider: Washington Espitia MD  Dx:   Encounter Diagnosis     ICD-10-CM    1  Left sided sciatica  M54 32 Ambulatory Referral to Physical Therapy          Start Time: 915  Stop Time: 1000  Total time in clinic (min): 45 minutes    Assessment  Assessment details: Pt is a 60 y/o female presenting to physical therapy with L sciatica  Upon examination, pt presents with impairments of decreased strength, decreased ROM, and increased pain of pt's lumbar spine/L gluteus raisa resulting in limitations of self-care/ADLs, household/work activities, ambulation, and lifting objects  Pt plan of care will focus on improving strength and ROM of pt's lumbar spine as well as decreasing pain and improving tolerance to functional activities  Pt plan will also focus on improving flexibility of pt's L piriformis, gluteus raisa, and hamstring to decrease tightness and pain of the affected area  Pt would benefit from skilled physical therapy to facilitate a return to her prior level of function  Impairments: abnormal or restricted ROM, activity intolerance, impaired physical strength, lacks appropriate home exercise program and pain with function  Functional limitations: self-care/ADLs, household/work activities, ambulation, and lifting objectsUnderstanding of Dx/Px/POC: good   Prognosis: good    Goals  STG - 4 weeks  1  Pt will have a subjective decrease in pain of her L piriformis by 2 levels or more during her work activities  2  Pt will demonstrate independence with her HEP    LTG - 8 weeks  1  Pt will demonstrate 4+/5 gross strength of her lumbar spine and B hips in all planes to facilitate a return to her prior level of function  2  Pt's FOTO score will improve from 47 to 58 or better to facilitate a return to pt's prior level of function        Plan  Patient would benefit from: PT eval and skilled physical therapy  Planned modality interventions: cryotherapy, thermotherapy: hydrocollator packs and unattended electrical stimulation  Planned therapy interventions: activity modification, abdominal trunk stabilization, balance, flexibility, functional ROM exercises, gait training, graded exercise, home exercise program, joint mobilization, manual therapy, massage, Thurston taping, neuromuscular re-education, patient education, postural training, self care, strengthening, stretching, therapeutic activities and therapeutic exercise  Frequency: 2x week  Duration in weeks: 16  Plan of Care beginning date: 2022  Plan of Care expiration date: 2023  Treatment plan discussed with: patient        Subjective Evaluation    History of Present Illness  Date of onset: 2022  Mechanism of injury: Pt is a 60 y/o female presenting to physical therapy with L sciatica  Pt reports the sciatica has been a chronic issue, however, she had an exacerbation of pain when she started working longer shifts at work  Pt reports she has the pain in her L buttock and will radiate down her LLE occasionally  Pt reports she will have increased pain when she is at work which involved prolonged walking, standing, and lifting  Pt reports she will have decreased pain in her L buttock when applying asper cream and taking the diclofenac she was prescribed  Pt reports she also has difficulty sleeping due to the pain of her L side             Recurrent probem    Quality of life: good    Pain  Current pain ratin  At best pain ratin  At worst pain ratin  Location: L buttock  Quality: sharp  Relieving factors: medications and rest  Aggravating factors: walking, standing and lifting  Progression: improved    Treatments  Previous treatment: medication  Current treatment: medication and physical therapy  Patient Goals  Patient goals for therapy: decreased pain          Objective     Postural Observations  Seated posture: fair  Standing posture: fair        Palpation     Additional Palpation Details  Tenderness to palpation of pt's L piriformis    Active Range of Motion     Lumbar   Flexion: 40 degrees  Restriction level: minimal  Extension: 20 degrees  with pain Restriction level: minimal  Left lateral flexion: 15 degrees    with pain Restriction level: moderate  Right lateral flexion: 20 degrees  Restriction level: minimal    Strength/Myotome Testing     Left Hip   Planes of Motion   Flexion: 4  Extension: 4-  Abduction: 4  Adduction: 4    Right Hip   Planes of Motion   Flexion: 4  Extension: 4  Abduction: 4  Adduction: 4    Left Knee   Extension: 4    Right Knee   Extension: 4    Left Ankle/Foot   Dorsiflexion: 4    Right Ankle/Foot   Dorsiflexion: 4    General Comments:      Lumbar Comments  Significant tightness of pt's L piriformis, glute raisa, and hamstring             Precautions: N/A      Manuals 12/5            Hamstring stretch 1x5 20"            Piriformis stretch 1x5 20"            SKTC 1x5 20"                         Neuro Re-Ed             Education on POC, diagnosis, and HEP 5'            bridges 2x10 3"                                                                             Ther Ex             SKTC 1x5 20"            Piriformis stretch 1x5 20"            Seated hamstring stretch 1x5 20"                                                                             Ther Activity                                       Gait Training                                       Modalities

## 2022-12-07 ENCOUNTER — APPOINTMENT (OUTPATIENT)
Dept: RADIOLOGY | Facility: AMBULARY SURGERY CENTER | Age: 57
End: 2022-12-07
Attending: SURGERY

## 2022-12-07 ENCOUNTER — OFFICE VISIT (OUTPATIENT)
Dept: OBGYN CLINIC | Facility: CLINIC | Age: 57
End: 2022-12-07

## 2022-12-07 VITALS
BODY MASS INDEX: 26.86 KG/M2 | RESPIRATION RATE: 17 BRPM | SYSTOLIC BLOOD PRESSURE: 112 MMHG | DIASTOLIC BLOOD PRESSURE: 78 MMHG | HEART RATE: 68 BPM | HEIGHT: 65 IN | WEIGHT: 161.2 LBS

## 2022-12-07 DIAGNOSIS — M65.331 TRIGGER MIDDLE FINGER OF RIGHT HAND: ICD-10-CM

## 2022-12-07 DIAGNOSIS — M65.341 TRIGGER RING FINGER OF RIGHT HAND: ICD-10-CM

## 2022-12-07 DIAGNOSIS — M25.542 ARTHRALGIA OF BOTH HANDS: ICD-10-CM

## 2022-12-07 DIAGNOSIS — M79.641 RIGHT HAND PAIN: ICD-10-CM

## 2022-12-07 DIAGNOSIS — M79.641 RIGHT HAND PAIN: Primary | ICD-10-CM

## 2022-12-07 DIAGNOSIS — M25.541 ARTHRALGIA OF BOTH HANDS: ICD-10-CM

## 2022-12-07 RX ORDER — DEXAMETHASONE SODIUM PHOSPHATE 100 MG/10ML
40 INJECTION INTRAMUSCULAR; INTRAVENOUS
Status: COMPLETED | OUTPATIENT
Start: 2022-12-07 | End: 2022-12-07

## 2022-12-07 RX ORDER — LIDOCAINE HYDROCHLORIDE 10 MG/ML
1 INJECTION, SOLUTION INFILTRATION; PERINEURAL
Status: COMPLETED | OUTPATIENT
Start: 2022-12-07 | End: 2022-12-07

## 2022-12-07 RX ADMIN — LIDOCAINE HYDROCHLORIDE 1 ML: 10 INJECTION, SOLUTION INFILTRATION; PERINEURAL at 10:24

## 2022-12-07 RX ADMIN — DEXAMETHASONE SODIUM PHOSPHATE 40 MG: 100 INJECTION INTRAMUSCULAR; INTRAVENOUS at 10:24

## 2022-12-07 NOTE — PROGRESS NOTES
Reece GOINS  Attending, Orthopaedic Surgery  Hand, Wrist, and Elbow Surgery  [facility]      ORTHOPAEDIC HAND, WRIST, AND ELBOW OFFICE  VISIT       ASSESSMENT/PLAN:      61year old female here for her right ring and long trigger fingers and bilateral generalized hand arthritis  New x-rays of the left hand were obtained today and reviewed  On exam patient has stiffness and clicking in the right ring and long fingers; ring being the worst   Non operative treatments were discussed with the patient that included localized cortisone injections into both the right ring and long fingers  Patient wished to proceed with the injections today, which he tolerated well  A Coban finger block was demonstrated and placed on patient  She can use this during her work day to help with the finger from locking  In regards to her generalized arthritis of the hands, she can start doing paraffin bath to do deep heating for the small joints, or heating pad in the evening  We will follow up with the patient in 8 weeks for re-evaluation  The patient verbalized understanding of exam findings and treatment plan  We engaged in the shared decision-making process and treatment options were discussed at length with the patient  Surgical and conservative management discussed today along with risks and benefits  Diagnoses and all orders for this visit:    Right hand pain  -     XR hand 3+ vw left; Future    Arthralgia of both hands  -     Ambulatory Referral to Hand Surgery  -     XR hand 3+ vw left; Future    Other orders  -     Hand/upper extremity injection  -     Hand/upper extremity injection        Follow Up:  Return for follow up in 6-8 weeks bilateral hands      To Do Next Visit:  Re-evaluation of current issue      ____________________________________________________________________________________________________________________________________________      CHIEF COMPLAINT:  Chief Complaint Patient presents with   • Right Hand - Pain, Locking   • Left Hand - Pain, Locking       SUBJECTIVE:  Elham Lomeli is a 61y o  year old RHD female who presents today for consultation for her bilateral hand osteoarthritis referred by her PCP, Dr Glenys Gutierrez  Patient reports that she has right ring and long finger stiffness along with intermittent locking  It is worse in the morning  In regards to the left hand she complains of stiffness  Both hands swell at night  She does use hot water soaks in the morning with the use of topical Voltaren gel  She has taken diclofenac 75 mg, but it has spiked her liver enzymes  Her PCP does monitor her liver through blood work  She has seen Dr Alex Monroy in the past for polyarthralgia  Patient works as a CNA        Pain/symptom timing:  Worse during the day when active  Pain/symptom context:  Worse with activites and work  Pain/symptom modifying factors:  Rest makes better, activities make worse  Pain/symptom associated signs/symptoms: none    Prior treatment   · NSAIDsYes   · Injections No   · Bracing/Orthotics No    Physical Therapy No     I have personally reviewed all the relevant PMH, PSH, SH, FH, Medications and allergies      PAST MEDICAL HISTORY:  Past Medical History:   Diagnosis Date   • 2019 novel coronavirus disease (COVID-19) 05/30/2020   • Abnormal Pap smear of cervix    • Acute maxillary sinusitis 12/27/2017   • Acute pain of right shoulder 04/10/2019   • BRCA1 negative 2015   • BRCA2 negative 2015   • Gastric ulcer    • 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   • Swelling of finger joint of right hand 12/10/2019   • Varicella        PAST SURGICAL HISTORY:  Past Surgical History:   Procedure Laterality Date   • ANKLE FRACTURE SURGERY Right 2021   • APPENDECTOMY     • FOOT SURGERY Right    • HERNIA REPAIR         FAMILY HISTORY:  Family History   Problem Relation Age of Onset   • Ovarian cancer Mother 48   • No Known Problems Father    • Thyroid cancer Sister 52   • No Known Problems Sister    • No Known Problems Daughter    • No Known Problems Son    • No Known Problems Son    • No Known Problems Son    • Cancer Maternal Grandmother         unknown type or age   • No Known Problems Paternal Grandmother    • No Known Problems Maternal Aunt    • Breast cancer Maternal Uncle         52's       SOCIAL HISTORY:  Social History     Tobacco Use   • Smoking status: Never   • Smokeless tobacco: Never   Vaping Use   • Vaping Use: Never used   Substance Use Topics   • Alcohol use: Not Currently   • Drug use: No       MEDICATIONS:    Current Outpatient Medications:   •  atorvastatin (LIPITOR) 20 mg tablet, Take 1 tablet (20 mg total) by mouth daily, Disp: 90 tablet, Rfl: 3  •  diclofenac (VOLTAREN) 75 mg EC tablet, Take 1 tablet (75 mg total) by mouth 2 (two) times a day, Disp: 60 tablet, Rfl: 3  •  estradiol (ESTRACE) 0 1 mg/g vaginal cream, Insert 0 5 g into the vagina 2 (two) times a week Apply a pea sized amount to external vaginal opening and urethra area twice weekly, Disp: 42 5 g, Rfl: 0  •  hydrochlorothiazide (HYDRODIURIL) 25 mg tablet, Take 1 tablet (25 mg total) by mouth daily, Disp: 90 tablet, Rfl: 1  •  losartan (COZAAR) 50 mg tablet, Take 1 tablet (50 mg total) by mouth daily, Disp: 90 tablet, Rfl: 1  •  tretinoin (REFISSA) 0 05 % cream, Apply topically daily at bedtime, Disp: 45 g, Rfl: 1  •  triamcinolone (KENALOG) 0 5 % cream, Apply topically 2 (two) times a day, Disp: 30 g, Rfl: 1  •  methocarbamol (Robaxin-750) 750 mg tablet, Take 1 tablet (750 mg total) by mouth every 6 (six) hours as needed for muscle spasms (Patient not taking: Reported on 11/11/2022), Disp: 30 tablet, Rfl: 0    ALLERGIES:  Allergies   Allergen Reactions   • Kiwi Extract - Food Allergy Hives   • Other      strawberry   • Pineapple - Food Allergy Hives           REVIEW OF SYSTEMS:  Review of Systems   Constitutional: Negative for chills, fever and unexpected weight change     HENT: Negative for hearing loss, nosebleeds and sore throat  Eyes: Negative for pain, redness and visual disturbance  Respiratory: Negative for cough, shortness of breath and wheezing  Cardiovascular: Negative for chest pain, palpitations and leg swelling  Gastrointestinal: Negative for abdominal pain and nausea  Genitourinary: Negative for dyspareunia, dysuria and frequency  Musculoskeletal: Positive for arthralgias and joint swelling  Skin: Negative for rash and wound  Neurological: Negative for dizziness, numbness and headaches  Psychiatric/Behavioral: Negative for decreased concentration and suicidal ideas  The patient is not nervous/anxious  VITALS:  Vitals:    12/07/22 0950   BP: 112/78   Pulse: 68   Resp: 17       LABS:  HgA1c:   Lab Results   Component Value Date    HGBA1C 5 8 (H) 09/09/2022     BMP:   Lab Results   Component Value Date    CALCIUM 9 9 10/10/2022    K 4 0 10/10/2022    CO2 27 10/10/2022     (H) 10/10/2022    BUN 16 10/10/2022    CREATININE 0 66 10/10/2022       _____________________________________________________  PHYSICAL EXAMINATION:  General: well developed and well nourished, alert, oriented times 3 and appears comfortable  Psychiatric: Normal  HEENT: Normocephalic, Atraumatic Trachea Midline, No torticollis  Pulmonary: No audible wheezing or respiratory distress   Abdomen/GI: Non tender, non distended   Cardiovascular: No pitting edema, 2+ radial pulse   Skin: No masses, erythema, lacerations, fluctation, ulcerations  Neurovascular: Sensation Intact to the Median, Ulnar, Radial Nerve, Motor Intact to the Median, Ulnar, Radial Nerve and Pulses Intact  Musculoskeletal: Normal, except as noted in detailed exam and in HPI  MUSCULOSKELETAL EXAMINATION:    right ring finger:  Positive palpable nodule over the A1 pulley  Positive tenderness to palpation over A1 pulley  Positive catching  Positive clicking      right long finger:  Positive palpable nodule over the A1 pulley  Positive tenderness to palpation over A1 pulley  Negative catching  Positive clicking     ___________________________________________________  STUDIES REVIEWED:  I have personally reviewed AP lateral and oblique radiographs of left hand 3 views  which demonstrate no acute fracture dislocation mild generative changes      PROCEDURES PERFORMED:  Hand/upper extremity injection: R ring A1  Universal Protocol:  Consent: Verbal consent obtained  Risks and benefits: risks, benefits and alternatives were discussed  Consent given by: patient  Time out: Immediately prior to procedure a "time out" was called to verify the correct patient, procedure, equipment, support staff and site/side marked as required  Timeout called at: 12/7/2022 10:22 AM   Patient understanding: patient states understanding of the procedure being performed  Site marked: the operative site was marked  Patient identity confirmed: verbally with patient    Supporting Documentation  Indications: tendon swelling and pain   Procedure Details  Condition:trigger finger Location: ring finger - R ring A1   Preparation: Patient was prepped and draped in the usual sterile fashion  Needle size: 25 G  Ultrasound guidance: no  Approach: volar  Medications administered: 1 mL lidocaine 1 %; 40 mg dexamethasone 100 mg/10 mL    Patient tolerance: patient tolerated the procedure well with no immediate complications  Dressing:  Sterile dressing applied     Hand/upper extremity injection: R long A1  Universal Protocol:  Consent: Verbal consent obtained  Risks and benefits: risks, benefits and alternatives were discussed  Consent given by: patient  Time out: Immediately prior to procedure a "time out" was called to verify the correct patient, procedure, equipment, support staff and site/side marked as required    Timeout called at: 12/7/2022 10:22 AM   Patient understanding: patient states understanding of the procedure being performed  Site marked: the operative site was marked  Patient identity confirmed: verbally with patient    Supporting Documentation  Indications: tendon swelling and pain   Procedure Details  Condition:trigger finger Location: long finger - R long A1   Preparation: Patient was prepped and draped in the usual sterile fashion  Needle size: 25 G  Ultrasound guidance: no  Approach: volar  Medications administered: 1 mL lidocaine 1 %; 40 mg dexamethasone 100 mg/10 mL    Patient tolerance: patient tolerated the procedure well with no immediate complications  Dressing:  Sterile dressing applied         _____________________________________________________      Scribe Attestation    I,:  David Solorzano am acting as a scribe while in the presence of the attending physician :       I,:  Shawanda Santos MD personally performed the services described in this documentation    as scribed in my presence :

## 2022-12-08 ENCOUNTER — OFFICE VISIT (OUTPATIENT)
Dept: FAMILY MEDICINE CLINIC | Facility: CLINIC | Age: 57
End: 2022-12-08

## 2022-12-08 VITALS
HEIGHT: 65 IN | OXYGEN SATURATION: 99 % | DIASTOLIC BLOOD PRESSURE: 74 MMHG | WEIGHT: 165.4 LBS | HEART RATE: 84 BPM | BODY MASS INDEX: 27.56 KG/M2 | TEMPERATURE: 97.9 F | SYSTOLIC BLOOD PRESSURE: 118 MMHG

## 2022-12-08 DIAGNOSIS — R79.89 ELEVATED LFTS: ICD-10-CM

## 2022-12-08 DIAGNOSIS — E78.2 MIXED HYPERLIPIDEMIA: ICD-10-CM

## 2022-12-08 DIAGNOSIS — R73.01 IMPAIRED FASTING GLUCOSE: Primary | ICD-10-CM

## 2022-12-08 DIAGNOSIS — Z80.41 FAMILY HISTORY OF OVARIAN CANCER: ICD-10-CM

## 2022-12-08 PROBLEM — R10.32 LEFT LOWER QUADRANT ABDOMINAL PAIN: Status: RESOLVED | Noted: 2022-11-07 | Resolved: 2022-12-08

## 2022-12-08 PROBLEM — R53.83 FATIGUE: Status: RESOLVED | Noted: 2018-10-10 | Resolved: 2022-12-08

## 2022-12-08 NOTE — PROGRESS NOTES
Name: Tosha Rayo      : 1965      MRN: [id number]  Encounter Provider: Paco Flores MD  Encounter Date: 2022   Encounter department: [address] [place] [address]     1  Impaired fasting glucose  Recommend a low carb diet  -     Hemoglobin A1C; Future; Expected date: 2023    2  Elevated LFTs  -     Comprehensive metabolic panel; Future; Expected date: 2023    3  Mixed hyperlipidemia  -     Lipid panel; Future; Expected date: 2023    4  Family history of ovarian cancer  -     Ambulatory Referral to Genetics; Future    F/U in 6 months or as needed       Subjective     Patient is here for a check up  Has a Hx of pre-diabetes, denies any symptoms related to this  Has a FHx significant for cancer ovarian and lymphoma  She is interested in screening with      Review of Systems   Constitutional: Negative for activity change, appetite change, fatigue and fever  HENT: Negative for congestion and ear discharge  Respiratory: Negative for cough and shortness of breath  Cardiovascular: Negative for chest pain and palpitations  Gastrointestinal: Negative for diarrhea and nausea  Musculoskeletal: Negative for arthralgias and back pain  Skin: Negative for color change and rash  Neurological: Negative for dizziness and headaches  Psychiatric/Behavioral: Negative for agitation and behavioral problems         Past Medical History:   Diagnosis Date   • 2019 novel coronavirus disease (COVID-19) 2020   • Abnormal Pap smear of cervix    • Acute maxillary sinusitis 2017   • Acute pain of right shoulder 04/10/2019   • BRCA1 negative    • BRCA2 negative    • Gastric ulcer    • Hyperproinsulinemia     [id number] resolved   • Swelling of finger joint of right hand 12/10/2019   • Varicella      Past Surgical History:   Procedure Laterality Date   • ANKLE FRACTURE SURGERY Right    • APPENDECTOMY     • FOOT SURGERY Right    • HERNIA REPAIR       Family History   Problem Relation Age of Onset   • Ovarian cancer Mother 48   • No Known Problems Father    • Thyroid cancer Sister 52   • No Known Problems Sister    • No Known Problems Daughter    • No Known Problems Son    • No Known Problems Son    • No Known Problems Son    • Cancer Maternal Grandmother         unknown type or age   • No Known Problems Paternal Grandmother    • No Known Problems Maternal Aunt    • Breast cancer Maternal Uncle         52's     Social History     Socioeconomic History   • Marital status: /Civil Union     Spouse name: None   • Number of children: None   • Years of education: None   • Highest education level: None   Occupational History   • None   Tobacco Use   • Smoking status: Never   • Smokeless tobacco: Never   Vaping Use   • Vaping Use: Never used   Substance and Sexual Activity   • Alcohol use: Not Currently   • Drug use: No   • Sexual activity: Yes     Partners: Male   Other Topics Concern   • None   Social History Narrative   • None     Social Determinants of Health     Financial Resource Strain: Not on file   Food Insecurity: Not on file   Transportation Needs: Not on file   Physical Activity: Not on file   Stress: Not on file   Social Connections: Not on file   Intimate Partner Violence: Not on file   Housing Stability: Not on file     Current Outpatient Medications on File Prior to Visit   Medication Sig   • atorvastatin (LIPITOR) 20 mg tablet Take 1 tablet (20 mg total) by mouth daily   • diclofenac (VOLTAREN) 75 mg EC tablet Take 1 tablet (75 mg total) by mouth 2 (two) times a day   • estradiol (ESTRACE) 0 1 mg/g vaginal cream Insert 0 5 g into the vagina 2 (two) times a week Apply a pea sized amount to external vaginal opening and urethra area twice weekly   • hydrochlorothiazide (HYDRODIURIL) 25 mg tablet Take 1 tablet (25 mg total) by mouth daily   • losartan (COZAAR) 50 mg tablet Take 1 tablet (50 mg total) by mouth daily   • tretinoin (REFISSA) 0 05 % cream Apply topically daily at bedtime   • triamcinolone (KENALOG) 0 5 % cream Apply topically 2 (two) times a day   • methocarbamol (Robaxin-750) 750 mg tablet Take 1 tablet (750 mg total) by mouth every 6 (six) hours as needed for muscle spasms (Patient not taking: Reported on 11/11/2022)     Allergies   Allergen Reactions   • Kiwi Extract - Food Allergy Hives   • Other      strawberry   • Pineapple - Food Allergy Hives     Immunization History   Administered Date(s) Administered   • COVID-19 PFIZER VACCINE 0 3 ML IM 01/11/2021, 02/01/2021   • COVID-19 Pfizer vac (Nikhil-sucrose, gray cap) 12 yr+ IM 02/09/2022   • H1N1, All Formulations 01/11/2010   • Hep B, adult 06/01/2021, 08/03/2021, 12/02/2021   • INFLUENZA 11/05/2015, 01/30/2019, 11/21/2019, 09/07/2020, 11/29/2021, 10/11/2022   • Influenza Quadrivalent Preservative Free 3 years and older IM 11/05/2015   • Influenza, seasonal, injectable 10/12/2017   • Tdap 11/18/2015, 07/05/2020   • Zoster Vaccine Recombinant 07/05/2020, 09/07/2020       Objective     /74 (BP Location: Right arm, Patient Position: Sitting)   Pulse 84   Temp 97 9 °F (36 6 °C) (Temporal)   Ht 5' 5" (1 651 m)   Wt 75 kg (165 lb 6 4 oz)   SpO2 99%   BMI 27 52 kg/m²     Physical Exam  Constitutional:       General: She is not in acute distress  Appearance: She is well-developed  She is not diaphoretic  HENT:      Head: Normocephalic and atraumatic  Nose: Nose normal    Eyes:      Conjunctiva/sclera: Conjunctivae normal       Pupils: Pupils are equal, round, and reactive to light  Cardiovascular:      Rate and Rhythm: Normal rate and regular rhythm  Heart sounds: Normal heart sounds  No murmur heard  Pulmonary:      Effort: Pulmonary effort is normal  No respiratory distress  Breath sounds: Normal breath sounds  No wheezing  Abdominal:      General: Bowel sounds are normal  There is no distension  Palpations: Abdomen is soft  Tenderness:  There is no abdominal tenderness  Skin:     General: Skin is warm and dry  Findings: No erythema or rash  Neurological:      Mental Status: She is alert and oriented to person, place, and time         Edwar Heller MD

## 2022-12-10 DIAGNOSIS — N95.2 VAGINAL ATROPHY: ICD-10-CM

## 2022-12-12 ENCOUNTER — TELEPHONE (OUTPATIENT)
Dept: GENETICS | Facility: CLINIC | Age: 57
End: 2022-12-12

## 2022-12-12 NOTE — TELEPHONE ENCOUNTER
I called Chance Arriola to schedule a new patient appointment with the [facility]       Outcome:  Genetics appointment scheduled for April 21 at 9:30 am I advised that I will review her case with our genetic counselors to determine if additional testing is recommended  Personal/Family History Related to Appointment:  Patient has no personal hx of cancer  Hx of 3 benign colon polyps per patient in 2020  Family hx of ovarian cancer (mother, MGM), Breast cancer (m uncle), foot cancer (m cousin), thyroid cancer (sister dx 52)  Paternal side is unknown  Patient is non-Synagogue  History of Genetic Testing:  Patient reports personal history of genetic testing on 1/2016  Through [facility]       Genetic Testing Results:     Genetic testing results are NEGATIVE for mutations and large rearrangements in:  BRCA1, BRCA2, APC, ISRAEL, BARD1, BMPR1A, BRIP1, CDH1, CDKN2A, CHEK2, EPCAM, MLH1, MSH2, MSH6, MUTYH, NBN, PMS2, PTEN, RAD51C, RAD51D, SMAD4, STK11, and TP53      Genetics Family History Questionnaire:  I confirmed the patient's e-mail on file as the best e-mail to send an invite link for our genetics family history intake

## 2022-12-12 NOTE — TELEPHONE ENCOUNTER
Homero Adorno,    I looked back at the report as well, for some reason Dennis's note does not include that CDK4 and PALB2 are on the panel as well  Only genes missing are AXIN2 DICER1 GREM1 HOXB13 MSH3 NF1 NTHL1 POLD1 POLE RECQL SMARCA4  These would have a very low yield of being positive given the patient's family history  Please call her to cancel appointment as no additional testing is needed

## 2022-12-13 ENCOUNTER — APPOINTMENT (OUTPATIENT)
Dept: PHYSICAL THERAPY | Facility: CLINIC | Age: 57
End: 2022-12-13

## 2022-12-13 RX ORDER — ESTRADIOL 0.1 MG/G
CREAM VAGINAL
Qty: 42.5 G | Refills: 0 | Status: SHIPPED | OUTPATIENT
Start: 2022-12-13

## 2022-12-14 ENCOUNTER — TELEMEDICINE (OUTPATIENT)
Dept: FAMILY MEDICINE CLINIC | Facility: CLINIC | Age: 57
End: 2022-12-14

## 2022-12-14 DIAGNOSIS — R68.89 FLU-LIKE SYMPTOMS: Primary | ICD-10-CM

## 2022-12-14 DIAGNOSIS — R05.9 COUGH, UNSPECIFIED TYPE: ICD-10-CM

## 2022-12-14 RX ORDER — DEXTROMETHORPHAN HYDROBROMIDE AND PROMETHAZINE HYDROCHLORIDE 15; 6.25 MG/5ML; MG/5ML
5 SOLUTION ORAL 4 TIMES DAILY PRN
Qty: 118 ML | Refills: 0 | Status: SHIPPED | OUTPATIENT
Start: 2022-12-14

## 2022-12-14 NOTE — LETTER
December 14, 2022     Patient: Jessi Mary  YOB: 1965  Date of Visit: 12/14/2022      To Whom it May Concern:    Jessi Mary is under my professional care  Neda Hidden was seen in my office on 12/14/2022  Please excuse Neda Hidden from work on 12/14/2022 and 12/15/2022  Neda Hidden may return to work on 12/16  If you have any questions or concerns, please don't hesitate to call           Sincerely,          Teodora Chao PA-C        CC: No Recipients

## 2022-12-14 NOTE — PROGRESS NOTES
Virtual Regular Visit    Verification of patient location:    Patient is located in the following state in which I hold an active license [place]      Assessment/Plan:    Problem List Items Addressed This Visit    None  Visit Diagnoses     Flu-like symptoms    -  Primary    Relevant Orders    Covid/Flu- Office Collect    Cough, unspecified type        Relevant Medications    Promethazine-DM (PHENERGAN-DM) 6 25-15 mg/5 mL oral syrup        Pt will proceed to office for COVID/FLU swab  Encourage checking oxygen level every 2 hours  If any worsening SOB or change to proceed to ER  Sent promethazine DM for cough and congestion         Reason for visit is   Chief Complaint   Patient presents with   • Virtual Regular Visit        Encounter provider Wes Ahuja PA-C    Provider located at [facility] [address] [place] [id number]      Recent Visits  Date Type Provider Dept   12/08/22 Office Visit Charmayne Mylar, MD [facility] [address] recent visits within past 7 days and meeting all other requirements  Today's Visits  Date Type Provider Dept   12/14/22 Telemedicine Gia Chao PA-C [facility] [address] today's visits and meeting all other requirements  Future Appointments  No visits were found meeting these conditions  Showing future appointments within next 150 days and meeting all other requirements       The patient was identified by name and date of birth  Anushka Alcantar was informed that this is a telemedicine visit and that the visit is being conducted through the [employer]  She agrees to proceed     My office door was closed  No one else was in the room  She acknowledged consent and understanding of privacy and security of the video platform  The patient has agreed to participate and understands they can discontinue the visit at any time  Patient is aware this is a billable service  Subjective  Leighann Freedman is a 61 y o  female flu symptoms       Pt presents for flu symptoms   Sore throat and chills Saturday progressed into severe fatigue having SOB  Having body aches saying into fingers and toes get tingling  COVID negative today   Taking theraflu    o2 today 99%       Past Medical History:   Diagnosis Date   • 2019 novel coronavirus disease (COVID-19) 05/30/2020   • Abnormal Pap smear of cervix    • Acute maxillary sinusitis 12/27/2017   • Acute pain of right shoulder 04/10/2019   • BRCA1 negative 2015   • BRCA2 negative 2015   • Gastric ulcer    • Hyperproinsulinemia     [id number] resolved   • Swelling of finger joint of right hand 12/10/2019   • Varicella        Past Surgical History:   Procedure Laterality Date   • ANKLE FRACTURE SURGERY Right 2021   • APPENDECTOMY     • FOOT SURGERY Right    • HERNIA REPAIR         Current Outpatient Medications   Medication Sig Dispense Refill   • Promethazine-DM (PHENERGAN-DM) 6 25-15 mg/5 mL oral syrup Take 5 mL by mouth 4 (four) times a day as needed for cough 118 mL 0   • atorvastatin (LIPITOR) 20 mg tablet Take 1 tablet (20 mg total) by mouth daily 90 tablet 3   • diclofenac (VOLTAREN) 75 mg EC tablet Take 1 tablet (75 mg total) by mouth 2 (two) times a day 60 tablet 3   • estradiol (ESTRACE) 0 1 mg/g vaginal cream insert 0 5 grams vaginally two times a week and apply A PEA SIZE AMOUNT TO EXTERNAL VAGINAL OPENING AND URETHRA AREA TWICE WEEKLY 42 5 g 0   • hydrochlorothiazide (HYDRODIURIL) 25 mg tablet Take 1 tablet (25 mg total) by mouth daily 90 tablet 1   • losartan (COZAAR) 50 mg tablet Take 1 tablet (50 mg total) by mouth daily 90 tablet 1   • methocarbamol (Robaxin-750) 750 mg tablet Take 1 tablet (750 mg total) by mouth every 6 (six) hours as needed for muscle spasms (Patient not taking: Reported on 11/11/2022) 30 tablet 0   • tretinoin (REFISSA) 0 05 % cream Apply topically daily at bedtime 45 g 1   • triamcinolone (KENALOG) 0 5 % cream Apply topically 2 (two) times a day 30 g 1     No current facility-administered medications for this visit  Allergies   Allergen Reactions   • Kiwi Extract - Food Allergy Hives   • Other      strawberry   • Pineapple - Food Allergy Hives       Review of Systems   Constitutional: Positive for chills and fatigue  Negative for fever  HENT: Positive for sore throat  Negative for ear pain, sinus pain and trouble swallowing  Eyes: Negative for pain, discharge and redness  Respiratory: Positive for cough and shortness of breath  Negative for chest tightness and wheezing  Cardiovascular: Negative for chest pain, palpitations and leg swelling  Gastrointestinal: Negative for abdominal pain, diarrhea, nausea and vomiting  Musculoskeletal: Negative for arthralgias, joint swelling and myalgias  Skin: Negative for rash  Neurological: Negative for dizziness, weakness, numbness and headaches  Video Exam    There were no vitals filed for this visit  Physical Exam   Pt pausing between sentences   Oxygen is 99% on virtual visit      I spent 10 minutes directly with the patient during this visit

## 2022-12-15 ENCOUNTER — APPOINTMENT (OUTPATIENT)
Dept: PHYSICAL THERAPY | Facility: CLINIC | Age: 57
End: 2022-12-15

## 2022-12-15 ENCOUNTER — TELEPHONE (OUTPATIENT)
Dept: FAMILY MEDICINE CLINIC | Facility: CLINIC | Age: 57
End: 2022-12-15

## 2022-12-15 LAB
FLUAV RNA RESP QL NAA+PROBE: NEGATIVE
FLUBV RNA RESP QL NAA+PROBE: NEGATIVE
SARS-COV-2 RNA RESP QL NAA+PROBE: NEGATIVE

## 2022-12-16 ENCOUNTER — APPOINTMENT (OUTPATIENT)
Dept: RADIOLOGY | Facility: CLINIC | Age: 57
End: 2022-12-16

## 2022-12-16 ENCOUNTER — OFFICE VISIT (OUTPATIENT)
Dept: FAMILY MEDICINE CLINIC | Facility: CLINIC | Age: 57
End: 2022-12-16

## 2022-12-16 VITALS
WEIGHT: 163.8 LBS | OXYGEN SATURATION: 98 % | TEMPERATURE: 96.7 F | SYSTOLIC BLOOD PRESSURE: 122 MMHG | BODY MASS INDEX: 27.29 KG/M2 | DIASTOLIC BLOOD PRESSURE: 80 MMHG | HEART RATE: 70 BPM | HEIGHT: 65 IN

## 2022-12-16 DIAGNOSIS — R06.02 SHORTNESS OF BREATH: ICD-10-CM

## 2022-12-16 DIAGNOSIS — R06.02 SHORTNESS OF BREATH: Primary | ICD-10-CM

## 2022-12-16 LAB
SARS-COV-2 AG UPPER RESP QL IA: NEGATIVE
VALID CONTROL: NORMAL

## 2022-12-16 RX ORDER — PREDNISONE 20 MG/1
40 TABLET ORAL DAILY
Qty: 10 TABLET | Refills: 0 | Status: SHIPPED | OUTPATIENT
Start: 2022-12-16 | End: 2022-12-21

## 2022-12-16 RX ORDER — AMOXICILLIN AND CLAVULANATE POTASSIUM 875; 125 MG/1; MG/1
1 TABLET, FILM COATED ORAL EVERY 12 HOURS SCHEDULED
Qty: 14 TABLET | Refills: 0 | Status: SHIPPED | OUTPATIENT
Start: 2022-12-16 | End: 2022-12-23

## 2022-12-16 NOTE — PROGRESS NOTES
Name: Meagan Walter      : 1965      MRN: [id number]  Encounter Provider: Ling Barrera MD  Encounter Date: 2022   Encounter department: [address] [place] [address]  Shortness of breath  After discussing risks and benefits of medication along with side effects will start the following:  -     amoxicillin-clavulanate (Augmentin) 875-125 mg per tablet; Take 1 tablet by mouth every 12 (twelve) hours for 7 days  -     predniSONE 20 mg tablet; Take 2 tablets (40 mg total) by mouth daily for 5 days    If symptoms worsen recommend going to the ER       Subjective     Patient is here because she has been having SOB and chest tightness which started 5 days ago  She had fevers however her fevers have now resolved  She says that she has difficulty breathing  And chest tightness  She denies any chest pain or palpitations  Denies any nausea, vomiting or abdominal pain  Not a smoker  Distant Hx of asthma when she was a child  Covid/Flu negative done 2 days ago  Review of Systems   Constitutional: Negative for activity change, appetite change, fatigue and fever  HENT: Negative for congestion and ear discharge  Respiratory: Positive for chest tightness and shortness of breath  Negative for cough  Cardiovascular: Negative for chest pain and palpitations  Gastrointestinal: Negative for diarrhea and nausea  Musculoskeletal: Negative for arthralgias and back pain  Skin: Negative for color change and rash  Neurological: Negative for dizziness and headaches  Psychiatric/Behavioral: Negative for agitation and behavioral problems         Past Medical History:   Diagnosis Date   •  novel coronavirus disease (COVID-19) 2020   • Abnormal Pap smear of cervix    • Acute maxillary sinusitis 2017   • Acute pain of right shoulder 04/10/2019   • BRCA1 negative    • BRCA2 negative    • Gastric ulcer    • Hyperproinsulinemia     [id number] resolved   • Swelling of finger joint of right hand 12/10/2019   • Varicella      Past Surgical History:   Procedure Laterality Date   • ANKLE FRACTURE SURGERY Right 2021   • APPENDECTOMY     • FOOT SURGERY Right    • HERNIA REPAIR       Family History   Problem Relation Age of Onset   • Ovarian cancer Mother 48   • No Known Problems Father    • Thyroid cancer Sister 52   • No Known Problems Sister    • No Known Problems Daughter    • No Known Problems Son    • No Known Problems Son    • No Known Problems Son    • Cancer Maternal Grandmother         unknown type or age   • No Known Problems Paternal Grandmother    • No Known Problems Maternal Aunt    • Breast cancer Maternal Uncle         52's     Social History     Socioeconomic History   • Marital status: /Civil Union     Spouse name: None   • Number of children: None   • Years of education: None   • Highest education level: None   Occupational History   • None   Tobacco Use   • Smoking status: Never   • Smokeless tobacco: Never   Vaping Use   • Vaping Use: Never used   Substance and Sexual Activity   • Alcohol use: Not Currently   • Drug use: No   • Sexual activity: Yes     Partners: Male   Other Topics Concern   • None   Social History Narrative   • None     Social Determinants of Health     Financial Resource Strain: Not on file   Food Insecurity: Not on file   Transportation Needs: Not on file   Physical Activity: Not on file   Stress: Not on file   Social Connections: Not on file   Intimate Partner Violence: Not on file   Housing Stability: Not on file     Current Outpatient Medications on File Prior to Visit   Medication Sig   • atorvastatin (LIPITOR) 20 mg tablet Take 1 tablet (20 mg total) by mouth daily   • diclofenac (VOLTAREN) 75 mg EC tablet Take 1 tablet (75 mg total) by mouth 2 (two) times a day   • estradiol (ESTRACE) 0 1 mg/g vaginal cream insert 0 5 grams vaginally two times a week and apply A PEA SIZE AMOUNT TO EXTERNAL VAGINAL OPENING AND URETHRA AREA TWICE WEEKLY   • hydrochlorothiazide (HYDRODIURIL) 25 mg tablet Take 1 tablet (25 mg total) by mouth daily   • losartan (COZAAR) 50 mg tablet Take 1 tablet (50 mg total) by mouth daily   • methocarbamol (Robaxin-750) 750 mg tablet Take 1 tablet (750 mg total) by mouth every 6 (six) hours as needed for muscle spasms (Patient not taking: Reported on 11/11/2022)   • Promethazine-DM (PHENERGAN-DM) 6 25-15 mg/5 mL oral syrup Take 5 mL by mouth 4 (four) times a day as needed for cough   • tretinoin (REFISSA) 0 05 % cream Apply topically daily at bedtime   • triamcinolone (KENALOG) 0 5 % cream Apply topically 2 (two) times a day     Allergies   Allergen Reactions   • Kiwi Extract - Food Allergy Hives   • Other      strawberry   • Pineapple - Food Allergy Hives     Immunization History   Administered Date(s) Administered   • COVID-19 PFIZER VACCINE 0 3 ML IM 01/11/2021, 02/01/2021   • COVID-19 Pfizer vac (Nikhil-sucrose, gray cap) 12 yr+ IM 02/09/2022   • H1N1, All Formulations 01/11/2010   • Hep B, adult 06/01/2021, 08/03/2021, 12/02/2021   • INFLUENZA 11/05/2015, 01/30/2019, 11/21/2019, 09/07/2020, 11/29/2021, 10/11/2022   • Influenza Quadrivalent Preservative Free 3 years and older IM 11/05/2015   • Influenza, seasonal, injectable 10/12/2017   • Tdap 11/18/2015, 07/05/2020   • Zoster Vaccine Recombinant 07/05/2020, 09/07/2020       Objective     /80   Pulse 70   Temp (!) 96 7 °F (35 9 °C)   Ht 5' 5" (1 651 m)   Wt 74 3 kg (163 lb 12 8 oz)   SpO2 98%   BMI 27 26 kg/m²     Physical Exam  Constitutional:       General: She is not in acute distress  Appearance: She is well-developed  She is not diaphoretic  HENT:      Head: Normocephalic and atraumatic  Nose: Nose normal    Eyes:      Conjunctiva/sclera: Conjunctivae normal       Pupils: Pupils are equal, round, and reactive to light  Cardiovascular:      Rate and Rhythm: Normal rate and regular rhythm  Heart sounds: Normal heart sounds   No murmur heard   Pulmonary:      Effort: Pulmonary effort is normal  No respiratory distress  Breath sounds: Normal breath sounds  No wheezing  Abdominal:      General: Bowel sounds are normal  There is no distension  Palpations: Abdomen is soft  Tenderness: There is no abdominal tenderness  Skin:     General: Skin is warm and dry  Findings: No erythema or rash  Neurological:      Mental Status: She is alert and oriented to person, place, and time         Maykel Zepeda MD

## 2022-12-20 ENCOUNTER — OFFICE VISIT (OUTPATIENT)
Dept: PHYSICAL THERAPY | Facility: CLINIC | Age: 57
End: 2022-12-20

## 2022-12-20 DIAGNOSIS — M54.32 LEFT SIDED SCIATICA: Primary | ICD-10-CM

## 2022-12-20 NOTE — PROGRESS NOTES
Daily Note     Today's date: 2022  Patient name: Michele Marinelli  : 1965  MRN: [id number]  Referring provider: Zack Peralta MD  Dx:   Encounter Diagnosis     ICD-10-CM    1  Left sided sciatica  M54 32                    Subjective: Pt reports no pain currently  Objective: See treatment diary below      Assessment: Tolerated treatment well  She reports feeling no increase in sx with exercises as outlined below  Good effort noted throughout session, she follows cues given for correct exercise form and hold times  Patient demonstrated fatigue post treatment, exhibited good technique with therapeutic exercises and would benefit from continued PT      Plan: Continue per plan of care        Precautions: N/A      Manuals            Hamstring stretch 1x5 20" 1x5 20"           Piriformis stretch 1x5 20" 1x5 20"           SKTC 1x5 20"                         Neuro Re-Ed             Education on POC, diagnosis, and HEP 5'            bridges 2x10 3" 2x10 3"                                                                            Ther Ex             SKTC 1x5 20" 1x5 20"           Piriformis stretch 1x5 20" 1x5 20"           Seated hamstring stretch 1x5 20" 1x5 20"           LTR  1x15           Pball flexion 3 way  10x 5"                                                  Ther Activity                                       Gait Training                                       Modalities

## 2022-12-21 ENCOUNTER — APPOINTMENT (OUTPATIENT)
Dept: RADIOLOGY | Facility: AMBULARY SURGERY CENTER | Age: 57
End: 2022-12-21
Attending: ORTHOPAEDIC SURGERY

## 2022-12-21 ENCOUNTER — OFFICE VISIT (OUTPATIENT)
Dept: OBGYN CLINIC | Facility: CLINIC | Age: 57
End: 2022-12-21

## 2022-12-21 VITALS
BODY MASS INDEX: 27.16 KG/M2 | SYSTOLIC BLOOD PRESSURE: 132 MMHG | WEIGHT: 163 LBS | HEART RATE: 76 BPM | HEIGHT: 65 IN | DIASTOLIC BLOOD PRESSURE: 89 MMHG

## 2022-12-21 DIAGNOSIS — M72.2 PLANTAR FASCIITIS OF RIGHT FOOT: Primary | ICD-10-CM

## 2022-12-21 DIAGNOSIS — M25.571 PAIN, JOINT, ANKLE AND FOOT, RIGHT: ICD-10-CM

## 2022-12-21 DIAGNOSIS — Z01.89 ENCOUNTER FOR LOWER EXTREMITY COMPARISON IMAGING STUDY: ICD-10-CM

## 2022-12-21 NOTE — PROGRESS NOTES
REGIS Larios  Attending, Orthopaedic Surgery  Foot and [facility] [address] [facility]        ORTHOPAEDIC FOOT AND ANKLE CLINIC VISIT     Assessment:     Encounter Diagnoses   Name Primary? • Pain, joint, ankle and foot, right    • Encounter for lower extremity comparison imaging study    • Plantar fasciitis of right foot Yes              Plan:   · The patient verbalized understanding of exam findings and treatment plan  We engaged in the shared decision-making process and treatment options were discussed at length with the patient  Surgical and conservative management discussed today along with risks and benefits  · She has right foot plantar fascitis   · Recommend activity modification   · Supportive stiff sole sneakers   · Silicon gel heel cups   · Night splint for morning pain   Return if symptoms worsen or fail to improve  History of Present Illness:   Chief Complaint:   Chief Complaint   Patient presents with   • Right Ankle - Pain     Claudia Vila is a 61 y o  female who is being seen for right heel pain  Patient had an right ankle ORIF completed by Dr Daryn Stroud in 03/21  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 and has pain that is worsened with standing and first thing in the morning  Pain is localized at heel with minimal radiating and described as sharp and severe  Patient denies numbness, tingling or radicular pain  Denies history of neuropathy  Patient does not smoke, does not have diabetes and does not take blood thinners  Patient denies family history of anesthesia complications and has not had any complications with anesthesia       Pain/symptom timing:  Worse during the day when active  Pain/symptom context:  Worse with activites and work  Pain/symptom modifying factors:  Rest makes better, activities make worse  Pain/symptom associated signs/symptoms: none    Prior treatment   · NSAIDsNot Asked    · Injections No   · Bracing/Orthotics No   · Physical Therapy No Orthopedic Surgical History:   See below    Past Medical, Surgical and Social History:  Past Medical History:  has a past medical history of 2019 novel coronavirus disease (COVID-19) (05/30/2020), Abnormal Pap smear of cervix, Acute maxillary sinusitis (12/27/2017), Acute pain of right shoulder (04/10/2019), BRCA1 negative (2015), BRCA2 negative (2015), Gastric ulcer, Hyperproinsulinemia, Swelling of finger joint of right hand (12/10/2019), and Varicella  Problem List: does not have any pertinent problems on file  Past Surgical History:  has a past surgical history that includes Appendectomy; Hernia repair; Foot surgery (Right); and Ankle fracture surgery (Right, 03/01/2021)  Family History: family history includes Breast cancer in her maternal uncle; Cancer in her maternal grandmother; No Known Problems in her daughter, father, maternal aunt, paternal grandmother, sister, son, son, and son; Ovarian cancer (age of onset: 48) in her mother; Thyroid cancer (age of onset: 52) in her sister  Social History:  reports that she has never smoked  She has never used smokeless tobacco  She reports that she does not currently use alcohol  She reports that she does not use drugs  Current Medications: has a current medication list which includes the following prescription(s): amoxicillin-clavulanate, atorvastatin, diclofenac, estradiol, hydrochlorothiazide, losartan, promethazine-dm, tretinoin, triamcinolone, methocarbamol, and prednisone  Allergies: is allergic to kiwi extract - food allergy, other, and pineapple - food allergy       Review of Systems:  General- denies fever/chills  HEENT- denies hearing loss or sore throat  Eyes- denies eye pain or visual disturbances, denies red eyes  Respiratory- denies cough or SOB  Cardio- denies chest pain or palpitations  GI- denies abdominal pain  Endocrine- denies urinary frequency  Urinary- denies pain with urination  Musculoskeletal- Negative except noted above  Skin- denies rashes or wounds  Neurological- denies dizziness or headache  Psychiatric- denies anxiety or difficulty concentrating    Physical Exam:   /89 (BP Location: Right arm, Patient Position: Sitting, Cuff Size: Adult)   Pulse 76   Ht 5' 5" (1 651 m)   Wt 73 9 kg (163 lb)   BMI 27 12 kg/m²   General/Constitutional: No apparent distress: well-nourished and well developed  Eyes: normal ocular motion  Cardio: RRR, Normal S1S2, No m/r/g  Lymphatic: No appreciable lymphadenopathy  Respiratory: Non-labored breathing, CTA b/l no w/c/r  Vascular: No edema, swelling or tenderness, except as noted in detailed exam   Integumentary: No impressive skin lesions present, except as noted in detailed exam   Neuro: No ataxia or tremors noted  Psych: Normal mood and affect, oriented to person, place and time  Appropriate affect  Musculoskeletal: Normal, except as noted in detailed exam and in HPI  Examination    Right    Gait Normal   Musculoskeletal Tender to palpation at heel     Skin Normal       Nails Normal    Range of Motion  20 degrees dorsiflexion, 40 degrees plantarflexion  Subtalar motion: normal    Stability Stable    Muscle Strength 5/5 tibialis anterior  5/5 gastrocnemius-soleus  5/5 posterior tibialis  5/5 peroneal/eversion strength  5/5 EHL  5/5 FHL    Neurologic Normal    Sensation  Intact to light touch throughout sural, saphenous, superficial peroneal, deep peroneal and medial/lateral plantar nerve distributions  Forkland-Marissa 5 07 filament (10g) testing  deferred  Cardiovascular Brisk capillary refill < 2 seconds,intact DP and PT pulses    Special Tests None      Imaging Studies:   3 views of the right ankle were taken, reviewed and interpreted independently that demonstrate no acute osseous abnormalites  Previous plate and screws of the distal fibula is in expected position without signs of loosening or malposition  Reviewed by me personally      Scribe Attestation    I,:  Sneha Lan PA-C am acting as a scribe while in the presence of the attending physician :       I,:  Jonatan Rosen MD personally performed the services described in this documentation    as scribed in my presence :             Jenell Crandall Lachman, MD  Foot & Ankle Surgery   Department of [address] [place] [address]      I personally performed the service  Jenell Crandall Lachman, MD

## 2022-12-21 NOTE — PATIENT INSTRUCTIONS
12/09/19 0835   Team Meeting   Meeting Type Daily Rounds   Team Members Present   Team Members Present Physician;Nurse;;Occupational Therapist   Physician Team Member Yoselin Ortiz 80 Team Member [facility] Management Team Member Shan Whatley   OT Team Member Jesse Grimes discussed with team, possible d/c tomorrow? Plantar Fasciitis   If your first few steps out of bed in the morning cause severe pain in the heel of your foot, you may have plantar fasciitis (fashee-EYE-tiss), an overuse injury that affects the sole of the foot  A diagnosis of plantar fasciitis means you have inflamed the tough, fibrous band of tissue (fascia) connecting your heel bone to the base of your toes  You're more likely to develop the condition if you're female, overweight or have a job that requires a lot of walking or standing on hard surfaces  You're also at risk if you walk or run for exercise, especially if you have tight calf muscles that limit how far you can flex your ankles  People with very flat feet or very high arches also are more prone to plantar fasciitis  The condition typically starts gradually with mild pain at the heel bone often referred to as a stone bruise  You're more likely to feel it after (not during) exercise  The pain classically occurs right after getting up in the morning and after a period of sitting  If you don't treat plantar fasciitis, it may become a chronic condition  You may not be able to keep up your level of activity, and you may develop symptoms of foot, knee, hip and back problems because plantar fasciitis can change the way you walk  Treatment  Most important- activity modification (reducing the number of steps you take each day, avoiding offending activities and performing any weightbearing activities in supportive shoes)  Gel heel cups  Physical Therapy  Night splints (if you have morning pain)  Stretching is the best treatment for plantar fasciitis  This is typically done with a Physical therapist  It may help to try to keep weight off your foot until the initial inflammation goes away  You can also apply ice to the sore area for 20 minutes three or four times a day to relieve your symptoms  Nonsteroidal anti-inflammatory medication such as ibuprofen or naproxen can be helpful on the bad days   Home exercises to stretch your Achilles tendon and plantar fascia are the mainstay of treatment and reduce the chance of recurrence  In one exercise, you lean forward against a wall with one knee straight and heel on the ground  Your other knee is bent  Your heel cord and foot arch stretch as you lean  Hold for 10 seconds, relax and straighten up  Repeat 20 times for each sore heel  It is important to keep the knee fully extended on the side being stretched  In another exercise, you lean forward onto a countertop, spreading your feet apart with one foot in front of the other  Flex your knees and squat down, keeping your heels on the ground as long as possible  Your heel cords and foot arches will stretch as the heels come up in the stretch  Hold for 10 seconds, relax and straighten up  Repeat 20 times  About 90 percent of people with plantar fasciitis improve significantly after two months of initial treatment  You may be advised to use shoes with shock-absorbing soles or fitted with an off-the-shelf shoe insert device like a rubber heel pad  Your foot may be taped into a specific position  If your plantar fasciitis continues after a few months of conservative treatment, your doctor may inject your heel with steroidal anti-inflammatory medication  If you still have symptoms, you may need to wear a walking cast for two to three weeks or a positional splint when you sleep  In a few cases, surgery is needed for chronically contracted tissue  Plantar Fascia-Specific Stretching Program  Cross your affected leg over your other leg  Using the hand on your affected side, take hold of your affected foot and pull your toes back towards shin  This creates tension/stretch in the arch of the foot/plantar fascia  Check for the appropriate stretch position by gently rubbing the thumb of your unaffected side left to right over the arch of the affected foot   The plantar fascia should feel firm, like a guitar string  Hold the stretch for a count of 10  A set is 10 repetitions  Perform at least three sets of stretches per day  You cannot perform the stretch too often  The most important times to stretch are before taking the first step in the morning and before standing after a period of prolonged sitting  Anti-inflammatory Medication  Anti-inflammatory medications can help decrease the inflammation in the arch and heel of your foot  These medications include Advil®, Motrin®, Ibuprofen, and Aleve®  Use the medication as directed on the package  If you tolerate it well, take it daily for two weeks then discontinue for one week  If symptoms worsen or return, resume for two weeks, then stop  You should eat when taking these medications, as they can be hard on your stomach  Arch Support  Over the counter inserts [facility]'s) provide added arch support and soft cushion  These are typically not needed but can be tried if desired  Based on the individual needs of your foot, custom inserts are rarely ever necessary   Additional Stretch: Achilles Tendon Stretch  Place a shoe insert under your affected foot  Place your affected leg behind your unaffected leg with the toes of your back foot pointed towards the heel of your other foot  Lean into the wall  Bend your front knee while keeping your back leg straight with your heel firmly on the ground  Hold the stretch for a count of 10  A set is 10 repetitions  Perform the stretch at least three times a day  Brooke Blade, [employer], [employer] (our favorite is the yashira myrtle & yashira bondi) are good brands but I recommend going to a dedicate shoe store (not [facility] [employer] or [employer] ) At these types of stores, they have experts that can fit you for shoes appropriate for your foot problem  Shoe choice is essential to solving/improving most types of foot pain    Even after a surgery, good shoes are necessary to keep the foot as comfortable as possible  [employer]  [address], [address] [address]  [facility], [address]  [phone]    [facility] [address] [place], [address]  [address] [facility] [address]  [address], [address] [facility]    [employer]  [address] [facility], [address] [place] [address]  [phone]    [address], [facility], [facility], [place] [address]  [phone]    [employer]  [address], [place], [address]    [address] [facility],[place] [address]  [address] [facility], [address]  [address] [facility], [address], [facility], [facility]   [phone]

## 2022-12-23 ENCOUNTER — APPOINTMENT (OUTPATIENT)
Dept: PHYSICAL THERAPY | Facility: CLINIC | Age: 57
End: 2022-12-23

## 2022-12-27 ENCOUNTER — OFFICE VISIT (OUTPATIENT)
Dept: PHYSICAL THERAPY | Facility: CLINIC | Age: 57
End: 2022-12-27

## 2022-12-27 DIAGNOSIS — M54.32 LEFT SIDED SCIATICA: Primary | ICD-10-CM

## 2022-12-27 NOTE — PROGRESS NOTES
Daily Note     Today's date: 2022  Patient name: Jackie Scott  : 1965  MRN: [id number]  Referring provider: Pinky Olmos MD  Dx:   Encounter Diagnosis     ICD-10-CM    1  Left sided sciatica  M54 32           Start Time: 1413  Stop Time: 1502  Total time in clinic (min): 49 minutes    Subjective: Pt reports she is overall doing much better and feels ready for discharge at this time  Objective: See treatment diary below      Assessment: Tolerated treatment well  Patient demonstrated fatigue post treatment and exhibited good technique with therapeutic exercises  Pt is agreeable and appropriate for discharge at this time secondary to returning to her prior level of function and meeting her therapy goals  Pt will be transitioned to a home exercise program and will follow up accordingly         Plan: Discharge     Precautions: N/A      Manuals           Hamstring stretch 1x5 20" 1x5 20" 1x5 20"          Piriformis stretch 1x5 20" 1x5 20" 1x5 20"          SKTC 1x5 20"  1x5 20"                       Neuro Re-Ed             Pt education 5'  3'          bridges 2x10 3" 2x10 3" 2x10           Prone hip extension   1x10 5"          Prone press up   1x10                                                 Ther Ex             SKTC 1x5 20" 1x5 20" 1x5 20"          Piriformis stretch 1x5 20" 1x5 20" 1x5 20"          Seated hamstring stretch 1x5 20" 1x5 20" 1x5 20"          LTR  1x15 1x15          Pball flexion 3 way  10x 5" 10x 5"          STS from chair   1x10                                    Ther Activity                                       Gait Training                                       Modalities

## 2022-12-29 ENCOUNTER — APPOINTMENT (OUTPATIENT)
Dept: PHYSICAL THERAPY | Facility: CLINIC | Age: 57
End: 2022-12-29

## 2023-01-03 ENCOUNTER — APPOINTMENT (OUTPATIENT)
Dept: LAB | Facility: CLINIC | Age: 58
End: 2023-01-03

## 2023-01-03 ENCOUNTER — OFFICE VISIT (OUTPATIENT)
Dept: PHYSICAL THERAPY | Facility: CLINIC | Age: 58
End: 2023-01-03

## 2023-01-03 ENCOUNTER — APPOINTMENT (OUTPATIENT)
Dept: RADIOLOGY | Facility: CLINIC | Age: 58
End: 2023-01-03

## 2023-01-03 DIAGNOSIS — Z11.1 SCREENING-PULMONARY TB: ICD-10-CM

## 2023-01-03 DIAGNOSIS — Z11.1 SCREENING-PULMONARY TB: Primary | ICD-10-CM

## 2023-01-03 DIAGNOSIS — M54.31 RIGHT SIDED SCIATICA: Primary | ICD-10-CM

## 2023-01-03 NOTE — PROGRESS NOTES
PT Evaluation     Today's date: 1/3/2023  Patient name: Layton Harman  : 1965  MRN: [id number]  Referring provider: Edi Rae PT  Dx:   Encounter Diagnosis     ICD-10-CM    1  Right sided sciatica  M54 31           Start Time: 1630  Stop Time: 171  Total time in clinic (min): 45 minutes    Assessment  Assessment details: Pt is a 60 y/o female presenting to physical therapy with R lumbar radiculopathy  Upon examination, pt presents with impairments of decreased strength, decreased ROM, and increased pain of pt's lumbar spine/L gluteus raisa resulting in limitations of self-care/ADLs, household/work activities, ambulation, and lifting objects  Pt plan of care will focus on improving strength and ROM of pt's lumbar spine as well as decreasing pain and improving tolerance to functional activities  Pt plan will also focus on improving flexibility of pt's R piriformis, gluteus raisa, and hamstring to decrease tightness and pain of the affected area  Pt would benefit from skilled physical therapy to facilitate a return to her prior level of function  Pt will be seen via Direct [facility] for the first thirty days of treatment  Impairments: abnormal or restricted ROM, activity intolerance, impaired physical strength, lacks appropriate home exercise program and pain with function  Functional limitations: self-care/ADLs, household/work activities, ambulation, and lifting objects  Symptom irritability: lowUnderstanding of Dx/Px/POC: good   Prognosis: good    Goals  1  Pt will have a subjective decrease in pain of her R piriformis by 2 levels or more during her work activities  2  Pt will demonstrate independence with her HEP    LTG - 8 weeks  1  Pt will demonstrate 4+/5 gross strength of her lumbar spine and B hips in all planes to facilitate a return to her prior level of function    2  Pt's FOTO score will improve from 36 to 64 or better to facilitate a return to pt's prior level of function  Plan  Patient would benefit from: PT eval and skilled physical therapy  Planned modality interventions: cryotherapy, thermotherapy: hydrocollator packs and unattended electrical stimulation  Planned therapy interventions: abdominal trunk stabilization, ADL training, behavior modification, flexibility, functional ROM exercises, gait training, graded exercise, home exercise program, joint mobilization, manual therapy, massage, Thurston taping, neuromuscular re-education, patient education, self care, strengthening, stretching, therapeutic activities and therapeutic exercise  Frequency: 2x week  Duration in weeks: 8  Plan of Care beginning date: 1/3/2023  Plan of Care expiration date: 2/3/2023  Treatment plan discussed with: patient        Subjective Evaluation    History of Present Illness  Mechanism of injury: Pt is a 60 y/o female presenting to physical therapy with R lumbar radiculopathy  Pt reports having chronic issues with her low back, however, this morning she had an exacerbation of pain upon waking up today  Pt reports she did not do anything outside of her normal activities  Pt reports she has been to therapy previously at [facility] for the same issue which helped significantly  Pt reports she will have increased pain in the area when sitting and walking as well as is unable to put pressure through her R buttock  Pt reports she is currently experiencing a sharp pain in the area of her R buttock  Quality of life: good    Pain  Current pain ratin  At best pain ratin  At worst pain ratin  Location: R lumbar  Quality: sharp  Relieving factors: medications  Aggravating factors: sitting and walking  Progression: worsening    Treatments  Previous treatment: physical therapy and medication  Current treatment: medication and physical therapy  Patient Goals  Patient goals for therapy: decreased pain          Objective     Palpation     Right   Tenderness of the lumbar paraspinals  Additional Palpation Details  Tenderness to palpation of pt's R piriformis    Active Range of Motion     Lumbar   Flexion: 40 degrees  with pain Restriction level: moderate  Extension: 15 degrees  Restriction level: moderate  Left lateral flexion: 15 degrees    Restriction level: minimal  Right lateral flexion: 10 degrees  with pain Restriction level: moderate    Strength/Myotome Testing     Left Hip   Planes of Motion   Flexion: 4  Extension: 4  Abduction: 4  Adduction: 4  External rotation: 4  Internal rotation: 4    Right Hip   Planes of Motion   Flexion: 4-  Extension: 4-  Abduction: 4-  Adduction: 4-  External rotation: 4-  Internal rotation: 4-    Left Knee   Flexion: 4  Extension: 4    Right Knee   Flexion: 4-  Extension: 4-    General Comments:      Lumbar Comments  Significant tightness of pt's R hamstring, glute, and piriformis    Lumbar flexion gross strength: 4-/5  Lumbar extension gross strength: 4-/5  Lumbar L lateral flexion gross strength: 4-/5  Lumbar R lateral flexion gross strength: 4-/5             Precautions: N/A      Manuals 1/3            SKTC 1x5 10"            Piriformis stretch 1x5 20"            Hamstring stretch 1x5 20"                         Neuro Re-Ed             Education on POC, diagnosis, and HEP 5'                                                                                          Ther Ex             LTR 1x10             Piriformis stretch 1x5 20"            SKTC 1x5 20"                                                                             Ther Activity                                       Gait Training                                       Modalities

## 2023-01-05 ENCOUNTER — OFFICE VISIT (OUTPATIENT)
Dept: PHYSICAL THERAPY | Facility: CLINIC | Age: 58
End: 2023-01-05

## 2023-01-05 DIAGNOSIS — M54.31 RIGHT SIDED SCIATICA: Primary | ICD-10-CM

## 2023-01-05 DIAGNOSIS — R76.12 POSITIVE QUANTIFERON-TB GOLD TEST: Primary | ICD-10-CM

## 2023-01-05 LAB
GAMMA INTERFERON BACKGROUND BLD IA-ACNC: 3.09 IU/ML
M TB IFN-G BLD-IMP: POSITIVE
M TB IFN-G CD4+ BCKGRND COR BLD-ACNC: 1.21 IU/ML
M TB IFN-G CD4+ BCKGRND COR BLD-ACNC: 1.25 IU/ML
MITOGEN IGNF BCKGRD COR BLD-ACNC: 5.66 IU/ML

## 2023-01-05 NOTE — PROGRESS NOTES
Daily Note     Today's date: 2023  Patient name: Vernell Reyes  : 1965  MRN: [id number]  Referring provider: Faraz Vincent MD  Dx:   Encounter Diagnosis     ICD-10-CM    1  Right sided sciatica  M54 31           Start Time: 1630  Stop Time: 1715  Total time in clinic (min): 45 minutes    Subjective: Pt reports she felt better after her initial evaluation with decrease pain in her R buttock  Objective: See treatment diary below      Assessment: Tolerated treatment well  Patient demonstrated fatigue post treatment, exhibited good technique with therapeutic exercises and would benefit from continued PT  Continued with manual therapy and stretching to decrease pain and improve motion of pt's lumbar spine, pt noted relief after performance  Pt required min verbal cues to facilitate performance of proper technique  Assess pt response to treatment at next visit  Plan: Continue per plan of care        Precautions: N/A      Manuals /3 1/5           Hamstring stretch 1x5 20" 1x5 20"           Piriformis stretch 1x5 20" 1x5 20"           SKTC 1x5 20" 1x5 20"                        Neuro Re-Ed             Pt education 5' 5'           bridges 2x10 3" 2x10 3"           Prone hip extension  1x10 5"           Prone press up                                                    Ther Ex             SKTC 1x5 20" 1x5 20"           Piriformis stretch 1x5 20" 1x5 20"           Seated hamstring stretch 1x5 20" 1x5 20"           LTR  1x15           Pball flexion 3 way  10x 5"           STS from chair             Recumbent bike  5'                        Ther Activity                                       Gait Training                                       Modalities

## 2023-01-11 ENCOUNTER — CONSULT (OUTPATIENT)
Dept: INFECTIOUS DISEASES | Facility: CLINIC | Age: 58
End: 2023-01-11

## 2023-01-11 VITALS
BODY MASS INDEX: 27.16 KG/M2 | RESPIRATION RATE: 17 BRPM | OXYGEN SATURATION: 98 % | WEIGHT: 163 LBS | DIASTOLIC BLOOD PRESSURE: 80 MMHG | TEMPERATURE: 96 F | HEIGHT: 65 IN | SYSTOLIC BLOOD PRESSURE: 118 MMHG | HEART RATE: 67 BPM

## 2023-01-11 DIAGNOSIS — I10 ESSENTIAL HYPERTENSION: ICD-10-CM

## 2023-01-11 DIAGNOSIS — M15.9 PRIMARY OSTEOARTHRITIS INVOLVING MULTIPLE JOINTS: ICD-10-CM

## 2023-01-11 DIAGNOSIS — R76.12 POSITIVE QUANTIFERON-TB GOLD TEST: ICD-10-CM

## 2023-01-11 DIAGNOSIS — Z22.7 LATENT TUBERCULOSIS: Primary | ICD-10-CM

## 2023-01-11 PROBLEM — M15.0 PRIMARY OSTEOARTHRITIS INVOLVING MULTIPLE JOINTS: Status: ACTIVE | Noted: 2023-01-11

## 2023-01-11 RX ORDER — PYRIDOXINE HCL (VITAMIN B6) 50 MG
50 TABLET ORAL DAILY
Qty: 30 TABLET | Refills: 3 | Status: SHIPPED | OUTPATIENT
Start: 2023-01-11 | End: 2023-02-10

## 2023-01-11 RX ORDER — ISONIAZID 300 MG/1
300 TABLET ORAL DAILY
Qty: 30 TABLET | Refills: 3 | Status: SHIPPED | OUTPATIENT
Start: 2023-01-11 | End: 2023-02-10

## 2023-01-11 NOTE — PROGRESS NOTES
Consultation - Infectious Disease   Renu Holt 61 y o  female MRN: [id number]  Unit/Bed#:  Encounter: [id number]      IMPRESSION & RECOMMENDATIONS:   Impression/Recommendations:  1  Latent TB  Positive Quantiferon 1/3/23  Negative CXR with no clinical evidence of active TB  Discussed risks vs benefits of treatment for latent TB, in addition to potential treatment options and possible side effects  Patient is agreeable to treatment  Most recent CMP 10/22 with normal LFTs  No hx of liver disease     -start INH 300mg daily and pyridoxine 50mg daily  -plan to complete 9 months   -avoid alcohol and acetaminophen use while on treatment   -followup in 1 month, ideally at [facility] office  2  HTN/HLD  3  OA, with chronic joint pains  Avoid Tylenol use, as above  HISTORY OF PRESENT ILLNESS:  Reason for Consult: Latent TB    HPI: Renu Holt is a 61 y o  female who is here for initial visit regarding recent positive Quantiferon test performed routinely by PCP  Patient has had multiple previous indeterminate Quantiferon tests, as well as many previously positive PPDs which she attributed to having received the BCG vaccine many years ago in [place]  She moved from [place] about 30 years ago  No other foreign travel other than [facility] ([facility]  No known TB exposures  She does work as a nurse aide  Denies fevers, chills, sweats, unexplained weight loss, cough, hemoptysis or other concerning symptoms  Has never been previously treated for latent TB  Patient rarely drinks alcohol  She does occasionally use Tylenol for management of her arthritis pain  No hx of liver issues  REVIEW OF SYSTEMS:  A complete system-based review of systems is otherwise negative      PAST MEDICAL HISTORY:  Past Medical History:   Diagnosis Date   • 2019 novel coronavirus disease (COVID-19) 05/30/2020   • Abnormal Pap smear of cervix    • Acute maxillary sinusitis 12/27/2017   • Acute pain of right shoulder 04/10/2019   • BRCA1 negative 2015 • BRCA2 negative 2015   • Gastric ulcer    • Hyperproinsulinemia     [id number] resolved   • Swelling of finger joint of right hand 12/10/2019   • Varicella      Past Surgical History:   Procedure Laterality Date   • ANKLE FRACTURE SURGERY Right 03/01/2021    "ankle fx"   • APPENDECTOMY     • FOOT SURGERY Right    • HERNIA REPAIR         FAMILY HISTORY:  Non-contributory    SOCIAL HISTORY:  Social History     Substance and Sexual Activity   Alcohol Use Not Currently     Social History     Substance and Sexual Activity   Drug Use No     Social History     Tobacco Use   Smoking Status Never   Smokeless Tobacco Never       ALLERGIES:  Allergies   Allergen Reactions   • Kiwi Extract - Food Allergy Hives   • Other      strawberry   • Pineapple - Food Allergy Hives       MEDICATIONS:  All current active medications have been reviewed  PHYSICAL EXAM:  Vitals:  [unfilled]  [email or web address]: Temperature: (!) 96 °F (35 6 °C)     Physical Exam:  General:  Well-nourished, well-developed, in no acute distress  Eyes:  Conjunctive clear with no hemorrhages or effusions  Oropharynx:  No ulcers, no lesions  Neck:  Supple, trachea midline  Lungs:  Nonlabored respirations  Abdomen:  Soft, non-tender, non-distended  Extremities:  No peripheral cyanosis, clubbing, or edema  Skin:  No rashes, no ulcers  Neurological:  Moves all four extremities spontaneously    LABS, IMAGING, & OTHER STUDIES:  Lab Results:  I have personally reviewed pertinent labs  Normal CMP 10/22  Imaging Studies:   I have personally reviewed pertinent imaging study reports and images in PACS     1/3/23 CXR negative  EKG, Pathology, and Other Studies:   I have personally reviewed pertinent reports

## 2023-01-11 NOTE — PATIENT INSTRUCTIONS
Start your INH/B6 as ordered  This has been sent to your pharmacy  No labs required at this time  Follow up in 1 months, or as needed sooner  Call me in the interim with questions or concerns

## 2023-01-12 ENCOUNTER — APPOINTMENT (OUTPATIENT)
Dept: PHYSICAL THERAPY | Facility: CLINIC | Age: 58
End: 2023-01-12

## 2023-01-19 ENCOUNTER — OFFICE VISIT (OUTPATIENT)
Dept: PHYSICAL THERAPY | Facility: CLINIC | Age: 58
End: 2023-01-19

## 2023-01-19 DIAGNOSIS — M54.31 RIGHT SIDED SCIATICA: Primary | ICD-10-CM

## 2023-01-19 NOTE — PROGRESS NOTES
Daily Note     Today's date: 2023  Patient name: Bebeto Espitia  : 1965  MRN: [id number]  Referring provider: Becky Vance PT  Dx:   Encounter Diagnosis     ICD-10-CM    1  Right sided sciatica  M54 31           Start Time: 1630  Stop Time: 1715  Total time in clinic (min): 45 minutes    Subjective: Pt reports her low back continues to feel better with decreased pain and improved motion  Objective: See treatment diary below      Assessment: Tolerated treatment well  Patient demonstrated fatigue post treatment, exhibited good technique with therapeutic exercises and would benefit from continued PT  Continued with manual therapy and stretching to decrease pain and improve motion of pt's lumbar spine and BLEs  Pt required min verbal cues to facilitate performance of proper technique  Assess pt response to treatment at next visit  Plan: Continue per plan of care        Precautions: N/A      Manuals /3 1/5 1/19          Hamstring stretch 1x5 20" 1x5 20" 1x5 20"          Piriformis stretch 1x5 20" 1x5 20" 1x5 20"          SKTC 1x5 20" 1x5 20" 1x5 20"                       Neuro Re-Ed             Pt education 5' 5' 5'          bridges 2x10 3" 2x10 3" 2x10 3"          Prone hip extension  1x10 5" 1x10 5"          Prone press up                                                    Ther Ex             SKTC 1x5 20" 1x5 20" 1x5 20"          Piriformis stretch 1x5 20" 1x5 20" 1x5 20"          Seated hamstring stretch 1x5 20" 1x5 20" 1x5 20"          LTR  1x15 1x15          Pball flexion 3 way  10x 5" 10x 5"          STS from chair             Recumbent bike  5' 5'                       Ther Activity                                       Gait Training                                       Modalities

## 2023-01-25 ENCOUNTER — OFFICE VISIT (OUTPATIENT)
Dept: OBGYN CLINIC | Facility: CLINIC | Age: 58
End: 2023-01-25

## 2023-01-25 VITALS
WEIGHT: 160 LBS | BODY MASS INDEX: 26.66 KG/M2 | HEART RATE: 76 BPM | SYSTOLIC BLOOD PRESSURE: 116 MMHG | DIASTOLIC BLOOD PRESSURE: 70 MMHG | HEIGHT: 65 IN

## 2023-01-25 DIAGNOSIS — Z01.812 ENCOUNTER FOR PRE-OPERATIVE LABORATORY TESTING: ICD-10-CM

## 2023-01-25 DIAGNOSIS — M65.331 TRIGGER MIDDLE FINGER OF RIGHT HAND: ICD-10-CM

## 2023-01-25 DIAGNOSIS — M65.341 TRIGGER RING FINGER OF RIGHT HAND: Primary | ICD-10-CM

## 2023-01-25 NOTE — PROGRESS NOTES
ASSESSMENT/PLAN:      61year old female here for her right ring and trigger fingers  The non operative treatments with cortisone injections didn't help her symptoms  The anatomy and physiology of trigger finger was discussed with the patient today in the office  Edema and increased contact pressure within the flexor tendons at the A1 pulley can cause pain, crepitation, and limitation of function  Treatment options include resting MP blocking splints to decrease edema, oral anti-inflammatory medications, home or formal therapy exercises, up to 2 steroid injections within the tendon sheath, or surgical release  While majority of patients do respond to conservative treatment, up to 20% may require surgical release  This is an outpatient procedure where the hand will be wrapped up for 5 days post op  She can not get it wet or have patient contact  After 5 days she can take the wraps off, and can wash with warm water soap and pat dry, but do not submerge  Sutures will come out between 10-14 days post op  Patient shows understanding and wishes to proceed  We will follow up with her post op  The patient verbalized understanding of exam findings and treatment plan  We engaged in the shared decision-making process and treatment options were discussed at length with the patient  Surgical and conservative management discussed today along with risks and benefits  Diagnoses and all orders for this visit:    Trigger ring finger of right hand  -     Case request operating room: RELEASE TRIGGER FINGER LONG FINGER AND RING FINGER; Standing    Trigger middle finger of right hand  -     Case request operating room: RELEASE TRIGGER FINGER LONG FINGER  [address]; Standing    Encounter for pre-operative laboratory testing  -     Basic metabolic panel;  Future    Other orders  -     Nursing Communication Warmimg Interventions Implemented; Standing  -     Nursing Communication CHG bath, have staff wash entire body (neck down) per pre-op bathing protocol  Routine, evening prior to, and day of surgery ; Standing  -     Nursing Communication Swab both nares with Povidone-Iodine solution, EXCLUDE if patient has shellfish/Iodine allergy, and replace with nasal alcohol swabstick  Routine, day of surgery, on call to OR; Standing  -     Void on call to OR; Standing  -     Insert peripheral IV; Standing  -     Diet NPO; Sips with meds; Standing  -     Apply Sequential Compression Device; Standing          Trigger Finger Release: The anatomy and physiology of trigger finger was discussed with the patient today in the office  Edema and increased contact pressure within the flexor tendons at the A1 pulley can cause pain, crepitation, and limitation of function  Treatment options include resting MP blocking splints to decrease edema, oral anti-inflammatory medications, home or formal therapy exercises, up to 2 steroid injections or surgical release  While majority of patients do respond to conservative treatment, up to 20% may require surgical release  The patient has elected release of the trigger finger  The patient has elected to undergo a release of the A1 pulley (trigger finger)  A small incision will be made over the palmar aspect of the hand, the tendon sheath holding the flexor tendons will be released  In the postoperative period, light activities are allowed immediately, driving is allowed when narcotic medication has stopped, and the incision may get wet after 2 days  Heavy activities (lifting more than approximately 10 pounds) will be allowed after the follow up appointment in 1-2 weeks  While the pain and discomfort within the wrist typically improves rapidly, some residual discomfort may be present for up to 6 weeks    The nodule that is typically palpable in the palmar aspect of the hand will not be removed, as this would necessitate removal of a portion of the flexor tendon, however the catching, clicking, and locking should resolve  Approximate success rate is 98%  The risks and benefits of the procedure were explained to the patient, which include, but are not limited to: Bleeding, infection, recurrence, pain, scar, damage to tendons, damage to nerves, and damage to blood vessels, need for future surgery and complications related to anesthesia  If bony work is done, risks also include malunion and nonunion  These risks, along with alternative conservative treatment options, and postoperative protocols were voiced back and understood by the patient  All questions were answered to the patient's satisfaction  The patient agrees to comply with a standard postoperative protocol, and is willing to proceed  Education was provided via written and auditory forms  There were no barriers to learning  Written handouts regarding wound care, incision and scar care, and general preoperative information, as well as risks and benefits were provided to the patient  Follow Up:  No follow-ups on file  To Do Next Visit:  Re-evaluation of current issue    ____________________________________________________________________________________________________________________________________________      CHIEF COMPLAINT:  Chief Complaint   Patient presents with   • Right Hand - Locking       SUBJECTIVE:  Praveen Forrester is a 61y o  year old RHD female who presents today for a 6 week follow up for her right long and ring trigger fingers  At her last visit on 12/5/2022, patient had cortisone injections for both the right long and ring trigger fingers, she did not give her any significant relief of her symptoms  She reports having a terrible headache post injections and drank some coffee for the caffeine  This helped her headache  Her long finger continues to get stuck and is painful  She wishes to discuss surgical intervention at this time  I have personally reviewed all the relevant PMH, PSH, SH, FH, Medications and allergies  PAST MEDICAL HISTORY:  Past Medical History:   Diagnosis Date   • 2019 novel coronavirus disease (COVID-19) 05/30/2020   • Abnormal Pap smear of cervix    • Acute maxillary sinusitis 12/27/2017   • Acute pain of right shoulder 04/10/2019   • BRCA1 negative 2015   • BRCA2 negative 2015   • Gastric ulcer    • Hyperproinsulinemia     [id number] resolved   • Swelling of finger joint of right hand 12/10/2019   • Varicella        PAST SURGICAL HISTORY:  Past Surgical History:   Procedure Laterality Date   • ANKLE FRACTURE SURGERY Right 03/01/2021    "ankle fx"   • APPENDECTOMY     • FOOT SURGERY Right    • HERNIA REPAIR         FAMILY HISTORY:  Family History   Problem Relation Age of Onset   • Ovarian cancer Mother 48   • No Known Problems Father    • Thyroid cancer Sister 52   • No Known Problems Sister    • No Known Problems Daughter    • No Known Problems Son    • No Known Problems Son    • No Known Problems Son    • Cancer Maternal Grandmother         unknown type or age   • No Known Problems Paternal Grandmother    • No Known Problems Maternal Aunt    • Breast cancer Maternal Uncle         52's       SOCIAL HISTORY:  Social History     Tobacco Use   • Smoking status: Never   • Smokeless tobacco: Never   Vaping Use   • Vaping Use: Never used   Substance Use Topics   • Alcohol use: Not Currently   • Drug use: No       MEDICATIONS:    Current Outpatient Medications:   •  atorvastatin (LIPITOR) 20 mg tablet, Take 1 tablet (20 mg total) by mouth daily, Disp: 90 tablet, Rfl: 3  •  estradiol (ESTRACE) 0 1 mg/g vaginal cream, insert 0 5 grams vaginally two times a week and apply A PEA SIZE AMOUNT TO EXTERNAL VAGINAL OPENING AND URETHRA AREA TWICE WEEKLY, Disp: 42 5 g, Rfl: 0  •  hydrochlorothiazide (HYDRODIURIL) 25 mg tablet, Take 1 tablet (25 mg total) by mouth daily, Disp: 90 tablet, Rfl: 1  •  isoniazid (NYDRAZID) 300 mg tablet, Take 1 tablet (300 mg total) by mouth daily, Disp: 30 tablet, Rfl: 3  •  losartan (COZAAR) 50 mg tablet, Take 1 tablet (50 mg total) by mouth daily, Disp: 90 tablet, Rfl: 1  •  pyridoxine (VITAMIN B6) 50 mg tablet, Take 1 tablet (50 mg total) by mouth daily, Disp: 30 tablet, Rfl: 3  •  tretinoin (REFISSA) 0 05 % cream, Apply topically daily at bedtime, Disp: 45 g, Rfl: 1  •  triamcinolone (KENALOG) 0 5 % cream, Apply topically 2 (two) times a day, Disp: 30 g, Rfl: 1  •  diclofenac (VOLTAREN) 75 mg EC tablet, Take 1 tablet (75 mg total) by mouth 2 (two) times a day (Patient not taking: Reported on 1/11/2023), Disp: 60 tablet, Rfl: 3  •  methocarbamol (Robaxin-750) 750 mg tablet, Take 1 tablet (750 mg total) by mouth every 6 (six) hours as needed for muscle spasms (Patient not taking: Reported on 11/11/2022), Disp: 30 tablet, Rfl: 0  •  Promethazine-DM (PHENERGAN-DM) 6 25-15 mg/5 mL oral syrup, Take 5 mL by mouth 4 (four) times a day as needed for cough (Patient not taking: Reported on 1/11/2023), Disp: 118 mL, Rfl: 0    ALLERGIES:  Allergies   Allergen Reactions   • Kiwi Extract - Food Allergy Hives   • Other      strawberry   • Pineapple - Food Allergy Hives       REVIEW OF SYSTEMS:  Review of Systems   Constitutional: Negative for chills, fever and unexpected weight change  HENT: Negative for hearing loss, nosebleeds and sore throat  Eyes: Negative for pain, redness and visual disturbance  Respiratory: Negative for cough, shortness of breath and wheezing  Cardiovascular: Negative for chest pain, palpitations and leg swelling  Gastrointestinal: Negative for abdominal pain and nausea  Genitourinary: Negative for dyspareunia, dysuria and frequency  Musculoskeletal: Positive for myalgias  Skin: Negative for rash and wound  Neurological: Negative for dizziness, numbness and headaches  Psychiatric/Behavioral: Negative for decreased concentration and suicidal ideas  The patient is not nervous/anxious          VITALS:  Vitals:    01/25/23 1418   BP: 116/70   Pulse: 76       LABS:  HgA1c: Lab Results   Component Value Date    HGBA1C 5 8 (H) 09/09/2022     BMP:   Lab Results   Component Value Date    CALCIUM 9 9 10/10/2022    K 4 0 10/10/2022    CO2 27 10/10/2022     (H) 10/10/2022    BUN 16 10/10/2022    CREATININE 0 66 10/10/2022       _____________________________________________________  PHYSICAL EXAMINATION:  General: well developed and well nourished, alert, oriented times 3 and appears comfortable  Psychiatric: Normal  HEENT: Normocephalic, Atraumatic Trachea Midline, No torticollis  Pulmonary: No audible wheezing or respiratory distress   Cardiovascular: No pitting edema, 2+ radial pulse   Abdominal/GI: abdomen non tender, non distended   Skin: No masses, erythema, lacerations, fluctation, ulcerations  Neurovascular: Sensation Intact to the Median, Ulnar, Radial Nerve, Motor Intact to the Median, Ulnar, Radial Nerve and Pulses Intact  Musculoskeletal: Normal, except as noted in detailed exam and in HPI  MUSCULOSKELETAL EXAMINATION:  right long finger:  Negative palpable nodule over the A1 pulley  Positive tenderness to palpation over A1 pulley  Positive catching  Positive clicking  right ring finger:  Negative palpable nodule over the A1 pulley  Positive tenderness to palpation over A1 pulley  Positive catching   Positive clicking     ___________________________________________________  STUDIES REVIEWED:  No images reviewed at today's visit          PROCEDURES PERFORMED:  Procedures  No Procedures performed today    _____________________________________________________      Vijay Guevara    I,:  Evans Ford am acting as a scribe while in the presence of the attending physician :       I,:  Maximo Thakur MD personally performed the services described in this documentation    as scribed in my presence :

## 2023-01-25 NOTE — H&P
ASSESSMENT/PLAN:      61year old female here for her right ring and trigger fingers  The non operative treatments with cortisone injections didn't help her symptoms  The anatomy and physiology of trigger finger was discussed with the patient today in the office  Edema and increased contact pressure within the flexor tendons at the A1 pulley can cause pain, crepitation, and limitation of function  Treatment options include resting MP blocking splints to decrease edema, oral anti-inflammatory medications, home or formal therapy exercises, up to 2 steroid injections within the tendon sheath, or surgical release  While majority of patients do respond to conservative treatment, up to 20% may require surgical release  This is an outpatient procedure where the hand will be wrapped up for 5 days post op  She can not get it wet or have patient contact  After 5 days she can take the wraps off, and can wash with warm water soap and pat dry, but do not submerge  Sutures will come out between 10-14 days post op  Patient shows understanding and wishes to proceed  We will follow up with her post op  The patient verbalized understanding of exam findings and treatment plan  We engaged in the shared decision-making process and treatment options were discussed at length with the patient  Surgical and conservative management discussed today along with risks and benefits  Diagnoses and all orders for this visit:    Trigger ring finger of right hand  -     Case request operating room: RELEASE TRIGGER FINGER LONG FINGER AND RING FINGER; Standing    Trigger middle finger of right hand  -     Case request operating room: RELEASE TRIGGER FINGER LONG FINGER  [address]; Standing    Encounter for pre-operative laboratory testing  -     Basic metabolic panel;  Future    Other orders  -     Nursing Communication Warmimg Interventions Implemented; Standing  -     Nursing Communication CHG bath, have staff wash entire body (neck down) per pre-op bathing protocol  Routine, evening prior to, and day of surgery ; Standing  -     Nursing Communication Swab both nares with Povidone-Iodine solution, EXCLUDE if patient has shellfish/Iodine allergy, and replace with nasal alcohol swabstick  Routine, day of surgery, on call to OR; Standing  -     Void on call to OR; Standing  -     Insert peripheral IV; Standing  -     Diet NPO; Sips with meds; Standing  -     Apply Sequential Compression Device; Standing          Trigger Finger Release: The anatomy and physiology of trigger finger was discussed with the patient today in the office  Edema and increased contact pressure within the flexor tendons at the A1 pulley can cause pain, crepitation, and limitation of function  Treatment options include resting MP blocking splints to decrease edema, oral anti-inflammatory medications, home or formal therapy exercises, up to 2 steroid injections or surgical release  While majority of patients do respond to conservative treatment, up to 20% may require surgical release  The patient has elected release of the trigger finger  The patient has elected to undergo a release of the A1 pulley (trigger finger)  A small incision will be made over the palmar aspect of the hand, the tendon sheath holding the flexor tendons will be released  In the postoperative period, light activities are allowed immediately, driving is allowed when narcotic medication has stopped, and the incision may get wet after 2 days  Heavy activities (lifting more than approximately 10 pounds) will be allowed after the follow up appointment in 1-2 weeks  While the pain and discomfort within the wrist typically improves rapidly, some residual discomfort may be present for up to 6 weeks    The nodule that is typically palpable in the palmar aspect of the hand will not be removed, as this would necessitate removal of a portion of the flexor tendon, however the catching, clicking, and locking should resolve  Approximate success rate is 98%  The risks and benefits of the procedure were explained to the patient, which include, but are not limited to: Bleeding, infection, recurrence, pain, scar, damage to tendons, damage to nerves, and damage to blood vessels, need for future surgery and complications related to anesthesia  If bony work is done, risks also include malunion and nonunion  These risks, along with alternative conservative treatment options, and postoperative protocols were voiced back and understood by the patient  All questions were answered to the patient's satisfaction  The patient agrees to comply with a standard postoperative protocol, and is willing to proceed  Education was provided via written and auditory forms  There were no barriers to learning  Written handouts regarding wound care, incision and scar care, and general preoperative information, as well as risks and benefits were provided to the patient  Follow Up:  No follow-ups on file  To Do Next Visit:  Re-evaluation of current issue    ____________________________________________________________________________________________________________________________________________      CHIEF COMPLAINT:  Chief Complaint   Patient presents with   • Right Hand - Locking       SUBJECTIVE:  Praveen Forrester is a 61y o  year old RHD female who presents today for a 6 week follow up for her right long and ring trigger fingers  At her last visit on 12/5/2022, patient had cortisone injections for both the right long and ring trigger fingers, she did not give her any significant relief of her symptoms  She reports having a terrible headache post injections and drank some coffee for the caffeine  This helped her headache  Her long finger continues to get stuck and is painful  She wishes to discuss surgical intervention at this time  I have personally reviewed all the relevant PMH, PSH, SH, FH, Medications and allergies  PAST MEDICAL HISTORY:  Past Medical History:   Diagnosis Date   • 2019 novel coronavirus disease (COVID-19) 05/30/2020   • Abnormal Pap smear of cervix    • Acute maxillary sinusitis 12/27/2017   • Acute pain of right shoulder 04/10/2019   • BRCA1 negative 2015   • BRCA2 negative 2015   • Gastric ulcer    • Hyperproinsulinemia     [id number] resolved   • Swelling of finger joint of right hand 12/10/2019   • Varicella        PAST SURGICAL HISTORY:  Past Surgical History:   Procedure Laterality Date   • ANKLE FRACTURE SURGERY Right 03/01/2021    "ankle fx"   • APPENDECTOMY     • FOOT SURGERY Right    • HERNIA REPAIR         FAMILY HISTORY:  Family History   Problem Relation Age of Onset   • Ovarian cancer Mother 48   • No Known Problems Father    • Thyroid cancer Sister 52   • No Known Problems Sister    • No Known Problems Daughter    • No Known Problems Son    • No Known Problems Son    • No Known Problems Son    • Cancer Maternal Grandmother         unknown type or age   • No Known Problems Paternal Grandmother    • No Known Problems Maternal Aunt    • Breast cancer Maternal Uncle         52's       SOCIAL HISTORY:  Social History     Tobacco Use   • Smoking status: Never   • Smokeless tobacco: Never   Vaping Use   • Vaping Use: Never used   Substance Use Topics   • Alcohol use: Not Currently   • Drug use: No       MEDICATIONS:    Current Outpatient Medications:   •  atorvastatin (LIPITOR) 20 mg tablet, Take 1 tablet (20 mg total) by mouth daily, Disp: 90 tablet, Rfl: 3  •  estradiol (ESTRACE) 0 1 mg/g vaginal cream, insert 0 5 grams vaginally two times a week and apply A PEA SIZE AMOUNT TO EXTERNAL VAGINAL OPENING AND URETHRA AREA TWICE WEEKLY, Disp: 42 5 g, Rfl: 0  •  hydrochlorothiazide (HYDRODIURIL) 25 mg tablet, Take 1 tablet (25 mg total) by mouth daily, Disp: 90 tablet, Rfl: 1  •  isoniazid (NYDRAZID) 300 mg tablet, Take 1 tablet (300 mg total) by mouth daily, Disp: 30 tablet, Rfl: 3  •  losartan (COZAAR) 50 mg tablet, Take 1 tablet (50 mg total) by mouth daily, Disp: 90 tablet, Rfl: 1  •  pyridoxine (VITAMIN B6) 50 mg tablet, Take 1 tablet (50 mg total) by mouth daily, Disp: 30 tablet, Rfl: 3  •  tretinoin (REFISSA) 0 05 % cream, Apply topically daily at bedtime, Disp: 45 g, Rfl: 1  •  triamcinolone (KENALOG) 0 5 % cream, Apply topically 2 (two) times a day, Disp: 30 g, Rfl: 1  •  diclofenac (VOLTAREN) 75 mg EC tablet, Take 1 tablet (75 mg total) by mouth 2 (two) times a day (Patient not taking: Reported on 1/11/2023), Disp: 60 tablet, Rfl: 3  •  methocarbamol (Robaxin-750) 750 mg tablet, Take 1 tablet (750 mg total) by mouth every 6 (six) hours as needed for muscle spasms (Patient not taking: Reported on 11/11/2022), Disp: 30 tablet, Rfl: 0  •  Promethazine-DM (PHENERGAN-DM) 6 25-15 mg/5 mL oral syrup, Take 5 mL by mouth 4 (four) times a day as needed for cough (Patient not taking: Reported on 1/11/2023), Disp: 118 mL, Rfl: 0    ALLERGIES:  Allergies   Allergen Reactions   • Kiwi Extract - Food Allergy Hives   • Other      strawberry   • Pineapple - Food Allergy Hives       REVIEW OF SYSTEMS:  Review of Systems   Constitutional: Negative for chills, fever and unexpected weight change  HENT: Negative for hearing loss, nosebleeds and sore throat  Eyes: Negative for pain, redness and visual disturbance  Respiratory: Negative for cough, shortness of breath and wheezing  Cardiovascular: Negative for chest pain, palpitations and leg swelling  Gastrointestinal: Negative for abdominal pain and nausea  Genitourinary: Negative for dyspareunia, dysuria and frequency  Musculoskeletal: Positive for myalgias  Skin: Negative for rash and wound  Neurological: Negative for dizziness, numbness and headaches  Psychiatric/Behavioral: Negative for decreased concentration and suicidal ideas  The patient is not nervous/anxious          VITALS:  Vitals:    01/25/23 1418   BP: 116/70   Pulse: 76       LABS:  HgA1c: Lab Results   Component Value Date    HGBA1C 5 8 (H) 09/09/2022     BMP:   Lab Results   Component Value Date    CALCIUM 9 9 10/10/2022    K 4 0 10/10/2022    CO2 27 10/10/2022     (H) 10/10/2022    BUN 16 10/10/2022    CREATININE 0 66 10/10/2022       _____________________________________________________  PHYSICAL EXAMINATION:  General: well developed and well nourished, alert, oriented times 3 and appears comfortable  Psychiatric: Normal  HEENT: Normocephalic, Atraumatic Trachea Midline, No torticollis  Pulmonary: No audible wheezing or respiratory distress   Cardiovascular: No pitting edema, 2+ radial pulse   Abdominal/GI: abdomen non tender, non distended   Skin: No masses, erythema, lacerations, fluctation, ulcerations  Neurovascular: Sensation Intact to the Median, Ulnar, Radial Nerve, Motor Intact to the Median, Ulnar, Radial Nerve and Pulses Intact  Musculoskeletal: Normal, except as noted in detailed exam and in HPI  MUSCULOSKELETAL EXAMINATION:  right long finger:  Negative palpable nodule over the A1 pulley  Positive tenderness to palpation over A1 pulley  Positive catching  Positive clicking  right ring finger:  Negative palpable nodule over the A1 pulley  Positive tenderness to palpation over A1 pulley  Positive catching   Positive clicking     ___________________________________________________  STUDIES REVIEWED:  No images reviewed at today's visit          PROCEDURES PERFORMED:  Procedures  No Procedures performed today    _____________________________________________________      Rosie Ramirez    I,:  Emre Sierra am acting as a scribe while in the presence of the attending physician :       I,:  Farrah Miguel MD personally performed the services described in this documentation    as scribed in my presence :

## 2023-01-26 ENCOUNTER — OFFICE VISIT (OUTPATIENT)
Dept: PHYSICAL THERAPY | Facility: CLINIC | Age: 58
End: 2023-01-26

## 2023-01-26 DIAGNOSIS — M54.31 RIGHT SIDED SCIATICA: Primary | ICD-10-CM

## 2023-01-26 NOTE — PROGRESS NOTES
Daily Note     Today's date: 2023  Patient name: Jeronimo Mishra  : 1965  MRN: [id number]  Referring provider: Zoila Montalvo, PT  Dx:   Encounter Diagnosis     ICD-10-CM    1  Right sided sciatica  M54 31           Start Time: 1630  Stop Time: 1715  Total time in clinic (min): 45 minutes    Subjective: Pt reports she is having pain in her L foot, however, her low back has been feeling better with decreased pain  Objective: See treatment diary below      Assessment: Tolerated treatment well  Patient demonstrated fatigue post treatment, exhibited good technique with therapeutic exercises and would benefit from continued PT  Continued with manual therapy and stretching to decreased pain and improve motion of pt's lumbar spine, pt noted relief after performance  Pt required min verbal cues to facilitate performance of proper technique  Assess pt response to treatment at next visit  Plan: Continue per plan of care        Precautions: N/A      Manuals 1/3 1         Hamstring stretch 1x5 20" 1x5 20" 1x5 20" 1x5 20"         Piriformis stretch 1x5 20" 1x5 20" 1x5 20" 1x5 20"         SKTC 1x5 20" 1x5 20" 1x5 20" 1x5 20"                      Neuro Re-Ed             Pt education 5' 5' 5' 5'         bridges 2x10 3" 2x10 3" 2x10 3" 2x10 3"         Prone hip extension  1x10 5" 1x10 5" 1x10 5"         Prone press up                                                    Ther Ex             SKTC 1x5 20" 1x5 20" 1x5 20" 1x5 20"         Piriformis stretch 1x5 20" 1x5 20" 1x5 20" 1x5 20"         Seated hamstring stretch 1x5 20" 1x5 20" 1x5 20" 1x5 20"         LTR  1x15 1x15 1x15         Pball flexion 3 way  10x 5" 10x 5" 10x 5"         STS from chair             Recumbent bike  5' 5' 5'                      Ther Activity                                       Gait Training                                       Modalities

## 2023-02-08 ENCOUNTER — HOSPITAL ENCOUNTER (OUTPATIENT)
Dept: MAMMOGRAPHY | Facility: CLINIC | Age: 58
Discharge: HOME | End: 2023-02-08

## 2023-02-08 VITALS — WEIGHT: 160 LBS | HEIGHT: 65 IN | BODY MASS INDEX: 26.66 KG/M2

## 2023-02-08 DIAGNOSIS — Z12.31 ENCOUNTER FOR SCREENING MAMMOGRAM FOR MALIGNANT NEOPLASM OF BREAST: ICD-10-CM

## 2023-02-08 NOTE — PROGRESS NOTES
I called the patient and discussed her concerns regarding her PAP being HPV positive. She verbalized an understanding and is more aware of her positive result. She is aware that there is nothing that she can take to help with her positive HPV result.    Progress Note - Infectious Disease   Lexi Hanley 61 y o  female MRN: [id number]  Unit/Bed#:  Encounter: [id number]      Impression/Plan:  1  Latent tuberculosis infection  No clinical or radiographic evidence of active tuberculosis infection  Seems to be tolerating the isoniazid and vitamin B6 without difficulty  Baseline HIV screen negative in 2019  -Continue isoniazid and vitamin B6 for 9 months total  -Avoid nephrotoxins including alcohol use  -We will check LFTs before next visit as patient is also on Lipitor  -Check HIV screen and the patient agrees  -Counseled patient to call and let us know immediately if any side effects such as nausea, vomiting, poor appetite, or any numbness/pain of the extremities  -There is no need for the patient be tested again for latent tuberculosis infection in the future    2  Hypertension  Seems to be well controlled  Follow-up with primary    3  Dyslipidemia  No concerning drug interaction with the Lipitor  4   Osteoarthritis  Follow-up with infectious disease in 4 to 6 weeks    Antibiotics:  Isoniazid/vitamin B6 started 1/11/2023    Subjective:  Patient here for follow-up of latent tuberculosis infection  She was started on isoniazid and vitamin B6 at the last visit in January  Patient has no fever, chills, sweats; no nausea, vomiting, diarrhea; no cough, shortness of breath; no pain  No new symptoms  She is not having any numbness or burning or tingling    ROS:  A complete review of systems is negative other than that noted above in the subjective    Followup portions patient history reviewed and updated as:   Allergies, current medications, past medical history, past social history, past surgical history, and the problem list    Objective:  Vitals:  Vitals:    02/09/23 0854   BP: 116/80   Pulse: 65   Resp: 15   Temp: (!) 97 4 °F (36 3 °C)   SpO2: 99%       Physical Exam:   General Appearance:  Alert, interactive, appearing well, nontoxic, no acute distress  Throat: Oropharynx moist without lesions  Lungs:   Clear to auscultation bilaterally; no audible wheezes, rhonchi or rales; respirations unlabored   Heart:  RRR; no murmur, rub or gallop   Abdomen:   Soft, non-tender, non-distended, positive bowel sounds  Extremities: No clubbing, cyanosis or edema   Skin: No new rashes or lesions  No new draining wounds         Labs, Imaging, & Other studies:   All pertinent labs and imaging studies were personally reviewed    Lab Results   Component Value Date    K 4 0 10/10/2022     (H) 10/10/2022    CO2 27 10/10/2022    BUN 16 10/10/2022    CREATININE 0 66 10/10/2022    GLUF 97 10/10/2022    CALCIUM 9 9 10/10/2022    CORRECTEDCA 9 2 09/10/2021    AST 20 10/10/2022    ALT 31 10/10/2022    ALKPHOS 102 10/10/2022    EGFR 97 10/10/2022     Lab Results   Component Value Date    WBC 4 14 (L) 04/28/2022    HGB 13 4 04/28/2022    HCT 42 2 04/28/2022    MCV 91 04/28/2022     04/28/2022

## 2023-02-09 ENCOUNTER — OFFICE VISIT (OUTPATIENT)
Dept: INFECTIOUS DISEASES | Facility: CLINIC | Age: 58
End: 2023-02-09

## 2023-02-09 VITALS
SYSTOLIC BLOOD PRESSURE: 116 MMHG | DIASTOLIC BLOOD PRESSURE: 80 MMHG | TEMPERATURE: 97.4 F | HEART RATE: 65 BPM | RESPIRATION RATE: 15 BRPM | OXYGEN SATURATION: 99 %

## 2023-02-09 DIAGNOSIS — E78.5 DYSLIPIDEMIA: ICD-10-CM

## 2023-02-09 DIAGNOSIS — Z22.7 TB LUNG, LATENT: Primary | ICD-10-CM

## 2023-02-09 DIAGNOSIS — M15.9 PRIMARY OSTEOARTHRITIS INVOLVING MULTIPLE JOINTS: ICD-10-CM

## 2023-02-09 DIAGNOSIS — I10 ESSENTIAL HYPERTENSION: ICD-10-CM

## 2023-02-09 NOTE — PATIENT INSTRUCTIONS
Continue INH and B6 as discussed  Please obtain bloodwork in approx 5 weeks  These orders have been placed  Follow up in office in 6 weeks  Please call office if you have any questions or concerns

## 2023-02-14 NOTE — PROGRESS NOTES
Diagnoses and all orders for this visit:    Vaginal atrophy    Advised to continue with 2 x weekly dosing and use coconut oil daily if needed for dryness   RTO prn and for annual exam     Subjective    CC: Problem visit     Temitope Ruano is a 60 y o  female [id number] here for 3 month follow up after starting estrace cream for vaginal atrophy in November 2022  She reports that she is feeling overall improvement with use of Estrace,but does still feel dryness, she is rarely sexually active at this time,  Would like to continue with estrace, no negative side effects with use   No LMP recorded   Patient is postmenopausal     Past Medical History:   Diagnosis Date   • 2019 novel coronavirus disease (COVID-19) 05/30/2020   • Abnormal Pap smear of cervix    • Acute maxillary sinusitis 12/27/2017   • Acute pain of right shoulder 04/10/2019   • BRCA1 negative 2015   • BRCA2 negative 2015   • Gastric ulcer    • Hyperproinsulinemia     [id number] resolved   • Swelling of finger joint of right hand 12/10/2019   • Varicella      Past Surgical History:   Procedure Laterality Date   • ANKLE FRACTURE SURGERY Right 03/01/2021    "ankle fx"   • APPENDECTOMY     • FOOT SURGERY Right    • HERNIA REPAIR         Immunization History   Administered Date(s) Administered   • COVID-19 PFIZER VACCINE 0 3 ML IM 01/11/2021, 02/01/2021   • COVID-19 Pfizer vac (Nikhil-sucrose, gray cap) 12 yr+ IM 02/09/2022   • H1N1, All Formulations 01/11/2010   • Hep B, adult 06/01/2021, 08/03/2021, 12/02/2021   • INFLUENZA 11/05/2015, 01/30/2019, 11/21/2019, 09/07/2020, 11/29/2021, 10/11/2022   • Influenza Quadrivalent Preservative Free 3 years and older IM 11/05/2015   • Influenza, seasonal, injectable 10/12/2017   • Tdap 11/18/2015, 07/05/2020   • Zoster Vaccine Recombinant 07/05/2020, 09/07/2020       Family History   Problem Relation Age of Onset   • Ovarian cancer Mother 48   • No Known Problems Father    • Thyroid cancer Sister 52   • No Known Problems Sister    • No Known Problems Daughter    • No Known Problems Son    • No Known Problems Son    • No Known Problems Son    • Cancer Maternal Grandmother         unknown type or age   • No Known Problems Paternal Grandmother    • No Known Problems Maternal Aunt    • Breast cancer Maternal Uncle         52's     Social History     Tobacco Use   • Smoking status: Never   • Smokeless tobacco: Never   Vaping Use   • Vaping Use: Never used   Substance Use Topics   • Alcohol use: Not Currently   • Drug use: No       Current Outpatient Medications:   •  atorvastatin (LIPITOR) 20 mg tablet, Take 1 tablet (20 mg total) by mouth daily, Disp: 90 tablet, Rfl: 3  •  estradiol (ESTRACE) 0 1 mg/g vaginal cream, insert 0 5 grams vaginally two times a week and apply A PEA SIZE AMOUNT TO EXTERNAL VAGINAL OPENING AND URETHRA AREA TWICE WEEKLY, Disp: 42 5 g, Rfl: 0  •  hydrochlorothiazide (HYDRODIURIL) 25 mg tablet, Take 1 tablet (25 mg total) by mouth daily, Disp: 90 tablet, Rfl: 1  •  losartan (COZAAR) 50 mg tablet, Take 1 tablet (50 mg total) by mouth daily, Disp: 90 tablet, Rfl: 1  •  Pyridoxine HCl (VITAMIN B6 PO), Take by mouth, Disp: , Rfl:   •  tretinoin (REFISSA) 0 05 % cream, Apply topically daily at bedtime, Disp: 45 g, Rfl: 1  •  triamcinolone (KENALOG) 0 5 % cream, Apply topically 2 (two) times a day, Disp: 30 g, Rfl: 1  •  isoniazid (NYDRAZID) 300 mg tablet, Take 1 tablet (300 mg total) by mouth daily, Disp: 30 tablet, Rfl: 3  •  pyridoxine (VITAMIN B6) 50 mg tablet, Take 1 tablet (50 mg total) by mouth daily, Disp: 30 tablet, Rfl: 3  Patient Active Problem List    Diagnosis Date Noted   • TB lung, latent 02/09/2023   • Primary osteoarthritis involving multiple joints 01/11/2023   • Shortness of breath 12/16/2022   • Impaired fasting glucose 12/08/2022   • Acute bilateral low back pain without sciatica 11/07/2022   • Family history of ovarian cancer 11/07/2022   • Cortical age-related cataract of both eyes 05/19/2022   • Closed nondisplaced fracture of lateral malleolus of right fibula 2021   • ASCUS with positive high risk HPV cervical 2020   • HPV (human papilloma virus) infection 2020   • Elevated C-reactive protein (CRP) 2020   • Post-menopausal 2020   • Varicose veins of both lower extremities with pain 2020   • Cervicalgia 2019   • Calcific tendonitis of right shoulder 04/10/2019   • Sciatica, right side 2019   • Essential hypertension 10/10/2018   • Snoring 10/10/2018   • Dyslipidemia 2016   • Lumbar radiculopathy 2015   • Migraine headache 2014       Allergies   Allergen Reactions   • Kiwi Extract - Food Allergy Hives   • Other      strawberry   • Pineapple - Food Allergy Hives       OB History    Para Term  AB Living   4 4 4     4   SAB IAB Ectopic Multiple Live Births           4      # Outcome Date GA Lbr Maurice/2nd Weight Sex Delivery Anes PTL Lv   4 Term            3 Term            2 Term            1 Term               Obstetric Comments   4 vaginal deliveries        Vitals:    02/15/23 1252   BP: 110/76   BP Location: Left arm   Patient Position: Sitting   Cuff Size: Large   Weight: 73 kg (161 lb)   Height: 5' 5" (1 651 m)     Body mass index is 26 79 kg/m²  Review of Systems     Constitutional: Negative for chills, fatigue, fever, headaches, visual disturbances, and unexpected weight change  Respiratory: Negative for cough, & shortness of breath  Cardiovascular: Negative for chest pain       Gastrointestinal: Negative for Abd pain, nausea & vomiting, constipation and diarrhea  Genitourinary: Negative for difficulty urinating, dysuria, hematuria, dyspareunia, unusual vaginal bleeding or discharge  Skin: Negative skin changes    Physical Exam     Constitutional: Alert & Oriented x3, well-developed and well-nourished  No distress  HENT: Atraumatic, Normocephalic, Conjunctivae clear  Neck: Normal range of motion  Neck supple   No thyromegaly, mass, nodules or tenderness  Pulmonary: Effort normal    Abdominal: Soft  No tenderness or masses  Musculoskeletal: Normal ROM  Skin: Warm & Dry  Psychological: Normal mood, thought content, behavior & judgement     Pelvic exam was performed with patient supine, lithotomy position  Able to tolerate spec exam    overall exam consistent with  changes   Labia: Negative rash, tenderness, lesion or injury on the right labia  Negative rash, tenderness, lesion or injury on the left labia  Urethral meatus:  Negative for  tenderness, inflammation or discharge  Uterus: not deviated, enlarged, fixed or tender  Cervix: No CMT, no discharge or friability  Right adnexa: no mass, no tenderness and no fullness  Left adnexa: no mass, no tenderness and no fullness  Vagina: No erythema, tenderness, masses, or foreign body in the vagina  No signs of injury around the vagina  No unusual vaginal discharge   Perineum without lesions, signs of injury, erythema or swelling  Inguinal Canal:        Right: No inguinal adenopathy or hernia present  Left: No inguinal adenopathy or hernia present

## 2023-02-15 ENCOUNTER — OFFICE VISIT (OUTPATIENT)
Dept: OBGYN CLINIC | Facility: CLINIC | Age: 58
End: 2023-02-15

## 2023-02-15 VITALS
WEIGHT: 161 LBS | DIASTOLIC BLOOD PRESSURE: 76 MMHG | SYSTOLIC BLOOD PRESSURE: 110 MMHG | HEIGHT: 65 IN | BODY MASS INDEX: 26.82 KG/M2

## 2023-02-15 DIAGNOSIS — N95.2 VAGINAL ATROPHY: Primary | ICD-10-CM

## 2023-02-15 PROBLEM — E66.3 OVERWEIGHT: Status: RESOLVED | Noted: 2020-09-03 | Resolved: 2023-02-15

## 2023-02-15 NOTE — PATIENT INSTRUCTIONS
Vaginal Atrophy   AMBULATORY CARE:   Vaginal atrophy  is a condition that causes thinning, drying, and inflammation of vaginal tissue  This condition is caused by decreased levels of estrogen (a female sex hormone)  Vaginal atrophy can increase your risk for vaginal and urinary tract infections  Vaginal atrophy can worsen over time if not treated  Common signs and symptoms include the following:   Vaginal dryness, itching, and burning    Vaginal discharge    Pain or discomfort during sex    Light bleeding after sex    Burning during urination    Frequent, sudden, strong urges to urinate    Urinary incontinence (loss of control of your bladder)    Contact your healthcare provider if:   You have a foul-smelling odor coming from your vagina  You have a thick, cheese-like discharge from your vagina  You have itching, swelling, or redness in your vagina  You have pain or burning when you urinate  Your urine smells bad  Your symptoms do not improve, or they get worse  You have questions or concerns about your condition or care  Treatment:   Over-the counter vaginal moisturizers  can help reduce dryness  Your healthcare provider may recommend that you use a vaginal moisturizer several times each week and during sex  Only use creams that are made for vaginal use  Do  not  use petroleum jelly  Lubricants can be used during sex to decrease pain and discomfort  Estrogen  may help decrease dryness  It may also lower your risk of vaginal infections if you are going through menopause  It can also help to relieve urinary symptoms  Estrogen may be prescribed in the form of a cream, tablet, or ring  These medicines can be applied or inserted into the vagina  Estrogen can also be prescribed in the form of a pill  Follow up with your doctor as directed:  Write down your questions so you remember to ask them during your visits     © Copyright [employer] 2022 Information is for End User's use only and may not be sold, redistributed or otherwise used for commercial purposes  All illustrations and images included in CareNotes® are the copyrighted property of [employer]  or Rachel Delgado  The above information is an  only  It is not intended as medical advice for individual conditions or treatments  Talk to your doctor, nurse or pharmacist before following any medical regimen to see if it is safe and effective for you  Vaginal Estrogen Replacement Therapy    What is Estrogen vaginal cream?   Estrogen is a female hormone which controls many processes in the female   body  During menopause, estrogen production slows down and then stops  Estrogen vaginal cream is supplied as a brand name of either Estrace or   Premarin    What is Estrogen cream used for? Estrogen vaginal cream is used to decrease menopause symptoms such as   vaginal dryness, burning, and itching of the vaginal area  It also may reduce   symptoms of urinary urgency and irritation with urination  How do I apply the Estrogen cream?   Estrogen cream comes in a tube with an applicator  The applicator is used to   insert the cream into your vagina  Your doctor will tell you how often to use the   cream  Generally you will use the cream daily for several weeks and then   decrease use to 3 times a week  It is best to use at bedtime so that there is less   leakage of the cream  The packaging will have specific instructions for applying   the cream and pictures to help you locate the vagina  General instructions are   as follows:   ? Wash your hands with warm water and soap  ? Remove the cap from the tube and attach the end of the applicator onto   the tube  ? Squeeze from the bottom of the tube to get the prescribed amount of   cream into the applicator  Use the measurement markings on the   applicator to make sure you have the correct dose  ? Remove the applicator from the tube  ? Locate the opening to your vagina  ? Insert the applicator into the vagina (like using a tampon), and push on   the plunger of the applicator to insert the medication  Then slowly   withdraw the applicator  ? Clean the applicator with warm water and soap  It is best to use liquid   soap as bar soap can leave a film on the applicator  ? Wash hands with warm water and soap  What are side effects of estrogen vaginal cream?   Side effects may include headache, breast pain, irregular vaginal bleeding or   spotting, stomach cramps or bloating, nausea and vomiting, or hair loss  Some   women may also notice vaginal burning, irritation, itching, or discharge  If you   experience any of these side effects, you should call your doctor  Safety Concerns   If you have a history of breast cancer, please ask your oncologist before   starting estrogen therapy  Although topical application of estrogen has less   absorption than oral supplements, there may be a slight increased risk of blood   clots with this medication

## 2023-02-16 ENCOUNTER — EVALUATION (OUTPATIENT)
Dept: PHYSICAL THERAPY | Facility: CLINIC | Age: 58
End: 2023-02-16

## 2023-02-16 DIAGNOSIS — M54.31 RIGHT SIDED SCIATICA: Primary | ICD-10-CM

## 2023-02-16 NOTE — PROGRESS NOTES
PT Re-Evaluation     Today's date: 2023  Patient name: Jackie Scott  : 1965  MRN: [id number]  Referring provider: Pinky Olmos MD  Dx:   Encounter Diagnosis     ICD-10-CM    1  Right sided sciatica  M54 31           Start Time: 1615  Stop Time: 1700  Total time in clinic (min): 45 minutes    Assessment  Assessment details: Pt is a 60 y/o female presenting to physical therapy with R lumbar radiculopathy  Upon re-examination, pt has improved strength and ROM of her lumbar spine and LEs as well as decreased pain of the same region  Pt's FOTO score has also improved, demonstrating an improved ability to perform functional activities  However, pt continues to have deficits in strength and ROM of her lumbar spine and LEs as well as pain and difficulty with functional activities  Pt plan of care will focus on the previously mentioned deficits and limitations  Pt would continue to benefit from skilled physical therapy to facilitate a return to her prior level of function  Impairments: abnormal or restricted ROM, activity intolerance, impaired physical strength, lacks appropriate home exercise program and pain with function  Functional limitations: self-care/ADLs, household/work activities, ambulation, and lifting objects  Symptom irritability: lowUnderstanding of Dx/Px/POC: good   Prognosis: good    Goals  1  Pt will have a subjective decrease in pain of her R piriformis by 2 levels or more during her work activities  2  Pt will demonstrate independence with her HEP    LTG - 8 weeks  1  Pt will demonstrate 4+/5 gross strength of her lumbar spine and B hips in all planes to facilitate a return to her prior level of function  2  Pt's FOTO score will improve from 36 to 64 or better to facilitate a return to pt's prior level of function      Plan  Patient would benefit from: PT eval and skilled physical therapy  Planned modality interventions: cryotherapy, thermotherapy: hydrocollator packs and unattended electrical stimulation  Planned therapy interventions: abdominal trunk stabilization, ADL training, behavior modification, flexibility, functional ROM exercises, gait training, graded exercise, home exercise program, joint mobilization, manual therapy, massage, Thurston taping, neuromuscular re-education, patient education, self care, strengthening, stretching, therapeutic activities and therapeutic exercise  Frequency: 2x week  Duration in weeks: 8  Plan of Care beginning date: 2023  Plan of Care expiration date: 2023  Treatment plan discussed with: patient        Subjective Evaluation    History of Present Illness  Mechanism of injury: Pt is a 62 y/o female presenting to physical therapy with R lumbar radiculopathy  Pt reports she was feeling better with decreased pain and improved motion of her low back  However, pt suffered an exacerbation in low back pain last week upon waking up  Pt reports she now has a pain in the middle of her low back which she thinks is from muscle  Pt reports she is no longer having any buttock pain or numbness/tingling in her LEs  Pt reports she is having difficulty with bending down and laying down in her bed  Quality of life: good    Pain  Current pain rating: 3  At best pain ratin  At worst pain ratin  Location: R lumbar  Quality: sharp  Relieving factors: medications  Aggravating factors: sitting and walking  Progression: worsening    Treatments  Previous treatment: physical therapy and medication  Current treatment: medication and physical therapy  Patient Goals  Patient goals for therapy: decreased pain          Objective     Palpation     Right   Tenderness of the lumbar paraspinals       Additional Palpation Details  Tenderness to palpation of pt's R piriformis    Active Range of Motion     Lumbar   Flexion: 55 degrees  Restriction level: moderate  Extension: 25 degrees  Restriction level: moderate  Left lateral flexion: 20 degrees    Restriction level: minimal  Right lateral flexion: 15 degrees  Restriction level: moderate    Strength/Myotome Testing     Left Hip   Planes of Motion   Flexion: 4  Extension: 4+  Abduction: 4  Adduction: 4+  External rotation: 4  Internal rotation: 4    Right Hip   Planes of Motion   Flexion: 4-  Extension: 4  Abduction: 4  Adduction: 4+  External rotation: 4-  Internal rotation: 4-    Left Knee   Flexion: 4  Extension: 4    Right Knee   Flexion: 4  Extension: 4    General Comments:      Lumbar Comments  Significant tightness of pt's R hamstring, glute, and piriformis    Lumbar flexion gross strength: 4/5  Lumbar extension gross strength: 4-/5  Lumbar L lateral flexion gross strength: 4/5  Lumbar R lateral flexion gross strength: 4-/5             Precautions: N/A      Manuals 1/3 1/5 1/19 1/26 2/16        DKTC     1x5 20"        Piriformis stretch 1x5 20" 1x5 20" 1x5 20" 1x5 20" 1x5 20"        SKTC 1x5 20" 1x5 20" 1x5 20" 1x5 20" 1x5 20"                     Neuro Re-Ed             Pt education 5' 5' 5' 5' 4'        bridges 2x10 3" 2x10 3" 2x10 3" 2x10 3" 2x10 3"        Prone hip extension  1x10 5" 1x10 5" 1x10 5" 1x10 5"        Prone press up     1x10        PPT     1x10 5"                                  Ther Ex             SKTC 1x5 20" 1x5 20" 1x5 20" 1x5 20" 1x5 20"        Piriformis stretch 1x5 20" 1x5 20" 1x5 20" 1x5 20" 1x5 20"        Seated hamstring stretch 1x5 20" 1x5 20" 1x5 20" 1x5 20"         LTR  1x15 1x15 1x15 1x15        Pball flexion 3 way  10x 5" 10x 5" 10x 5" 10x 5"        STS from chair             Recumbent bike  5' 5' 5' 5'                     Ther Activity                                       Gait Training                                       Modalities

## 2023-02-20 ENCOUNTER — OFFICE VISIT (OUTPATIENT)
Dept: PHYSICAL THERAPY | Facility: CLINIC | Age: 58
End: 2023-02-20

## 2023-02-20 DIAGNOSIS — M54.31 RIGHT SIDED SCIATICA: Primary | ICD-10-CM

## 2023-02-20 NOTE — PROGRESS NOTES
Daily Note     Today's date: 2023  Patient name: Daphne Sandhu  : 1965  MRN: [id number]  Referring provider: Lauren Tolentino MD  Dx:   Encounter Diagnosis     ICD-10-CM    1  Right sided sciatica  M54 31           Start Time:   Stop Time:   Total time in clinic (min): 44 minutes    Subjective: Pt reports she did well after her last therapy session with decreased pain and stiffness in her low back  Objective: See treatment diary below      Assessment: Tolerated treatment well  Patient demonstrated fatigue post treatment, exhibited good technique with therapeutic exercises and would benefit from continued PT  Continued with manual therapy and mobility exercises to decrease pain and improve motion of pt's lumbar spine  Pt required min verbal cues to facilitate performance of proper technique  Assess pt response to treatment at next visit  Plan: Continue per plan of care        Precautions: N/A      Manuals 1/3 1/5 1/19 1/26 2/16 2/20       DKTC     1x5 20" 1x5 20"       Piriformis stretch 1x5 20" 1x5 20" 1x5 20" 1x5 20" 1x5 20" 1x5 20"       SKTC 1x5 20" 1x5 20" 1x5 20" 1x5 20" 1x5 20" 1x5 20"                    Neuro Re-Ed             Pt education 5' 5' 5' 5' 4' 4'       bridges 2x10 3" 2x10 3" 2x10 3" 2x10 3" 2x10 3" 2x10 3"       Prone hip extension  1x10 5" 1x10 5" 1x10 5" 1x10 5" 1x10 5"       Prone press up     1x10 1x10       PPT     1x10 5" 1x10 5"                                 Ther Ex             SKTC 1x5 20" 1x5 20" 1x5 20" 1x5 20" 1x5 20" 1x5 20"       Piriformis stretch 1x5 20" 1x5 20" 1x5 20" 1x5 20" 1x5 20" 1x5 20"       Seated hamstring stretch 1x5 20" 1x5 20" 1x5 20" 1x5 20"         LTR  1x15 1x15 1x15 1x15 1x15       Pball flexion 3 way  10x 5" 10x 5" 10x 5" 10x 5" 10x 5"       STS from chair             Recumbent bike  5' 5' 5' 5' 5'                    Ther Activity                                       Gait Training                                       Modalities

## 2023-02-22 ENCOUNTER — OFFICE VISIT (OUTPATIENT)
Dept: URGENT CARE | Facility: CLINIC | Age: 58
End: 2023-02-22

## 2023-02-22 VITALS
BODY MASS INDEX: 27.46 KG/M2 | WEIGHT: 165 LBS | OXYGEN SATURATION: 99 % | RESPIRATION RATE: 18 BRPM | HEART RATE: 77 BPM | TEMPERATURE: 97.7 F

## 2023-02-22 DIAGNOSIS — L08.9 LOCAL INFECTION OF THE SKIN AND SUBCUTANEOUS TISSUE, UNSPECIFIED: Primary | ICD-10-CM

## 2023-02-22 RX ORDER — CEPHALEXIN 500 MG/1
500 CAPSULE ORAL EVERY 12 HOURS SCHEDULED
Qty: 14 CAPSULE | Refills: 0 | Status: SHIPPED | OUTPATIENT
Start: 2023-02-22 | End: 2023-03-01

## 2023-02-22 NOTE — PROGRESS NOTES
[facility]        NAME: Arianne Gongora is a 60 y o  female  : 1965    MRN: [id number]  DATE: 2023  TIME: 4:44 PM    Assessment and Plan   Local infection of the skin and subcutaneous tissue, unspecified [L08 9]  1  Local infection of the skin and subcutaneous tissue, unspecified  cephalexin (KEFLEX) 500 mg capsule            Patient Instructions       Follow up with PCP in 3-5 days  Proceed to  ER if symptoms worsen  Chief Complaint     Chief Complaint   Patient presents with   • Rash     3 open sores in between the buttocks  Has been cleaning it and applying OTC antibiotic  History of Present Illness       Patient is a 61 yo female who presents for evaluaiton of 3 sores in between her buttocks x 3 weeks  She has been cleaning the area and applying OTC antibiotic ointment without relief  She denies fevers, drainage from the area, diarrhea, constiupation  Review of Systems   Review of Systems   Constitutional: Negative for fever  Gastrointestinal: Negative for constipation and diarrhea  Skin: Positive for wound           Current Medications       Current Outpatient Medications:   •  cephalexin (KEFLEX) 500 mg capsule, Take 1 capsule (500 mg total) by mouth every 12 (twelve) hours for 7 days, Disp: 14 capsule, Rfl: 0  •  atorvastatin (LIPITOR) 20 mg tablet, Take 1 tablet (20 mg total) by mouth daily, Disp: 90 tablet, Rfl: 3  •  estradiol (ESTRACE) 0 1 mg/g vaginal cream, insert 0 5 grams vaginally two times a week and apply A PEA SIZE AMOUNT TO EXTERNAL VAGINAL OPENING AND URETHRA AREA TWICE WEEKLY, Disp: 42 5 g, Rfl: 0  •  hydrochlorothiazide (HYDRODIURIL) 25 mg tablet, Take 1 tablet (25 mg total) by mouth daily, Disp: 90 tablet, Rfl: 1  •  isoniazid (NYDRAZID) 300 mg tablet, Take 1 tablet (300 mg total) by mouth daily, Disp: 30 tablet, Rfl: 3  •  losartan (COZAAR) 50 mg tablet, Take 1 tablet (50 mg total) by mouth daily, Disp: 90 tablet, Rfl: 1  •  pyridoxine (VITAMIN B6) 50 mg tablet, Take 1 tablet (50 mg total) by mouth daily, Disp: 30 tablet, Rfl: 3  •  Pyridoxine HCl (VITAMIN B6 PO), Take by mouth, Disp: , Rfl:   •  tretinoin (REFISSA) 0 05 % cream, Apply topically daily at bedtime, Disp: 45 g, Rfl: 1  •  triamcinolone (KENALOG) 0 5 % cream, Apply topically 2 (two) times a day, Disp: 30 g, Rfl: 1    Current Allergies     Allergies as of 02/22/2023 - Reviewed 02/22/2023   Allergen Reaction Noted   • Kiwi extract - food allergy Hives 11/11/2022   • Other  04/10/2019   • Pineapple - food allergy Hives 08/30/2017            The following portions of the patient's history were reviewed and updated as appropriate: allergies, current medications, past family history, past medical history, past social history, past surgical history and problem list      Past Medical History:   Diagnosis Date   • 2019 novel coronavirus disease (COVID-19) 05/30/2020   • Abnormal Pap smear of cervix    • Acute maxillary sinusitis 12/27/2017   • Acute pain of right shoulder 04/10/2019   • BRCA1 negative 2015   • BRCA2 negative 2015   • Gastric ulcer    • 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   • Swelling of finger joint of right hand 12/10/2019   • Varicella        Past Surgical History:   Procedure Laterality Date   • ANKLE FRACTURE SURGERY Right 03/01/2021    "ankle fx"   • APPENDECTOMY     • FOOT SURGERY Right    • HERNIA REPAIR         Family History   Problem Relation Age of Onset   • Ovarian cancer Mother 48   • No Known Problems Father    • Thyroid cancer Sister 52   • No Known Problems Sister    • No Known Problems Daughter    • No Known Problems Son    • No Known Problems Son    • No Known Problems Son    • Cancer Maternal Grandmother         unknown type or age   • No Known Problems Paternal Grandmother    • No Known Problems Maternal Aunt    • Breast cancer Maternal Uncle         50's         Medications have been verified          Objective   Pulse 77   Temp 97 7 °F (36 5 °C) Resp 18   Wt 74 8 kg (165 lb)   SpO2 99%   BMI 27 46 kg/m²        Physical Exam     Physical Exam  Constitutional:       General: She is not in acute distress  Appearance: She is not toxic-appearing  Cardiovascular:      Rate and Rhythm: Normal rate  Pulmonary:      Effort: Pulmonary effort is normal    Genitourinary:     Comments: 3 0 5 cm sores noted in upper gluteal cleft  No abscess, no drainage   Skin:     General: Skin is warm and dry  Neurological:      Mental Status: She is alert

## 2023-02-23 ENCOUNTER — OFFICE VISIT (OUTPATIENT)
Dept: PHYSICAL THERAPY | Facility: CLINIC | Age: 58
End: 2023-02-23

## 2023-02-23 DIAGNOSIS — M54.31 RIGHT SIDED SCIATICA: Primary | ICD-10-CM

## 2023-02-23 NOTE — PROGRESS NOTES
Daily Note     Today's date: 2023  Patient name: Harper Berkowitz  : 1965  MRN: [id number]  Referring provider: Devora Maldonado MD  Dx:   Encounter Diagnosis     ICD-10-CM    1  Right sided sciatica  M54 31                      Subjective: Pt reports wanting to skip the prone exercises  Objective: See treatment diary below      Assessment: Tolerated treatment well  Good recall of current program demonstrated  Held prone exercises due to pt request this visit  Patient demonstrated fatigue post treatment, exhibited good technique with therapeutic exercises and would benefit from continued PT      Plan: Continue per plan of care        Precautions: N/A      Manuals 1/3 1/5 1/19 1/26 2/16 2/20 2/23      DKTC     1x5 20" 1x5 20" 1x5 20"      Piriformis stretch 1x5 20" 1x5 20" 1x5 20" 1x5 20" 1x5 20" 1x5 20" self      SKTC 1x5 20" 1x5 20" 1x5 20" 1x5 20" 1x5 20" 1x5 20" self                   Neuro Re-Ed             Pt education 5' 5' 5' 5' 4' 4'       bridges 2x10 3" 2x10 3" 2x10 3" 2x10 3" 2x10 3" 2x10 3" 3x10 3"      Prone hip extension  1x10 5" 1x10 5" 1x10 5" 1x10 5" 1x10 5" held      Prone press up     1x10 1x10 held      PPT     1x10 5" 1x10 5" 1x10 5"                                Ther Ex             SKTC 1x5 20" 1x5 20" 1x5 20" 1x5 20" 1x5 20" 1x5 20" 1x5 20"      Piriformis stretch 1x5 20" 1x5 20" 1x5 20" 1x5 20" 1x5 20" 1x5 20" 1x5 20"      Seated hamstring stretch 1x5 20" 1x5 20" 1x5 20" 1x5 20"         LTR  1x15 1x15 1x15 1x15 1x15 1x15      Pball flexion 3 way  10x 5" 10x 5" 10x 5" 10x 5" 10x 5" 10x 5"      STS from chair             Recumbent bike  5' 5' 5' 5' 5' 5'                   Ther Activity                                       Gait Training                                       Modalities

## 2023-02-24 ENCOUNTER — LAB (OUTPATIENT)
Dept: LAB | Facility: CLINIC | Age: 58
End: 2023-02-24

## 2023-02-24 DIAGNOSIS — Z22.7 TB LUNG, LATENT: ICD-10-CM

## 2023-02-24 DIAGNOSIS — E78.2 MIXED HYPERLIPIDEMIA: ICD-10-CM

## 2023-02-24 DIAGNOSIS — R79.89 ELEVATED LFTS: ICD-10-CM

## 2023-02-24 DIAGNOSIS — R73.01 IMPAIRED FASTING GLUCOSE: ICD-10-CM

## 2023-02-24 LAB
ALBUMIN SERPL BCP-MCNC: 3.9 G/DL (ref 3.5–5)
ALP SERPL-CCNC: 87 U/L (ref 46–116)
ALT SERPL W P-5'-P-CCNC: 46 U/L (ref 12–78)
ANION GAP SERPL CALCULATED.3IONS-SCNC: 3 MMOL/L (ref 4–13)
AST SERPL W P-5'-P-CCNC: 33 U/L (ref 5–45)
BILIRUB DIRECT SERPL-MCNC: 0.18 MG/DL (ref 0–0.2)
BILIRUB SERPL-MCNC: 0.73 MG/DL (ref 0.2–1)
BUN SERPL-MCNC: 19 MG/DL (ref 5–25)
CALCIUM SERPL-MCNC: 10.1 MG/DL (ref 8.3–10.1)
CHLORIDE SERPL-SCNC: 109 MMOL/L (ref 96–108)
CHOLEST SERPL-MCNC: 182 MG/DL
CO2 SERPL-SCNC: 27 MMOL/L (ref 21–32)
CREAT SERPL-MCNC: 0.55 MG/DL (ref 0.6–1.3)
EST. AVERAGE GLUCOSE BLD GHB EST-MCNC: 114 MG/DL
GFR SERPL CREATININE-BSD FRML MDRD: 102 ML/MIN/1.73SQ M
GLUCOSE P FAST SERPL-MCNC: 100 MG/DL (ref 65–99)
HBA1C MFR BLD: 5.6 %
HDLC SERPL-MCNC: 108 MG/DL
HIV 1+2 AB+HIV1 P24 AG SERPL QL IA: NORMAL
HIV 2 AB SERPL QL IA: NORMAL
HIV1 AB SERPL QL IA: NORMAL
HIV1 P24 AG SERPL QL IA: NORMAL
LDLC SERPL CALC-MCNC: 64 MG/DL (ref 0–100)
NONHDLC SERPL-MCNC: 74 MG/DL
POTASSIUM SERPL-SCNC: 3.9 MMOL/L (ref 3.5–5.3)
PROT SERPL-MCNC: 7.1 G/DL (ref 6.4–8.4)
SODIUM SERPL-SCNC: 139 MMOL/L (ref 135–147)
TRIGL SERPL-MCNC: 51 MG/DL

## 2023-02-27 ENCOUNTER — OFFICE VISIT (OUTPATIENT)
Dept: PHYSICAL THERAPY | Facility: CLINIC | Age: 58
End: 2023-02-27

## 2023-02-27 DIAGNOSIS — M54.31 RIGHT SIDED SCIATICA: Primary | ICD-10-CM

## 2023-02-27 NOTE — PROGRESS NOTES
Daily Note     Today's date: 2023  Patient name: Sandra Vazquez  : 1965  MRN: [id number]  Referring provider: Magaly Jose MD  Dx:   Encounter Diagnosis     ICD-10-CM    1  Right sided sciatica  M54 31                      Subjective: Patient requests to not perform prone exercises due to it making her nauseous  States she is having back-pain but it does not go down her legs anymore  Objective: See treatment diary below      Assessment: Tolerated treatment well  Held prone exercises due to subjective  Progressed PPT to include marches for DLS  She was challenged with maintaining PPT with the addition of marches requiring a reset between each rep  Added in standing hip ext and rack pulls to progress lumbar strengthening  She had slight midline lumbar discomfort with hip ext  Extensive verbal and tactile cues to facilitate proper hip hinge, cont to work on this in future sessions  Patient demonstrated fatigue post treatment and would benefit from continued PT      Plan: Progress treatment as tolerated         Precautions: N/A      Manuals 1/3 1/5 1/19 1/26 2/16 2/20 2/23 2/27     DKTC     1x5 20" 1x5 20" 1x5 20" self     Piriformis stretch 1x5 20" 1x5 20" 1x5 20" 1x5 20" 1x5 20" 1x5 20" self self     SKTC 1x5 20" 1x5 20" 1x5 20" 1x5 20" 1x5 20" 1x5 20" self self                  Neuro Re-Ed             Pt education 5' 5' 5' 5' 4' 4'       bridges 2x10 3" 2x10 3" 2x10 3" 2x10 3" 2x10 3" 2x10 3" 3x10 3" 3x10 3"     Prone hip extension  1x10 5" 1x10 5" 1x10 5" 1x10 5" 1x10 5" held held     Prone press up     1x10 1x10 held held     PPT     1x10 5" 1x10 5" 1x10 5" 1x10 5"     PPT +Marches        1x20 ea alt     Standing hip ext        1x10 3"  ea      Standing hip abd        NV     Ther Ex             SKTC 1x5 20" 1x5 20" 1x5 20" 1x5 20" 1x5 20" 1x5 20" 1x5 20" 1x5 20"     DKTC        1x5 20"      Piriformis stretch 1x5 20" 1x5 20" 1x5 20" 1x5 20" 1x5 20" 1x5 20" 1x5 20" 1x5   20"     Seated hamstring stretch 1x5 20" 1x5 20" 1x5 20" 1x5 20"         LTR  1x15 1x15 1x15 1x15 1x15 1x15 1x15 5"     Pball flexion 3 way  10x 5" 10x 5" 10x 5" 10x 5" 10x 5" 10x 5" 10x 5"     STS from chair             Recumbent bike  5' 5' 5' 5' 5' 5' 5'     Rack Pull        To 12" with basketball  1x15 #    Ther Activity                                       Gait Training                                       Modalities

## 2023-03-01 ENCOUNTER — OFFICE VISIT (OUTPATIENT)
Dept: FAMILY MEDICINE CLINIC | Facility: CLINIC | Age: 58
End: 2023-03-01

## 2023-03-01 ENCOUNTER — OFFICE VISIT (OUTPATIENT)
Dept: PHYSICAL THERAPY | Facility: CLINIC | Age: 58
End: 2023-03-01

## 2023-03-01 VITALS
HEART RATE: 78 BPM | BODY MASS INDEX: 27.32 KG/M2 | HEIGHT: 65 IN | DIASTOLIC BLOOD PRESSURE: 80 MMHG | TEMPERATURE: 97.8 F | OXYGEN SATURATION: 97 % | WEIGHT: 164 LBS | SYSTOLIC BLOOD PRESSURE: 128 MMHG

## 2023-03-01 DIAGNOSIS — Z78.0 POSTMENOPAUSAL ESTROGEN DEFICIENCY: ICD-10-CM

## 2023-03-01 DIAGNOSIS — M54.16 LUMBAR RADICULOPATHY: Primary | ICD-10-CM

## 2023-03-01 DIAGNOSIS — M54.31 RIGHT SIDED SCIATICA: Primary | ICD-10-CM

## 2023-03-01 RX ORDER — IBUPROFEN 800 MG/1
800 TABLET ORAL 2 TIMES DAILY WITH MEALS
Qty: 60 TABLET | Refills: 2 | Status: SHIPPED | OUTPATIENT
Start: 2023-03-01

## 2023-03-01 RX ORDER — METHOCARBAMOL 500 MG/1
500 TABLET, FILM COATED ORAL
Qty: 30 TABLET | Refills: 2 | Status: SHIPPED | OUTPATIENT
Start: 2023-03-01

## 2023-03-01 NOTE — PROGRESS NOTES
Name: Jaja Conde      : 1965      MRN: [id number]  Encounter Provider: Jennifer Muñoz MD  Encounter Date: 3/1/2023   Encounter department: [address] [place] [address]     1  Lumbar radiculopathy  After discussing risks and benefits of medication along with side effects will start the following:  -     ibuprofen (MOTRIN) 800 mg tablet; Take 1 tablet (800 mg total) by mouth 2 (two) times a day with meals  -     methocarbamol (ROBAXIN) 500 mg tablet; Take 1 tablet (500 mg total) by mouth daily at bedtime    2  Postmenopausal estrogen deficiency  -     DXA bone density spine hip and pelvis; Future; Expected date: 2023    F/U in 3 months or as needed       Subjective     Patient is here because she has been having worsening mid and lower back pain  She says that the pain has been present for months  Worse in the morning and it improves somewhat during the day  No recent injuries  Has been going to PT however not helping  Had X-rays done last year which showed arthritic changes  Review of Systems   Constitutional: Negative for activity change, appetite change, fatigue and fever  HENT: Negative for congestion and ear discharge  Respiratory: Negative for cough and shortness of breath  Cardiovascular: Negative for chest pain and palpitations  Gastrointestinal: Negative for diarrhea and nausea  Musculoskeletal: Positive for arthralgias and back pain  Skin: Negative for color change and rash  Neurological: Negative for dizziness and headaches  Psychiatric/Behavioral: Negative for agitation and behavioral problems         Past Medical History:   Diagnosis Date   •  novel coronavirus disease (COVID-19) 2020   • Abnormal Pap smear of cervix    • Acute maxillary sinusitis 2017   • Acute pain of right shoulder 04/10/2019   • BRCA1 negative    • BRCA2 negative    • Gastric ulcer    • Hyperproinsulinemia     [id number] resolved   • Swelling of finger joint of right hand 12/10/2019   • Varicella      Past Surgical History:   Procedure Laterality Date   • ANKLE FRACTURE SURGERY Right 03/01/2021    "ankle fx"   • APPENDECTOMY     • FOOT SURGERY Right    • HERNIA REPAIR       Family History   Problem Relation Age of Onset   • Ovarian cancer Mother 48   • No Known Problems Father    • Thyroid cancer Sister 52   • No Known Problems Sister    • No Known Problems Daughter    • No Known Problems Son    • No Known Problems Son    • No Known Problems Son    • Cancer Maternal Grandmother         unknown type or age   • No Known Problems Paternal Grandmother    • No Known Problems Maternal Aunt    • Breast cancer Maternal Uncle         52's     Social History     Socioeconomic History   • Marital status: /Civil Union     Spouse name: Not on file   • Number of children: Not on file   • Years of education: Not on file   • Highest education level: Not on file   Occupational History   • Not on file   Tobacco Use   • Smoking status: Never   • Smokeless tobacco: Never   Vaping Use   • Vaping Use: Never used   Substance and Sexual Activity   • Alcohol use: Not Currently   • Drug use: No   • Sexual activity: Yes     Partners: Male   Other Topics Concern   • Not on file   Social History Narrative   • Not on file     Social Determinants of Health     Financial Resource Strain: Not on file   Food Insecurity: Not on file   Transportation Needs: Not on file   Physical Activity: Not on file   Stress: Not on file   Social Connections: Not on file   Intimate Partner Violence: Not on file   Housing Stability: Not on file     Current Outpatient Medications on File Prior to Visit   Medication Sig   • atorvastatin (LIPITOR) 20 mg tablet Take 1 tablet (20 mg total) by mouth daily   • cephalexin (KEFLEX) 500 mg capsule Take 1 capsule (500 mg total) by mouth every 12 (twelve) hours for 7 days   • estradiol (ESTRACE) 0 1 mg/g vaginal cream insert 0 5 grams vaginally two times a week and apply A PEA SIZE AMOUNT TO EXTERNAL VAGINAL OPENING AND URETHRA AREA TWICE WEEKLY   • hydrochlorothiazide (HYDRODIURIL) 25 mg tablet Take 1 tablet (25 mg total) by mouth daily   • losartan (COZAAR) 50 mg tablet Take 1 tablet (50 mg total) by mouth daily   • Pyridoxine HCl (VITAMIN B6 PO) Take by mouth   • tretinoin (REFISSA) 0 05 % cream Apply topically daily at bedtime   • triamcinolone (KENALOG) 0 5 % cream Apply topically 2 (two) times a day   • isoniazid (NYDRAZID) 300 mg tablet Take 1 tablet (300 mg total) by mouth daily   • pyridoxine (VITAMIN B6) 50 mg tablet Take 1 tablet (50 mg total) by mouth daily     Allergies   Allergen Reactions   • Kiwi Extract - Food Allergy Hives   • Other      strawberry   • Pineapple - Food Allergy Hives     Immunization History   Administered Date(s) Administered   • COVID-19 PFIZER VACCINE 0 3 ML IM 01/11/2021, 02/01/2021   • COVID-19 Pfizer vac (Nikhil-sucrose, gray cap) 12 yr+ IM 02/09/2022   • H1N1, All Formulations 01/11/2010   • Hep B, adult 06/01/2021, 08/03/2021, 12/02/2021   • INFLUENZA 11/05/2015, 01/30/2019, 11/21/2019, 09/07/2020, 11/29/2021, 10/11/2022   • Influenza Quadrivalent Preservative Free 3 years and older IM 11/05/2015   • Influenza, seasonal, injectable 10/12/2017   • Tdap 11/18/2015, 07/05/2020   • Zoster Vaccine Recombinant 07/05/2020, 09/07/2020       Objective     /80 (BP Location: Left arm, Patient Position: Sitting, Cuff Size: Large)   Pulse 78   Temp 97 8 °F (36 6 °C)   Ht 5' 5" (1 651 m)   Wt 74 4 kg (164 lb)   SpO2 97%   BMI 27 29 kg/m²     Physical Exam  Constitutional:       General: She is not in acute distress  Appearance: She is well-developed  She is not diaphoretic  HENT:      Head: Normocephalic and atraumatic  Nose: Nose normal    Eyes:      Conjunctiva/sclera: Conjunctivae normal       Pupils: Pupils are equal, round, and reactive to light  Cardiovascular:      Rate and Rhythm: Normal rate and regular rhythm  Heart sounds: Normal heart sounds  No murmur heard  Pulmonary:      Effort: Pulmonary effort is normal  No respiratory distress  Breath sounds: Normal breath sounds  No wheezing  Abdominal:      General: Bowel sounds are normal  There is no distension  Palpations: Abdomen is soft  Tenderness: There is no abdominal tenderness  Musculoskeletal:         General: Tenderness present  Comments: Lumbar tenderness  FROM of lumbar spine   Skin:     General: Skin is warm and dry  Findings: No erythema or rash  Neurological:      Mental Status: She is alert and oriented to person, place, and time         Gautam Canela MD

## 2023-03-01 NOTE — PROGRESS NOTES
Daily Note     Today's date: 3/1/2023  Patient name: Miracle Arriola  : 1965  MRN: [id number]  Referring provider: Marika Presley MD  Dx:   Encounter Diagnosis     ICD-10-CM    1  Right sided sciatica  M54 31                      Subjective: Patient reports her back is really hurting she called the dr and they are calling in a muscle relaxer which she needs to  after her session  Objective: See treatment diary below      Assessment: Tolerated treatment well  Focus on stretching and core strengthening  She was able to activate TA with cues  PPT performed with bridges today for progression  Held standing TE due to subjective report  Patient demonstrated fatigue post treatment and would benefit from continued PT      Plan: Continue per plan of care        Precautions: N/A      Manuals 1/3 1/5 1/19 1/26 2/16 2/20 2/23 2/27 3/1    DKTC     1x5 20" 1x5 20" 1x5 20" self     Piriformis stretch 1x5 20" 1x5 20" 1x5 20" 1x5 20" 1x5 20" 1x5 20" self self     SKTC 1x5 20" 1x5 20" 1x5 20" 1x5 20" 1x5 20" 1x5 20" self self                  Neuro Re-Ed             Pt education 5' 5' 5' 5' 4' 4'       TA         supine 5" x20 w/ diaphragmatic breathing    bridges 2x10 3" 2x10 3" 2x10 3" 2x10 3" 2x10 3" 2x10 3" 3x10 3" 3x10 3" +PPT  3x10 3"    Prone hip extension  1x10 5" 1x10 5" 1x10 5" 1x10 5" 1x10 5" held held     Prone press up     1x10 1x10 held held     PPT     1x10 5" 1x10 5" 1x10 5" 1x10 5" W/ bridge    PPT +Marches        1x20 ea alt 1x20 ea alt    Standing hip ext        1x10 3"  ea  NV    Standing hip abd        NV     Ther Ex             SKTC 1x5 20" 1x5 20" 1x5 20" 1x5 20" 1x5 20" 1x5 20" 1x5 20" 1x5 20" 1x5 20"    DKTC        1x5 20"  1x5 20"    Piriformis stretch 1x5 20" 1x5 20" 1x5 20" 1x5 20" 1x5 20" 1x5 20" 1x5 20" 1x5   20" 1x5 20"    Seated hamstring stretch 1x5 20" 1x5 20" 1x5 20" 1x5 20"         LTR  1x15 1x15 1x15 1x15 1x15 1x15 1x15 5" 1x15 5"    Pball flexion 3 way  10x 5" 10x 5" 10x 5" 10x 5" 10x 5" 10x 5" 10x 5" nv    STS from chair             Recumbent bike  5' 5' 5' 5' 5' 5' 5' 5'    Rack Pull        To 12" with basketball  1x15 #  NV if able    Ther Activity                                       Gait Training                                       Modalities

## 2023-03-02 ENCOUNTER — APPOINTMENT (OUTPATIENT)
Dept: PHYSICAL THERAPY | Facility: CLINIC | Age: 58
End: 2023-03-02

## 2023-03-06 ENCOUNTER — APPOINTMENT (OUTPATIENT)
Dept: PHYSICAL THERAPY | Facility: CLINIC | Age: 58
End: 2023-03-06

## 2023-03-10 ENCOUNTER — APPOINTMENT (OUTPATIENT)
Dept: PHYSICAL THERAPY | Facility: CLINIC | Age: 58
End: 2023-03-10

## 2023-03-13 ENCOUNTER — OFFICE VISIT (OUTPATIENT)
Dept: PHYSICAL THERAPY | Facility: CLINIC | Age: 58
End: 2023-03-13

## 2023-03-13 DIAGNOSIS — M54.31 RIGHT SIDED SCIATICA: Primary | ICD-10-CM

## 2023-03-13 NOTE — PROGRESS NOTES
Daily Note     Today's date: 3/13/2023  Patient name: Bradd Favre  : 1965  MRN: [id number]  Referring provider: Lanis Ormond, MD  Dx:   Encounter Diagnosis     ICD-10-CM    1  Right sided sciatica  M54 31                      Subjective: Patient states one medication she was on was causing her muscle spasms  She saw her Dr a week or so ago and he gave her muscle relaxers and ibprophen and she is feeling a bit better  She no longer has the pain down her leg  She notes she attempted to PPT in bed and it caused her sciatica to act up again  Objective: See treatment diary below      Assessment: Tolerated treatment well  She seemingly demonstrates and extension preference, treatment today focused on ext with the exception of her stretches  No symptoms reported during session  Able to perform standing hip ext and abd with minimal discomfort  Extensive cues and the addition of tactile cue to facilitate proper hip hinge, will cont to work on in future sessions  Wobble board added for core strengthening and stabilization she was able to perform well with no UE and good control  Patient demonstrated fatigue post treatment and would benefit from continued PT      Plan: Progress treatment as tolerated         Precautions: N/A      Manuals 1/3 1/5 1/19 1/26 2/16 2/20 2/23 2/27 3/1 3/13   DKTC     1x5 20" 1x5 20" 1x5 20" self     Piriformis stretch 1x5 20" 1x5 20" 1x5 20" 1x5 20" 1x5 20" 1x5 20" self self     SKTC 1x5 20" 1x5 20" 1x5 20" 1x5 20" 1x5 20" 1x5 20" self self                  Neuro Re-Ed             Pt education 5' 5' 5' 5' 4' 4'       TA         supine 5" x20 w/ diaphragmatic breathing    bridges 2x10 3" 2x10 3" 2x10 3" 2x10 3" 2x10 3" 2x10 3" 3x10 3" 3x10 3" +PPT  3x10 3" 3x10 3"    Prone hip extension  1x10 5" 1x10 5" 1x10 5" 1x10 5" 1x10 5" held held  1x10 ea alt 3"   Prone press up     1x10 1x10 held held     PPT     1x10 5" 1x10 5" 1x10 5" 1x10 5" W/ bridge    PPT +Marches        1x20 ea alt 1x20 ea alt    Standing hip ext        1x10 3"  ea  NV 1x10 ea 3"   Standing hip abd        NV  1x10 ea 3"   Ther Ex             SKTC 1x5 20" 1x5 20" 1x5 20" 1x5 20" 1x5 20" 1x5 20" 1x5 20" 1x5 20" 1x5 20"    DKTC        1x5 20"  1x5 20" 1x5 20"   Piriformis stretch 1x5 20" 1x5 20" 1x5 20" 1x5 20" 1x5 20" 1x5 20" 1x5 20" 1x5   20" 1x5 20" 1x5 20"   Seated hamstring stretch 1x5 20" 1x5 20" 1x5 20" 1x5 20"         LTR  1x15 1x15 1x15 1x15 1x15 1x15 1x15 5" 1x15 5" 1x15 5"   Pball flexion 3 way  10x 5" 10x 5" 10x 5" 10x 5" 10x 5" 10x 5" 10x 5" nv    STS from chair             Recumbent bike  5' 5' 5' 5' 5' 5' 5' 5' 5'   Rack Pull        To 12" with basketball  1x15 #  NV if able To 12" vertical DB 4# OTB for hip hinge cue   Ther Activity             Wobble board          No UE 20x A/P                Gait Training                                       Modalities

## 2023-03-16 ENCOUNTER — OFFICE VISIT (OUTPATIENT)
Dept: PHYSICAL THERAPY | Facility: CLINIC | Age: 58
End: 2023-03-16

## 2023-03-16 DIAGNOSIS — M54.31 RIGHT SIDED SCIATICA: Primary | ICD-10-CM

## 2023-03-16 NOTE — PROGRESS NOTES
Daily Note     Today's date: 3/16/2023  Patient name: Jackie Scott  : 1965  MRN: [id number]  Referring provider: Pinky Olmos MD  Dx:   Encounter Diagnosis     ICD-10-CM    1  Right sided sciatica  M54 31           Start Time: 1630  Stop Time: 1715  Total time in clinic (min): 45 minutes    Subjective: Pt reports her low back has been feeling better with decreased pain  Objective: See treatment diary below      Assessment: Tolerated treatment well  Patient demonstrated fatigue post treatment, exhibited good technique with therapeutic exercises and would benefit from continued PT  Pt was able to progress today with inclusion of up up down down into pt's exercise program  Pt required min verbal cues to facilitate performance of proper technique  Assess pt response to treatment at next visit  Plan: Continue per plan of care        Precautions: N/A      Manuals 3/16      2/23 2/27 3/1 3/13   DKTC       1x5 20" self     Piriformis stretch 1x5 20"      self self     SKTC 1x5 20"      self self     Hamstring stretch 1x5 20"            Neuro Re-Ed             Pt education             TA         supine 5" x20 w/ diaphragmatic breathing    bridges 3x10      3x10 3" 3x10 3" +PPT  3x10 3" 3x10 3"    Prone hip extension 1x10 5"      held held  1x10 ea alt 3"   Prone press up       held held     Up up down down 1x10            PPT +Marches 1x15 ea       1x20 ea alt 1x20 ea alt    Standing hip ext        1x10 3"  ea  NV 1x10 ea 3"   Standing hip abd        NV  1x10 ea 3"   Ther Ex             SKTC       1x5 20" 1x5 20" 1x5 20"    DKTC        1x5 20"  1x5 20" 1x5 20"   Piriformis stretch 1x5 20"      1x5 20" 1x5   20" 1x5 20" 1x5 20"   Seated hamstring stretch             LTR 1x15 ea      1x15 1x15 5" 1x15 5" 1x15 5"   Pball flexion 3 way 1x10      10x 5" 10x 5" nv    STS from chair             Recumbent bike for cardiovascular endurance 5'      5' 5' 5' 5'   Rack Pull        To 12" with basketball  1x15 #  NV if able To 12" vertical DB 4# OTB for hip hinge cue   Ther Activity             Wobble board          No UE 20x A/P                Gait Training                                       Modalities

## 2023-03-22 ENCOUNTER — ANESTHESIA EVENT (OUTPATIENT)
Dept: PERIOP | Facility: AMBULARY SURGERY CENTER | Age: 58
End: 2023-03-22
Payer: COMMERCIAL

## 2023-03-22 NOTE — PRE-PROCEDURE INSTRUCTIONS
Pre-Surgery Instructions:   Medication Instructions   • atorvastatin (LIPITOR) 20 mg tablet Take day of surgery. • estradiol (ESTRACE) 0.1 mg/g vaginal cream Take night before surgery if needs   • hydrochlorothiazide (HYDRODIURIL) 25 mg tablet Hold day of surgery. • ibuprofen (MOTRIN) 800 mg tablet Stop taking 3 days prior to surgery. • isoniazid (NYDRAZID) 300 mg tablet to check with SOC   • losartan (COZAAR) 50 mg tablet Hold day of surgery. • methocarbamol (ROBAXIN) 500 mg tablet Take night before surgery if needs   • pyridoxine (VITAMIN B6) 50 mg tablet to check with SOC   • tretinoin (REFISSA) 0.05 % cream Hold day of surgery. • triamcinolone (KENALOG) 0.5 % cream Hold day of surgery. Medication instructions for day surgery reviewed. Please use only a sip of water to take your instructed medications. Avoid all over the counter vitamins, supplements and NSAIDS for one week prior to surgery per anesthesia guidelines. Tylenol is ok to take as needed. You will receive a call one business day prior to surgery with an arrival time and hospital directions. If your surgery is scheduled on a Monday, the hospital will be calling you on the Friday prior to your surgery. If you have not heard from anyone by 8pm, please call the hospital supervisor through the hospital  at [phone]. Derinda Gottron [phone]). Do not eat or drink anything after midnight the night before your surgery, including candy, mints, lifesavers, or chewing gum. Do not drink alcohol 24hrs before your surgery. Try not to smoke at least 24hrs before your surgery. Follow the pre surgery showering instructions as listed in the [facility] Surgical Experience Booklet” or otherwise provided by your surgeon's office. Do not shave the surgical area 24 hours before surgery. Do not apply any lotions, creams, including makeup, cologne, deodorant, or perfumes after showering on the day of your surgery.      No contact lenses, eye make-up, or artificial eyelashes. Remove nail polish, including gel polish, and any artificial, gel, or acrylic nails if possible. Remove all jewelry including rings and body piercing jewelry. Wear causal clothing that is easy to take on and off. Consider your type of surgery. Keep any valuables, jewelry, piercings at home. Please bring any specially ordered equipment (sling, braces) if indicated. Arrange for a responsible person to drive you to and from the hospital on the day of your surgery. Visitor Guidelines discussed. Call the surgeon's office with any new illnesses, exposures, or additional questions prior to surgery. Please reference your [facility] Surgical Experience Booklet” for additional information to prepare for your upcoming surgery.

## 2023-03-29 ENCOUNTER — OFFICE VISIT (OUTPATIENT)
Dept: INFECTIOUS DISEASES | Facility: CLINIC | Age: 58
End: 2023-03-29

## 2023-03-29 VITALS
OXYGEN SATURATION: 98 % | SYSTOLIC BLOOD PRESSURE: 134 MMHG | RESPIRATION RATE: 16 BRPM | TEMPERATURE: 97.2 F | DIASTOLIC BLOOD PRESSURE: 82 MMHG | HEART RATE: 62 BPM

## 2023-03-29 DIAGNOSIS — Z22.7 TB LUNG, LATENT: Primary | ICD-10-CM

## 2023-03-29 DIAGNOSIS — E78.5 DYSLIPIDEMIA: ICD-10-CM

## 2023-03-29 DIAGNOSIS — M15.9 PRIMARY OSTEOARTHRITIS INVOLVING MULTIPLE JOINTS: ICD-10-CM

## 2023-03-29 NOTE — PROGRESS NOTES
Progress Note - Infectious Disease   Tatyana Collins 60 y o  female MRN: [id number]  Unit/Bed#:  Encounter: [id number]      IMPRESSION & RECOMMENDATIONS:   Impression/Recommendations:  1  Latent TB  Positive Quantiferon 1/3/23  Negative CXR with no clinical evidence of active TB  Patient complains of fatigue but no other new complaints including GI symptoms or paresthesias  Most recent LFTs remain within normal limits  No history of liver disease  Negative HIV     -Continue INH 300mg daily and pyridoxine 50mg daily  -plan to complete 9 months of treatment   -Continue to avoid hepatotoxins including alcohol  -Follow-up in 4 to 6 weeks   -Recheck LFTs prior to next visit as patient remains on Lipitor   -Patient was provided with letter for work stating she is undergoing treatment  No need for further testing for latent TB      2  HTN/dyslipidemia  No concerning drug interaction with Lipitor      3  OA, with chronic joint pains  Avoid Tylenol use, as above  Antibiotics  Isoniazid/vitamin B6 started 1/11/2030    Discussed above plan in detail with patient and answered all of her questions  Follow-up in office in 4 to 6 weeks  Subjective:  Patient is here for follow-up regarding latent tuberculosis management  Complains of fatigue out of the norm for her for which she is going to bed early every night  Denies fevers, chills, respiratory symptoms, pain, nausea, vomiting, diarrhea, paresthesias  Review of systems: Complete review of systems is negative other than what is noted above  Reviewed and updated as appropriate: Past medical history, past surgical history, social history, current medications, problem list, allergies      Objective:  Vitals:  [unfilled]  [email or web address]: Temperature: (!) 97 2 °F (36 2 °C)    Physical Exam:   General:  Well-nourished, well-developed, in no acute distress  Eyes:  Conjunctive clear with no hemorrhages or effusions  Oropharynx:  No visible lesions  Neck: Trachea midline  Lungs: Nonlabored respirations  Abdomen:  Soft, nondistended  Extremities:  No peripheral cyanosis, clubbing, or edema  Skin:  No rashes, no ulcers  Neurological:  Moves all four extremities spontaneously    Lab Results:  I have personally reviewed pertinent labs  Negative HIV from 2/24/2023 2/24/2023 LFTs within normal limits  Imaging Studies:   I have personally reviewed pertinent imaging study reports and images in PACS  EKG, Pathology, and Other Studies:   I have personally reviewed pertinent reports

## 2023-03-29 NOTE — PATIENT INSTRUCTIONS
Continue INH/B6 as discussed  Please call office if you need refills  Liver function panel prior to appt  Follow up in office in 6 weeks  Please call office if you have any questions or concerns

## 2023-03-31 ENCOUNTER — ANESTHESIA (OUTPATIENT)
Dept: PERIOP | Facility: AMBULARY SURGERY CENTER | Age: 58
End: 2023-03-31
Payer: COMMERCIAL

## 2023-05-05 ENCOUNTER — APPOINTMENT (OUTPATIENT)
Dept: LAB | Facility: CLINIC | Age: 58
End: 2023-05-05

## 2023-05-05 DIAGNOSIS — Z22.7 TB LUNG, LATENT: ICD-10-CM

## 2023-05-05 LAB
ALBUMIN SERPL BCP-MCNC: 3.6 G/DL (ref 3.5–5)
ALP SERPL-CCNC: 83 U/L (ref 46–116)
ALT SERPL W P-5'-P-CCNC: 43 U/L (ref 12–78)
AST SERPL W P-5'-P-CCNC: 28 U/L (ref 5–45)
BILIRUB DIRECT SERPL-MCNC: 0.18 MG/DL (ref 0–0.2)
BILIRUB SERPL-MCNC: 0.49 MG/DL (ref 0.2–1)
PROT SERPL-MCNC: 6.9 G/DL (ref 6.4–8.4)

## 2023-05-06 DIAGNOSIS — I10 ESSENTIAL HYPERTENSION: ICD-10-CM

## 2023-05-08 RX ORDER — HYDROCHLOROTHIAZIDE 25 MG/1
TABLET ORAL
Qty: 90 TABLET | Refills: 1 | Status: SHIPPED | OUTPATIENT
Start: 2023-05-08

## 2023-05-08 RX ORDER — LOSARTAN POTASSIUM 50 MG/1
TABLET ORAL
Qty: 90 TABLET | Refills: 1 | Status: SHIPPED | OUTPATIENT
Start: 2023-05-08

## 2023-05-10 ENCOUNTER — OFFICE VISIT (OUTPATIENT)
Dept: INFECTIOUS DISEASES | Facility: CLINIC | Age: 58
End: 2023-05-10

## 2023-05-10 VITALS
SYSTOLIC BLOOD PRESSURE: 112 MMHG | DIASTOLIC BLOOD PRESSURE: 70 MMHG | BODY MASS INDEX: 27.59 KG/M2 | RESPIRATION RATE: 18 BRPM | HEIGHT: 65 IN | WEIGHT: 165.6 LBS | HEART RATE: 70 BPM

## 2023-05-10 DIAGNOSIS — R76.12 POSITIVE QUANTIFERON-TB GOLD TEST: ICD-10-CM

## 2023-05-10 DIAGNOSIS — Z22.7 TB LUNG, LATENT: Primary | ICD-10-CM

## 2023-05-10 DIAGNOSIS — E78.5 DYSLIPIDEMIA: ICD-10-CM

## 2023-05-10 RX ORDER — PYRIDOXINE HCL (VITAMIN B6) 50 MG
50 TABLET ORAL DAILY
Qty: 30 TABLET | Refills: 0 | Status: SHIPPED | OUTPATIENT
Start: 2023-05-10 | End: 2023-06-09

## 2023-05-10 RX ORDER — ISONIAZID 300 MG/1
300 TABLET ORAL DAILY
Qty: 30 TABLET | Refills: 0 | Status: SHIPPED | OUTPATIENT
Start: 2023-05-10 | End: 2023-06-09

## 2023-05-10 NOTE — PROGRESS NOTES
Progress Note - Infectious Disease   Naa Metcalf 60 y o  female MRN: [id number]  Unit/Bed#:  Encounter: [id number]         IMPRESSION & RECOMMENDATIONS:   Impression/Recommendations:  1  Latent TB  Positive Quantiferon 1/3/23  Negative CXR with no clinical evidence of active TB  No new complaints including GI symptoms or paresthesias  Most recent LFTs remain within normal limits  No history of liver disease  Continues to tolerate antibiotic without difficulty      -Continue INH 300mg daily and pyridoxine 50mg daily  We will provide refills  -plan to complete 9 months of treatment, beginning of October   -Continue to avoid hepatotoxins including alcohol  -Follow-up in 4 to 6 weeks   -Recheck LFTs prior to next visit as patient remains on Lipitor      2  HTN/dyslipidemia  No concerning drug interaction with Lipitor      3  OA, with chronic joint pains  Continue to avoid Tylenol use, as above      Antibiotics  Isoniazid/vitamin B6 started 1/11/2030     Discussed above plan in detail with patient and answered all of her questions  Follow-up in office in 4 to 6 weeks          Subjective:  Patient is here for regular follow-up regarding management of latent tuberculosis  She is feeling well and denies any new concerns regarding the meds  She is fully compliant with them and takes them every day at the same time  No signs of peripheral neuropathy  She is scheduled for trigger finger surgery in July  No new complaints including fevers, chills, respiratory symptoms      Reviewed and updated as appropriate: Past medical history, past surgical history, social history, current medications, allergies, problem list       Objective:  Vitals:  [unfilled]  [email or web address]:      Physical Exam:   General:  Well-nourished, well-developed, in no acute distress  Eyes:  Conjunctive clear with no hemorrhages or effusions  Oropharynx:  No ulcers, no lesions  Neck:  Supple, trachea midline  Lungs: Nonlabored respirations  Abdomen:  Soft, non-tender, non-distented  Extremities:  No peripheral cyanosis, clubbing, or edema  Skin:  No rashes, no ulcers  Neurological:  Moves all four extremities spontaneously    Lab Results:  I have personally reviewed pertinent labs  Results from last 7 days   Lab Units 05/05/23  0837   AST U/L 28   ALT U/L 43   ALK PHOS U/L 83               Imaging Studies:   I have personally reviewed pertinent imaging study reports and images in PACS  EKG, Pathology, and Other Studies:   I have personally reviewed pertinent reports

## 2023-05-15 ENCOUNTER — TELEPHONE (OUTPATIENT)
Dept: INFECTIOUS DISEASES | Facility: CLINIC | Age: 58
End: 2023-05-15

## 2023-05-15 DIAGNOSIS — Z22.7 TB LUNG, LATENT: Primary | ICD-10-CM

## 2023-05-15 NOTE — TELEPHONE ENCOUNTER
Called and spoke with patient today regarding follow up appointment  Informed patient I was call to get her scheduled for her follow up appointment  Patient is scheduled for 6/22/23 at 9:30AM with Janelle Pavon at the [facility] office  Also reminded patient to have labs done prior to appointment  Patient verbalizes understanding at this time

## 2023-06-08 DIAGNOSIS — R76.12 POSITIVE QUANTIFERON-TB GOLD TEST: ICD-10-CM

## 2023-06-09 ENCOUNTER — OFFICE VISIT (OUTPATIENT)
Dept: FAMILY MEDICINE CLINIC | Facility: CLINIC | Age: 58
End: 2023-06-09
Payer: COMMERCIAL

## 2023-06-09 VITALS
SYSTOLIC BLOOD PRESSURE: 132 MMHG | HEART RATE: 60 BPM | HEIGHT: 65 IN | WEIGHT: 170 LBS | DIASTOLIC BLOOD PRESSURE: 84 MMHG | BODY MASS INDEX: 28.32 KG/M2 | OXYGEN SATURATION: 99 % | TEMPERATURE: 97.3 F

## 2023-06-09 DIAGNOSIS — R07.89 OTHER CHEST PAIN: ICD-10-CM

## 2023-06-09 DIAGNOSIS — M65.341 TRIGGER FINGER, RIGHT RING FINGER: ICD-10-CM

## 2023-06-09 DIAGNOSIS — R76.12 POSITIVE QUANTIFERON-TB GOLD TEST: ICD-10-CM

## 2023-06-09 DIAGNOSIS — M65.331 TRIGGER MIDDLE FINGER OF RIGHT HAND: Primary | ICD-10-CM

## 2023-06-09 PROCEDURE — 93000 ELECTROCARDIOGRAM COMPLETE: CPT | Performed by: FAMILY MEDICINE

## 2023-06-09 PROCEDURE — 99213 OFFICE O/P EST LOW 20 MIN: CPT | Performed by: FAMILY MEDICINE

## 2023-06-09 RX ORDER — ISONIAZID 300 MG/1
TABLET ORAL
Qty: 30 TABLET | Refills: 0 | Status: SHIPPED | OUTPATIENT
Start: 2023-06-09 | End: 2023-07-09

## 2023-06-09 RX ORDER — ISONIAZID 300 MG/1
300 TABLET ORAL DAILY
Qty: 30 TABLET | Refills: 0 | Status: SHIPPED | OUTPATIENT
Start: 2023-06-09 | End: 2023-07-09

## 2023-06-09 NOTE — PROGRESS NOTES
Name: Kat Anderson      : 1965      MRN: [id number]  Encounter Provider: Tammy Mcdaniel MD  Encounter Date: 2023   Encounter department: [address] [place] [address]     1  Trigger middle finger of right hand  2  Trigger finger, right ring finger  Cleared for upcoming surgery    3  Positive QuantiFERON-TB Gold test  -     isoniazid (NYDRAZID) 300 mg tablet; Take 1 tablet (300 mg total) by mouth daily    4  Other chest pain  -     POCT ECG- sinus bradycardia noted    Follow up in 6 months           Subjective     Patient is here because she has been having severe right middle and ring trigger finger pain  The pain is 10/10 at its worse it radiates up her right forearm and feels an electric shock up her forearm  Her middle and ring finger also get locked in position  She has tried to work however when she has to use her right hand the pain is unbearable at times and prohibits her from doing her work  She is going for surgeon on  with Dr Starla Holbrook orthopedic surgery  She has been having chest pain at rest  denies any radiation of the chest pain to her left arm  No associated nausea or vomiting symptoms  Also she tested positive for PPD and was prescribed isoniazid for latent tb which she is currently taking  denies any symptoms such as coughing up blood, weight loss, fevers or night sweats  Review of Systems   Constitutional: Negative for activity change, appetite change, fatigue and fever  HENT: Negative for congestion and ear discharge  Respiratory: Negative for cough and shortness of breath  Cardiovascular: Negative for chest pain and palpitations  Gastrointestinal: Negative for diarrhea and nausea  Musculoskeletal: Positive for arthralgias and myalgias  Negative for back pain  Skin: Negative for color change and rash  Neurological: Negative for dizziness and headaches  Psychiatric/Behavioral: Negative for agitation and behavioral problems  "      Past Medical History:   Diagnosis Date   • 2019 novel coronavirus disease (COVID-19) 05/30/2020    x 2 2020 and 2022   • Abnormal Pap smear of cervix    • Arthritis    • BRCA1 negative 2015   • BRCA2 negative 2015   • Gastric ulcer    • Hyperlipidemia    • Hypertension    • Low back pain    • Right trigger finger     long and ring   • TB lung, latent    • Varicella      Past Surgical History:   Procedure Laterality Date   • ANKLE FRACTURE SURGERY Right 03/01/2021    \"ankle fx\"   • APPENDECTOMY     • CATARACT EXTRACTION Bilateral    • COLONOSCOPY     • FOOT SURGERY Right    • HERNIA REPAIR       Family History   Problem Relation Age of Onset   • Ovarian cancer Mother 48   • No Known Problems Father    • Thyroid cancer Sister 52   • No Known Problems Sister    • No Known Problems Daughter    • No Known Problems Son    • No Known Problems Son    • No Known Problems Son    • Cancer Maternal Grandmother         unknown type or age   • No Known Problems Paternal Grandmother    • No Known Problems Maternal Aunt    • Breast cancer Maternal Uncle         52's     Social History     Socioeconomic History   • Marital status: /Civil Union     Spouse name: None   • Number of children: None   • Years of education: None   • Highest education level: None   Occupational History   • None   Tobacco Use   • Smoking status: Never   • Smokeless tobacco: Never   Vaping Use   • Vaping Use: Never used   Substance and Sexual Activity   • Alcohol use: Yes     Comment: rare   • Drug use: No   • Sexual activity: Yes     Partners: Male   Other Topics Concern   • None   Social History Narrative   • None     Social Determinants of Health     Financial Resource Strain: Not on file   Food Insecurity: Not on file   Transportation Needs: Not on file   Physical Activity: Not on file   Stress: Not on file   Social Connections: Not on file   Intimate Partner Violence: Not on file   Housing Stability: Not on file     Current Outpatient " Medications on File Prior to Visit   Medication Sig   • atorvastatin (LIPITOR) 20 mg tablet Take 1 tablet (20 mg total) by mouth daily   • estradiol (ESTRACE) 0 1 mg/g vaginal cream insert 0 5 grams vaginally two times a week and apply A PEA SIZE AMOUNT TO EXTERNAL VAGINAL OPENING AND URETHRA AREA TWICE WEEKLY (Patient taking differently: if needed)   • hydrochlorothiazide (HYDRODIURIL) 25 mg tablet take 1 tablet by mouth once daily   • ibuprofen (MOTRIN) 800 mg tablet Take 1 tablet (800 mg total) by mouth 2 (two) times a day with meals (Patient taking differently: Take 800 mg by mouth every 8 (eight) hours as needed)   • losartan (COZAAR) 50 mg tablet take 1 tablet by mouth once daily   • methocarbamol (ROBAXIN) 500 mg tablet Take 1 tablet (500 mg total) by mouth daily at bedtime (Patient taking differently: Take 500 mg by mouth daily at bedtime as needed)   • pyridoxine (VITAMIN B6) 50 mg tablet Take 1 tablet (50 mg total) by mouth daily   • Pyridoxine HCl (VITAMIN B6 PO) Take by mouth   • tretinoin (REFISSA) 0 05 % cream Apply topically daily at bedtime (Patient taking differently: Apply topically if needed)   • triamcinolone (KENALOG) 0 5 % cream Apply topically 2 (two) times a day (Patient taking differently: Apply topically if needed)   • [DISCONTINUED] isoniazid (NYDRAZID) 300 mg tablet Take 1 tablet (300 mg total) by mouth daily     Allergies   Allergen Reactions   • Kiwi Extract - Food Allergy Hives   • Other      strawberry   • Pineapple - Food Allergy Hives     Immunization History   Administered Date(s) Administered   • COVID-19 PFIZER VACCINE 0 3 ML IM 01/11/2021, 02/01/2021   • COVID-19 Pfizer vac (Nikhil-sucrose, gray cap) 12 yr+ IM 02/09/2022   • H1N1, All Formulations 01/11/2010   • Hep B, adult 06/01/2021, 08/03/2021, 12/02/2021   • INFLUENZA 11/05/2015, 01/30/2019, 11/21/2019, 09/07/2020, 11/29/2021, 10/11/2022   • Influenza Quadrivalent Preservative Free 3 years and older IM 11/05/2015   • "Influenza, seasonal, injectable 10/12/2017   • Tdap 11/18/2015, 07/05/2020   • Zoster Vaccine Recombinant 07/05/2020, 09/07/2020       Objective     /84   Pulse 60   Temp (!) 97 3 °F (36 3 °C)   Ht 5' 5\" (1 651 m)   Wt 77 1 kg (170 lb)   SpO2 99%   BMI 28 29 kg/m²     Physical Exam  Constitutional:       General: She is not in acute distress  Appearance: She is well-developed  She is not diaphoretic  HENT:      Head: Normocephalic and atraumatic  Nose: Nose normal    Eyes:      Conjunctiva/sclera: Conjunctivae normal       Pupils: Pupils are equal, round, and reactive to light  Cardiovascular:      Rate and Rhythm: Normal rate and regular rhythm  Heart sounds: Normal heart sounds  No murmur heard  Pulmonary:      Effort: Pulmonary effort is normal  No respiratory distress  Breath sounds: Normal breath sounds  No wheezing  Abdominal:      General: Bowel sounds are normal  There is no distension  Palpations: Abdomen is soft  Tenderness: There is no abdominal tenderness  Musculoskeletal:         General: Tenderness and deformity present  Comments: When she makes a fist right middle and ring finger stay on flexed position  Skin:     General: Skin is warm and dry  Findings: No erythema or rash  Neurological:      Mental Status: She is alert and oriented to person, place, and time         María Elena Barboza MD  "

## 2023-06-14 ENCOUNTER — APPOINTMENT (OUTPATIENT)
Dept: LAB | Facility: CLINIC | Age: 58
End: 2023-06-14
Payer: COMMERCIAL

## 2023-06-14 DIAGNOSIS — Z22.7 TB LUNG, LATENT: ICD-10-CM

## 2023-06-14 LAB
ALBUMIN SERPL BCP-MCNC: 3.6 G/DL (ref 3.5–5)
ALP SERPL-CCNC: 98 U/L (ref 46–116)
ALT SERPL W P-5'-P-CCNC: 53 U/L (ref 12–78)
AST SERPL W P-5'-P-CCNC: 39 U/L (ref 5–45)
BILIRUB DIRECT SERPL-MCNC: 0.18 MG/DL (ref 0–0.2)
BILIRUB SERPL-MCNC: 0.56 MG/DL (ref 0.2–1)
PROT SERPL-MCNC: 7.4 G/DL (ref 6.4–8.4)

## 2023-06-14 PROCEDURE — 36415 COLL VENOUS BLD VENIPUNCTURE: CPT

## 2023-06-14 PROCEDURE — 80076 HEPATIC FUNCTION PANEL: CPT

## 2023-06-22 ENCOUNTER — OFFICE VISIT (OUTPATIENT)
Dept: INFECTIOUS DISEASES | Facility: CLINIC | Age: 58
End: 2023-06-22
Payer: COMMERCIAL

## 2023-06-22 VITALS
SYSTOLIC BLOOD PRESSURE: 118 MMHG | BODY MASS INDEX: 27.66 KG/M2 | DIASTOLIC BLOOD PRESSURE: 78 MMHG | RESPIRATION RATE: 18 BRPM | OXYGEN SATURATION: 98 % | HEART RATE: 78 BPM | HEIGHT: 65 IN | WEIGHT: 166 LBS

## 2023-06-22 DIAGNOSIS — R76.12 POSITIVE QUANTIFERON-TB GOLD TEST: ICD-10-CM

## 2023-06-22 DIAGNOSIS — Z22.7 TB LUNG, LATENT: Primary | ICD-10-CM

## 2023-06-22 PROCEDURE — 99212 OFFICE O/P EST SF 10 MIN: CPT | Performed by: PHYSICIAN ASSISTANT

## 2023-06-22 RX ORDER — ISONIAZID 300 MG/1
300 TABLET ORAL DAILY
Qty: 120 TABLET | Refills: 0 | Status: SHIPPED | OUTPATIENT
Start: 2023-06-22 | End: 2023-10-20

## 2023-06-22 NOTE — PROGRESS NOTES
Outpatient Progress Note - [facility] Infectious Disease   Shelba Paget 60 y o  female MRN: [id number]  Encounter: [id number]    Assessment/Plan:  1  Latent TB  Positive Quantiferon 1/3/23  Negative CXR with no clinical evidence of active TB  HIV neg  No new GI symptoms but reports 3 days of paresthesias that resolved spontaneously and have not recurred  Most recent LFTs remain within normal limits  Continues to tolerate antibiotic without difficulty    -Continue INH 300mg daily and pyridoxine 50mg daily - Rx sent   -plan to complete 9 months of treatment through the beginning of October  -Continue to avoid hepatotoxins including etoh/APAP  -Follow-up in 6 weeks   -Recheck LFTs prior to next visit as patient remains on Lipitor      2  HTN/dyslipidemia   No concerning drug interaction with Lipitor      3  OA, with chronic joint pains  Continue to avoid Tylenol use, as above  Above assessment and plan discussed in detail with patient during examination  Antibiotics:  INH/B6     Subjective:  Patient presents to outpatient ID office for ongoing management of latent TB  She is tolerating the abx without new sx  No new medications  No GI complaints  Reports 3 day hx of numbness/tingling that resolved  It was worse in R LE/foot which she attributes to prior LE trauma/surgery  Sx have not recurred  No fevers chills, cough, SOB, weight loss, night sweats, etc       Objective:    Vitals:   Vitals:    06/22/23 0927   BP: 118/78   Pulse: 78   Resp: 18   SpO2: 98%     Physical Exam:     General Appearance:  Alert, interactive, nontoxic, no acute distress  HEENT: Oropharynx moist without lesions  Lungs:   Clear to auscultation bilaterally; no wheezes, rhonchi or rales; respirations unlabored   Heart:  RRR; no murmur   Abdomen:   Soft, non-tender, non-distended, positive bowel sounds  No palpable organomegaly  Extremities: No clubbing, cyanosis or edema   Skin: No new rashes or lesions  No draining wounds noted  Labs, Imaging, & Other studies:   All pertinent labs and imaging studies were personally reviewed by me from 6/14      The following portions of the patient's history were reviewed and updated as appropriate: allergies, current medications, past family history, past medical history, past social history, past surgical history and problem list

## 2023-06-22 NOTE — PATIENT INSTRUCTIONS
-Continue INH 300mg daily and pyridoxine 50mg daily  We will provide refills    -plan to complete 9 months of treatment through the beginning of October  -Continue to avoid hepatotoxins including alcohol and tylenol  -Follow-up in 6 weeks   -Recheck LFTs prior to next visit as patient remains on Lipitor

## 2023-07-06 ENCOUNTER — APPOINTMENT (OUTPATIENT)
Dept: LAB | Facility: CLINIC | Age: 58
End: 2023-07-06
Payer: COMMERCIAL

## 2023-07-06 DIAGNOSIS — Z01.812 ENCOUNTER FOR PRE-OPERATIVE LABORATORY TESTING: ICD-10-CM

## 2023-07-06 DIAGNOSIS — R76.12 POSITIVE QUANTIFERON-TB GOLD TEST: ICD-10-CM

## 2023-07-06 DIAGNOSIS — Z22.7 TB LUNG, LATENT: ICD-10-CM

## 2023-07-06 LAB
ALBUMIN SERPL BCP-MCNC: 3.7 G/DL (ref 3.5–5)
ALP SERPL-CCNC: 93 U/L (ref 46–116)
ALT SERPL W P-5'-P-CCNC: 37 U/L (ref 12–78)
ANION GAP SERPL CALCULATED.3IONS-SCNC: 3 MMOL/L
AST SERPL W P-5'-P-CCNC: 27 U/L (ref 5–45)
BILIRUB DIRECT SERPL-MCNC: 0.18 MG/DL (ref 0–0.2)
BILIRUB SERPL-MCNC: 0.62 MG/DL (ref 0.2–1)
BUN SERPL-MCNC: 22 MG/DL (ref 5–25)
CALCIUM SERPL-MCNC: 9.5 MG/DL (ref 8.3–10.1)
CHLORIDE SERPL-SCNC: 110 MMOL/L (ref 96–108)
CO2 SERPL-SCNC: 25 MMOL/L (ref 21–32)
CREAT SERPL-MCNC: 0.76 MG/DL (ref 0.6–1.3)
GFR SERPL CREATININE-BSD FRML MDRD: 86 ML/MIN/1.73SQ M
GLUCOSE P FAST SERPL-MCNC: 102 MG/DL (ref 65–99)
POTASSIUM SERPL-SCNC: 3.7 MMOL/L (ref 3.5–5.3)
PROT SERPL-MCNC: 7.6 G/DL (ref 6.4–8.4)
SODIUM SERPL-SCNC: 138 MMOL/L (ref 135–147)

## 2023-07-06 PROCEDURE — 80048 BASIC METABOLIC PNL TOTAL CA: CPT

## 2023-07-06 PROCEDURE — 80076 HEPATIC FUNCTION PANEL: CPT

## 2023-07-06 PROCEDURE — 36415 COLL VENOUS BLD VENIPUNCTURE: CPT

## 2023-07-10 NOTE — H&P
ASSESSMENT/PLAN:       61year old female here for her right ring and trigger fingers. The non operative treatments with cortisone injections didn't help her symptoms. The anatomy and physiology of trigger finger was discussed with the patient today in the office. Edema and increased contact pressure within the flexor tendons at the A1 pulley can cause pain, crepitation, and limitation of function. Treatment options include resting MP blocking splints to decrease edema, oral anti-inflammatory medications, home or formal therapy exercises, up to 2 steroid injections within the tendon sheath, or surgical release. While majority of patients do respond to conservative treatment, up to 20% may require surgical release. This is an outpatient procedure where the hand will be wrapped up for 5 days post op. She can not get it wet or have patient contact. After 5 days she can take the wraps off, and can wash with warm water soap and pat dry, but do not submerge. Sutures will come out between 10-14 days post op. Patient shows understanding and wishes to proceed. We will follow up with her post op.      The patient verbalized understanding of exam findings and treatment plan. We engaged in the shared decision-making process and treatment options were discussed at length with the patient. Surgical and conservative management discussed today along with risks and benefits.     Diagnoses and all orders for this visit:     Trigger ring finger of right hand  -     Case request operating room: RELEASE TRIGGER FINGER LONG FINGER AND RING FINGER; Standing     Trigger middle finger of right hand  -     Case request operating room: RELEASE TRIGGER FINGER LONG FINGER AND [address] [place] [address]; Standing     Encounter for pre-operative laboratory testing  -     Basic metabolic panel;  Future     Other orders  -     Nursing Communication [address] [place] [address] Interventions Implemented; Standing  -     Nursing Communication [facility] bath, have staff wash entire body (neck down) per pre-op bathing protocol. Routine, evening prior to, and day of surgery.; Standing  -     Nursing Communication Swab both nares with Povidone-Iodine solution, EXCLUDE if patient has shellfish/Iodine allergy, and replace with nasal alcohol swabstick. Routine, day of surgery, on call to OR; Standing  -     Void on call to OR; Standing  -     Insert peripheral IV; Standing  -     Diet NPO; Sips with meds; Standing  -     Apply Sequential Compression Device; Standing             Trigger Finger Release: The anatomy and physiology of trigger finger was discussed with the patient today in the office. Edema and increased contact pressure within the flexor tendons at the A1 pulley can cause pain, crepitation, and limitation of function. Treatment options include resting MP blocking splints to decrease edema, oral anti-inflammatory medications, home or formal therapy exercises, up to 2 steroid injections or surgical release. While majority of patients do respond to conservative treatment, up to 20% may require surgical release. The patient has elected release of the trigger finger. The patient has elected to undergo a release of the A1 pulley (trigger finger). A small incision will be made over the palmar aspect of the hand, the tendon sheath holding the flexor tendons will be released. In the postoperative period, light activities are allowed immediately, driving is allowed when narcotic medication has stopped, and the incision may get wet after 2 days. Heavy activities (lifting more than approximately 10 pounds) will be allowed after the follow up appointment in 1-2 weeks. While the pain and discomfort within the wrist typically improves rapidly, some residual discomfort may be present for up to 6 weeks.   The nodule that is typically palpable in the palmar aspect of the hand will not be removed, as this would necessitate removal of a portion of the flexor tendon, however the catching, clicking, and locking should resolve. Approximate success rate is 98%. The risks and benefits of the procedure were explained to the patient, which include, but are not limited to: Bleeding, infection, recurrence, pain, scar, damage to tendons, damage to nerves, and damage to blood vessels, need for future surgery and complications related to anesthesia. If bony work is done, risks also include malunion and nonunion. These risks, along with alternative conservative treatment options, and postoperative protocols were voiced back and understood by the patient. All questions were answered to the patient's satisfaction. The patient agrees to comply with a standard postoperative protocol, and is willing to proceed. Education was provided via written and auditory forms. There were no barriers to learning. Written handouts regarding wound care, incision and scar care, and general preoperative information, as well as risks and benefits were provided to the patient.     Follow Up:  No follow-ups on file.        To Do Next Visit:  Re-evaluation of current issue     ____________________________________________________________________________________________________________________________________________        CHIEF COMPLAINT:      Chief Complaint   Patient presents with   • Right Hand - Locking         SUBJECTIVE:  Riesa Gowers is a 61y.o. year old RHD female who presents today for a 6 week follow up for her right long and ring trigger fingers. At her last visit on 12/5/2022, patient had cortisone injections for both the right long and ring trigger fingers, she did not give her any significant relief of her symptoms. She reports having a terrible headache post injections and drank some coffee for the caffeine. This helped her headache. Her long finger continues to get stuck and is painful.   She wishes to discuss surgical intervention at this time.        I have personally reviewed all the relevant PMH, PSH, SH, FH, Medications and allergies.      PAST MEDICAL HISTORY:       Past Medical History:   Diagnosis Date   • 2019 novel coronavirus disease (COVID-19) 05/30/2020   • Abnormal Pap smear of cervix     • Acute maxillary sinusitis 12/27/2017   • Acute pain of right shoulder 04/10/2019   • BRCA1 negative 2015   • BRCA2 negative 2015   • Gastric ulcer     • Hyperproinsulinemia       [id number] resolved   • Swelling of finger joint of right hand 12/10/2019   • Varicella           PAST SURGICAL HISTORY:        Past Surgical History:   Procedure Laterality Date   • ANKLE FRACTURE SURGERY Right 03/01/2021     "ankle fx"   • APPENDECTOMY       • FOOT SURGERY Right     • HERNIA REPAIR             FAMILY HISTORY:        Family History   Problem Relation Age of Onset   • Ovarian cancer Mother 48   • No Known Problems Father     • Thyroid cancer Sister 52   • No Known Problems Sister     • No Known Problems Daughter     • No Known Problems Son     • No Known Problems Son     • No Known Problems Son     • Cancer Maternal Grandmother           unknown type or age   • No Known Problems Paternal Grandmother     • No Known Problems Maternal Aunt     • Breast cancer Maternal Uncle           52's         SOCIAL HISTORY:  Social History           Tobacco Use   • Smoking status: Never   • Smokeless tobacco: Never   Vaping Use   • Vaping Use: Never used   Substance Use Topics   • Alcohol use: Not Currently   • Drug use:  No         MEDICATIONS:     Current Outpatient Medications:   •  atorvastatin (LIPITOR) 20 mg tablet, Take 1 tablet (20 mg total) by mouth daily, Disp: 90 tablet, Rfl: 3  •  estradiol (ESTRACE) 0.1 mg/g vaginal cream, insert 0.5 grams vaginally two times a week and apply A PEA SIZE AMOUNT TO EXTERNAL VAGINAL OPENING AND URETHRA AREA TWICE WEEKLY, Disp: 42.5 g, Rfl: 0  •  hydrochlorothiazide (HYDRODIURIL) 25 mg tablet, Take 1 tablet (25 mg total) by mouth daily, Disp: 90 tablet, Rfl: 1  •  isoniazid (NYDRAZID) 300 mg tablet, Take 1 tablet (300 mg total) by mouth daily, Disp: 30 tablet, Rfl: 3  •  losartan (COZAAR) 50 mg tablet, Take 1 tablet (50 mg total) by mouth daily, Disp: 90 tablet, Rfl: 1  •  pyridoxine (VITAMIN B6) 50 mg tablet, Take 1 tablet (50 mg total) by mouth daily, Disp: 30 tablet, Rfl: 3  •  tretinoin (REFISSA) 0.05 % cream, Apply topically daily at bedtime, Disp: 45 g, Rfl: 1  •  triamcinolone (KENALOG) 0.5 % cream, Apply topically 2 (two) times a day, Disp: 30 g, Rfl: 1  •  diclofenac (VOLTAREN) 75 mg EC tablet, Take 1 tablet (75 mg total) by mouth 2 (two) times a day (Patient not taking: Reported on 1/11/2023), Disp: 60 tablet, Rfl: 3  •  methocarbamol (Robaxin-750) 750 mg tablet, Take 1 tablet (750 mg total) by mouth every 6 (six) hours as needed for muscle spasms (Patient not taking: Reported on 11/11/2022), Disp: 30 tablet, Rfl: 0  •  Promethazine-DM (PHENERGAN-DM) 6.25-15 mg/5 mL oral syrup, Take 5 mL by mouth 4 (four) times a day as needed for cough (Patient not taking: Reported on 1/11/2023), Disp: 118 mL, Rfl: 0     ALLERGIES:        Allergies   Allergen Reactions   • Kiwi Extract - Food Allergy Hives   • Other         strawberry   • Pineapple - Food Allergy Hives         REVIEW OF SYSTEMS:  Review of Systems   Constitutional: Negative for chills, fever and unexpected weight change. HENT: Negative for hearing loss, nosebleeds and sore throat. Eyes: Negative for pain, redness and visual disturbance. Respiratory: Negative for cough, shortness of breath and wheezing. Cardiovascular: Negative for chest pain, palpitations and leg swelling. Gastrointestinal: Negative for abdominal pain and nausea. Genitourinary: Negative for dyspareunia, dysuria and frequency. Musculoskeletal: Positive for myalgias. Skin: Negative for rash and wound. Neurological: Negative for dizziness, numbness and headaches. Psychiatric/Behavioral: Negative for decreased concentration and suicidal ideas.  The patient is not nervous/anxious.          VITALS:      Vitals:     01/25/23 1418   BP: 116/70   Pulse: 76         LABS:  HgA1c:         Lab Results   Component Value Date     HGBA1C 5.8 (H) 09/09/2022      BMP:         Lab Results   Component Value Date     CALCIUM 9.9 10/10/2022     K 4.0 10/10/2022     CO2 27 10/10/2022      (H) 10/10/2022     BUN 16 10/10/2022     CREATININE 0.66 10/10/2022         _____________________________________________________  PHYSICAL EXAMINATION:  General: well developed and well nourished, alert, oriented times 3 and appears comfortable  Psychiatric: Normal  HEENT: Normocephalic, Atraumatic Trachea Midline, No torticollis  Pulmonary: No audible wheezing or respiratory distress   Cardiovascular: No pitting edema, 2+ radial pulse   Abdominal/GI: abdomen non tender, non distended   Skin: No masses, erythema, lacerations, fluctation, ulcerations  Neurovascular: Sensation Intact to the Median, Ulnar, Radial Nerve, Motor Intact to the Median, Ulnar, Radial Nerve and Pulses Intact  Musculoskeletal: Normal, except as noted in detailed exam and in HPI.        MUSCULOSKELETAL EXAMINATION:  right long finger:  Negative palpable nodule over the A1 pulley. Positive tenderness to palpation over A1 pulley. Positive catching. Positive clicking.       right ring finger:  Negative palpable nodule over the A1 pulley. Positive tenderness to palpation over A1 pulley. Positive catching. Positive clicking.    ___________________________________________________  STUDIES REVIEWED:  No images reviewed at today's visit              PROCEDURES PERFORMED:  Procedures  No Procedures performed today     _____________________________________________________             Scribe Attestation    I,:  Getachew Swan am acting as a scribe while in the presence of the attending physician.:       I,:  Justo Peters MD personally performed the services described in this documentation    as scribed in my presence. :

## 2023-07-11 ENCOUNTER — HOSPITAL ENCOUNTER (OUTPATIENT)
Facility: AMBULARY SURGERY CENTER | Age: 58
Setting detail: OUTPATIENT SURGERY
Discharge: HOME | End: 2023-07-11
Attending: SURGERY | Admitting: SURGERY
Payer: COMMERCIAL

## 2023-07-11 VITALS
HEART RATE: 60 BPM | WEIGHT: 165 LBS | SYSTOLIC BLOOD PRESSURE: 135 MMHG | BODY MASS INDEX: 27.49 KG/M2 | DIASTOLIC BLOOD PRESSURE: 86 MMHG | OXYGEN SATURATION: 100 % | HEIGHT: 65 IN | TEMPERATURE: 97 F | RESPIRATION RATE: 17 BRPM

## 2023-07-11 DIAGNOSIS — Z47.89 AFTERCARE FOLLOWING SURGERY OF THE MUSCULOSKELETAL SYSTEM: ICD-10-CM

## 2023-07-11 DIAGNOSIS — M65.331 TRIGGER MIDDLE FINGER OF RIGHT HAND: Primary | ICD-10-CM

## 2023-07-11 DIAGNOSIS — M65.341 TRIGGER RING FINGER OF RIGHT HAND: ICD-10-CM

## 2023-07-11 PROCEDURE — 26055 INCISE FINGER TENDON SHEATH: CPT | Performed by: SURGERY

## 2023-07-11 PROCEDURE — NC001 PR NO CHARGE: Performed by: SURGERY

## 2023-07-11 RX ORDER — OXYCODONE HYDROCHLORIDE AND ACETAMINOPHEN 5; 325 MG/1; MG/1
1 TABLET ORAL EVERY 4 HOURS PRN
Qty: 6 TABLET | Refills: 0 | Status: SHIPPED | OUTPATIENT
Start: 2023-07-11 | End: 2023-07-21

## 2023-07-11 RX ORDER — SODIUM CHLORIDE, SODIUM LACTATE, POTASSIUM CHLORIDE, CALCIUM CHLORIDE 600; 310; 30; 20 MG/100ML; MG/100ML; MG/100ML; MG/100ML
125 INJECTION, SOLUTION INTRAVENOUS CONTINUOUS
Status: DISCONTINUED | OUTPATIENT
Start: 2023-07-11 | End: 2023-07-11 | Stop reason: HOSPADM

## 2023-07-11 RX ORDER — PROPOFOL 10 MG/ML
INJECTION, EMULSION INTRAVENOUS CONTINUOUS PRN
Status: DISCONTINUED | OUTPATIENT
Start: 2023-07-11 | End: 2023-07-11

## 2023-07-11 RX ORDER — SODIUM CHLORIDE, SODIUM LACTATE, POTASSIUM CHLORIDE, CALCIUM CHLORIDE 600; 310; 30; 20 MG/100ML; MG/100ML; MG/100ML; MG/100ML
INJECTION, SOLUTION INTRAVENOUS CONTINUOUS PRN
Status: DISCONTINUED | OUTPATIENT
Start: 2023-07-11 | End: 2023-07-11

## 2023-07-11 RX ORDER — FENTANYL CITRATE 50 UG/ML
INJECTION, SOLUTION INTRAMUSCULAR; INTRAVENOUS AS NEEDED
Status: DISCONTINUED | OUTPATIENT
Start: 2023-07-11 | End: 2023-07-11

## 2023-07-11 RX ORDER — MAGNESIUM HYDROXIDE 1200 MG/15ML
LIQUID ORAL AS NEEDED
Status: DISCONTINUED | OUTPATIENT
Start: 2023-07-11 | End: 2023-07-11 | Stop reason: HOSPADM

## 2023-07-11 RX ORDER — HYDROCODONE BITARTRATE AND ACETAMINOPHEN 5; 325 MG/1; MG/1
1 TABLET ORAL EVERY 6 HOURS PRN
Status: DISCONTINUED | OUTPATIENT
Start: 2023-07-11 | End: 2023-07-11 | Stop reason: HOSPADM

## 2023-07-11 RX ORDER — CEFAZOLIN SODIUM 2 G/50ML
2000 SOLUTION INTRAVENOUS EVERY 8 HOURS
Status: DISCONTINUED | OUTPATIENT
Start: 2023-07-11 | End: 2023-07-11 | Stop reason: ALTCHOICE

## 2023-07-11 RX ORDER — PROPOFOL 10 MG/ML
INJECTION, EMULSION INTRAVENOUS AS NEEDED
Status: DISCONTINUED | OUTPATIENT
Start: 2023-07-11 | End: 2023-07-11

## 2023-07-11 RX ORDER — FENTANYL CITRATE/PF 50 MCG/ML
25 SYRINGE (ML) INJECTION
Status: DISCONTINUED | OUTPATIENT
Start: 2023-07-11 | End: 2023-07-11 | Stop reason: HOSPADM

## 2023-07-11 RX ORDER — MIDAZOLAM HYDROCHLORIDE 2 MG/2ML
INJECTION, SOLUTION INTRAMUSCULAR; INTRAVENOUS AS NEEDED
Status: DISCONTINUED | OUTPATIENT
Start: 2023-07-11 | End: 2023-07-11

## 2023-07-11 RX ORDER — ONDANSETRON 2 MG/ML
4 INJECTION INTRAMUSCULAR; INTRAVENOUS ONCE AS NEEDED
Status: DISCONTINUED | OUTPATIENT
Start: 2023-07-11 | End: 2023-07-11 | Stop reason: HOSPADM

## 2023-07-11 RX ORDER — CEFAZOLIN SODIUM 1 G/50ML
1000 SOLUTION INTRAVENOUS ONCE
Status: COMPLETED | OUTPATIENT
Start: 2023-07-11 | End: 2023-07-11

## 2023-07-11 RX ORDER — SODIUM CHLORIDE, SODIUM LACTATE, POTASSIUM CHLORIDE, CALCIUM CHLORIDE 600; 310; 30; 20 MG/100ML; MG/100ML; MG/100ML; MG/100ML
20 INJECTION, SOLUTION INTRAVENOUS CONTINUOUS
Status: DISCONTINUED | OUTPATIENT
Start: 2023-07-11 | End: 2023-07-11 | Stop reason: HOSPADM

## 2023-07-11 RX ADMIN — PROPOFOL 100 MCG/KG/MIN: 10 INJECTION, EMULSION INTRAVENOUS at 08:23

## 2023-07-11 RX ADMIN — FENTANYL CITRATE 50 MCG: 50 INJECTION, SOLUTION INTRAMUSCULAR; INTRAVENOUS at 08:21

## 2023-07-11 RX ADMIN — MIDAZOLAM HYDROCHLORIDE 2 MG: 1 INJECTION, SOLUTION INTRAMUSCULAR; INTRAVENOUS at 08:21

## 2023-07-11 RX ADMIN — FENTANYL CITRATE 50 MCG: 50 INJECTION, SOLUTION INTRAMUSCULAR; INTRAVENOUS at 08:29

## 2023-07-11 RX ADMIN — CEFAZOLIN SODIUM 1000 MG: 1 SOLUTION INTRAVENOUS at 08:21

## 2023-07-11 RX ADMIN — SODIUM CHLORIDE, SODIUM LACTATE, POTASSIUM CHLORIDE, AND CALCIUM CHLORIDE: .6; .31; .03; .02 INJECTION, SOLUTION INTRAVENOUS at 08:21

## 2023-07-11 RX ADMIN — PROPOFOL 50 MG: 10 INJECTION, EMULSION INTRAVENOUS at 08:23

## 2023-07-11 RX ADMIN — HYDROCODONE BITARTRATE AND ACETAMINOPHEN 1 TABLET: 5; 325 TABLET ORAL at 09:40

## 2023-07-11 NOTE — OP NOTE
OPERATIVE REPORT  PATIENT NAME: Jean Paul Chinchilla  :  1965  MRN: [id number]  Pt Location: [facility]    SURGERY DATE: 23    Surgeon(s) and Role:     * Mildred Sandoval MD - Primary     * Chava Naik PA-C - Assisting     * Marielle Leary MD - Observing    Pre-Op Diagnosis:  Trigger ring finger of right hand [M65.341]  Trigger middle finger of right hand [M65.331]    Post-Op Diagnosis Codes:     * Trigger ring finger of right hand [M65.341]     * Trigger middle finger of right hand [M65.331]    Procedure(s):  RELEASE TRIGGER FINGER LONG FINGER AND RING FINGER (Right)    Specimen(s):  No specimens collected during this procedure. Estimated Blood Loss:   Minimal    Anesthesia Type:   Conscious Sedation     IMPLANTS:  * No implants in log *    PERIOPERATIVE ANTIBIOTICS:    cefazolin, 1 gram    Tourniquet Time: 4 min  at 250 mmHg          Operative Indications: The patient has a history of Trigger Finger  right  long finger, ring finger that was recalcitrant to conservative management. The decision was made to bring the patient to the operating room for Trigger Finger Release  right  long finger, ring finger. Risks of the procedure were explained which include, but are not limited to bleeding; infection; damage to nerves, arteries,veins, tendons; scar; pain; need for reoperation; failure to give desired result; and risks of anaesthesia. All questions were answered to satisfaction and they were willing to proceed. Operative Findings:  Hypertrophy A1 pulleys long and ring     Complications:   None    Procedure and Technique:  After the patient, site, and procedure were identified, the patient was brought into the operating room in a supine position. Local anaesthesia and sedation were provided. A well padded tourniquet was applied to the extremity, set at 250 mmHg. The  right upper extremity was then prepped and drapped in a normal, sterile, orthopedic fashion.        A longitudinal  incision is made in line with the long  finger overlying the A1 pulley. . Dissection was  carried down under loupe magnification. Skin and subcutaneous tissues were sharply incised. The tissue was elevated off the A1 pulley. The A1 pulley was  identified and incised. There was no retinacular cyst noted. The FDS and FDP were noted to have independent glide after release. A  longitudinal  incision is made in line with the romg  finger overlying the A1 pulley. . Dissection was  carried down under loupe magnification. Skin and subcutaneous tissues were sharply incised. The tissue was elevated off the A1 pulley. The A1 pulley was  identified and incised. There was no retinacular cyst noted. The FDS and FDP were noted to have independent glide after release. The patient was able to fully flex and extend without any triggering. At the completion of the procedure, hemostasis was obtained with cautery and direct pressure. The wounds were copiously irrigated with sterile solution. The wounds were closed with Prolene. Sterile dressings were applied, including Xeroform, gauze, tweeners, webril, ACE. Please note, all sponge, needle, and instrument counts were correct prior to closure. Loupe magnification was utilized. The patient tolerated the procedure well. I was present for the entire procedure., A qualified resident physician was not available. and A physician assistant was required during the procedure for retraction, tissue handling, dissection and suturing.     Patient Disposition:  PACU     SIGNATURE: Mildred Sandoval MD  DATE: 07/11/23  TIME: 8:42 AM

## 2023-07-11 NOTE — INTERVAL H&P NOTE
H&P reviewed. After examining the patient I find no changes in the patients condition since the H&P had been written.

## 2023-07-11 NOTE — DISCHARGE INSTR - AVS FIRST PAGE
Post Operative Instructions    You have had surgery on your arm today, please read and follow the information below:  Elevate your hand above your elbow during the next 24-48 hours to help with swelling. Place your hand and arm over your head with motion at your shoulder three times a day. Do not apply any cream/ointment/oil to your incisions including antibiotics. Do not soak your hands in standing water (dishwater, tubs, Jacuzzi's, pools, etc.) until given permission (typically 2-3 weeks after injury)    Call the office if you notice any:  Increased numbness or tingling of your hand or fingers that is not relieved with elevation. Increasing pain that is not controlled with medication. Difficulty chewing, breathing, swallowing. Chest pains or shortness of breath. Fever over 101.4 degrees. Bandage: Please keep bandages clean and dry. Remove bandage after 5 days. Once bandages are removed you may wash hands with soap and water. Short showers are okay as well, but please avoid soaking the hand as described above (ie no pools, baths, dirty dish water, hot tubs, ocean/lake water, etc). Sutures will be removed in the office at your first follow up visit, please do not remove them yourself. Please do NOT put any topical agents on the surgical wound including neosporin, peroxide, tea tree oil, vitamin E, etc. as these can delay wound healing. Motion: Move fingers into a fist 5 times a day, DO NOT move any splinted fingers. Weight bearing status: Avoid heavy lifting (>5 pounds) with the extremity that was operated on until follow up appointment. Normal activities of daily living are OK. Ice: Ice for 10 minutes every hour as needed for swelling x 24 hours. Sling: No sling necessary.     Pain medication:   Naproxen 220 mg two times a day (this is over the counter!)  *do not take this medication if you were told by your doctor that you cannot take anti-inflammatories or NSAIDS  Tylenol Extended Release 650 mg every 8 hours (this is over the counter!)   Norco/Percocet one tab every 6 hours ONLY AS NEEDED for severe pain        Follow-up Appointment: 10-14 days with Dr Loi Mcmullen        Please call the office if you have any questions or concerns regarding your post-operative care.

## 2023-07-11 NOTE — ANESTHESIA POSTPROCEDURE EVALUATION
Post-Op Assessment Note    CV Status:  Stable  Pain Score: 0    Pain management: adequate     Mental Status:  Alert and awake   Hydration Status:  Euvolemic   PONV Controlled:  Controlled   Airway Patency:  Patent      Post Op Vitals Reviewed: Yes      Staff: Anesthesiologist         No notable events documented.     /86 (07/11/23 0941)    Temp (!) 97 °F (36.1 °C) (07/11/23 0941)    Pulse 60 (07/11/23 0941)   Resp 17 (07/11/23 0941)    SpO2 100 % (07/11/23 0941)

## 2023-07-11 NOTE — ANESTHESIA PREPROCEDURE EVALUATION
Procedure:  RELEASE TRIGGER FINGER LONG FINGER AND RING FINGER (Right: Middle Finger)    Relevant Problems   CARDIO   (+) Essential hypertension   (+) Migraine headache      MUSCULOSKELETAL   (+) Acute bilateral low back pain without sciatica   (+) Primary osteoarthritis involving multiple joints   (+) Sciatica, right side      NEURO/PSYCH   (+) Migraine headache      PULMONARY   (+) Shortness of breath      Nervous and Auditory   (+) Lumbar radiculopathy      Other   (+) Dyslipidemia        Physical Exam    Airway    Mallampati score: III  TM Distance: >3 FB  Neck ROM: full     Dental       Cardiovascular      Pulmonary      Other Findings        Anesthesia Plan  ASA Score- 2     Anesthesia Type- IV sedation with anesthesia with ASA Monitors. Additional Monitors:   Airway Plan:           Plan Factors-    Chart reviewed. Existing labs reviewed. Patient summary reviewed. Induction- intravenous. Postoperative Plan- Plan for postoperative opioid use. Informed Consent- Anesthetic plan and risks discussed with patient. I personally reviewed this patient with the CRNA. Discussed and agreed on the Anesthesia Plan with the CRNA. Brock Trivedi

## 2023-07-20 ENCOUNTER — OFFICE VISIT (OUTPATIENT)
Dept: OBGYN CLINIC | Facility: CLINIC | Age: 58
End: 2023-07-20

## 2023-07-20 VITALS — DIASTOLIC BLOOD PRESSURE: 85 MMHG | SYSTOLIC BLOOD PRESSURE: 128 MMHG | HEART RATE: 62 BPM

## 2023-07-20 DIAGNOSIS — Z98.890 S/P TRIGGER FINGER RELEASE: Primary | ICD-10-CM

## 2023-07-20 DIAGNOSIS — M65.342 TRIGGER FINGER, LEFT RING FINGER: ICD-10-CM

## 2023-07-20 PROCEDURE — 99024 POSTOP FOLLOW-UP VISIT: CPT | Performed by: SURGERY

## 2023-07-20 NOTE — LETTER
July 20, 2023     Patient: Amarilis Gutiérrez  YOB: 1965  Date of Visit: 7/20/2023      To Whom it May Concern:    Hallie Hinkle is under my professional care. Noam Lovell was seen in my office on 7/20/2023. Noam Lovell is unable to use the right hand for work at this time. We will reevaluate her when she is seen back in about 3 weeks and update restrictions at that time. If you have any questions or concerns, please don't hesitate to call.          Sincerely,          Alaina Armstrong MD        CC: No Recipients

## 2023-07-20 NOTE — PROGRESS NOTES
Assessment/Plan:  Patient ID: Delia Kasper 60 y.o. 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 year old female who presents for follow up after Release Trigger Finger Long Finger And Ring Finger - Right. Today patient has no significant pain but notes some swelling and stiffness in the operative hand.        PHYSICAL EXAMINATION:  General: well developed and well nourished, alert, oriented times 3 and appears comfortable  Psychiatric: Normal    MUSCULOSKELETAL EXAMINATION:  Incision: Clean, dry, intact and healed  Surgery Site: normal, no evidence of infection   Range of Motion: Limited due to stiffness  Neurovascular status: Neuro intact, good cap refill  Activity Restrictions: No restrictions - resume activity as tolerated   Done today: Sutures out      STUDIES REVIEWED:  No Studies to review      PROCEDURES PERFORMED:  Procedures  No Procedures performed today      Mynor Lombardi

## 2023-08-03 ENCOUNTER — EVALUATION (OUTPATIENT)
Dept: OCCUPATIONAL THERAPY | Facility: CLINIC | Age: 58
End: 2023-08-03
Payer: COMMERCIAL

## 2023-08-03 ENCOUNTER — OFFICE VISIT (OUTPATIENT)
Dept: INFECTIOUS DISEASES | Facility: CLINIC | Age: 58
End: 2023-08-03
Payer: COMMERCIAL

## 2023-08-03 VITALS
SYSTOLIC BLOOD PRESSURE: 110 MMHG | HEART RATE: 66 BPM | RESPIRATION RATE: 15 BRPM | TEMPERATURE: 97.5 F | DIASTOLIC BLOOD PRESSURE: 70 MMHG | OXYGEN SATURATION: 97 %

## 2023-08-03 DIAGNOSIS — R76.12 POSITIVE QUANTIFERON-TB GOLD TEST: ICD-10-CM

## 2023-08-03 DIAGNOSIS — Z22.7 TB LUNG, LATENT: Primary | ICD-10-CM

## 2023-08-03 DIAGNOSIS — M65.342 TRIGGER FINGER, LEFT RING FINGER: ICD-10-CM

## 2023-08-03 DIAGNOSIS — Z98.890 S/P TRIGGER FINGER RELEASE: ICD-10-CM

## 2023-08-03 PROCEDURE — 97110 THERAPEUTIC EXERCISES: CPT | Performed by: OCCUPATIONAL THERAPIST

## 2023-08-03 PROCEDURE — 99211 OFF/OP EST MAY X REQ PHY/QHP: CPT | Performed by: PHYSICIAN ASSISTANT

## 2023-08-03 PROCEDURE — 97165 OT EVAL LOW COMPLEX 30 MIN: CPT | Performed by: OCCUPATIONAL THERAPIST

## 2023-08-03 RX ORDER — ISONIAZID 300 MG/1
300 TABLET ORAL DAILY
Qty: 65 TABLET | Refills: 0 | Status: SHIPPED | OUTPATIENT
Start: 2023-08-03 | End: 2023-10-07

## 2023-08-03 NOTE — PROGRESS NOTES
Outpatient Progress Note - [facility] Infectious Disease   Marilu Brown 60 y.o. female MRN: [id number]  Encounter: [id number]    Assessment/Plan:  1. Latent TB. Positive Quantiferon 1/3/23. Negative CXR with no clinical evidence of active TB. HIV neg. No new GI symptoms and prior paresthesias resolved spontaneously and have not recurred. Most recent LFTs remain within normal limits. Continues to tolerate antibiotics without difficulty.   -Continue INH 300mg daily and pyridoxine 50mg daily - Rx sent today  -plan to complete 9 months of treatment through the beginning of October  -Continue to avoid hepatotoxins including etoh/APAP  -Follow-up in 6-8 weeks    -Recheck LFTs prior to next visit as patient remains on Lipitor.     2. HTN/dyslipidemia.  No concerning drug interaction with Lipitor.     3. OA, with chronic joint pains. Continue to avoid Tylenol use, as above. Above assessment and plan discussed in detail with patient during examination. Antibiotics:  INH/B6    Subjective:  Patient presents to outpatient ID office for follow up regarding latent TB. She recently had trigger finger surgery and is doing OT. She is out of work. She is tolerating the abx. No further episodes of tingling sensation. No fevers or chills. No cough or SOB. Objective:    Vitals:   Vitals:    08/03/23 0933   BP: 110/70   Pulse: 66   Resp: 15   Temp: 97.5 °F (36.4 °C)   SpO2: 97%     Physical Exam:     General Appearance:  Alert, interactive, nontoxic, no acute distress. HEENT: Oropharynx moist without lesions. Lungs:   Clear to auscultation bilaterally; no wheezes, rhonchi or rales; respirations unlabored   Heart:  RRR; no murmur   Abdomen:   Soft, non-tender, non-distended, positive bowel sounds. No palpable organomegaly. Extremities: No clubbing, cyanosis or edema   Skin: No new rashes or lesions. No draining wounds noted.        Labs, Imaging, & Other studies:   All pertinent labs and imaging studies were personally reviewed by me from 7/6.     The following portions of the patient's history were reviewed and updated as appropriate: allergies, current medications, past family history, past medical history, past social history, past surgical history and problem list.

## 2023-08-03 NOTE — PATIENT INSTRUCTIONS
-Continue INH 300mg daily and pyridoxine 50mg daily   -plan to complete 9 months of treatment through the beginning of October  -Continue to avoid alcohol and Tylenol use  -Follow-up in 6-8 weeks   -Recheck LFTs prior to next visit while on Lipitor.

## 2023-08-03 NOTE — PROGRESS NOTES
OT Evaluation     Today's date: 8/3/2023  Patient name: Tal Bhatia  : 1965  MRN: [id number]  Referring provider: Humphrey Logan PA-C  Dx:   Encounter Diagnosis     ICD-10-CM    1. S/P trigger finger release  Z98.890 Ambulatory Referral to PT/OT Hand Therapy      2. Trigger finger, left ring finger  M65.342 Ambulatory Referral to PT/OT Hand Therapy                     Assessment  Assessment details: Demarcus Angel is a 61 y/o RHD female presenting with bilateral trigger fingers. She reports a history of bilateral trigger fingers over the past 2 years. The right thumb was injected in  with good results. The right middle and ring  began triggering about a year ago and she received injection in 2023 f/b a severe headache reaction. The right hand continued to trigger after the injections and required surgery for release on 23. The left ring finger locks frequently during day. No injection or surgery to the left. Rakesh Konig right hand presents with localized edema in the ring and small with motion restrictions. The left hand motion has full AROM although with frequent triggering of ring finger. No pain at rest, no sensory complaints.  strength of the right restricted by pain and limited motion and the left by locking of the ring finger. 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 Evaluation is of low complexity, due to minimal comorbidities and stable clinical presentation. Pt demonstrates good tolerance of therapy today and was provided with a written HEP for the right hand focusing on edema reduction, tendon gliding exercises, light putty strengthening. The left hand ring finger was immobilized in an oval 8 which she is instructed to wear full time. She was instructed to perform exercises several times  Daily. The patient demonstrates HEP instructions appropriately, verbalizes understanding, and is in agreement with the written HEP.   Pt will benefit from skilled OT intervention to progress  Right hand motion, resolve pain, increased strength,  and allow return to PLOF. Left hand requires reduction of locking with splint wear. Impairments: abnormal or restricted ROM, activity intolerance, impaired physical strength, lacks appropriate home exercise program and pain with function    Symptom irritability: lowUnderstanding of Dx/Px/POC: excellent  Goals  STG 2 weeks   Increase digital arc by at least 10 degrees in extension and flexion   Increase flexion to [facility] by at least 1 cm   Reduce edema to a minimal level   Reduce pain at rest to less than 2/10    LTG  By discharge   Achieve functional GALAN of digit >240 to allow independence in holding small items in hand   Achieve functional extension with minimal lag to allow independence in donning gloves and tucking in clothing. Pain at rest resolved, pain >2/10 with activity achieving independence in self care without increased of pain. Achieve  strength to at least 50% of the uninvolved achieving independence in opening containers. Achieve FOTO goals established at .        Plan  Patient would benefit from: OT eval and skilled occupational therapy  Planned modality interventions: thermotherapy: hydrocollator packs and ultrasound  Planned therapy interventions: IASTM, joint mobilization, kinesiology taping, manual therapy, massage, activity modification, strengthening, stretching, therapeutic activities, therapeutic exercise, home exercise program, graded exercise, graded activity, functional ROM exercises, fine motor coordination training and patient education  Duration in weeks: 6  Plan of Care beginning date: 8/3/2023  Plan of Care expiration date: 9/14/2023  Treatment plan discussed with: patient        Subjective Evaluation    History of Present Illness  Mechanism of injury: surgery          Recurrent probem    Quality of life: excellent    Patient Goals  Patient goals for therapy: decreased edema, decreased pain, increased motion, increased strength and return to work    Pain  Current pain ratin  Location: Right palm III, IV;  L IV palm  Quality: discomfort and tight  Relieving factors: rest and heat  Aggravating factors: lifting (gripping)  Progression: improved    Social Support    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 Right Wrist/Hand   Positive for edema. Tenderness     Additional Tenderness Details  Right hand tenderness along DPC, surgical sites    Neurological Testing     Sensation     Wrist/Hand   Left   Intact: light touch    Right   Intact: light touch    Active Range of Motion     Additional Active Range of Motion Details  Composite flexion of digits to palm  II 0 cm,  III  1 cm,  IV  1 cm, V  0 cm    Strength/Myotome Testing     Additional Strength Details  Jesus #2  L  39 lbs,  R  13.2 lbs. Swelling     Left Wrist/Hand   Circumference MCP: 17.3 cm    Right Wrist/Hand   Circumference MCP: 17.7 cm             Precautions: DOS right TFR IV, III 23. Access Code: [email or web address]  URL: [email or web address]  Date: 2023  Prepared by: Cliff Flash    Exercises  - Wrist Tendon Gliding  - 3 x daily - 7 x weekly - 1 sets - 10 reps  - Putty Squeezes  - 3 x daily - 7 x weekly - 1-3 sets - 10 reps  - Seated Edema Reduction Massage  - 7 x weekly    POC expires Auth Status Unit limit Start date  Expiration date PT/OT + Visit Limit?    23 No auth until  visit 4 8/3/23 BOMN 60 max           Dx R TFR III iV  L TGF IV        Dr Gama Running @              Date 8/3            Visit 1            Manuals             IASTM             scar             Edema R XS issued                         Neuro Re-Ed Ther Ex             HEP R glove, TGE,  Putty  scar care  L  Oval 8                                                                                                       Ther Activity                                       Gait Training                                       Modalities             MHP Pre tx

## 2023-08-09 ENCOUNTER — OFFICE VISIT (OUTPATIENT)
Dept: OCCUPATIONAL THERAPY | Facility: CLINIC | Age: 58
End: 2023-08-09
Payer: COMMERCIAL

## 2023-08-09 DIAGNOSIS — Z98.890 S/P TRIGGER FINGER RELEASE: Primary | ICD-10-CM

## 2023-08-09 DIAGNOSIS — M65.342 TRIGGER FINGER, LEFT RING FINGER: ICD-10-CM

## 2023-08-09 PROCEDURE — 97110 THERAPEUTIC EXERCISES: CPT | Performed by: OCCUPATIONAL THERAPIST

## 2023-08-09 PROCEDURE — 97112 NEUROMUSCULAR REEDUCATION: CPT | Performed by: OCCUPATIONAL THERAPIST

## 2023-08-09 NOTE — PROGRESS NOTES
Daily Note     Today's date: 2023  Patient name: Lizette Dewitt  : 1965  MRN: [id number]  Referring provider: Daphne Horowitz PA-C  Dx:   Encounter Diagnosis     ICD-10-CM    1. S/P trigger finger release  Z98.890       2. Trigger finger, left ring finger  M65.342                      Subjective:   "Still sore. The tape helped"      Objective: See treatment diary below;  Issued kinesiotape for V/D palm support and coban for III for edema. Assessment: Tolerated treatment well. Patient would benefit from continued OT      Plan: Progress treatment as tolerated. NV in 10-14 days. Assess motion, strength, pain, edema. Precautions: DOS right TFR IV, III 23. Access Code: [email or web address]  URL: [email or web address]  Date: 2023  Prepared by: Alyssa Logan    Exercises  - Wrist Tendon Gliding  - 3 x daily - 7 x weekly - 1 sets - 10 reps  - Putty Squeezes  - 3 x daily - 7 x weekly - 1-3 sets - 10 reps  - Seated Edema Reduction Massage  - 7 x weekly    POC expires Auth Status Unit limit Start date  Expiration date PT/OT + Visit Limit?    23 No auth until  visit 4 8/3/23 BOMN 60 max           Dx R TFR III iV  L TGF IV        Dr Heather Torres @              Date 8/3 8/9           Visit 1 2           Manuals  8'           IASTM  Volar palm           scar  STM           Edema R XS issued            kinesiotape  Tape V/D palm  in webs           Neuro Re-Ed                                                                                                        Ther Ex    23'           HEP R glove, TGE,  Putty  scar care  L  Oval 8 Coban pt ed for III PIP HOS;   Kinestio tape ed for V/D palm           PROM 1:1  Digital hook to full fists;  P/h fists x10           TGE  Hook to full  x10                                                                            Ther Activity                                       Gait Training Modalities             MHP Pre tx Pre tx

## 2023-08-10 ENCOUNTER — OFFICE VISIT (OUTPATIENT)
Dept: OBGYN CLINIC | Facility: CLINIC | Age: 58
End: 2023-08-10

## 2023-08-10 VITALS — WEIGHT: 165 LBS | BODY MASS INDEX: 27.49 KG/M2 | HEIGHT: 65 IN

## 2023-08-10 DIAGNOSIS — M62.40 INTRINSIC MUSCLE TIGHTNESS: ICD-10-CM

## 2023-08-10 DIAGNOSIS — Z98.890 S/P TRIGGER FINGER RELEASE: Primary | ICD-10-CM

## 2023-08-10 PROCEDURE — 99024 POSTOP FOLLOW-UP VISIT: CPT | Performed by: SURGERY

## 2023-08-10 NOTE — PROGRESS NOTES
Assessment/Plan:  Patient ID: Yury Giles 60 y.o. female   Surgery: Release Trigger Finger Long Finger And Ring Finger - Right  Date of Surgery: 7/11/2023    Keep massaging the incisions to help the scar to flatten out. Continue OT working on the intrinsic hand tightness. Demonstrated to the patient table top, claw, full fist.       Follow Up:  2  month(s)    To Do Next Visit:  Re-evaluate hand tightness      CHIEF COMPLAINT:  Chief Complaint   Patient presents with   • Follow-up     Trigger finger release on 7/11/23 patient has been to two OT visit she is still getting a lot of swelling in the long finger         SUBJECTIVE:  Yury Giles is a 60y.o. year old female who presents for follow up after Release Trigger Finger Long Finger And Ring Finger - Right. Today patient has swelling over top of hand.        PHYSICAL EXAMINATION:  General: well developed and well nourished, alert, oriented times 3 and appears comfortable  Psychiatric: Normal    MUSCULOSKELETAL EXAMINATION:  Incision: Clean, dry, intact and healed  Surgery Site: normal, no evidence of infection  and swelling present  Range of Motion: As expected and Limited due to stiffness  Neurovascular status: Neuro intact, good cap refill and radial pulse 2+  Activity Restrictions: No restrictions      STUDIES REVIEWED:  No Studies to review      PROCEDURES PERFORMED:  Procedures  No Procedures performed today    Scribe Attestation    I,:  Bryan Levine am acting as a scribe while in the presence of the attending physician.:       I,:  Bianca Cruz MD personally performed the services described in this documentation    as scribed in my presence.:

## 2023-08-10 NOTE — PATIENT INSTRUCTIONS
Continue Keep massaging the incisions to help the scar to flatten out. Continue OT working on the intrinsic hand tightness.  Demonstrated to the patient table top, claw, full fist.

## 2023-08-22 ENCOUNTER — OFFICE VISIT (OUTPATIENT)
Dept: OCCUPATIONAL THERAPY | Facility: CLINIC | Age: 58
End: 2023-08-22
Payer: COMMERCIAL

## 2023-08-22 DIAGNOSIS — Z98.890 S/P TRIGGER FINGER RELEASE: Primary | ICD-10-CM

## 2023-08-22 DIAGNOSIS — M65.342 TRIGGER FINGER, LEFT RING FINGER: ICD-10-CM

## 2023-08-22 PROCEDURE — 97110 THERAPEUTIC EXERCISES: CPT | Performed by: OCCUPATIONAL THERAPIST

## 2023-08-22 PROCEDURE — 97140 MANUAL THERAPY 1/> REGIONS: CPT | Performed by: OCCUPATIONAL THERAPIST

## 2023-08-22 NOTE — PROGRESS NOTES
Daily Note     Today's date: 2023  Patient name: Duran Esteban  : 1965  MRN: [id number]  Referring provider: Joya Felix PA-C  Dx:   Encounter Diagnosis     ICD-10-CM    1. S/P trigger finger release  Z98.890       2. Trigger finger, left ring finger  M65.342                      Subjective:   "Still sore. The tape helped"  "So stiff still"  Tight composite flexion. Objective: See treatment diary below;  Issued kinesiotape for V/D palm support and coban for III for edema. Assessment: Tolerated treatment well. Patient would benefit from continued OT      Plan: Progress treatment as tolerated. Continue care 1-2 per week per new Tx. Assess motion, strength, pain, edema. Precautions: DOS right TFR IV, III 23. Access Code: [email or web address]  URL: [email or web address]  Date: 2023  Prepared by: Andrew Portillo    Exercises  - Wrist Tendon Gliding  - 3 x daily - 7 x weekly - 1 sets - 10 reps  - Putty Squeezes  - 3 x daily - 7 x weekly - 1-3 sets - 10 reps  - Seated Edema Reduction Massage  - 7 x weekly    POC expires Auth Status Unit limit Start date  Expiration date PT/OT + Visit Limit? 23 No auth until  visit 4 8/3/23 BOMN 60 max           Dx R TFR III iV  L TGF IV        Dr Wendy Greer @              Date 8/3 8/9 8/22          Visit 1 2 3          Manuals  8' 8'          IASTM  Volar palm Volar palm          scar  STM Cupping          Edema R XS issued            kinesiotape  Tape V/D palm  in webs Web K tape over palm          Neuro Re-Ed                                                                                                        Ther Ex       23'          HEP R glove, TGE,  Putty  scar care  L  Oval 8 Coban pt ed for III PIP HOS;   Kinestio tape ed for V/D palm Cont taping, add intrinsic PROM and hook fists.            PROM 1:1  Digital hook to full fists;  P/h fists x10 Intrinsic PROM, isolate then comp  10'          TGE Hook to full  x10 [employer] to full x20          P/H fists   x20 full fist                                                              Ther Activity                                       Gait Training                                       Modalities             MHP Pre tx Pre tx Pre tx

## 2023-08-24 ENCOUNTER — OFFICE VISIT (OUTPATIENT)
Dept: OCCUPATIONAL THERAPY | Facility: CLINIC | Age: 58
End: 2023-08-24
Payer: COMMERCIAL

## 2023-08-24 DIAGNOSIS — Z98.890 S/P TRIGGER FINGER RELEASE: Primary | ICD-10-CM

## 2023-08-24 DIAGNOSIS — M65.342 TRIGGER FINGER, LEFT RING FINGER: ICD-10-CM

## 2023-08-24 PROCEDURE — 97530 THERAPEUTIC ACTIVITIES: CPT | Performed by: OCCUPATIONAL THERAPIST

## 2023-08-24 PROCEDURE — 97110 THERAPEUTIC EXERCISES: CPT | Performed by: OCCUPATIONAL THERAPIST

## 2023-08-24 NOTE — PROGRESS NOTES
Daily Note     Today's date: 2023  Patient name: Minor Place  : 1965  MRN: [id number]  Referring provider: Abisai Hayden PA-C  Dx:   Encounter Diagnosis     ICD-10-CM    1. S/P trigger finger release  Z98.890       2. Trigger finger, left ring finger  M65.342                      Subjective:   "Still sore. The tape helped"  "So stiff still"  Tight composite flexion. Objective: See treatment diary below      Assessment: Tolerated treatment well. Patient would benefit from continued OT; Post tx, full flexion achieved. Plan: Progress treatment as tolerated. Continue care 1-2 per week per new Tx. Assess motion, strength, pain, edema. Continue with kinesiotape for V/D palm support and coban for III for edema. Precautions: DOS right TFR IV, III 23. Access Code: [email or web address]  URL: [email or web address]  Date: 2023  Prepared by: Alex Freitas    Exercises  - Wrist Tendon Gliding  - 3 x daily - 7 x weekly - 1 sets - 10 reps  - Putty Squeezes  - 3 x daily - 7 x weekly - 1-3 sets - 10 reps  - Seated Edema Reduction Massage  - 7 x weekly    POC expires Auth Status Unit limit Start date  Expiration date PT/OT + Visit Limit? 23 No auth until  visit 4 8/3/23 BOMN 60 max           Dx R TFR III iV  L TGF IV        Dr Viviana Beasley Not met              Date 8/3 8/9 8/22 8/24         Visit 1 2 3 4         Manuals  8' 8' 5'         IASTM  Volar palm Volar palm Volar palm digits         scar  STM Cupping Cupping          Edema R XS issued            kinesiotape  Tape V/D palm  in webs Web K tape over palm          Neuro Re-Ed                                                                                                        Ther Ex    23'   '   25'         HEP R glove, TGE,  Putty  scar care  L  Oval 8 Coban pt ed for III PIP HOS;   Kinestio tape ed for V/D palm Cont taping, add intrinsic PROM and hook fists. NV, resume taping, coban. Cont HEP  P/H fists.            PROM 1:1  Digital hook to full fists;  P/h fists x10 Intrinsic PROM, isolate then comp  10' Intrinsic PROM, isolate then comp 10'         TGE  Hook to full  x10 [place] [employer] to full [employer]  Full x20         P/H fists   x20 full fist P/H 10x3"                                                             Ther Activity      8'         Gripping isometric    Dowel to putty x40         Dexterity    P/u, lay down gems  3x8         Gait Training                                       Modalities             MHP Pre tx Pre tx Pre tx Pre tx

## 2023-09-01 ENCOUNTER — APPOINTMENT (OUTPATIENT)
Dept: LAB | Facility: CLINIC | Age: 58
End: 2023-09-01
Payer: COMMERCIAL

## 2023-09-01 DIAGNOSIS — Z22.7 TB LUNG, LATENT: ICD-10-CM

## 2023-09-01 LAB
ALBUMIN SERPL BCP-MCNC: 4.1 G/DL (ref 3.5–5)
ALP SERPL-CCNC: 78 U/L (ref 34–104)
ALT SERPL W P-5'-P-CCNC: 24 U/L (ref 7–52)
ANION GAP SERPL CALCULATED.3IONS-SCNC: 10 MMOL/L
AST SERPL W P-5'-P-CCNC: 29 U/L (ref 13–39)
BASOPHILS # BLD AUTO: 0.02 THOUSANDS/ÂΜL (ref 0–0.1)
BASOPHILS NFR BLD AUTO: 1 % (ref 0–1)
BILIRUB SERPL-MCNC: 0.71 MG/DL (ref 0.2–1)
BUN SERPL-MCNC: 15 MG/DL (ref 5–25)
CALCIUM SERPL-MCNC: 9.7 MG/DL (ref 8.4–10.2)
CHLORIDE SERPL-SCNC: 106 MMOL/L (ref 96–108)
CO2 SERPL-SCNC: 25 MMOL/L (ref 21–32)
CREAT SERPL-MCNC: 0.6 MG/DL (ref 0.6–1.3)
EOSINOPHIL # BLD AUTO: 0.17 THOUSAND/ÂΜL (ref 0–0.61)
EOSINOPHIL NFR BLD AUTO: 4 % (ref 0–6)
ERYTHROCYTE [DISTWIDTH] IN BLOOD BY AUTOMATED COUNT: 12.6 % (ref 11.6–15.1)
GFR SERPL CREATININE-BSD FRML MDRD: 100 ML/MIN/1.73SQ M
GLUCOSE P FAST SERPL-MCNC: 110 MG/DL (ref 65–99)
HCT VFR BLD AUTO: 41.1 % (ref 34.8–46.1)
HGB BLD-MCNC: 13.3 G/DL (ref 11.5–15.4)
IMM GRANULOCYTES # BLD AUTO: 0.02 THOUSAND/UL (ref 0–0.2)
IMM GRANULOCYTES NFR BLD AUTO: 1 % (ref 0–2)
LYMPHOCYTES # BLD AUTO: 1.01 THOUSANDS/ÂΜL (ref 0.6–4.47)
LYMPHOCYTES NFR BLD AUTO: 24 % (ref 14–44)
MCH RBC QN AUTO: 29.6 PG (ref 26.8–34.3)
MCHC RBC AUTO-ENTMCNC: 32.4 G/DL (ref 31.4–37.4)
MCV RBC AUTO: 91 FL (ref 82–98)
MONOCYTES # BLD AUTO: 0.33 THOUSAND/ÂΜL (ref 0.17–1.22)
MONOCYTES NFR BLD AUTO: 8 % (ref 4–12)
NEUTROPHILS # BLD AUTO: 2.69 THOUSANDS/ÂΜL (ref 1.85–7.62)
NEUTS SEG NFR BLD AUTO: 62 % (ref 43–75)
NRBC BLD AUTO-RTO: 0 /100 WBCS
PLATELET # BLD AUTO: 192 THOUSANDS/UL (ref 149–390)
PMV BLD AUTO: 11.1 FL (ref 8.9–12.7)
POTASSIUM SERPL-SCNC: 3.6 MMOL/L (ref 3.5–5.3)
PROT SERPL-MCNC: 6.7 G/DL (ref 6.4–8.4)
RBC # BLD AUTO: 4.5 MILLION/UL (ref 3.81–5.12)
SODIUM SERPL-SCNC: 141 MMOL/L (ref 135–147)
WBC # BLD AUTO: 4.24 THOUSAND/UL (ref 4.31–10.16)

## 2023-09-01 PROCEDURE — 85025 COMPLETE CBC W/AUTO DIFF WBC: CPT

## 2023-09-01 PROCEDURE — 80053 COMPREHEN METABOLIC PANEL: CPT

## 2023-09-01 PROCEDURE — 36415 COLL VENOUS BLD VENIPUNCTURE: CPT

## 2023-09-05 ENCOUNTER — APPOINTMENT (OUTPATIENT)
Dept: OCCUPATIONAL THERAPY | Facility: CLINIC | Age: 58
End: 2023-09-05
Payer: COMMERCIAL

## 2023-09-07 ENCOUNTER — OFFICE VISIT (OUTPATIENT)
Dept: INFECTIOUS DISEASES | Facility: CLINIC | Age: 58
End: 2023-09-07
Payer: COMMERCIAL

## 2023-09-07 ENCOUNTER — APPOINTMENT (OUTPATIENT)
Dept: OCCUPATIONAL THERAPY | Facility: CLINIC | Age: 58
End: 2023-09-07
Payer: COMMERCIAL

## 2023-09-07 VITALS
OXYGEN SATURATION: 98 % | HEART RATE: 73 BPM | SYSTOLIC BLOOD PRESSURE: 118 MMHG | DIASTOLIC BLOOD PRESSURE: 72 MMHG | TEMPERATURE: 97.7 F | RESPIRATION RATE: 15 BRPM

## 2023-09-07 DIAGNOSIS — Z22.7 TB LUNG, LATENT: Primary | ICD-10-CM

## 2023-09-07 PROCEDURE — 99211 OFF/OP EST MAY X REQ PHY/QHP: CPT | Performed by: PHYSICIAN ASSISTANT

## 2023-09-07 NOTE — PROGRESS NOTES
Outpatient Progress Note - [facility] Infectious Disease   Devin Spencer 60 y.o. female MRN: [id number]  Encounter: [id number]      Assessment/Plan:  1. Latent TB. Positive Quantiferon 1/3/23. Negative CXR with no clinical evidence of active TB. HIV neg. No new GI symptoms and prior paresthesias resolved spontaneously and have not recurred. Most recent LFTs remain within normal limits on labs 9/1. Continues to tolerate antibiotics without difficulty.   -Continue INH 300mg daily and pyridoxine 50mg daily    -plan to complete 9 months of treatment through the beginning of October  -Continue to avoid hepatotoxins including etoh/APAP  -No additional labs or follow up required, call with questions   -Provided letter for proof of treatment completion      2. HTN/dyslipidemia.  No concerning drug interaction with Lipitor.     3. OA, with chronic joint pains. Continue to avoid Tylenol use, as above.     Above assessment and plan discussed in detail with patient during examination. Antibiotics:  PO INH, pyridoxine     Subjective:  Patient presents to outpatient ID office for ongoing management of latent TB. She is feeling under the weather. Reports dry cough and sneezing. Covid Ag negative x2. She is taking dayquil and nightquil with minimal relief. Discussed trial of Claritin instead. She is agreeable. Plans to complete therapy for latent TB next month around 10/7. She denies paresthesias. No missed doses of abx. Denies fevers or chills. Healing well post trigger finger surgery. Objective:    Vitals:   Vitals:    09/07/23 0910   BP: 118/72   Pulse: 73   Resp: 15   Temp: 97.7 °F (36.5 °C)   SpO2: 98%     Physical Exam:     General Appearance:  Alert, interactive, nontoxic, no acute distress. HEENT: Oropharynx moist without lesions.     Lungs:   Clear to auscultation bilaterally; no wheezes, rhonchi or rales; respirations unlabored   Heart:  RRR; no murmur   Abdomen:   Soft, non-tender, non-distended, positive bowel sounds. No palpable organomegaly. Extremities: No clubbing, cyanosis or edema   Skin: No new rashes or lesions. No draining wounds noted. Labs, Imaging, & Other studies:   All pertinent labs and imaging studies were personally reviewed by me from 9/1.     Results from last 7 days   Lab Units 09/01/23  1239   WBC Thousand/uL 4.24*   HEMOGLOBIN g/dL 13.3   PLATELETS Thousands/uL 192     Results from last 7 days   Lab Units 09/01/23  1239   SODIUM mmol/L 141   POTASSIUM mmol/L 3.6   CHLORIDE mmol/L 106   CO2 mmol/L 25   BUN mg/dL 15   CREATININE mg/dL 0.60   EGFR ml/min/1.73sq m 100   CALCIUM mg/dL 9.7   AST U/L 29   ALT U/L 24   ALK PHOS U/L 78                       The following portions of the patient's history were reviewed and updated as appropriate: allergies, current medications, past family history, past medical history, past social history, past surgical history and problem list.

## 2023-09-07 NOTE — PATIENT INSTRUCTIONS
-Continue INH 300mg daily and pyridoxine 50mg daily   -plan to complete 9 months of treatment through the beginning of October  -Continue to avoid hepatotoxins including etoh/APAP  -No additional labs or follow up with ID required   -letter stating proof of treatment completion can be found on mychart     Can try OTC Claritin (anti-histamine) daily for congestion and sneezing

## 2023-09-12 ENCOUNTER — OFFICE VISIT (OUTPATIENT)
Dept: OCCUPATIONAL THERAPY | Facility: CLINIC | Age: 58
End: 2023-09-12
Payer: COMMERCIAL

## 2023-09-12 DIAGNOSIS — M65.342 TRIGGER FINGER, LEFT RING FINGER: ICD-10-CM

## 2023-09-12 DIAGNOSIS — Z98.890 S/P TRIGGER FINGER RELEASE: Primary | ICD-10-CM

## 2023-09-12 PROCEDURE — 97140 MANUAL THERAPY 1/> REGIONS: CPT | Performed by: OCCUPATIONAL THERAPIST

## 2023-09-12 PROCEDURE — 97110 THERAPEUTIC EXERCISES: CPT | Performed by: OCCUPATIONAL THERAPIST

## 2023-09-12 NOTE — PROGRESS NOTES
Daily Note     Today's date: 2023  Patient name: Neisha Mcallister  : 1965  MRN: [id number]  Referring provider: Frosty Aschoff, PA-C  Dx:   Encounter Diagnosis     ICD-10-CM    1. S/P trigger finger release  Z98.890       2. 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 "So stiff still"  Tight composite flexion. Objective: See treatment diary below  Strength/Myotome Testing     Additional Strength Details  Jesus #2  L  39 lbs,  R  25.3 lbs. Assessment: Tolerated treatment well. Patient would benefit from continued OT; Post tx, full flexion achieved. Dip flexion restricted prior to tx. Plan: Progress treatment as tolerated. Continue care 1-2 per week. Assess motion, strength, pain, edema. Issue putty for gripping and DIP flexion strengthening. Added wrist E Pre to HEP. Precautions: DOS right TFR IV, III 23. Access Code: [email or web address]  URL: [email or web address]  Date: 2023  Prepared by: Annalee De    Exercises  - Wrist Tendon Gliding  - 3 x daily - 7 x weekly - 1 sets - 10 reps  - Putty Squeezes  - 3 x daily - 7 x weekly - 1-3 sets - 10 reps  - Seated Edema Reduction Massage  - 7 x weekly    Access Code: [email or web address]  URL: [email or web address]  Date: 2023  Prepared by: Annalee De    Exercises  - Putty Squeezes  - 3-4 x daily - 7 x weekly - 20 reps  - Rolling Putty on Table  - 3-4 x daily - 7 x weekly  - Seated Wrist Extension with Dumbbell  - 1 x daily - 7 x weekly - 1-3 sets - 10 reps    POC expires Auth Status Unit limit Start date  Expiration date PT/OT + Visit Limit?    23 No auth until  visit 4 8/3/23 BOMN 60 max           Dx R TFR III iV  L TGF IV        Dr Derek Brian Not met              Date 8/3 8/9 8/22 8/24 9/12 RE NV       Visit 1 2 3 4 5        Manuals  8' 8' 5' 8'        IAS  Volar palm Volar palm Volar palm digits Volar palm digits        scar  STM Cupping Cupping  cupping        Edema R XS issued            kinesiotape  Tape V/D palm  in webs Web K tape over palm          Neuro Re-Ed                                                                                                        Ther Ex    23'   23'   25'   23'        HEP R glove, TGE,  Putty  scar care  L  Oval 8 Coban pt ed for III PIP HOS;   Kinestio tape ed for V/D palm Cont taping, add intrinsic PROM and hook fists. NV, resume taping, coban. Cont HEP  P/H fists. New HEP, DIP flexion putty , wrist E PRE        PROM 1:1  Digital hook to full fists;  P/h fists x10 Intrinsic PROM, isolate then comp  10' Intrinsic PROM, isolate then comp 10' Intrinsic PROM isolate, comp        TGE  Hook to full  x10 [place] [employer] to full [employer]  Full x20 Hook to full         P/H fists   x20 full fist P/H 10x3" P/h fist x10        PRE digits     O putty   X20, roll after.          Wrist E PRE     #1 x20  #2 x20                                  Ther Activity      8'         Gripping isometric    Dowel to putty x40         Dexterity    P/u, lay down gems  3x8         Gait Training                                       Modalities             MHP Pre tx Pre tx Pre tx Pre tx Pre tx

## 2023-09-14 ENCOUNTER — OFFICE VISIT (OUTPATIENT)
Dept: OCCUPATIONAL THERAPY | Facility: CLINIC | Age: 58
End: 2023-09-14
Payer: COMMERCIAL

## 2023-09-14 DIAGNOSIS — M65.342 TRIGGER FINGER, LEFT RING FINGER: ICD-10-CM

## 2023-09-14 DIAGNOSIS — Z98.890 S/P TRIGGER FINGER RELEASE: Primary | ICD-10-CM

## 2023-09-14 PROCEDURE — 97140 MANUAL THERAPY 1/> REGIONS: CPT | Performed by: OCCUPATIONAL THERAPIST

## 2023-09-14 PROCEDURE — 97530 THERAPEUTIC ACTIVITIES: CPT | Performed by: OCCUPATIONAL THERAPIST

## 2023-09-14 PROCEDURE — 97110 THERAPEUTIC EXERCISES: CPT | Performed by: OCCUPATIONAL THERAPIST

## 2023-09-14 NOTE — PROGRESS NOTES
Daily Note     Today's date: 2023  Patient name: Kaylen Leahy  : 1965  MRN: [id number]  Referring provider: Saturnino Woods PA-C  Dx:   Encounter Diagnosis     ICD-10-CM    1. S/P trigger finger release  Z98.890       2. 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 "So stiff still"  Tight composite flexion. Objective: See treatment diary below  Strength/Myotome Testing     Additional Strength Details  Jesus #2  L  39 lbs,  R  25.3 lbs. Assessment: Tolerated treatment well. Patient would benefit from continued OT; Post tx, full flexion achieved. Dip flexion restricted prior to tx. Pain reducing with passive stretches. Plan: Progress treatment as tolerated. Continue care 1-2 per week. Assess motion, strength, pain, edema. Issue putty for gripping and DIP flexion strengthening. Added wrist E Pre to HEP. NV RE. Precautions: DOS right TFR IV, III 23. Access Code: [email or web address]  URL: [email or web address]  Date: 2023  Prepared by: Murphy Newton    Exercises  - Wrist Tendon Gliding  - 3 x daily - 7 x weekly - 1 sets - 10 reps  - Putty Squeezes  - 3 x daily - 7 x weekly - 1-3 sets - 10 reps  - Seated Edema Reduction Massage  - 7 x weekly    Access Code: [email or web address]  URL: [email or web address]  Date: 2023  Prepared by: Murphy Newton    Exercises  - Putty Squeezes  - 3-4 x daily - 7 x weekly - 20 reps  - Rolling Putty on Table  - 3-4 x daily - 7 x weekly  - Seated Wrist Extension with Dumbbell  - 1 x daily - 7 x weekly - 1-3 sets - 10 reps    POC expires Auth Status Unit limit Start date  Expiration date PT/OT + Visit Limit?    23 No auth until  visit 4 8/3/23 BOMN 60 max           Dx R TFR III iV  L TGF IV        Dr Cari Philip Not met              Date 8/3 8/9 8/22 8/24 9/12 9/14 Do RE      Visit 1 2 3 4 5 6       Manuals  8' 8' 5' 8' 10' IASTM  Volar palm Volar palm Volar palm digits Volar palm digits Volar palm digits       scar  STM Cupping Cupping  cupping cupping       Edema R XS issued            kinesiotape  Tape V/D palm  in webs Web K tape over palm          Neuro Re-Ed                                                                                                        Ther Ex    23'   23'   25'   23'   15'       HEP R glove, TGE,  Putty  scar care  L  Oval 8 Coban pt ed for III PIP HOS;   Kinestio tape ed for V/D palm Cont taping, add intrinsic PROM and hook fists. NV, resume taping, coban. Cont HEP  P/H fists. New HEP, DIP flexion putty , wrist E PRE Cont DIP flexion; night splint left       PROM 1:1  Digital hook to full fists;  P/h fists x10 Intrinsic PROM, isolate then comp  10' Intrinsic PROM, isolate then comp 10' Intrinsic PROM isolate, comp Intrinsic PROM isolate then comp       TGE  Hook to full  x10 [employer] to full [employer]   x20  Full x20 Hook to full  Hook to full       P/H fists   x20 full fist P/H 10x3" P/h fist x10 P/H fists       PRE digits     O putty   X20, roll after.          Wrist E PRE     #1 x20  #2 x20                                  Ther Activity      8'   10'       Gripping isometric    Dowel to putty x40  Dowel into putty x40       Gripping individ    P/u, lay down gems  3x8  Y dig flexion   4x10       Gripping concentric      Gray #2   5x6                                 Modalities             MHP Pre tx Pre tx Pre tx Pre tx Pre tx Pre tx

## 2023-09-18 ENCOUNTER — OFFICE VISIT (OUTPATIENT)
Dept: OCCUPATIONAL THERAPY | Facility: CLINIC | Age: 58
End: 2023-09-18
Payer: COMMERCIAL

## 2023-09-18 DIAGNOSIS — M65.342 TRIGGER FINGER, LEFT RING FINGER: ICD-10-CM

## 2023-09-18 DIAGNOSIS — Z98.890 S/P TRIGGER FINGER RELEASE: Primary | ICD-10-CM

## 2023-09-18 PROCEDURE — 97168 OT RE-EVAL EST PLAN CARE: CPT | Performed by: OCCUPATIONAL THERAPIST

## 2023-09-18 PROCEDURE — 97530 THERAPEUTIC ACTIVITIES: CPT | Performed by: OCCUPATIONAL THERAPIST

## 2023-09-18 PROCEDURE — 97110 THERAPEUTIC EXERCISES: CPT | Performed by: OCCUPATIONAL THERAPIST

## 2023-09-18 PROCEDURE — 97140 MANUAL THERAPY 1/> REGIONS: CPT | Performed by: OCCUPATIONAL THERAPIST

## 2023-09-18 NOTE — PROGRESS NOTES
OT Re-Evaluation     Today's date: 2023  Patient name: Hurtis Sandhoff  : 1965  MRN: [id number]  Referring provider: Loco Geronimo PA-C  Dx:   Encounter Diagnosis     ICD-10-CM    1. S/P trigger finger release  Z98.890       2. Trigger finger, left ring finger  M65.342                      Assessment  Assessment details: Dea Porter is a 61 y/o RHD female presenting with bilateral trigger fingers. She reports a history of bilateral trigger fingers over the past 2 years. The right thumb was injected in  with good results. The right middle and ring  began triggering about a year ago and she received injection in 2023 f/b a severe headache reaction. The right hand continued to trigger after the injections and required surgery for release on 23. The left ring finger locks frequently during day. No injection or surgery to the left. Liddie Apgar right hand presents with localized edema in the ring and small with motion restrictions. The left hand motion has full AROM although with frequent triggering of ring finger. No pain at rest, no sensory complaints.  strength of the right restricted by pain and limited motion and the left by locking of the ring finger. 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 Evaluation is of low complexity, due to minimal comorbidities and stable clinical presentation. Pt demonstrates good tolerance of therapy today and was provided with a written HEP for the right hand focusing on edema reduction, tendon gliding exercises, light putty strengthening. The left hand ring finger was immobilized in an oval 8 which she is instructed to wear full time. She was instructed to perform exercises several times  Daily. The patient demonstrates HEP instructions appropriately, verbalizes understanding, and is in agreement with the written HEP.   Pt will benefit from skilled OT intervention to progress  Right hand motion, resolve pain, increased strength,  and allow return to PLOF. Left hand requires reduction of locking with splint wear. RE 9/18/23  Adriana Aceves has attended 7 therapy visits and has made excellent progress in achieve full motion of the digits. Functional strength has been improving which allows her increased ease in managing her patients at work. Pain is resolving. Edema remains mild bilaterally. Pain is also minimal.  Therapy may continue and will be weaned to Hep as therapy goals are met. Impairments: abnormal or restricted ROM, activity intolerance, impaired physical strength and pain with function    Symptom irritability: lowUnderstanding of Dx/Px/POC: excellent  Goals  STG 2 weeks   Increase digital arc by at least 10 degrees in extension and flexion MET   Increase flexion to [facility] by at least 1 cmMET   Reduce edema to a minimal levelMET   Reduce pain at rest to less than 2/10MET    LTG  By discharge   Achieve functional GALAN of digit >240 to allow independence in holding small items in hand MET   Achieve functional extension with minimal lag to allow independence in donning gloves and tucking in clothing. MET   Pain at rest resolved, pain >2/10 with activity achieving independence in self care without increased of pain. MET   Achieve  strength to at least 50% of the uninvolved achieving independence in opening containersPROGRESSING   Achieve FOTO goals established at IE.  [address]      Plan  Patient would benefit from: skilled occupational therapy  Planned modality interventions: thermotherapy: hydrocollator packs and ultrasound  Planned therapy interventions: IASTM, joint mobilization, kinesiology taping, manual therapy, massage, activity modification, strengthening, stretching, therapeutic activities, therapeutic exercise, home exercise program, graded exercise, graded activity, functional ROM exercises, fine motor coordination training and patient education  Duration in weeks: 4  Plan of Care beginning date: 2023  Plan of Care expiration date: 10/16/2023  Treatment plan discussed with: patient        Subjective Evaluation    History of Present Illness  Mechanism of injury: surgery          Recurrent probem    Quality of life: excellent    Patient Goals  Patient goals for therapy: decreased edema, decreased pain, increased motion, increased strength and return to work    Pain  Current pain ratin  Location: Right palm III, IV;  L IV palm  Quality: discomfort and tight  Relieving factors: rest and heat  Aggravating factors: lifting (gripping)  Progression: improved    Social Support    Employment 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 Right Wrist/Hand   Negative for edema. Neurological Testing     Sensation     Wrist/Hand   Left   Intact: light touch    Right   Intact: light touch    Active Range of Motion     Additional Active Range of Motion Details  Composite flexion of digits to palm  II 0 cm,  III  0 cm,  IV  0 cm, V  0 cm  Post heat and stretch, full motion    Strength/Myotome Testing     Additional Strength Details  IE  Jesus #2  L  39 lbs,  R  13.2 lbs.     23-  Jesus #2  L  39 lbs,  R  33.3 lbs. Swelling     Left Wrist/Hand   Circumference MCP: 17.3 cm    Right Wrist/Hand   Circumference MCP: 17.5 cm      Daily Note     Today's date: 2023  Patient name: Chris Dudley  : 1965  MRN: [id number]  Referring provider: Jaguar Diego PA-C  Dx:   Encounter Diagnosis     ICD-10-CM    1. S/P trigger finger release  Z98.890       2.  Trigger finger, left ring finger  M65.342                      Subjective:   "I can feel my strength getting better"  At work, positioning patients.  " the swelling in the left hand is better"      Objective: See treatment diary below      Assessment: Tolerated treatment well. Patient would benefit from continued OT; Post tx, full flexion achieved. Steady increase of motion and strength without increased pain or edema. Plan: Progress treatment as tolerated. Continue care 1 visit per week. Wean to HEP as therapy goals are met. Precautions: DOS right TFR IV, III 7/11/23. Access Code: [email or web address]  URL: [email or web address]  Date: 08/03/2023  Prepared by: Artemio Indianola    Exercises  - Wrist Tendon Gliding  - 3 x daily - 7 x weekly - 1 sets - 10 reps  - Putty Squeezes  - 3 x daily - 7 x weekly - 1-3 sets - 10 reps  - Seated Edema Reduction Massage  - 7 x weekly    Access Code: [email or web address]  URL: [email or web address]  Date: 09/12/2023  Prepared by: Artemio Indianola    Exercises  - Putty Squeezes  - 3-4 x daily - 7 x weekly - 20 reps  - Rolling Putty on Table  - 3-4 x daily - 7 x weekly  - Seated Wrist Extension with Dumbbell  - 1 x daily - 7 x weekly - 1-3 sets - 10 reps    POC expires Auth Status Unit limit Start date  Expiration date PT/OT + Visit Limit?   10/16 No auth until 25 th visit 4 8/3/23 BOMN 60 max           Dx R TFR III iV  L TGF IV        Dr Stef Simmons Not met 9/12  54/60             Date 8/3 8/9 8/22 8/24 9/12 9/14 9/18      Visit 1 2 3 4 5 6 7      Manuals  8' 8' 5' 8' 10' 8'      IASTM  Volar palm Volar palm Volar palm digits Volar palm digits Volar palm digits Volar palm       scar  STM Cupping Cupping  cupping cupping       Edema R XS issued            kinesiotape  Tape V/D palm  in webs Web K tape over palm          Neuro Re-Ed                                                                                                        Ther Ex    23'   23'   25'   23'   15'   15'      HEP R glove, TGE,  Putty  scar care  L  Oval 8 Coban pt ed for III PIP HOS;   Kinestio tape ed for V/D palm Cont taping, add intrinsic PROM and hook fists. NV, resume taping, coban.   Cont HEP  P/H fists.   New HEP, DIP flexion putty , wrist E PRE Cont DIP flexion; night splint left Cont putty and gripping PRE's      PROM 1:1  Digital hook to full fists;  P/h fists x10 Intrinsic PROM, isolate then comp  10' Intrinsic PROM, isolate then comp 10' Intrinsic PROM isolate, comp Intrinsic PROM isolate then comp Intrinsic PROM isolate, comp      TGE  Hook to full  x10 [employer] to full [employer]   x20  Full x20 Hook to full  Hook to full Hook to full      P/H fists   x20 full fist P/H 10x3" P/h fist x10 P/H fists P/H fists   10x10      PRE digits     O putty   X20, roll after.          Wrist E PRE     #1 x20  #2 x20                                  Ther Activity      8'   10'   15'      Gripping isometric    Dowel to putty x40  Dowel into putty x40 [employer] into putty  x40      Gripping individ    P/u, lay down gems  3x8  Y dig flexion   4x10 Y dig flex  4x10      Gripping concentric      [employer] #2   5x6 [employer] #2  5x6      Squeezine        plastic, thin and thick                   Modalities             MHP Pre tx Pre tx Pre tx Pre tx Pre tx Pre tx Pre tx with fist

## 2023-09-21 ENCOUNTER — OFFICE VISIT (OUTPATIENT)
Dept: OCCUPATIONAL THERAPY | Facility: CLINIC | Age: 58
End: 2023-09-21
Payer: COMMERCIAL

## 2023-09-21 DIAGNOSIS — Z98.890 S/P TRIGGER FINGER RELEASE: Primary | ICD-10-CM

## 2023-09-21 PROCEDURE — 97110 THERAPEUTIC EXERCISES: CPT | Performed by: OCCUPATIONAL THERAPIST

## 2023-09-21 PROCEDURE — 97530 THERAPEUTIC ACTIVITIES: CPT | Performed by: OCCUPATIONAL THERAPIST

## 2023-09-21 PROCEDURE — 97140 MANUAL THERAPY 1/> REGIONS: CPT | Performed by: OCCUPATIONAL THERAPIST

## 2023-09-21 NOTE — PROGRESS NOTES
Daily Note     Today's date: 2023  Patient name: Kaylen Leahy  : 1965  MRN: [id number]  Referring provider: Saturnino Woods PA-C  Dx:   Encounter Diagnosis     ICD-10-CM    1. S/P trigger finger release  Z98.890                      Subjective:   "I can feel my strength getting better"  At work, positioning patients.  " the swelling in the left hand is better"      Objective: See treatment diary below      Assessment: Tolerated treatment well. Patient would benefit from continued OT; Post tx, full flexion achieved. Steady increase of motion and strength without increased pain or edema. Plan: Progress treatment as tolerated. Continue care 1 visit per week. Wean to HEP as therapy goals are met. NV is last scheduled appt. Discuss discharge. Precautions: DOS right TFR IV, III 23. Access Code: [email or web address]  URL: [email or web address]  Date: 2023  Prepared by: Murphy Helena    Exercises  - Wrist Tendon Gliding  - 3 x daily - 7 x weekly - 1 sets - 10 reps  - Putty Squeezes  - 3 x daily - 7 x weekly - 1-3 sets - 10 reps  - Seated Edema Reduction Massage  - 7 x weekly    Access Code: [email or web address]  URL: [email or web address]  Date: 2023  Prepared by: Murphy Helena    Exercises  - Putty Squeezes  - 3-4 x daily - 7 x weekly - 20 reps  - Rolling Putty on Table  - 3-4 x daily - 7 x weekly  - Seated Wrist Extension with Dumbbell  - 1 x daily - 7 x weekly - 1-3 sets - 10 reps    POC expires Auth Status Unit limit Start date  Expiration date PT/OT + Visit Limit?   10/16 No auth until  visit 4 8/3/23 BOMN 60 max           Dx R TFR III iV  L TGF IV        Dr Cari Philip Not met   54/60             Date 8/3 8/9 8/22 8/24 9/12 9/14 9/18 9/21     Visit 1 2 3 4 5 6 7 8     Manuals  8' 8' 5' 8' 10' 8' 8'     IASTM  Volar palm Volar palm Volar palm digits Volar palm digits Volar palm digits Volar palm  Volar palm disits     scar  STM Cupping Cupping cupping cupping       Jt mobs R XS issued       Ips IV III     kinesiotape  Tape V/D palm  in webs Web K tape over palm          Neuro Re-Ed                                                                                                        Ther Ex    23'   23'   25'   23'   15'   15'   15'     HEP R glove, TGE,  Putty  scar care  L  Oval 8 Coban pt ed for III PIP HOS;   Kinestio tape ed for V/D palm Cont taping, add intrinsic PROM and hook fists. NV, resume taping, coban. Cont HEP  P/H fists. New HEP, DIP flexion putty , wrist E PRE Cont DIP flexion; night splint left Cont putty and gripping PRE's      PROM 1:1  Digital hook to full fists;  P/h fists x10 Intrinsic PROM, isolate then comp  10' Intrinsic PROM, isolate then comp 10' Intrinsic PROM isolate, comp Intrinsic PROM isolate then comp Intrinsic PROM isolate, comp Intrinsic PROM isolate, then comp  fist     TGE  Hook to full  x10 [employer] to full [employer]   x20  Full x20 Hook to full  Hook to full Hook to full Hook to full     P/H fists   x20 full fist P/H 10x3" P/h fist x10 P/H fists P/H fists   10x10 P/h fists   10x10     PRE digits     O putty   X20, roll after.          Wrist E PRE     #1 x20  #2 x20                                  Ther Activity      8'   10'   15' 15'     Gripping isometric    Dowel to putty x40  Dowel into putty x40 Dowel into putty  x40 Dowel into putty   x40     Gripping individ    P/u, lay down gems  3x8  Y dig flexion   4x10 Y dig flex  4x10 Y dig flex with tips only  4x10     Gripping concentric      Ledora Card #2   5x6 Ledora Card #2  5x6 Ledora Card #3  5x6     Squeezine        plastic, thin and thick Tan web tips  to edge  x25                  Modalities             MHP Pre tx Pre tx Pre tx Pre tx Pre tx Pre tx Pre tx with fist Pre tx with fist

## 2023-09-25 ENCOUNTER — OFFICE VISIT (OUTPATIENT)
Dept: OCCUPATIONAL THERAPY | Facility: CLINIC | Age: 58
End: 2023-09-25
Payer: COMMERCIAL

## 2023-09-25 DIAGNOSIS — Z98.890 S/P TRIGGER FINGER RELEASE: Primary | ICD-10-CM

## 2023-09-25 DIAGNOSIS — M65.342 TRIGGER FINGER, LEFT RING FINGER: ICD-10-CM

## 2023-09-25 PROCEDURE — 97110 THERAPEUTIC EXERCISES: CPT | Performed by: OCCUPATIONAL THERAPIST

## 2023-09-25 PROCEDURE — 97140 MANUAL THERAPY 1/> REGIONS: CPT | Performed by: OCCUPATIONAL THERAPIST

## 2023-09-25 PROCEDURE — 97530 THERAPEUTIC ACTIVITIES: CPT | Performed by: OCCUPATIONAL THERAPIST

## 2023-09-25 NOTE — PROGRESS NOTES
Daily Note     Today's date: 2023  Patient name: Gilmar Vilchis  : 1965  MRN: [id number]  Referring provider: Arie Serrano PA-C  Dx:   Encounter Diagnosis     ICD-10-CM    1. S/P trigger finger release  Z98.890       2. Trigger finger, left ring finger  M65.342                      Subjective:   "I can feel my strength getting better"  At work, positioning patients.  " the swelling in the left hand is better"      Objective: See treatment diary below      Assessment: Tolerated treatment well. Patient would benefit from continued OT; Post tx, full flexion achieved. Steady increase of motion and strength without increased pain or edema. Plan: Progress treatment as tolerated. Return in 2 weeks for final appt. Begin to wean left ring finger trigger splint. By NV, wean completely. Wean to HEP as therapy goals are met. NV is last scheduled appt. Discuss discharge. Repeat FOTO. Precautions: DOS right TFR IV, III 23. Access Code: [email or web address]  URL: [email or web address]  Date: 2023  Prepared by: Samuel Candelaria    Exercises  - Wrist Tendon Gliding  - 3 x daily - 7 x weekly - 1 sets - 10 reps  - Putty Squeezes  - 3 x daily - 7 x weekly - 1-3 sets - 10 reps  - Seated Edema Reduction Massage  - 7 x weekly    Access Code: [email or web address]  URL: [email or web address]  Date: 2023  Prepared by: Samuel Candelaria    Exercises  - Putty Squeezes  - 3-4 x daily - 7 x weekly - 20 reps  - Rolling Putty on Table  - 3-4 x daily - 7 x weekly  - Seated Wrist Extension with Dumbbell  - 1 x daily - 7 x weekly - 1-3 sets - 10 reps    POC expires Auth Status Unit limit Start date  Expiration date PT/OT + Visit Limit?   10/16 No auth until  visit 4 8/3/23 BOMN 60 max           Dx R TFR III iV  L TGF IV        Dr Mike Randolph Not met   54/60             Date 8/3 8/9 8/22 8/24 9/12 9/14 9/18 9/21 9/25    Visit 1 2 3 4 5 6 7 8 9    Manuals  8' 8' 5' 8' 10' 8' 8' 8' IASTM  Volar palm Volar palm Volar palm digits Volar palm digits Volar palm digits Volar palm  Volar palm disits Volar palm digits    scar  STM Cupping Cupping  cupping cupping       Jt mobs R XS issued       Ips IV III IP III IV    kinesiotape  Tape V/D palm  in webs Web K tape over palm          Neuro Re-Ed                                                                                                        Ther Ex    23' 23'   25'   23'   15'   15'   15'   15'    HEP R glove, TGE,  Putty  scar care  L  Oval 8 Coban pt ed for III PIP HOS;   Kinestio tape ed for V/D palm Cont taping, add intrinsic PROM and hook fists. NV, resume taping, coban. Cont HEP  P/H fists. New HEP, DIP flexion putty , wrist E PRE Cont DIP flexion; night splint left Cont putty and gripping PRE's      PROM 1:1  Digital hook to full fists;  P/h fists x10 Intrinsic PROM, isolate then comp  10' Intrinsic PROM, isolate then comp 10' Intrinsic PROM isolate, comp Intrinsic PROM isolate then comp Intrinsic PROM isolate, comp Intrinsic PROM isolate, then comp  fist Intrinsic stretch; full comp fist PROM    TGE  Hook to full  x10 [employer] to full [employer]   x20  Full x20 Hook to full  Hook to full Hook to full Hook to full Hook to full    P/H fists   x20 full fist P/H 10x3" P/h fist x10 P/H fists P/H fists   10x10 P/h fists   10x10 P/H fists   10x10    PRE digits     O putty   X20, roll after.          Wrist E PRE     #1 x20  #2 x20                                  Ther Activity      8'   10'   15' 15'   10'    Gripping isometric    Dowel to putty x40  Dowel into putty x40 Dowel into putty  x40 Dowel into putty   x40 Dowel into putty   x40    Gripping individ    P/u, lay down gems  3x8  Y dig flexion   4x10 Y dig flex  4x10 Y dig flex with tips only  4x10 Y dig flex with tips   4x10    Gripping concentric      Anjelica Alberto #2   5x6 Anjelica Alberto #2  5x6 Anjelica Alberto #3  5x6 Anjelica Alberto #3  5x6    Squeezine        plastic, thin and thick [employer]  to edge  x25                  Modalities             MHP Pre tx Pre tx Pre tx Pre tx Pre tx Pre tx Pre tx with fist Pre tx with fist Pre tx with fist

## 2023-10-09 ENCOUNTER — OFFICE VISIT (OUTPATIENT)
Dept: OCCUPATIONAL THERAPY | Facility: CLINIC | Age: 58
End: 2023-10-09
Payer: COMMERCIAL

## 2023-10-09 DIAGNOSIS — Z98.890 S/P TRIGGER FINGER RELEASE: Primary | ICD-10-CM

## 2023-10-09 DIAGNOSIS — M65.342 TRIGGER FINGER, LEFT RING FINGER: ICD-10-CM

## 2023-10-09 PROCEDURE — 97110 THERAPEUTIC EXERCISES: CPT | Performed by: OCCUPATIONAL THERAPIST

## 2023-10-09 PROCEDURE — 97140 MANUAL THERAPY 1/> REGIONS: CPT | Performed by: OCCUPATIONAL THERAPIST

## 2023-10-09 NOTE — PROGRESS NOTES
OT Discharge     Today's date: 10/9/2023  Patient name: Reina Espinosa  : 1965  MRN: [id number]  Referring provider: Darlyn Gee PA-C  Dx:   Encounter Diagnosis     ICD-10-CM    1. S/P trigger finger release  Z98.890       2. Trigger finger, left ring finger  M65.342                      Assessment  Assessment details: Flora Leone is a 59 y/o RHD female presenting with bilateral trigger fingers. She reports a history of bilateral trigger fingers over the past 2 years. The right thumb was injected in  with good results. The right middle and ring  began triggering about a year ago and she received injection in 2023 f/b a severe headache reaction. The right hand continued to trigger after the injections and required surgery for release on 23. The left ring finger locks frequently during day. No injection or surgery to the left. Emery Camel right hand presents with localized edema in the ring and small with motion restrictions. The left hand motion has full AROM although with frequent triggering of ring finger. No pain at rest, no sensory complaints.  strength of the right restricted by pain and limited motion and the left by locking of the ring finger. 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 Evaluation is of low complexity, due to minimal comorbidities and stable clinical presentation. Pt demonstrates good tolerance of therapy today and was provided with a written HEP for the right hand focusing on edema reduction, tendon gliding exercises, light putty strengthening. The left hand ring finger was immobilized in an oval 8 which she is instructed to wear full time. She was instructed to perform exercises several times  Daily. The patient demonstrates HEP instructions appropriately, verbalizes understanding, and is in agreement with the written HEP.   Pt will benefit from skilled OT intervention to progress  Right hand motion, resolve pain, increased strength,  and allow return to PLOF. Left hand requires reduction of locking with splint wear. RE 9/18/23  Carlos Monreal has attended 7 therapy visits and has made excellent progress in achieve full motion of the digits. Functional strength has been improving which allows her increased ease in managing her patients at work. Pain is resolving. Edema remains mild bilaterally. Pain is also minimal.  Therapy may continue and will be weaned to Hep as therapy goals are met. Discharge Summary 10/9/23    Carlos Monreal has been seen for 8 OT visits and has made excellent progress in motion and symptom control. . The patient presents with decreased pain and improved functional use of both hands. Functional strength and motion achieved on right, left ring finger continues to softly lock. She has weaned from all splinting. The patient  is independent with the upgraded HEP. The patient  has achieved maximum benefit from OT and continued skilled OT is not indicated at this time. Recommend discharge from OT. The patient is in agreement with discharge plan  Impairments: abnormal or restricted ROM, activity intolerance, impaired physical strength and pain with function    Symptom irritability: lowUnderstanding of Dx/Px/POC: excellent   Prognosis: good    Goals  STG 2 weeks   Increase digital arc by at least 10 degrees in extension and flexion MET   Increase flexion to [facility] by at least 1 cmMET   Reduce edema to a minimal levelMET   Reduce pain at rest to less than 2/10MET    LTG  By discharge   Achieve functional GALAN of digit >240 to allow independence in holding small items in hand MET   Achieve functional extension with minimal lag to allow independence in donning gloves and tucking in clothing. MET   Pain at rest resolved, pain >2/10 with activity achieving independence in self care without increased of pain.  MET   Achieve  strength to at least 50% of the uninvolved achieving independence in opening containers MET   Achieve FOTO goals established at IE. NOT MET due to ongoing left triggering. Plan  Planned therapy interventions: home exercise program and strengthening  Other planned therapy interventions: oval 8 wear prn  Duration in weeks: 4  Plan of Care beginning date: 2023  Plan of Care expiration date: 10/16/2023  Treatment plan discussed with: patient        Subjective Evaluation    History of Present Illness  Mechanism of injury: surgery          Recurrent probem    Quality of life: excellent    Patient Goals  Patient goals for therapy: decreased edema, decreased pain, increased motion, increased strength and return to work    Pain  Current pain ratin  Location: Right palm III, IV;  L IV palm  Quality: tight and burning  Relieving factors: rest, heat and change in position  Exacerbated by: gripping. Progression: improved    Social Support    Employment 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 Right Wrist/Hand   Negative for edema. Neurological Testing     Sensation     Wrist/Hand   Left   Intact: light touch    Right   Intact: light touch    Active Range of Motion     Additional Active Range of Motion Details  Composite flexion of digits to palm  II 0 cm,  III  0 cm,  IV  0 cm, V  0 cm  Post heat and stretch, full motion    Strength/Myotome Testing     Additional Strength Details   Jesus #2  L  42 lbs,  R  24 lbs. Swelling     Left Wrist/Hand   Circumference MCP: 17.3 cm    Right Wrist/Hand   Circumference MCP: 17.5 cm      Daily Note     Today's date: 10/9/2023  Patient name: Francia Valdivia  : 1965  MRN: [id number]  Referring provider: Ingrid Ocampo PA-C  Dx:   Encounter Diagnosis     ICD-10-CM    1. S/P trigger finger release  Z98.890       2. Trigger finger, left ring finger  M65.342                      Subjective:   "I can feel my strength getting better"  At work, positioning patients.  " the swelling in the left hand is better"      Objective: See treatment diary below      Assessment: Tolerated treatment well. Post tx, full flexion achieved. Steady increase of motion and strength without increased pain or edema. Soft locking of left ring finger with hard gripping. All splinting weaned. Plan:  Discharge therapy to [facility] with splint PRN and continued strengthening with digiflex. Therapy goals met. FOTO score not met. Precautions: DOS right TFR IV, III 7/11/23. Access Code: [email or web address]  URL: [email or web address]  Date: 08/03/2023  Prepared by: Annalee De    Exercises  - Wrist Tendon Gliding  - 3 x daily - 7 x weekly - 1 sets - 10 reps  - Putty Squeezes  - 3 x daily - 7 x weekly - 1-3 sets - 10 reps  - Seated Edema Reduction Massage  - 7 x weekly    Access Code: [email or web address]  URL: [email or web address]  Date: 09/12/2023  Prepared by: Geminia Kenisha    Exercises  - Putty Squeezes  - 3-4 x daily - 7 x weekly - 20 reps  - Rolling Putty on Table  - 3-4 x daily - 7 x weekly  - Seated Wrist Extension with Dumbbell  - 1 x daily - 7 x weekly - 1-3 sets - 10 reps    POC expires Auth Status Unit limit Start date  Expiration date PT/OT + Visit Limit?   10/16 No auth until 25 th visit 4 8/3/23 BOMN 60 max           Dx R TFR III iV  L TGF IV        Dr Derek Brian Not met 9/12  54/60             Date 8/3 8/9 8/22 8/24 9/12 9/14 9/18 9/21 9/25 10/9  RE/DC   Visit 1 2 3 4 5 6 7 8 9 10   Manuals  8' 8' 5' 8' 10' 8' 8' 8' 8'   IASTM  Volar palm Volar palm Volar palm digits Volar palm digits Volar palm digits Volar palm  Volar palm disits Volar palm digits Volar palm digits   scar  STM Cupping Cupping  cupping cupping       Jt mobs R XS issued       Ips IV III IP III IV Ips III, IV   kinesiotape  Tape V/D palm  in webs Web K tape over palm          Neuro Re-Ed                                                                                                        Ther Ex    23' 23'   25'   23'   15'   15'   15'   15'   10'   HEP R glove, TGE,  Putty  scar care  L  Oval 8 Coban pt ed for III PIP HOS;   Kinestio tape ed for V/D palm Cont taping, add intrinsic PROM and hook fists. NV, resume taping, coban. Cont HEP  P/H fists. New HEP, DIP flexion putty , wrist E PRE Cont DIP flexion; night splint left Cont putty and gripping PRE's   Review HEP for discharge. Splint wear, digiflex red   15'   PROM 1:1  Digital hook to full fists;  P/h fists x10 Intrinsic PROM, isolate then comp  10' Intrinsic PROM, isolate then comp 10' Intrinsic PROM isolate, comp Intrinsic PROM isolate then comp Intrinsic PROM isolate, comp Intrinsic PROM isolate, then comp  fist Intrinsic stretch; full comp fist PROM Intrinsics, full fist stretches. Comp fist PROM   TGE  Hook to full  x10 [employer] to full [employer]   x20  Full x20 Hook to full  Hook to full Hook to full Hook to full Hook to full Hook to full    P/H fists   x20 full fist P/H 10x3" P/h fist x10 P/H fists P/H fists   10x10 P/h fists   10x10 P/H fists   10x10 P/H fists   10x10   PRE digits     O putty   X20, roll after.          Wrist E PRE     #1 x20  #2 x20                                  Ther Activity      8'   10'   15' 15'   10'    Gripping isometric    Dowel to putty x40  Dowel into putty x40 Dowel into putty  x40 Dowel into putty   x40 Dowel into putty   x40    Gripping individ    P/u, lay down gems  3x8  Y dig flexion   4x10 Y dig flex  4x10 Y dig flex with tips only  4x10 Y dig flex with tips   4x10    Gripping concentric      Tony Hernandez #2   5x6 Tony Hernandez #2  5x6 Tony Hernandez #3  5x6 Tony Hernandez #3  5x6    Squeezine        plastic, thin and thick Tan web tips  to edge  x25                  Modalities             MHP Pre tx Pre tx Pre tx Pre tx Pre tx Pre tx Pre tx with fist Pre tx with fist Pre tx with fist Pre tx with fist

## 2023-10-19 ENCOUNTER — OFFICE VISIT (OUTPATIENT)
Dept: OBGYN CLINIC | Facility: CLINIC | Age: 58
End: 2023-10-19

## 2023-10-19 VITALS — BODY MASS INDEX: 27.49 KG/M2 | WEIGHT: 165 LBS | HEIGHT: 65 IN

## 2023-10-19 DIAGNOSIS — Z98.890 S/P TRIGGER FINGER RELEASE: Primary | ICD-10-CM

## 2023-10-19 NOTE — PROGRESS NOTES
ASSESSMENT/PLAN:      60year old female here for her right long and ring trigger finger releases, 7/11/2023. Overall patient is doing well. She has full digital range of motion although she feels she cannot make a very tight fist.  She has completed occupational therapy and may continue to work on her home exercise program.  She was advised to work on strengthening exercises at home and may continue with activity as tolerated. She does have a left ring finger trigger finger which is bothersome and very stiff. She would like to get this surgically released sometime in the near I will return the office several months prior to sign consent. The patient verbalized understanding of exam findings and treatment plan. We engaged in the shared decision-making process and treatment options were discussed at length with the patient. Surgical and conservative management discussed today along with risks and benefits. Diagnoses and all orders for this visit:    S/P trigger finger release      Follow Up:  Return if symptoms worsen or fail to improve. To Do Next Visit:  Re-evaluation of current issue    ____________________________________________________________________________________________________________________________________________      CHIEF COMPLAINT:  Chief Complaint   Patient presents with    Right Ring Finger - Follow-up     Feeling tightness and swelling       SUBJECTIVE:  Dorys Marin is a 60y.o. year old RHD female who presents here for a 2 month follow up for her right long and ring trigger finger releases, 7/11/2023. She was having residual tightness/stiffness post operatively and was sent to OT to work on that. She has attended 8 sessions, where her last visit on 10/9/2023, noting that the patient has had excellent progress with her motion and symptoms control.   Today patient notes some occasional shooting pains into the hand with certain motions, denies any paresthesias or nocturnal symptoms. She has returned to work full duty and is tolerating this well. Left ring finger continues to trigger and bother her, she will consider surgery for this in the next year       I have personally reviewed all the relevant PMH, PSH, SH, FH, Medications and allergies.      PAST MEDICAL HISTORY:  Past Medical History:   Diagnosis Date    2019 novel coronavirus disease (COVID-19) 05/30/2020    x 2 2020 and 2022    Abnormal Pap smear of cervix     Arthritis     BRCA1 negative 2015    BRCA2 negative 2015    Gastric ulcer     Hyperlipidemia     Hypertension     Low back pain     Right trigger finger     long and ring    TB lung, latent     Varicella        PAST SURGICAL HISTORY:  Past Surgical History:   Procedure Laterality Date    ANKLE FRACTURE SURGERY Right 03/01/2021    "ankle fx"    APPENDECTOMY      CATARACT EXTRACTION Bilateral     COLONOSCOPY      FOOT SURGERY Right     HERNIA REPAIR      WA TENDON SHEATH INCISION Right 7/11/2023    Procedure: RELEASE TRIGGER FINGER LONG FINGER AND RING FINGER;  Surgeon: Lynford Holstein, MD;  Location: [facility] MAIN OR;  Service: Orthopedics       FAMILY HISTORY:  Family History   Problem Relation Age of Onset    Ovarian cancer Mother 48    No Known Problems Father     Thyroid cancer Sister 52    No Known Problems Sister     No Known Problems Daughter     No Known Problems Son     No Known Problems Son     No Known Problems Son     Cancer Maternal Grandmother         unknown type or age    No Known Problems Paternal Grandmother     No Known Problems Maternal Aunt     Breast cancer Maternal Uncle         52's       SOCIAL HISTORY:  Social History     Tobacco Use    Smoking status: Never    Smokeless tobacco: Never   Vaping Use    Vaping Use: Never used   Substance Use Topics    Alcohol use: Yes     Comment: rare    Drug use: No       MEDICATIONS:    Current Outpatient Medications:     atorvastatin (LIPITOR) 20 mg tablet, Take 1 tablet (20 mg total) by mouth daily, Disp: 90 tablet, Rfl: 3    diclofenac sodium (VOLTAREN) 50 mg EC tablet, Take 50 mg by mouth 2 (two) times a day as needed (Patient not taking: Reported on 9/7/2023), Disp: , Rfl:     estradiol (ESTRACE) 0.1 mg/g vaginal cream, insert 0.5 grams vaginally two times a week and apply A PEA SIZE AMOUNT TO EXTERNAL VAGINAL OPENING AND URETHRA AREA TWICE WEEKLY (Patient not taking: Reported on 8/3/2023), Disp: 42.5 g, Rfl: 0    hydrochlorothiazide (HYDRODIURIL) 25 mg tablet, take 1 tablet by mouth once daily, Disp: 90 tablet, Rfl: 1    ibuprofen (MOTRIN) 800 mg tablet, Take 1 tablet (800 mg total) by mouth 2 (two) times a day with meals (Patient taking differently: Take 800 mg by mouth every 8 (eight) hours as needed), Disp: 60 tablet, Rfl: 2    isoniazid (NYDRAZID) 300 mg tablet, Take 1 tablet (300 mg total) by mouth daily, Disp: 30 tablet, Rfl: 0    isoniazid (NYDRAZID) 300 mg tablet, Take 1 tablet (300 mg total) by mouth daily, Disp: 65 tablet, Rfl: 0    losartan (COZAAR) 50 mg tablet, take 1 tablet by mouth once daily, Disp: 90 tablet, Rfl: 1    methocarbamol (ROBAXIN) 500 mg tablet, Take 1 tablet (500 mg total) by mouth daily at bedtime (Patient not taking: Reported on 8/3/2023), Disp: 30 tablet, Rfl: 2    pyridoxine (VITAMIN B6) 50 mg tablet, Take 1 tablet (50 mg total) by mouth daily, Disp: 30 tablet, Rfl: 0    Pyridoxine HCl (VITAMIN B6 PO), Take by mouth, Disp: , Rfl:     tretinoin (REFISSA) 0.05 % cream, Apply topically daily at bedtime (Patient taking differently: Apply topically if needed), Disp: 45 g, Rfl: 1    triamcinolone (KENALOG) 0.5 % cream, Apply topically 2 (two) times a day (Patient not taking: Reported on 8/3/2023), Disp: 30 g, Rfl: 1    ALLERGIES:  Allergies   Allergen Reactions    Kiwi Extract - Food Allergy Hives    Other      strawberry    Pineapple - Food Allergy Hives       REVIEW OF SYSTEMS:  Review of Systems   Constitutional:  Negative for chills, fatigue, fever and unexpected weight change. HENT:  Negative for hearing loss, nosebleeds and sore throat. Eyes:  Negative for pain, redness and visual disturbance. Respiratory:  Negative for cough, shortness of breath and wheezing. Cardiovascular:  Negative for chest pain, palpitations and leg swelling. Gastrointestinal:  Negative for abdominal pain, nausea and vomiting. Endocrine: Negative for polydipsia and polyuria. Genitourinary:  Negative for difficulty urinating, dyspareunia, dysuria, frequency and hematuria. Musculoskeletal:  Negative for arthralgias, joint swelling and myalgias. Skin:  Negative for rash and wound. Neurological:  Negative for dizziness, speech difficulty, weakness, numbness and headaches. Psychiatric/Behavioral:  Negative for decreased concentration and suicidal ideas. The patient is not nervous/anxious. VITALS:  There were no vitals filed for this visit. LABS:  HgA1c:   Lab Results   Component Value Date    HGBA1C 5.6 02/24/2023     BMP:   Lab Results   Component Value Date    CALCIUM 9.7 09/01/2023    K 3.6 09/01/2023    CO2 25 09/01/2023     09/01/2023    BUN 15 09/01/2023    CREATININE 0.60 09/01/2023       _____________________________________________________  PHYSICAL EXAMINATION:  General: well developed and well nourished, alert, oriented times 3, and appears comfortable  Psychiatric: Normal  HEENT: Normocephalic, Atraumatic Trachea Midline, No torticollis  Pulmonary: No audible wheezing or respiratory distress   Cardiovascular: No pitting edema, 2+ radial pulse   Abdominal/GI: abdomen non tender, non distended   Skin: No masses, erythema, lacerations, fluctation, ulcerations  Neurovascular: Sensation Intact to the Median, Ulnar, Radial Nerve, Motor Intact to the Median, Ulnar, Radial Nerve, and Pulses Intact  Musculoskeletal: Normal, except as noted in detailed exam and in HPI.       MUSCULOSKELETAL EXAMINATION:  Right hand:   Well-healed surgical incisions  No excessive scar tissue formation  Full digital range of motion, full composite fist, full extension  Sensation intact distally, negative carpal tunnel testing    Left ring finger is significantly stiff and limited in flexion secondary to a known trigger finger. ___________________________________________________  STUDIES REVIEWED:  No images reviewed at today's visit.       PROCEDURES PERFORMED:  Procedures  No Procedures performed today    _____________________________________________________    Jeremiah Dumas

## 2023-10-21 ENCOUNTER — OFFICE VISIT (OUTPATIENT)
Dept: URGENT CARE | Facility: CLINIC | Age: 58
End: 2023-10-21
Payer: COMMERCIAL

## 2023-10-21 VITALS
RESPIRATION RATE: 16 BRPM | DIASTOLIC BLOOD PRESSURE: 84 MMHG | WEIGHT: 170 LBS | BODY MASS INDEX: 28.29 KG/M2 | HEART RATE: 78 BPM | TEMPERATURE: 98.6 F | SYSTOLIC BLOOD PRESSURE: 138 MMHG | OXYGEN SATURATION: 97 %

## 2023-10-21 DIAGNOSIS — B02.9 HERPES ZOSTER WITHOUT COMPLICATION: Primary | ICD-10-CM

## 2023-10-21 DIAGNOSIS — R51.9 ACUTE INTRACTABLE HEADACHE, UNSPECIFIED HEADACHE TYPE: ICD-10-CM

## 2023-10-21 DIAGNOSIS — S03.00XA DISLOCATION OF TEMPOROMANDIBULAR JOINT, INITIAL ENCOUNTER: ICD-10-CM

## 2023-10-21 PROCEDURE — G0383 LEV 4 HOSP TYPE B ED VISIT: HCPCS

## 2023-10-21 PROCEDURE — 99284 EMERGENCY DEPT VISIT MOD MDM: CPT

## 2023-10-21 RX ORDER — METHOCARBAMOL 500 MG/1
500 TABLET, FILM COATED ORAL 4 TIMES DAILY
Qty: 20 TABLET | Refills: 0 | Status: SHIPPED | OUTPATIENT
Start: 2023-10-21

## 2023-10-21 RX ORDER — KETOROLAC TROMETHAMINE 30 MG/ML
15 INJECTION, SOLUTION INTRAMUSCULAR; INTRAVENOUS ONCE
Status: COMPLETED | OUTPATIENT
Start: 2023-10-21 | End: 2023-10-21

## 2023-10-21 RX ADMIN — KETOROLAC TROMETHAMINE 15 MG: 30 INJECTION, SOLUTION INTRAMUSCULAR; INTRAVENOUS at 10:06

## 2023-10-21 NOTE — PATIENT INSTRUCTIONS
You were given a toradol shot in office - do not take ibuporfen/advil/aleve within 8 hours of this medication. If this does not help you can start on robaxin medication I sent to your pharmacy. Over the counter medications to try include - Tylenol, ibuprofen, or naproxen - the earlier you take after the symptoms begin the better they work. Try to avoid headache triggers, stress, alcohol use, caffeine, lack of sleep, noise/lighting, or certain foods. Limit taking daily medication for headaches - this can cause rebound headaches. Only use at maximum 3 times a week to prevent this. Follow up with PCP in 3-5 days. Proceed to ER if symptoms worsen such as sudden severe headache, dizziness, loss of balance, or vision changes.

## 2023-10-21 NOTE — LETTER
October 21, 2023     Patient: Valentino Levins   YOB: 1965   Date of Visit: 10/21/2023       To Whom it May Concern:    Viraj Morales was seen in my clinic on 10/21/2023. She should be excused 10/21/23. If you have any questions or concerns, please don't hesitate to call.          Sincerely,          MARVEL Vang        CC: No Recipients

## 2023-10-21 NOTE — PROGRESS NOTES
[facility]    NAME: Dorys Marin is a 60 y.o. female  : 1965    MRN: [id number]  DATE: 2023  TIME: 10:05 AM    Assessment and Plan   Herpes zoster without complication [Z24.5]  1. Herpes zoster without complication  ketorolac (TORADOL) injection 15 mg    methocarbamol (ROBAXIN) 500 mg tablet      2. Acute intractable headache, unspecified headache type  ketorolac (TORADOL) injection 15 mg    methocarbamol (ROBAXIN) 500 mg tablet      3. Dislocation of temporomandibular joint, initial encounter  ketorolac (TORADOL) injection 15 mg    methocarbamol (ROBAXIN) 500 mg tablet        Given toradol in office. Given robaxin given TMJ symptoms and pain. Shingles for over 5 days - supportive care for symptoms but this may have been the trigger to headache/muscle aches. Over the counter medications to try include - Tylenol, ibuprofen, or naproxen - the earlier you take after the symptoms begin the better they work. Educated to try and reduce triggers/stress and headache red flags. Follow up with PCP for symptoms. Go to ER with red flag symptoms. Patient Instructions     Over the counter medications to try include - Tylenol, ibuprofen, or naproxen - the earlier you take after the symptoms begin the better they work. Try to avoid headache triggers, stress, alcohol use, caffeine, lack of sleep, noise/lighting, or certain foods. Limit taking daily medication for headaches - this can cause rebound headaches. Only use at maximum 3 times a week to prevent this. Follow up with PCP in 3-5 days. Proceed to ER if symptoms worsen such as sudden severe headache, dizziness, loss of balance, or vision changes. Chief Complaint     Chief Complaint   Patient presents with    Headache     Started since Monday headache all over, light sensitivity, nausea. Sometimes radiates to jaws. Noted rash on lower back since Tuesday. Denies any URI sx. Negative home covid test last night.  No fever or chills. History of Present Illness       Presents with headache that has been progressively getting worse that began 5 days ago. History of bad headache about 1 year ago. About the same time she noted a rash to her left lower back. Area is itchy/painful. She reports history of shingles and getting the shingles vaccine. Headache intermittent, 10/10, did not come on suddenly like a thunderclap, pain radiates into the jaw. Nothing makes better or worse. Mild nausea, no vomiting. No vision changes, dizziness, light sensitivity. Review of Systems   Review of Systems   Constitutional:  Negative for chills, fatigue and fever. HENT:  Negative for congestion, dental problem and sore throat. Jaw pain   Eyes:  Negative for visual disturbance. Respiratory:  Negative for cough, shortness of breath and wheezing. Cardiovascular:  Negative for chest pain. Gastrointestinal:  Positive for nausea. Negative for abdominal pain and vomiting. Genitourinary:  Negative for dysuria. Musculoskeletal:  Negative for myalgias. Skin:  Positive for rash. Neurological:  Positive for headaches. Negative for dizziness, syncope and light-headedness. Psychiatric/Behavioral:  Negative for confusion.           Current Medications       Current Outpatient Medications:     atorvastatin (LIPITOR) 20 mg tablet, Take 1 tablet (20 mg total) by mouth daily, Disp: 90 tablet, Rfl: 3    losartan (COZAAR) 50 mg tablet, take 1 tablet by mouth once daily, Disp: 90 tablet, Rfl: 1    methocarbamol (ROBAXIN) 500 mg tablet, Take 1 tablet (500 mg total) by mouth 4 (four) times a day, Disp: 20 tablet, Rfl: 0    diclofenac sodium (VOLTAREN) 50 mg EC tablet, Take 50 mg by mouth 2 (two) times a day as needed (Patient not taking: Reported on 9/7/2023), Disp: , Rfl:     estradiol (ESTRACE) 0.1 mg/g vaginal cream, insert 0.5 grams vaginally two times a week and apply A PEA SIZE AMOUNT TO EXTERNAL VAGINAL OPENING AND URETHRA AREA TWICE WEEKLY (Patient not taking: Reported on 8/3/2023), Disp: 42.5 g, Rfl: 0    hydrochlorothiazide (HYDRODIURIL) 25 mg tablet, take 1 tablet by mouth once daily, Disp: 90 tablet, Rfl: 1    ibuprofen (MOTRIN) 800 mg tablet, Take 1 tablet (800 mg total) by mouth 2 (two) times a day with meals (Patient taking differently: Take 800 mg by mouth every 8 (eight) hours as needed), Disp: 60 tablet, Rfl: 2    isoniazid (NYDRAZID) 300 mg tablet, Take 1 tablet (300 mg total) by mouth daily, Disp: 30 tablet, Rfl: 0    isoniazid (NYDRAZID) 300 mg tablet, Take 1 tablet (300 mg total) by mouth daily, Disp: 65 tablet, Rfl: 0    pyridoxine (VITAMIN B6) 50 mg tablet, Take 1 tablet (50 mg total) by mouth daily, Disp: 30 tablet, Rfl: 0    Pyridoxine HCl (VITAMIN B6 PO), Take by mouth, Disp: , Rfl:     tretinoin (REFISSA) 0.05 % cream, Apply topically daily at bedtime (Patient taking differently: Apply topically if needed), Disp: 45 g, Rfl: 1    triamcinolone (KENALOG) 0.5 % cream, Apply topically 2 (two) times a day (Patient not taking: Reported on 8/3/2023), Disp: 30 g, Rfl: 1    Current Facility-Administered Medications:     ketorolac (TORADOL) injection 15 mg, 15 mg, Intramuscular, Once, MARVEL Joy    Current Allergies     Allergies as of 10/21/2023 - Reviewed 10/21/2023   Allergen Reaction Noted    Kiwi extract - food allergy Hives 11/11/2022    Other  04/10/2019    Pineapple - food allergy Hives 08/30/2017            The following portions of the patient's history were reviewed and updated as appropriate: allergies, current medications, past family history, past medical history, past social history, past surgical history and problem list.     Past Medical History:   Diagnosis Date    2019 novel coronavirus disease (COVID-19) 05/30/2020    x 2 2020 and 2022    Abnormal Pap smear of cervix     Arthritis     BRCA1 negative 2015    BRCA2 negative 2015    Gastric ulcer     Hyperlipidemia     Hypertension     Low back pain     Right trigger finger     long and ring    TB lung, latent     Varicella        Past Surgical History:   Procedure Laterality Date    ANKLE FRACTURE SURGERY Right 03/01/2021    "ankle fx"    APPENDECTOMY      CATARACT EXTRACTION Bilateral     COLONOSCOPY      FOOT SURGERY Right     HERNIA REPAIR      DC TENDON SHEATH INCISION Right 7/11/2023    Procedure: RELEASE TRIGGER FINGER LONG FINGER AND RING FINGER;  Surgeon: Samina Gutiérrez MD;  [facility]: [facility] MAIN OR;  Service: Orthopedics       Family History   Problem Relation Age of Onset    Ovarian cancer Mother 48    No Known Problems Father     Thyroid cancer Sister 52    No Known Problems Sister     No Known Problems Daughter     No Known Problems Son     No Known Problems Son     No Known Problems Son     Cancer Maternal Grandmother         unknown type or age    No Known Problems Paternal Grandmother     No Known Problems Maternal Aunt     Breast cancer Maternal Uncle         50's         Medications have been verified. Objective   /84   Pulse 78   Temp 98.6 °F (37 °C)   Resp 16   Wt 77.1 kg (170 lb)   SpO2 97%   BMI 28.29 kg/m²        Physical Exam     Physical Exam  Vitals reviewed. Constitutional:       General: She is not in acute distress. Appearance: Normal appearance. HENT:      Head:      Comments: + click at TMJ bilaterally. No edema, erythema. Normal dentition without erythema, edema, or abscess noted. Right Ear: Tympanic membrane, ear canal and external ear normal.      Left Ear: Tympanic membrane, ear canal and external ear normal.      Nose: Nose normal.      Mouth/Throat:      Mouth: Mucous membranes are moist.      Pharynx: No posterior oropharyngeal erythema. Eyes:      Conjunctiva/sclera: Conjunctivae normal.   Cardiovascular:      Rate and Rhythm: Normal rate and regular rhythm. Pulses: Normal pulses. Heart sounds: Normal heart sounds. No murmur heard.   Pulmonary:      Effort: Pulmonary effort is normal. No respiratory distress. Breath sounds: Normal breath sounds. Skin:     General: Skin is warm and dry. Comments: Left lower back with localized cluster of about 5-10 vesicles consistent with herpes zoster. Neurological:      General: No focal deficit present. Mental Status: She is alert and oriented to person, place, and time.    Psychiatric:         Mood and Affect: Mood normal.         Behavior: Behavior normal.

## 2023-11-02 ENCOUNTER — OFFICE VISIT (OUTPATIENT)
Dept: FAMILY MEDICINE CLINIC | Facility: CLINIC | Age: 58
End: 2023-11-02
Payer: COMMERCIAL

## 2023-11-02 VITALS
BODY MASS INDEX: 27.99 KG/M2 | WEIGHT: 168 LBS | OXYGEN SATURATION: 98 % | TEMPERATURE: 97.3 F | DIASTOLIC BLOOD PRESSURE: 110 MMHG | HEIGHT: 65 IN | HEART RATE: 62 BPM | SYSTOLIC BLOOD PRESSURE: 184 MMHG

## 2023-11-02 DIAGNOSIS — R53.83 OTHER FATIGUE: ICD-10-CM

## 2023-11-02 DIAGNOSIS — Z13.220 SCREENING FOR LIPID DISORDERS: ICD-10-CM

## 2023-11-02 DIAGNOSIS — H61.23 BILATERAL HEARING LOSS DUE TO CERUMEN IMPACTION: Primary | ICD-10-CM

## 2023-11-02 DIAGNOSIS — Z23 NEEDS FLU SHOT: ICD-10-CM

## 2023-11-02 PROCEDURE — 90686 IIV4 VACC NO PRSV 0.5 ML IM: CPT

## 2023-11-02 PROCEDURE — 69210 REMOVE IMPACTED EAR WAX UNI: CPT

## 2023-11-02 PROCEDURE — 99214 OFFICE O/P EST MOD 30 MIN: CPT

## 2023-11-02 PROCEDURE — 90471 IMMUNIZATION ADMIN: CPT

## 2023-11-02 NOTE — PROGRESS NOTES
Name: Gildardo Del Toro      : 1965      MRN: [id number]  Encounter Provider: Deana Azevedo PA-C  Encounter Date: 2023   Encounter department: [address] [place] [address]     1. Bilateral hearing loss due to cerumen impaction    2. Needs flu shot  -     FLUZONE: influenza vaccine, quadrivalent, 0.5 mL    3. Other fatigue  -     CBC and differential; Future  -     Comprehensive metabolic panel; Future  -     Hemoglobin A1C; Future  -     TSH, 3rd generation with Free T4 reflex; Future  -     Vitamin D 25 hydroxy; Future    4. Screening for lipid disorders  -     Lipid Panel with Direct LDL reflex; Future    Patient presents for bilateral clogged ears with associated hearing loss. Cerumen removal performed; patient's symptoms improved immediately after completion. Patient tolerated procedure well, no pain reported. To call if she has any questions or concerns. Patient requesting blood work for fatigue - ordered as documented above. Did educate patient some may not be covered by insurance; recommended she call before getting completed. HTN: patient's BP elevated at today's visit. No symptoms of high blood pressure; physical exam unremarkable. Likely related to lack of sleep - continue current BP regimen and continue to monitor. Subjective      CC: clogged ears    Patient presents for "clogged ears" - notes she goes to see ENT regularly for wax cleanings but it feels like there is too much wax in her ears, right worse than the left. No pain or drainage. She has tried to use OTC drops and put a "pick" in her ear with no relief. Patient's BP elevated at today's visit. She notes she does not feel any symptoms of high blood pressure but that it may be related to her lack of sleep lately because of her job. Notes she is not under much stress either. No headaches, dizziness, lightheadedness, or visual disturbances. Not patient's main concern for today's visit. Also requests blood work orders at today's visit. Review of Systems   Constitutional:  Negative for appetite change, chills, diaphoresis, fatigue and fever. HENT:  Positive for ear pain ("clogged ears"). Negative for congestion, rhinorrhea and sore throat. Eyes:  Negative for visual disturbance. Respiratory:  Negative for cough, chest tightness, shortness of breath and wheezing. Cardiovascular:  Negative for chest pain, palpitations and leg swelling. Gastrointestinal:  Negative for abdominal pain, constipation, diarrhea, nausea and vomiting. Neurological:  Negative for dizziness, light-headedness and headaches.        Current Outpatient Medications on File Prior to Visit   Medication Sig    atorvastatin (LIPITOR) 20 mg tablet Take 1 tablet (20 mg total) by mouth daily    hydrochlorothiazide (HYDRODIURIL) 25 mg tablet take 1 tablet by mouth once daily    ibuprofen (MOTRIN) 800 mg tablet Take 1 tablet (800 mg total) by mouth 2 (two) times a day with meals (Patient taking differently: Take 800 mg by mouth every 8 (eight) hours as needed)    losartan (COZAAR) 50 mg tablet take 1 tablet by mouth once daily    methocarbamol (ROBAXIN) 500 mg tablet Take 1 tablet (500 mg total) by mouth 4 (four) times a day    pyridoxine (VITAMIN B6) 50 mg tablet Take 1 tablet (50 mg total) by mouth daily    Pyridoxine HCl (VITAMIN B6 PO) Take by mouth    tretinoin (REFISSA) 0.05 % cream Apply topically daily at bedtime (Patient taking differently: Apply topically if needed)    triamcinolone (KENALOG) 0.5 % cream Apply topically 2 (two) times a day    [DISCONTINUED] diclofenac sodium (VOLTAREN) 50 mg EC tablet Take 50 mg by mouth 2 (two) times a day as needed (Patient not taking: Reported on 9/7/2023)    [DISCONTINUED] estradiol (ESTRACE) 0.1 mg/g vaginal cream insert 0.5 grams vaginally two times a week and apply A PEA SIZE AMOUNT TO EXTERNAL VAGINAL OPENING AND URETHRA AREA TWICE WEEKLY (Patient not taking: Reported on 8/3/2023)    [DISCONTINUED] isoniazid (NYDRAZID) 300 mg tablet Take 1 tablet (300 mg total) by mouth daily    [DISCONTINUED] isoniazid (NYDRAZID) 300 mg tablet Take 1 tablet (300 mg total) by mouth daily       Objective     BP (!) 184/110   Pulse 62   Temp (!) 97.3 °F (36.3 °C)   Ht 5' 5" (1.651 m)   Wt 76.2 kg (168 lb)   SpO2 98%   BMI 27.96 kg/m²     Physical Exam  Constitutional:       General: She is not in acute distress. Appearance: Normal appearance. She is not ill-appearing or diaphoretic. HENT:      Head: Normocephalic and atraumatic. Right Ear: External ear normal. There is impacted cerumen. Left Ear: External ear normal. There is impacted cerumen. Nose: Nose normal. No congestion or rhinorrhea. Mouth/Throat:      Mouth: Mucous membranes are moist.      Pharynx: Oropharynx is clear. No oropharyngeal exudate or posterior oropharyngeal erythema. Eyes:      General:         Right eye: No discharge. Left eye: No discharge. Conjunctiva/sclera: Conjunctivae normal.      Pupils: Pupils are equal, round, and reactive to light. Cardiovascular:      Rate and Rhythm: Normal rate and regular rhythm. Pulses: Normal pulses. Heart sounds: Normal heart sounds. No murmur heard. Pulmonary:      Effort: Pulmonary effort is normal. No respiratory distress. Breath sounds: Normal breath sounds. No wheezing, rhonchi or rales. Musculoskeletal:         General: Normal range of motion. Cervical back: Normal range of motion and neck supple. Lymphadenopathy:      Cervical: No cervical adenopathy. Skin:     General: Skin is warm. Neurological:      Mental Status: She is alert and oriented to person, place, and time.       Gait: Gait normal.   Psychiatric:         Mood and Affect: Mood normal.       Ear cerumen removal    Date/Time: 11/2/2023 4:00 PM    Performed by: Edy Black PA-C  Authorized by: Edy Black PA-C  Universal Protocol:  Consent: Verbal consent obtained. Risks and benefits: risks, benefits and alternatives were discussed  Consent given by: patient  Time out: Immediately prior to procedure a "time out" was called to verify the correct patient, procedure, equipment, support staff and site/side marked as required. Timeout called at: 11/2/2023 3:30 PM.  Patient understanding: patient states understanding of the procedure being performed  Patient consent: the patient's understanding of the procedure matches consent given  Procedure consent: procedure consent matches procedure scheduled  Relevant documents: relevant documents present and verified  Patient identity confirmed: verbally with patient    Patient location:  Clinic  Indications / Diagnosis:  Bilateral cerumen impaction  Procedure details:     Local anesthetic:  None    Location:  R ear and L ear    Procedure type: irrigation with instrumentation      Instrumentation: curette      Approach:  Natural orifice, internal and external  Post-procedure details:     Complication:  None    Hearing quality:  Improved    Patient tolerance of procedure: Tolerated well, no immediate complications  Comments:      Patient tolerated procedure well; no pain after procedure. Notes her ears feel "much better" and her hearing is improved.       Joss Atkins PA-C

## 2023-11-03 ENCOUNTER — APPOINTMENT (OUTPATIENT)
Dept: LAB | Facility: CLINIC | Age: 58
End: 2023-11-03
Payer: COMMERCIAL

## 2023-11-03 DIAGNOSIS — Z13.220 SCREENING FOR LIPID DISORDERS: ICD-10-CM

## 2023-11-03 DIAGNOSIS — I10 ESSENTIAL HYPERTENSION: ICD-10-CM

## 2023-11-03 DIAGNOSIS — R53.83 OTHER FATIGUE: ICD-10-CM

## 2023-11-03 LAB
25(OH)D3 SERPL-MCNC: 44 NG/ML (ref 30–100)
ALBUMIN SERPL BCP-MCNC: 4.2 G/DL (ref 3.5–5)
ALP SERPL-CCNC: 92 U/L (ref 34–104)
ALT SERPL W P-5'-P-CCNC: 31 U/L (ref 7–52)
ANION GAP SERPL CALCULATED.3IONS-SCNC: 7 MMOL/L
AST SERPL W P-5'-P-CCNC: 30 U/L (ref 13–39)
BASOPHILS # BLD AUTO: 0.04 THOUSANDS/ÂΜL (ref 0–0.1)
BASOPHILS NFR BLD AUTO: 1 % (ref 0–1)
BILIRUB SERPL-MCNC: 0.58 MG/DL (ref 0.2–1)
BUN SERPL-MCNC: 15 MG/DL (ref 5–25)
CALCIUM SERPL-MCNC: 9.8 MG/DL (ref 8.4–10.2)
CHLORIDE SERPL-SCNC: 106 MMOL/L (ref 96–108)
CHOLEST SERPL-MCNC: 173 MG/DL
CO2 SERPL-SCNC: 28 MMOL/L (ref 21–32)
CREAT SERPL-MCNC: 0.55 MG/DL (ref 0.6–1.3)
EOSINOPHIL # BLD AUTO: 0.14 THOUSAND/ÂΜL (ref 0–0.61)
EOSINOPHIL NFR BLD AUTO: 4 % (ref 0–6)
ERYTHROCYTE [DISTWIDTH] IN BLOOD BY AUTOMATED COUNT: 12.8 % (ref 11.6–15.1)
EST. AVERAGE GLUCOSE BLD GHB EST-MCNC: 117 MG/DL
GFR SERPL CREATININE-BSD FRML MDRD: 102 ML/MIN/1.73SQ M
GLUCOSE P FAST SERPL-MCNC: 92 MG/DL (ref 65–99)
HBA1C MFR BLD: 5.7 %
HCT VFR BLD AUTO: 44.1 % (ref 34.8–46.1)
HDLC SERPL-MCNC: 81 MG/DL
HGB BLD-MCNC: 13.9 G/DL (ref 11.5–15.4)
IMM GRANULOCYTES # BLD AUTO: 0.01 THOUSAND/UL (ref 0–0.2)
IMM GRANULOCYTES NFR BLD AUTO: 0 % (ref 0–2)
LDLC SERPL CALC-MCNC: 80 MG/DL (ref 0–100)
LYMPHOCYTES # BLD AUTO: 1.41 THOUSANDS/ÂΜL (ref 0.6–4.47)
LYMPHOCYTES NFR BLD AUTO: 37 % (ref 14–44)
MCH RBC QN AUTO: 29.4 PG (ref 26.8–34.3)
MCHC RBC AUTO-ENTMCNC: 31.5 G/DL (ref 31.4–37.4)
MCV RBC AUTO: 93 FL (ref 82–98)
MONOCYTES # BLD AUTO: 0.29 THOUSAND/ÂΜL (ref 0.17–1.22)
MONOCYTES NFR BLD AUTO: 8 % (ref 4–12)
NEUTROPHILS # BLD AUTO: 1.93 THOUSANDS/ÂΜL (ref 1.85–7.62)
NEUTS SEG NFR BLD AUTO: 50 % (ref 43–75)
NRBC BLD AUTO-RTO: 0 /100 WBCS
PLATELET # BLD AUTO: 188 THOUSANDS/UL (ref 149–390)
PMV BLD AUTO: 10.7 FL (ref 8.9–12.7)
POTASSIUM SERPL-SCNC: 4.4 MMOL/L (ref 3.5–5.3)
PROT SERPL-MCNC: 7 G/DL (ref 6.4–8.4)
RBC # BLD AUTO: 4.72 MILLION/UL (ref 3.81–5.12)
SODIUM SERPL-SCNC: 141 MMOL/L (ref 135–147)
TRIGL SERPL-MCNC: 62 MG/DL
TSH SERPL DL<=0.05 MIU/L-ACNC: 0.94 UIU/ML (ref 0.45–4.5)
WBC # BLD AUTO: 3.82 THOUSAND/UL (ref 4.31–10.16)

## 2023-11-03 PROCEDURE — 36415 COLL VENOUS BLD VENIPUNCTURE: CPT

## 2023-11-03 PROCEDURE — 83036 HEMOGLOBIN GLYCOSYLATED A1C: CPT

## 2023-11-03 PROCEDURE — 85025 COMPLETE CBC W/AUTO DIFF WBC: CPT

## 2023-11-03 PROCEDURE — 80061 LIPID PANEL: CPT

## 2023-11-03 PROCEDURE — 80053 COMPREHEN METABOLIC PANEL: CPT

## 2023-11-03 PROCEDURE — 82306 VITAMIN D 25 HYDROXY: CPT

## 2023-11-03 PROCEDURE — 84443 ASSAY THYROID STIM HORMONE: CPT

## 2023-11-03 RX ORDER — LOSARTAN POTASSIUM 50 MG/1
TABLET ORAL
Qty: 90 TABLET | Refills: 1 | Status: SHIPPED | OUTPATIENT
Start: 2023-11-03

## 2023-11-03 RX ORDER — HYDROCHLOROTHIAZIDE 25 MG/1
TABLET ORAL
Qty: 90 TABLET | Refills: 1 | Status: SHIPPED | OUTPATIENT
Start: 2023-11-03

## 2023-11-13 ENCOUNTER — TELEPHONE (OUTPATIENT)
Age: 58
End: 2023-11-13

## 2023-11-13 NOTE — TELEPHONE ENCOUNTER
Caller: Patient    Doctor: Jan     Reason for call: Patient calling to check status on insurance paperwork and if completed date received 10/19     Call back#: [phone]

## 2023-11-15 NOTE — PROGRESS NOTES
Diagnoses and all orders for this visit:    Encounter for gynecological examination without abnormal finding  -     Liquid-based pap, screening    Encounter for screening mammogram for malignant neoplasm of breast  -     Mammo screening bilateral w 3d & cad; Future    Screening for STDs (sexually transmitted diseases)  -     Chlamydia/GC amplified DNA by PCR        Advised to call with any Post Menopausal bleeding. Calcium/ Vit D dietary requirements discussed,   Weight bearing exercises minium of 150 mins/weekly advised. Kegel exercises advised daily, see after visit summary for instructions and recommendations  Reviewed perineal hygiene and vaginal dryness post menopause  SBE and yearly mammography advised. ASCCP guidelines reviewed. Will discontinue PAP's according to recommendations. Condoms encouraged with all sexual activity to prevent STI's. Health maintenance encouraged with PMD, remain current with Colonoscopy, Dexa scan, and recommended vaccines. Advised to call with any issues, all concerns & questions addressed.    See after visit summary for further information    F/U annually or Biannual if Medicare       Health Maintenance:    Last PAP: 11/11/2022 Negative / HPV other positive HPV 16& 18 were Negative , Colposcopy 2/21/22 Neg  Next PAP Due:collected today     Last Mammogram:02/08/2023  Life time Razia García % 3.29, Density A almost entirely fatty, Bi-Rads 1 Negative  Next Mammogram: Order given    Last Colonoscopy:09/19/2020 RTO in 5 years     Last DEXA: Not on file    Subjective    CC: Yearly Exam     Pleasant 60 y.o. , female [id number]  postmenopausal here for annual exam.   GYN hx includes: 4 vaginal deliveries, abnormal Pap, BRCA 1 & 2 Neg  No personal hx of breast, cervical, ovarian or colon CA  Family Hx: Mother - Ovarian cancer (D),Maternal uncle - Breast cancer , Maternal cousin  Foot cancer  ( alive lost her foot )  Pt had genetic testing done which was Neg per patient   She reports no new changes in her health. Medically stable, extensive medical hx      Reports history of abnormal pap smears. Denies abnormal Mammograms   Reports postmenopausal bleeding. With ultrasound follow-up patient declined EMB 2 years ago. Wanted to watch and wait. Reports no further episodes of postmenopausal bleeding   denies issues with vasomotor symptoms  Denies pelvic pain   Denies vaginal issues. She is no longer using estrace cream, denies any further dryness or pain with intercourse   Denies symptoms of pelvic organ prolapse, urinary, or fecal incontinence. Is rarely sexually active. Monogamous relationship. Denies dyspareunia   Denies STI concerns.  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     Past Medical History:   Diagnosis Date    2019 novel coronavirus disease (COVID-19) 05/30/2020    x 2 2020 and 2022    Abnormal Pap smear of cervix     Arthritis     BRCA1 negative 2015    BRCA2 negative 2015    Gastric ulcer     Hyperlipidemia     Hypertension     Low back pain     Right trigger finger     long and ring    TB lung, latent     Varicella      Past Surgical History:   Procedure Laterality Date    ANKLE FRACTURE SURGERY Right 03/01/2021    "ankle fx"    APPENDECTOMY      CATARACT EXTRACTION Bilateral     COLONOSCOPY      FOOT SURGERY Right     HERNIA REPAIR      GA TENDON SHEATH INCISION Right 7/11/2023    Procedure: RELEASE TRIGGER FINGER LONG FINGER AND RING FINGER;  Surgeon: Raysa Hand MD;  Location: [facility];  Service: Orthopedics      Family History   Problem Relation Age of Onset    Ovarian cancer Mother 48    No Known Problems Father     Thyroid cancer Sister 52    No Known Problems Sister     No Known Problems Daughter     No Known Problems Son     No Known Problems Son     No Known Problems Son     Cancer Maternal Grandmother         unknown type or age    No Known Problems Paternal Grandmother     No Known Problems Maternal Aunt     Breast cancer Maternal Uncle         52's     Social History     Tobacco Use    Smoking status: Never    Smokeless tobacco: Never   Vaping Use    Vaping Use: Never used   Substance Use Topics    Alcohol use: Not Currently     Comment: rare    Drug use: No       Current Outpatient Medications:     atorvastatin (LIPITOR) 20 mg tablet, Take 1 tablet (20 mg total) by mouth daily, Disp: 90 tablet, Rfl: 3    hydrochlorothiazide (HYDRODIURIL) 25 mg tablet, take 1 tablet by mouth once daily, Disp: 90 tablet, Rfl: 1    ibuprofen (MOTRIN) 800 mg tablet, Take 1 tablet (800 mg total) by mouth 2 (two) times a day with meals (Patient taking differently: Take 800 mg by mouth every 8 (eight) hours as needed), Disp: 60 tablet, Rfl: 2    losartan (COZAAR) 50 mg tablet, take 1 tablet by mouth once daily, Disp: 90 tablet, Rfl: 1    methocarbamol (ROBAXIN) 500 mg tablet, Take 1 tablet (500 mg total) by mouth 4 (four) times a day, Disp: 20 tablet, Rfl: 0    Pyridoxine HCl (VITAMIN B6 PO), Take by mouth, Disp: , Rfl:     pyridoxine (VITAMIN B6) 50 mg tablet, Take 1 tablet (50 mg total) by mouth daily, Disp: 30 tablet, Rfl: 0  Patient Active Problem List    Diagnosis Date Noted    Trigger middle finger of right hand 06/09/2023    Trigger ring finger of right hand 06/09/2023    Positive QuantiFERON-TB Gold test 06/09/2023    Other chest pain 06/09/2023    TB lung, latent 02/09/2023    Primary osteoarthritis involving multiple joints 01/11/2023    Shortness of breath 12/16/2022    Impaired fasting glucose 12/08/2022    Acute bilateral low back pain without sciatica 11/07/2022    Family history of ovarian cancer 11/07/2022    Cortical age-related cataract of both eyes 05/19/2022    Closed nondisplaced fracture of lateral malleolus of right fibula 03/17/2021    ASCUS with positive high risk HPV cervical 09/09/2020    Elevated C-reactive protein (CRP) 09/03/2020    Post-menopausal 07/24/2020    Varicose veins of both lower extremities with pain 2020    Cervicalgia 2019    Calcific tendonitis of right shoulder 04/10/2019    Sciatica, right side 2019    Essential hypertension 10/10/2018    Snoring 10/10/2018    Dyslipidemia 2016    Lumbar radiculopathy 2015    Migraine headache 2014       Allergies   Allergen Reactions    Kiwi Extract - Food Allergy Hives    Other      strawberry    Pineapple - Food Allergy Hives       OB History    Para Term  AB Living   6 4 4   1 4   SAB IAB Ectopic Multiple Live Births       1   4      # Outcome Date GA Lbr Maurice/2nd Weight Sex Delivery Anes PTL Lv   6             5 Ectopic            4 Term            3 Term            2 Term            1 Term               Obstetric Comments   4 vaginal deliveries   1 Ectopic and 1 still born @ 8 months        Vitals:    23 1244   BP: 124/80   BP Location: Left arm   Patient Position: Sitting   Cuff Size: Large   Weight: 74.8 kg (165 lb)   Height: 5' 5" (1.651 m)     Body mass index is 27.46 kg/m². Review of Systems     Constitutional: Negative for chills, fatigue, fever, headaches, visual disturbances, and unexpected weight change. Respiratory: Negative for cough, & shortness of breath. Cardiovascular: Negative for chest pain. .    Gastrointestinal: Negative for Abd pain, nausea & vomiting, constipation and diarrhea. Genitourinary: Negative for difficulty urinating, dysuria, hematuria, , unusual vaginal bleeding or discharge. dyspareunia  Skin: Negative skin changes    Physical Exam     Constitutional: Alert & Oriented x3, well-developed and well-nourished. No distress. HENT: Atraumatic, Normocephalic, Conjunctivae clear  Neck: Normal range of motion. Neck supple. No thyromegaly, mass, nodules or tenderness  Pulmonary: Effort normal.   Abdominal: Soft.  No tenderness or masses  Musculoskeletal: Normal ROM  Skin: Warm & Dry  Psychological: Normal mood, thought content, behavior & judgement Breasts:   Right: tissue soft without masses, tenderness, skin changes or nipple discharge. No areas of erythema or pain. No subclavicular, axillary, pectoral adenopathy  Left:  tissue soft without masses, tenderness, skin changes or nipple discharge. No areas of erythema or pain. No subclavicular, axillary, pectoral adenopathy    Pelvic exam was performed with patient supine, lithotomy position. Exam is consistent with mild  atrophic changes. Vulva/ Vestibule: Right: Negative rash, tenderness, lesion or injury                               Left: Negative rash, tenderness, lesion or injury   Urethral meatus: Negative for  tenderness, inflammation or discharge. Uterus: not deviated, enlarged, fixed or tender. Cervix: No CMT, no discharge or friability. Right adnexa: no mass, no tenderness and no fullness. Left adnexa: no mass, no tenderness and no fullness. Vagina: Consistent with mild  atrophic changes. No erythema, tenderness, masses, or foreign body in the vagina. No signs of injury around the vagina. No unusual vaginal discharge   Perineum without lesions, signs of injury, erythema or swelling. Inguinal Canal:        Right: No inguinal adenopathy or hernia present. Left: No inguinal adenopathy or hernia present.

## 2023-11-16 NOTE — PATIENT INSTRUCTIONS
Breast Self Exam for Women   AMBULATORY CARE:   A breast self-exam (BSE)  is a way to check your breasts for lumps and other changes. Regular BSEs can help you know how your breasts normally look and feel. Most breast lumps or changes are not cancer, but you should always have them checked by a healthcare provider. Why you should do a BSE:  Breast cancer is the most common type of cancer in women. Even if you have mammograms, you may still want to do a BSE regularly. If you know how your breasts normally feel and look, it may help you know when to contact your healthcare provider. Mammograms can miss some cancers. You may find a lump during a BSE that did not show up on a mammogram.  When you should do a BSE:  If you have periods, you may want to do your BSE 1 week after your period ends. This is the time when your breasts may be the least swollen, lumpy, or tender. You can do regular BSEs even if you are breastfeeding or have breast implants. Call your doctor if:   You find any lumps or changes in your breasts. You have breast pain or fluid coming from your nipples. You have questions or concerns about your condition or care. How to do a BSE:       Look at your breasts in a mirror. Look at the size and shape of each breast and nipple. Check for swelling, lumps, dimpling, scaly skin, or other skin changes. Look for nipple changes, such as a nipple that is painful or beginning to pull inward. Gently squeeze both nipples and check to see if fluid (that is not breast milk) comes out of them. If you find any of these or other breast changes, contact your healthcare provider. Check your breasts while you sit or  the following 3 positions:    Clau Duran your arms down at your sides. Raise your hands and join them behind your head. Put firm pressure with your hands on your hips. Bend slightly forward while you look at your breasts in the mirror. Lie down and feel your breasts.   When you lie down, your breast tissue spreads out evenly over your chest. This makes it easier for you to feel for lumps and anything that may not be normal for your breasts. Do a BSE on one breast at a time. Place a small pillow or towel under your left shoulder. Put your left arm behind your head. Use the 3 middle fingers of your right hand. Use your fingertip pads, on the top of your fingers. Your fingertip pad is the most sensitive part of your finger. Use small circles to feel your breast tissue. Use your fingertip pads to make dime-sized, overlapping circles on your breast and armpits. Use light, medium, and firm pressure. First, press lightly. Second, press with medium pressure to feel a little deeper into the breast. Last, use firm pressure to feel deep within your breast.    Examine your entire breast area. Examine the breast area from above the breast to below the breast where you feel only ribs. Make small circles with your fingertips, starting in the middle of your armpit. Make circles going up and down the breast area. Continue toward your breast and all the way across it. Examine the area from your armpit all the way over to the middle of your chest (breastbone). Stop at the middle of your chest.    Move the pillow or towel to your right shoulder, and put your right arm behind your head. Use the 3 fingertip pads of your left hand, and repeat the above steps to do a BSE on your right breast.  What else you can do to check for breast problems or cancer:  Talk to your healthcare provider about mammograms. A mammogram is an x-ray of your breasts to screen for breast cancer or other problems. Your provider can tell you the benefits and risks of mammograms. The first mammogram is usually at age 39 or 48. Your provider may recommend you start at 36 or younger if your risk for breast cancer is high. Mammograms usually continue every 1 to 2 years until age 76.        Follow up with your doctor as directed:  Write down your questions so you remember to ask them during your visits. © Copyright Martín Rodgers 2023 Information is for End User's use only and may not be sold, redistributed or otherwise used for commercial purposes. The above information is an  only. It is not intended as medical advice for individual conditions or treatments. Talk to your doctor, nurse or pharmacist before following any medical regimen to see if it is safe and effective for you. Wellness Visit for Adults   AMBULATORY CARE:   A wellness visit  is when you see your healthcare provider to get screened for health problems. Your healthcare provider will also give you advice on how to stay healthy. Write down your questions so you remember to ask them. Ask your healthcare provider how often you should have a wellness visit. What happens at a wellness visit:  Your healthcare provider will ask about your health, and your family history of health problems. This includes high blood pressure, heart disease, and cancer. He or she will ask if you have symptoms that concern you, if you smoke, and about your mood. You may also be asked about your intake of medicines, supplements, food, and alcohol. Any of the following may be done: Your weight  will be checked. Your height may also be checked so your body mass index (BMI) can be calculated. Your BMI shows if you are at a healthy weight. Your blood pressure  and heart rate will be checked. Your temperature may also be checked. Blood and urine tests  may be done. Blood tests may be done to check your cholesterol levels. Abnormal cholesterol levels increase your risk for heart disease and stroke. You may also need a blood or urine test to check for diabetes if you are at increased risk. Urine tests may be done to look for signs of an infection or kidney disease. A physical exam  includes checking your heartbeat and lungs with a stethoscope.  Your healthcare provider may also check your skin to look for sun damage. Screening tests  may be recommended. A screening test is done to check for diseases that may not cause symptoms. The screening tests you may need depend on your age, gender, family history, and lifestyle habits. For example, colorectal screening may be recommended if you are 48years old or older. Screening tests you need if you are a woman:   A Pap smear  is used to screen for cervical cancer. Pap smears are usually done every 3 to 5 years depending on your age. You may need them more often if you have had abnormal Pap smear test results in the past. Ask your healthcare provider how often you should have a Pap smear. A mammogram  is an x-ray of your breasts to screen for breast cancer. Experts recommend mammograms every 2 years starting at age 48 years. You may need a mammogram at age 52 years or younger if you have an increased risk for breast cancer. Talk to your healthcare provider about when you should start having mammograms and how often you need them. Vaccines you may need:   Get an influenza vaccine  every year. The influenza vaccine protects you from the flu. Several types of viruses cause the flu. The viruses change over time, so new vaccines are made each year. Get a tetanus-diphtheria (Td) booster vaccine  every 10 years. This vaccine protects you against tetanus and diphtheria. Tetanus is a severe infection that may cause painful muscle spasms and lockjaw. Diphtheria is a severe bacterial infection that causes a thick covering in the back of your mouth and throat. Get a human papillomavirus (HPV) vaccine  if you are female and aged 23 to 32 or male 23 to 24 and never received it. This vaccine protects you from HPV infection. HPV is the most common infection spread by sexual contact. HPV may also cause vaginal, penile, and anal cancers. Get a pneumococcal vaccine  if you are aged 72 years or older.  The pneumococcal vaccine is an injection given to protect you from pneumococcal disease. Pneumococcal disease is an infection caused by pneumococcal bacteria. The infection may cause pneumonia, meningitis, or an ear infection. Get a shingles vaccine  if you are 60 or older, even if you have had shingles before. The shingles vaccine is an injection to protect you from the varicella-zoster virus. This is the same virus that causes chickenpox. Shingles is a painful rash that develops in people who had chickenpox or have been exposed to the virus. How to eat healthy:  My Plate is a model for planning healthy meals. It shows the types and amounts of foods that should go on your plate. Fruits and vegetables make up about half of your plate, and grains and protein make up the other half. A serving of dairy is included on the side of your plate. The amount of calories and serving sizes you need depends on your age, gender, weight, and height. Examples of healthy foods are listed below:  Eat a variety of vegetables  such as dark green, red, and orange vegetables. You can also include canned vegetables low in sodium (salt) and frozen vegetables without added butter or sauces. Eat a variety of fresh fruits , canned fruit in 100% juice, frozen fruit, and dried fruit. Include whole grains. At least half of the grains you eat should be whole grains. Examples include whole-wheat bread, wheat pasta, brown rice, and whole-grain cereals such as oatmeal.    Eat a variety of protein foods such as seafood (fish and shellfish), lean meat, and poultry without skin (turkey and chicken). Examples of lean meats include pork leg, shoulder, or tenderloin, and beef round, sirloin, tenderloin, and extra lean ground beef. Other protein foods include eggs and egg substitutes, beans, peas, soy products, nuts, and seeds. Choose low-fat dairy products such as skim or 1% milk or low-fat yogurt, cheese, and cottage cheese. Limit unhealthy fats  such as butter, hard margarine, and shortening. Exercise:  Exercise at least 30 minutes per day on most days of the week. Some examples of exercise include walking, biking, dancing, and swimming. You can also fit in more physical activity by taking the stairs instead of the elevator or parking farther away from stores. Include muscle strengthening activities 2 days each week. Regular exercise provides many health benefits. It helps you manage your weight, and decreases your risk for type 2 diabetes, heart disease, stroke, and high blood pressure. Exercise can also help improve your mood. Ask your healthcare provider about the best exercise plan for you. General health and safety guidelines:   Do not smoke. Nicotine and other chemicals in cigarettes and cigars can cause lung damage. Ask your healthcare provider for information if you currently smoke and need help to quit. E-cigarettes or smokeless tobacco still contain nicotine. Talk to your healthcare provider before you use these products. Limit alcohol. A drink of alcohol is 12 ounces of beer, 5 ounces of wine, or 1½ ounces of liquor. Lose weight, if needed. Being overweight increases your risk of certain health conditions. These include heart disease, high blood pressure, type 2 diabetes, and certain types of cancer. Protect your skin. Do not sunbathe or use tanning beds. Use sunscreen with a SPF 15 or higher. Apply sunscreen at least 15 minutes before you go outside. Reapply sunscreen every 2 hours. Wear protective clothing, hats, and sunglasses when you are outside. Drive safely. Always wear your seatbelt. Make sure everyone in your car wears a seatbelt. A seatbelt can save your life if you are in an accident. Do not use your cell phone when you are driving. This could distract you and cause an accident. Pull over if you need to make a call or send a text message. Practice safe sex. Use latex condoms if are sexually active and have more than one partner.  Your healthcare provider may recommend screening tests for sexually transmitted infections (STIs). Wear helmets, lifejackets, and protective gear. Always wear a helmet when you ride a bike or motorcycle, go skiing, or play sports that could cause a head injury. Wear protective equipment when you play sports. Wear a lifejacket when you are on a boat or doing water sports. © Copyright [facility] 2023 Information is for End User's use only and may not be sold, redistributed or otherwise used for commercial purposes. The above information is an  only. It is not intended as medical advice for individual conditions or treatments. Talk to your doctor, nurse or pharmacist before following any medical regimen to see if it is safe and effective for you. Kegel Exercises for Women   AMBULATORY CARE:   Kegel exercises  help strengthen your pelvic muscles. Pelvic muscles hold your pelvic organs, such as your bladder and uterus, in place. Kegel exercises help prevent or control certain conditions, such as urine incontinence (leakage) or uterine prolapse. Call your doctor or physical therapist if:   You cannot feel your muscles tighten or relax. You continue to leak urine. You have questions or concerns about your condition or care. Use the correct muscles:  Pelvic muscles are the muscles you use to control urine flow. To target these muscles, stop and start the flow of urine several times. This will help you become familiar with how it feels to tighten and relax these muscles. How to do Kegel exercises:   Get into a comfortable position. You may lie down, stand up, or sit down to do these exercises. When you first try to do these exercises, it may be easier if you lie down. Tighten or squeeze your pelvic muscles slowly. It may feel like you are trying to hold back urine or gas. Hold this position for 3 seconds. Relax for 3 seconds. Repeat this cycle 10 times.  Do not hold your breath when you do Kegel exercises. Keep your stomach, back, and leg muscles relaxed. Do 10 sets of Kegel exercises, at least 3 times a day. When you know how to do Kegel exercises, use different positions. This will help to strengthen your pelvic muscles as much as possible. You can do these exercises while you lie on the floor, watch TV, or while you stand. Tighten your pelvic muscles before you sneeze, cough, or lift to prevent urine leakage. You may notice improved bladder control within about 6 weeks. Follow up with your doctor or physical therapist as directed:  Write down your questions so you remember to ask them during your visits. © Copyright Lewis Margaret 2023 Information is for End User's use only and may not be sold, redistributed or otherwise used for commercial purposes. The above information is an  only. It is not intended as medical advice for individual conditions or treatments. Talk to your doctor, nurse or pharmacist before following any medical regimen to see if it is safe and effective for you. Perineal Hygiene      Your vaginal naturally takes care of its self, it is a self washing system, the less you mess the healthier it will be     No soaps or feminine wash to the vulva, these products can cause dermitis, bacterial infections and other vulvar problems. Use only water to cleanse, or water with Dove or [employer] if necessary. No scented lotions or products are advised in or near your vulva. Use only coconut oil for moisture if needed. No douching this may cause imbalance in your vaginal PH and further issues. If you wear panty liners, you may apply a thin coating of Vaseline, A&D ointment or coconut oil to the vulvar tissues as a skin barrier     Cotton underware, loose fitting clothing  Only perfume-free, dye-free laundry detergent, use a second rinse cycle   Avoid fabric softeners/dryer sheets. Partner should avoid the same products as well.        Over the counter probiotic to restore vaginal cecile may be helpful as well, take daily. You may also look into Boric Acid vaginal suppositories to restore vaginal PH balance for up to 2 weeks as directed on the box. You may not use these if you are pregnant      For vaginal dryness: You may use:     Coconut oil (organic, pure, unscented) as needed for moisture or lubrication. ( Do not use if allergic)       Replens moisture restore external comfort gel daily ( use as directed on the box)        Replens long lasting vaginal moisturizer  ( use as directed on the box)         For Vaginal Lubrication:          You may use:     Coconut oil (organic, pure, unscented) as a lubricant or another scent-free lubricant (Astroglide, Uberlube) if needed. Do not use coconut oil or silicone if using a condom as this may break down the integrity of the condom and cause an unplanned pregnancy              Do not use coconut oil if allergic               Replens silky smooth lubricant, premium silicone based lubricant for intercourse. ( use as directed, a small amount will provide an enhanced natural feeling)     Any premium over the counter vaginal lubricant water or silicone based. Silicone based will have more staying power. Menopause   WHAT YOU NEED TO KNOW:   What is menopause? Menopause is a normal stage in a person's life when monthly periods stop. You are considered to be in menopause when you have not had a period for a full year after the age of 36. Menopause usually occurs between ages 52 to 48. Perimenopause is a stage before menopause that may cause signs and symptoms similar to menopause. Perimenopause may start about 4 years before menopause. What causes menopause? Menopause starts when the ovaries stop making the female hormones estrogen and progesterone. After menopause, you are no longer able to become pregnant.  Any of the following may trigger menopause or early menopause:  Surgery, including a hysterectomy or oophorectomy    Family history of early menopause    Smoking    Chemotherapy or pelvic radiation    Chromosome abnormalities, including Friend syndrome and Fragile X syndrome    Premature ovarian insufficiency (the ovaries stop producing eggs before age 36)    What are the signs and symptoms of menopause? Irregular menstrual cycles with heavy vaginal bleeding followed by decreased bleeding until it stops    Hot flashes (feeling warm, flushed, and sweaty)    Vaginal changes such as increased dryness    Mood changes such as anxiety, depression, or decreased desire to have sex    Trouble sleeping, joint pain, headaches    Brittle nails, hair on chin or chest where it is normally absent    Decrease in breast size and change in skin texture    Weight gain    How is menopause treated or managed? Hormone replacement therapy (HRT) is medicine that replaces your low hormone levels. HRT contains estrogen and sometimes progestin. HRT has several benefits. HRT helps prevent osteoporosis, which decreases your risk for bone fractures. HRT also protects you from colorectal cancer. HRT also has some risks. HRT increases your risk for breast cancer, blood clots, heart disease, a heart attack, or a stroke. If you are 72 years or older, HRT can also increase your risk for dementia. Your risk for uterine or endometrial cancer, gallbladder disease, and urinary incontinence is higher if you take estrogen-only HRT. Manage hot flashes. Hot flashes are brief periods of feeling very warm, flushed, and sweaty. Hot flashes can last from a few seconds to several minutes. They may happen many times during the day, and are common at night. Layer your clothing so that you can easily remove some clothing and cool yourself during a hot flash. Cold drinks may also be helpful. Non-hormone medicines can help relieve or prevent hot flashes.  Examples include certain antidepressants, nerve medicines, and high blood pressure medicines. Reduce vaginal dryness by using over-the-counter vaginal creams. Vaginal dryness may cause you to have pain or discomfort during sex. Only use creams that are made for vaginal use. Do  not  use petroleum jelly. You may put an estrogen cream in and around your vagina. Estrogen cream may help decrease vaginal dryness and lower your risk of vaginal infections. Continue to use birth control during perimenopause if you do not want to get pregnant. You may need to use birth control until it has been 1 year since your periods stopped. Ask your healthcare provider when you can stop using birth control to prevent pregnancy. How can I live a healthy lifestyle during and after menopause? After menopause, your risk for heart disease and bone loss increases. Ask about these and other ways to stay healthy:  Exercise regularly. Exercise helps you maintain a healthy weight. Exercise can also help to control your blood pressure and cholesterol levels. Include weight-bearing exercise for strong bones. Weight bearing exercise is recommended for at least 30 minutes, 3 times a week. Ask your healthcare provider about the best exercise plan for you. Eat a variety of healthy foods. Include fruits, vegetables, whole grains (whole-wheat bread, pasta, and cereals), low-fat dairy, and lean protein foods (beans, poultry, and fish). Limit foods high in sodium (salt). Ask your healthcare provider for more information about a meal plan that is right for you. Do not smoke. If you smoke, it is never too late to quit. You are more likely to have a heart attack, lung disease, blood clots, and cancer if you smoke. Ask your healthcare provider for information if you need help quitting. Take supplements as directed. You may need extra calcium and vitamin D to help prevent osteoporosis. Limit alcohol and caffeine. Alcohol and caffeine may worsen your symptoms.     When should I call my doctor? You have vaginal bleeding after menopause. You have questions or concerns about your condition or care. CARE AGREEMENT:   You have the right to help plan your care. Learn about your health condition and how it may be treated. Discuss treatment options with your healthcare providers to decide what care you want to receive. You always have the right to refuse treatment. The above information is an  only. It is not intended as medical advice for individual conditions or treatments. Talk to your doctor, nurse or pharmacist before following any medical regimen to see if it is safe and effective for you. © Copyright Merged with [facility] 2023 Information is for End User's use only and may not be sold, redistributed or otherwise used for commercial purposes. Vaginal Atrophy   AMBULATORY CARE:   Vaginal atrophy  is a condition that causes thinning, drying, and inflammation of vaginal tissue. This condition is caused by decreased levels of estrogen (a female sex hormone). Vaginal atrophy can increase your risk for vaginal and urinary tract infections. Vaginal atrophy can worsen over time if not treated. Common signs and symptoms include the following:   Vaginal dryness, itching, and burning    Vaginal discharge    Pain or discomfort during sex    Light bleeding after sex    Burning during urination    Frequent, sudden, strong urges to urinate    Urinary incontinence (loss of control of your bladder)    Contact your healthcare provider if:   You have a foul-smelling odor coming from your vagina. You have a thick, cheese-like discharge from your vagina. You have itching, swelling, or redness in your vagina. You have pain or burning when you urinate. Your urine smells bad. Your symptoms do not improve, or they get worse. You have questions or concerns about your condition or care. Treatment:   Over-the counter vaginal moisturizers  can help reduce dryness.  Your healthcare provider may recommend that you use a vaginal moisturizer several times each week and during sex. Only use creams that are made for vaginal use. Do  not  use petroleum jelly. Lubricants can be used during sex to decrease pain and discomfort. Estrogen  may help decrease dryness. It may also lower your risk of vaginal infections if you are going through menopause. It can also help to relieve urinary symptoms. Estrogen may be prescribed in the form of a cream, tablet, or ring. These medicines can be applied or inserted into the vagina. Estrogen can also be prescribed in the form of a pill. Follow up with your doctor as directed:  Write down your questions so you remember to ask them during your visits. © Copyright Sonia Price 2023 Information is for End User's use only and may not be sold, redistributed or otherwise used for commercial purposes. The above information is an  only. It is not intended as medical advice for individual conditions or treatments. Talk to your doctor, nurse or pharmacist before following any medical regimen to see if it is safe and effective for you.

## 2023-11-17 ENCOUNTER — ANNUAL EXAM (OUTPATIENT)
Dept: OBGYN CLINIC | Facility: CLINIC | Age: 58
End: 2023-11-17
Payer: COMMERCIAL

## 2023-11-17 ENCOUNTER — TELEPHONE (OUTPATIENT)
Age: 58
End: 2023-11-17

## 2023-11-17 VITALS
WEIGHT: 165 LBS | SYSTOLIC BLOOD PRESSURE: 124 MMHG | HEIGHT: 65 IN | DIASTOLIC BLOOD PRESSURE: 80 MMHG | BODY MASS INDEX: 27.49 KG/M2

## 2023-11-17 DIAGNOSIS — Z11.51 ENCOUNTER FOR SCREENING FOR HUMAN PAPILLOMAVIRUS (HPV): ICD-10-CM

## 2023-11-17 DIAGNOSIS — Z01.419 ENCOUNTER FOR GYNECOLOGICAL EXAMINATION WITHOUT ABNORMAL FINDING: Primary | ICD-10-CM

## 2023-11-17 DIAGNOSIS — Z12.31 ENCOUNTER FOR SCREENING MAMMOGRAM FOR MALIGNANT NEOPLASM OF BREAST: ICD-10-CM

## 2023-11-17 DIAGNOSIS — Z11.3 SCREENING FOR STDS (SEXUALLY TRANSMITTED DISEASES): ICD-10-CM

## 2023-11-17 PROBLEM — B97.7 HPV (HUMAN PAPILLOMA VIRUS) INFECTION: Status: RESOLVED | Noted: 2020-09-09 | Resolved: 2023-11-17

## 2023-11-17 PROCEDURE — 87491 CHLMYD TRACH DNA AMP PROBE: CPT | Performed by: OBSTETRICS & GYNECOLOGY

## 2023-11-17 PROCEDURE — G0145 SCR C/V CYTO,THINLAYER,RESCR: HCPCS | Performed by: OBSTETRICS & GYNECOLOGY

## 2023-11-17 PROCEDURE — 99396 PREV VISIT EST AGE 40-64: CPT | Performed by: OBSTETRICS & GYNECOLOGY

## 2023-11-17 PROCEDURE — 87591 N.GONORRHOEAE DNA AMP PROB: CPT | Performed by: OBSTETRICS & GYNECOLOGY

## 2023-11-17 NOTE — TELEPHONE ENCOUNTER
Pt is aware her forms are completed as they were sent to her through [facility] on 11/13   I let her know her forms are scanned in her chart so she is able to pick them up whenever she is available as long as the office is open.

## 2023-11-17 NOTE — TELEPHONE ENCOUNTER
Caller: Patient    Doctor: Dr. Les Anthony    Reason for call: Patient calling asking if she can come and  her short term disability paperwork. Patient asking for call back when forms are ready to be picked up. She is available today to come down. She can be reached at number below.     Call back#: [phone]

## 2023-11-21 LAB
C TRACH DNA SPEC QL NAA+PROBE: NEGATIVE
N GONORRHOEA DNA SPEC QL NAA+PROBE: NEGATIVE

## 2023-11-21 PROCEDURE — 87624 HPV HI-RISK TYP POOLED RSLT: CPT | Performed by: OBSTETRICS & GYNECOLOGY

## 2023-11-22 ENCOUNTER — TELEPHONE (OUTPATIENT)
Dept: OBGYN CLINIC | Facility: CLINIC | Age: 58
End: 2023-11-22

## 2023-11-22 NOTE — TELEPHONE ENCOUNTER
Lab left voice message on script line for Iris to be sure HPV req was being sent over today.     Ext [id number]

## 2023-11-28 LAB
LAB AP GYN PRIMARY INTERPRETATION: NORMAL
Lab: NORMAL

## 2023-12-01 ENCOUNTER — APPOINTMENT (OUTPATIENT)
Dept: LAB | Facility: CLINIC | Age: 58
End: 2023-12-01
Payer: COMMERCIAL

## 2023-12-01 DIAGNOSIS — R79.89 ABNORMAL CBC: ICD-10-CM

## 2023-12-01 DIAGNOSIS — E78.2 MIXED HYPERLIPIDEMIA: ICD-10-CM

## 2023-12-01 DIAGNOSIS — R79.89 ABNORMAL CBC: Primary | ICD-10-CM

## 2023-12-01 LAB
BASOPHILS # BLD AUTO: 0.05 THOUSANDS/ÂΜL (ref 0–0.1)
BASOPHILS NFR BLD AUTO: 2 % (ref 0–1)
EOSINOPHIL # BLD AUTO: 0.15 THOUSAND/ÂΜL (ref 0–0.61)
EOSINOPHIL NFR BLD AUTO: 5 % (ref 0–6)
ERYTHROCYTE [DISTWIDTH] IN BLOOD BY AUTOMATED COUNT: 13 % (ref 11.6–15.1)
HCT VFR BLD AUTO: 39.9 % (ref 34.8–46.1)
HGB BLD-MCNC: 12.9 G/DL (ref 11.5–15.4)
IMM GRANULOCYTES # BLD AUTO: 0.01 THOUSAND/UL (ref 0–0.2)
IMM GRANULOCYTES NFR BLD AUTO: 0 % (ref 0–2)
LYMPHOCYTES # BLD AUTO: 1.48 THOUSANDS/ÂΜL (ref 0.6–4.47)
LYMPHOCYTES NFR BLD AUTO: 43 % (ref 14–44)
MCH RBC QN AUTO: 30.3 PG (ref 26.8–34.3)
MCHC RBC AUTO-ENTMCNC: 32.3 G/DL (ref 31.4–37.4)
MCV RBC AUTO: 94 FL (ref 82–98)
MONOCYTES # BLD AUTO: 0.3 THOUSAND/ÂΜL (ref 0.17–1.22)
MONOCYTES NFR BLD AUTO: 9 % (ref 4–12)
NEUTROPHILS # BLD AUTO: 1.38 THOUSANDS/ÂΜL (ref 1.85–7.62)
NEUTS SEG NFR BLD AUTO: 41 % (ref 43–75)
NRBC BLD AUTO-RTO: 0 /100 WBCS
PLATELET # BLD AUTO: 199 THOUSANDS/UL (ref 149–390)
PMV BLD AUTO: 10.4 FL (ref 8.9–12.7)
RBC # BLD AUTO: 4.26 MILLION/UL (ref 3.81–5.12)
WBC # BLD AUTO: 3.37 THOUSAND/UL (ref 4.31–10.16)

## 2023-12-01 PROCEDURE — 85025 COMPLETE CBC W/AUTO DIFF WBC: CPT

## 2023-12-01 PROCEDURE — 36415 COLL VENOUS BLD VENIPUNCTURE: CPT

## 2023-12-01 RX ORDER — ATORVASTATIN CALCIUM 20 MG/1
20 TABLET, FILM COATED ORAL DAILY
Qty: 90 TABLET | Refills: 3 | Status: SHIPPED | OUTPATIENT
Start: 2023-12-01 | End: 2023-12-04 | Stop reason: SDUPTHER

## 2023-12-04 DIAGNOSIS — E78.2 MIXED HYPERLIPIDEMIA: ICD-10-CM

## 2023-12-04 RX ORDER — ATORVASTATIN CALCIUM 20 MG/1
20 TABLET, FILM COATED ORAL DAILY
Qty: 90 TABLET | Refills: 0 | Status: SHIPPED | OUTPATIENT
Start: 2023-12-04 | End: 2023-12-05 | Stop reason: SDUPTHER

## 2023-12-05 ENCOUNTER — TELEPHONE (OUTPATIENT)
Dept: HEMATOLOGY ONCOLOGY | Facility: CLINIC | Age: 58
End: 2023-12-05

## 2023-12-05 DIAGNOSIS — D70.9 NEUTROPENIA, UNSPECIFIED TYPE (HCC): Primary | ICD-10-CM

## 2023-12-05 DIAGNOSIS — E78.2 MIXED HYPERLIPIDEMIA: ICD-10-CM

## 2023-12-05 RX ORDER — ATORVASTATIN CALCIUM 20 MG/1
20 TABLET, FILM COATED ORAL DAILY
Qty: 90 TABLET | Refills: 0 | Status: SHIPPED | OUTPATIENT
Start: 2023-12-05

## 2023-12-05 NOTE — TELEPHONE ENCOUNTER
Appointment Schedule   Who are you speaking with? Patient   If it is not the patient, are they listed on an active communication consent form? N/A   Which provider is the appointment scheduled with? Dr. Pao Murray   At which location is the appointment scheduled for? [facility]   When is the appointment scheduled? Please list date and time 1/3/24 @ 11   What is the reason for this appointment? Neutropenia, unspecified type [facility])    Did patient voice understanding of the details of this appointment? Yes   Was the no show policy reviewed with patient?  Yes

## 2023-12-19 ENCOUNTER — TELEPHONE (OUTPATIENT)
Dept: OBGYN CLINIC | Facility: CLINIC | Age: 58
End: 2023-12-19

## 2023-12-19 NOTE — TELEPHONE ENCOUNTER
Spoke with patient at length in regards to HPV results on her Pap smear since 2020.  We reviewed viral pathophysiology & transmission  She mentions that her  recently had a small area on his penis near his pubic hair that looked pimple-like, broke open, and left a crater like mariaa.  There was no pain with this lesion.  Patient did contact his primary care doctor and this is having STD testing done.  Patient was concerned that her positive HPV on her Pap could have caused this problem with her   I reassured her that that was not the normal progression and transmission of cervical HPV  I will however review her Pap smears with Dr. Chepe Lester to see if she does need any further treatment for her most recent Pap smear coming back with positive HPV yet again

## 2023-12-20 ENCOUNTER — TELEPHONE (OUTPATIENT)
Dept: FAMILY MEDICINE CLINIC | Facility: CLINIC | Age: 58
End: 2023-12-20

## 2023-12-20 NOTE — TELEPHONE ENCOUNTER
"Patient asking for a lab slip for Antibodies for  herpes .    \" has the antibodies and she wants hers checked\"  "

## 2023-12-20 NOTE — TELEPHONE ENCOUNTER
Can we please call this patient and set her up for a colposcopy.  I have already spoken to her and she is aware that you will be calling.  She has had the procedure 2 times before, still review the procedure with her they will thank you

## 2023-12-21 ENCOUNTER — TELEPHONE (OUTPATIENT)
Dept: HEMATOLOGY ONCOLOGY | Facility: CLINIC | Age: 58
End: 2023-12-21

## 2023-12-21 NOTE — TELEPHONE ENCOUNTER
I called and spoke to patient in regards to rescheduling her appointment from 01/03/24 with Dr. Candelaria to 02/26/24 with Fidelia. Patient was agreeable to this.

## 2023-12-27 ENCOUNTER — OFFICE VISIT (OUTPATIENT)
Dept: FAMILY MEDICINE CLINIC | Facility: CLINIC | Age: 58
End: 2023-12-27
Payer: COMMERCIAL

## 2023-12-27 ENCOUNTER — APPOINTMENT (OUTPATIENT)
Age: 58
End: 2023-12-27
Payer: COMMERCIAL

## 2023-12-27 ENCOUNTER — HOSPITAL ENCOUNTER (OUTPATIENT)
Age: 58
Discharge: HOME | End: 2023-12-27
Payer: COMMERCIAL

## 2023-12-27 VITALS
DIASTOLIC BLOOD PRESSURE: 82 MMHG | BODY MASS INDEX: 27.52 KG/M2 | OXYGEN SATURATION: 98 % | WEIGHT: 165.4 LBS | SYSTOLIC BLOOD PRESSURE: 134 MMHG | TEMPERATURE: 98 F | HEART RATE: 73 BPM

## 2023-12-27 VITALS — HEIGHT: 65 IN | BODY MASS INDEX: 26.82 KG/M2 | WEIGHT: 161 LBS

## 2023-12-27 DIAGNOSIS — Z22.7 TB LUNG, LATENT: ICD-10-CM

## 2023-12-27 DIAGNOSIS — Z11.1 SCREENING FOR TUBERCULOSIS: ICD-10-CM

## 2023-12-27 DIAGNOSIS — Z00.00 ROUTINE PHYSICAL EXAMINATION: Primary | ICD-10-CM

## 2023-12-27 DIAGNOSIS — I10 ESSENTIAL HYPERTENSION: ICD-10-CM

## 2023-12-27 DIAGNOSIS — E78.2 MIXED HYPERLIPIDEMIA: ICD-10-CM

## 2023-12-27 DIAGNOSIS — Z78.0 POSTMENOPAUSAL ESTROGEN DEFICIENCY: ICD-10-CM

## 2023-12-27 PROCEDURE — 99396 PREV VISIT EST AGE 40-64: CPT

## 2023-12-27 PROCEDURE — 77080 DXA BONE DENSITY AXIAL: CPT

## 2023-12-27 PROCEDURE — 71046 X-RAY EXAM CHEST 2 VIEWS: CPT

## 2023-12-27 RX ORDER — ATORVASTATIN CALCIUM 20 MG/1
20 TABLET, FILM COATED ORAL DAILY
Qty: 90 TABLET | Refills: 0 | Status: SHIPPED | OUTPATIENT
Start: 2023-12-27

## 2023-12-27 RX ORDER — HYDROCHLOROTHIAZIDE 25 MG/1
25 TABLET ORAL DAILY
Qty: 90 TABLET | Refills: 1 | Status: SHIPPED | OUTPATIENT
Start: 2023-12-27

## 2023-12-27 RX ORDER — LOSARTAN POTASSIUM 50 MG/1
50 TABLET ORAL DAILY
Qty: 90 TABLET | Refills: 1 | Status: SHIPPED | OUTPATIENT
Start: 2023-12-27

## 2023-12-27 NOTE — PROGRESS NOTES
ADULT ANNUAL PHYSICAL  [facility]    NAME: Marina Land  AGE: 58 y.o. SEX: female  : 1965     DATE: 2023     Assessment and Plan:     Problem List Items Addressed This Visit       Essential hypertension    Relevant Medications    hydrochlorothiazide (HYDRODIURIL) 25 mg tablet    losartan (COZAAR) 50 mg tablet    TB lung, latent     Other Visit Diagnoses       Routine physical examination    -  Primary    Screening for tuberculosis        Relevant Orders    XR chest pa & lateral    Mixed hyperlipidemia        Relevant Medications    atorvastatin (LIPITOR) 20 mg tablet        Immunizations and preventive care screenings were discussed with patient today. Appropriate education was printed on patient's after visit summary.  Patient presents for physical exam for employment.   No acute complaints; physical exam unremarkable.   Of note, patient's employer is requiring TB testing - patient does have hx of latent TB in which she follows with ID for and has undergone treatment for with medication. Notes she got a letter from ID explaining it but her work is still requesting a screening; therefore ordering CXR as a form of TB screening.     Mammogram scheduled for February.   DXA scan scheduled for later today.     HTN: Patient's BP well controlled today at 134/82 mmHg. Refilled current HCTZ 25 mg QD and Losartan 50 mg QD and continue to monitor at home.     Hyperlipidemia: Lipid panel completed on 11/3/23 - cholesterol and triglycerides were WNL. Refilled current atorvastatin 20 mg QD.      Counseling:  Exercise: the importance of regular exercise/physical activity was discussed. Recommend exercise 3-5 times per week for at least 30 minutes.     Follow up in three months or sooner as needed.      Depression Screening and Follow-up Plan: Patient was screened for depression during today's encounter. They screened negative with a PHQ-2 score of  1.      Return in about 3 months (around 3/27/2024).     Chief Complaint:     Chief Complaint   Patient presents with    Physical Exam     Employment Physical-       History of Present Illness:     Adult Annual Physical   Patient here for a comprehensive physical exam. The patient reports no problems.    Diet and Physical Activity  Diet/Nutrition: well balanced diet and consuming 3-5 servings of fruits/vegetables daily.   Exercise: walking and moderate cardiovascular exercise.      Depression Screening  PHQ-2/9 Depression Screening    Little interest or pleasure in doing things: 1 - several days  Feeling down, depressed, or hopeless: 0 - not at all  PHQ-2 Score: 1  PHQ-2 Interpretation: Negative depression screen       General Health  Sleep: sleeps well and gets 4-6 hours of sleep on average.   Hearing: normal - bilateral.  Vision: no vision problems and goes for regular eye exams.   Dental: regular dental visits and brushes teeth twice daily.       /GYN Health  Follows with gynecology? yes   Patient is: postmenopausal  Last menstrual period: no menses    Review of Systems:     Review of Systems   Constitutional:  Negative for appetite change, chills, diaphoresis, fatigue and fever.   Eyes:  Negative for visual disturbance.   Respiratory:  Negative for cough, chest tightness, shortness of breath and wheezing.    Cardiovascular:  Negative for chest pain, palpitations and leg swelling.   Gastrointestinal:  Negative for abdominal pain, blood in stool, constipation, diarrhea, nausea and vomiting.   Genitourinary:  Negative for difficulty urinating.   Neurological:  Negative for dizziness, light-headedness and headaches.      Past Medical History:     Past Medical History:   Diagnosis Date    2019 novel coronavirus disease (COVID-19) 05/30/2020    x 2 2020 and 2022    Abnormal Pap smear of cervix     Arthritis     BRCA1 negative 2015    BRCA2 negative 2015    Gastric ulcer     Hyperlipidemia     Hypertension     Low  "back pain     Right trigger finger     long and ring    TB lung, latent     Varicella       Past Surgical History:     Past Surgical History:   Procedure Laterality Date    ANKLE FRACTURE SURGERY Right 03/01/2021    \"ankle fx\"    APPENDECTOMY      CATARACT EXTRACTION Bilateral     COLONOSCOPY      FOOT SURGERY Right     HERNIA REPAIR      IN TENDON SHEATH INCISION Right 7/11/2023    Procedure: RELEASE TRIGGER FINGER LONG FINGER AND RING FINGER;  Surgeon: Brittaney Montoya MD;  Location: [facility] MAIN OR;  Service: Orthopedics      Social History:     Social History     Socioeconomic History    Marital status: /Civil Union     Spouse name: None    Number of children: None    Years of education: None    Highest education level: None   Occupational History    None   Tobacco Use    Smoking status: Never    Smokeless tobacco: Never   Vaping Use    Vaping status: Never Used   Substance and Sexual Activity    Alcohol use: Not Currently     Comment: rare    Drug use: No    Sexual activity: Yes     Partners: Male   Other Topics Concern    None   Social History Narrative    None     Social Determinants of Health     Financial Resource Strain: Not on file   Food Insecurity: Not on file   Transportation Needs: Not on file   Physical Activity: Not on file   Stress: Not on file   Social Connections: Not on file   Intimate Partner Violence: Not on file   Housing Stability: Not on file      Family History:     Family History   Problem Relation Age of Onset    Ovarian cancer Mother 50    No Known Problems Father     Thyroid cancer Sister 47    No Known Problems Sister     No Known Problems Daughter     No Known Problems Son     No Known Problems Son     No Known Problems Son     Cancer Maternal Grandmother         unknown type or age    No Known Problems Paternal Grandmother     No Known Problems Maternal Aunt     Breast cancer Maternal Uncle         50's      Current Medications:     Current Outpatient Medications "   Medication Sig Dispense Refill    atorvastatin (LIPITOR) 20 mg tablet Take 1 tablet (20 mg total) by mouth daily 90 tablet 0    hydrochlorothiazide (HYDRODIURIL) 25 mg tablet Take 1 tablet (25 mg total) by mouth daily 90 tablet 1    ibuprofen (MOTRIN) 800 mg tablet Take 1 tablet (800 mg total) by mouth 2 (two) times a day with meals (Patient taking differently: Take 800 mg by mouth every 8 (eight) hours as needed) 60 tablet 2    losartan (COZAAR) 50 mg tablet Take 1 tablet (50 mg total) by mouth daily 90 tablet 1    methocarbamol (ROBAXIN) 500 mg tablet Take 1 tablet (500 mg total) by mouth 4 (four) times a day 20 tablet 0    Pyridoxine HCl (VITAMIN B6 PO) Take by mouth      pyridoxine (VITAMIN B6) 50 mg tablet Take 1 tablet (50 mg total) by mouth daily 30 tablet 0     No current facility-administered medications for this visit.      Allergies:     Allergies   Allergen Reactions    Kiwi Extract - Food Allergy Hives    Other      strawberry    Pineapple - Food Allergy Hives      Physical Exam:     /82   Pulse 73   Temp 98 °F (36.7 °C)   Wt 75 kg (165 lb 6.4 oz)   SpO2 98%   BMI 27.52 kg/m²     Physical Exam  Vitals reviewed.   Constitutional:       General: She is not in acute distress.     Appearance: Normal appearance. She is not ill-appearing or diaphoretic.   HENT:      Head: Normocephalic and atraumatic.      Right Ear: Tympanic membrane, ear canal and external ear normal. There is no impacted cerumen.      Left Ear: Tympanic membrane, ear canal and external ear normal. There is no impacted cerumen.      Nose: Nose normal. No congestion or rhinorrhea.      Mouth/Throat:      Mouth: Mucous membranes are moist.      Pharynx: Oropharynx is clear. No oropharyngeal exudate or posterior oropharyngeal erythema.   Eyes:      General:         Right eye: No discharge.         Left eye: No discharge.      Conjunctiva/sclera: Conjunctivae normal.   Cardiovascular:      Rate and Rhythm: Normal rate and regular  rhythm.      Pulses: Normal pulses.      Heart sounds: Normal heart sounds. No murmur heard.  Pulmonary:      Effort: Pulmonary effort is normal. No respiratory distress.      Breath sounds: Normal breath sounds. No wheezing, rhonchi or rales.   Abdominal:      General: Bowel sounds are normal. There is no distension.      Palpations: Abdomen is soft.      Tenderness: There is no abdominal tenderness.   Musculoskeletal:         General: Normal range of motion.      Cervical back: Normal range of motion and neck supple.      Right lower leg: No edema.      Left lower leg: No edema.   Lymphadenopathy:      Cervical: No cervical adenopathy.   Skin:     General: Skin is warm.      Capillary Refill: Capillary refill takes less than 2 seconds.   Neurological:      General: No focal deficit present.      Mental Status: She is alert.      Gait: Gait normal.   Psychiatric:         Mood and Affect: Mood normal.          Yadi Portillo PA-C  [facility]

## 2024-01-02 DIAGNOSIS — M81.0 AGE-RELATED OSTEOPOROSIS WITHOUT CURRENT PATHOLOGICAL FRACTURE: Primary | ICD-10-CM

## 2024-01-02 RX ORDER — ALENDRONATE SODIUM 70 MG/1
70 TABLET ORAL
Qty: 12 TABLET | Refills: 3 | Status: SHIPPED | OUTPATIENT
Start: 2024-01-02

## 2024-01-04 ENCOUNTER — OFFICE VISIT (OUTPATIENT)
Dept: FAMILY MEDICINE CLINIC | Facility: CLINIC | Age: 59
End: 2024-01-04
Payer: COMMERCIAL

## 2024-01-04 VITALS
HEART RATE: 60 BPM | TEMPERATURE: 97.8 F | BODY MASS INDEX: 27.16 KG/M2 | DIASTOLIC BLOOD PRESSURE: 82 MMHG | HEIGHT: 65 IN | WEIGHT: 163 LBS | OXYGEN SATURATION: 98 % | SYSTOLIC BLOOD PRESSURE: 116 MMHG

## 2024-01-04 DIAGNOSIS — A60.9 ANOGENITAL HERPES SIMPLEX VIRUS (HSV) INFECTION: Primary | ICD-10-CM

## 2024-01-04 DIAGNOSIS — R06.02 SHORTNESS OF BREATH ON EXERTION: ICD-10-CM

## 2024-01-04 DIAGNOSIS — L98.9 SKIN LESIONS: ICD-10-CM

## 2024-01-04 PROCEDURE — 99213 OFFICE O/P EST LOW 20 MIN: CPT

## 2024-01-04 PROCEDURE — 87252 VIRUS INOCULATION TISSUE: CPT

## 2024-01-04 RX ORDER — VALACYCLOVIR HYDROCHLORIDE 1 G/1
1000 TABLET, FILM COATED ORAL 2 TIMES DAILY
Qty: 14 TABLET | Refills: 0 | Status: SHIPPED | OUTPATIENT
Start: 2024-01-04 | End: 2024-01-11

## 2024-01-04 NOTE — PROGRESS NOTES
"Name: Marina Land      : 1965      MRN: [id number]  Encounter Provider: Yadi Portillo PA-C  Encounter Date: 2024   Encounter department: [facility]    Assessment & Plan     1. Anogenital herpes simplex virus (HSV) infection  -     valACYclovir (VALTREX) 1,000 mg tablet; Take 1 tablet (1,000 mg total) by mouth 2 (two) times a day for 7 days    2. Skin lesions  -     Virus culture; Future  -     Virus culture    3. Shortness of breath on exertion  -     Stress test only, exercise; Future; Expected date: 2024    Patient presents for evaluation of skin lesions around the anus; potential exposure to HSV from  about one month ago. On exam, right sided lesion open with active drainage - therefore swabbed and sent for viral culture for confirmation of infection. Otherwise based on physical appearance of skin lesions and exposure hx, suspect HSV and treating with Valtrex x 7 days. Educated patient on potential side effects of medication. To call if she has any questions/concerns.     Patient notes sob on exertion; would like stress test ordered. Therefore order for stress test placed at today's visit. Advised ER if she were to ever experience chest pain, sob at rest, or trouble breathing.        Subjective      CC: skin lesions     Patient presents for evaluation of skin lesions around her anus that she thinks may be an HSV outbreak. Patient's  was diagnosed with HSV and treated appopriately last month; patient does admit to anal intercourse. Patient notes no other genital lesions. She first noticed them this past , notes she had \"very small amount\" of burning pain in the area and then this morning she noticed the lesions seemed to burst. Patient is concerned for HSV given her recent potential exposure.     Patient also notes intermittent shortness of breath on exertion and is requesting a stress test be ordered.       Review of Systems   Constitutional:  " "Negative for chills, diaphoresis, fatigue and fever.   Respiratory:  Negative for chest tightness and shortness of breath.    Cardiovascular:  Negative for chest pain and palpitations.   Skin:  Positive for rash (anal skin lesions).   Neurological:  Negative for dizziness, light-headedness and headaches.       Current Outpatient Medications on File Prior to Visit   Medication Sig    alendronate (Fosamax) 70 mg tablet Take 1 tablet (70 mg total) by mouth every 7 days    atorvastatin (LIPITOR) 20 mg tablet Take 1 tablet (20 mg total) by mouth daily    hydrochlorothiazide (HYDRODIURIL) 25 mg tablet Take 1 tablet (25 mg total) by mouth daily    ibuprofen (MOTRIN) 800 mg tablet Take 1 tablet (800 mg total) by mouth 2 (two) times a day with meals (Patient taking differently: Take 800 mg by mouth every 8 (eight) hours as needed)    losartan (COZAAR) 50 mg tablet Take 1 tablet (50 mg total) by mouth daily    methocarbamol (ROBAXIN) 500 mg tablet Take 1 tablet (500 mg total) by mouth 4 (four) times a day    pyridoxine (VITAMIN B6) 50 mg tablet Take 1 tablet (50 mg total) by mouth daily    Pyridoxine HCl (VITAMIN B6 PO) Take by mouth       Objective     /82   Pulse 60   Temp 97.8 °F (36.6 °C)   Ht 5' 5\" (1.651 m)   Wt 73.9 kg (163 lb)   SpO2 98%   BMI 27.12 kg/m²     Physical Exam  Vitals reviewed. Exam conducted with a chaperone present (Rena).   Constitutional:       General: She is not in acute distress.     Appearance: Normal appearance. She is not ill-appearing or diaphoretic.   HENT:      Head: Normocephalic and atraumatic.      Right Ear: External ear normal.      Left Ear: External ear normal.      Nose: Nose normal.      Mouth/Throat:      Mouth: Mucous membranes are moist.   Eyes:      General:         Right eye: No discharge.         Left eye: No discharge.      Conjunctiva/sclera: Conjunctivae normal.   Genitourinary:      Musculoskeletal:         General: Normal range of motion.      Cervical back: " Normal range of motion.   Skin:     General: Skin is warm.   Neurological:      General: No focal deficit present.      Mental Status: She is alert.      Gait: Gait normal.   Psychiatric:         Mood and Affect: Mood normal.       Yadi Portillo PA-C

## 2024-01-05 ENCOUNTER — TELEPHONE (OUTPATIENT)
Dept: FAMILY MEDICINE CLINIC | Facility: CLINIC | Age: 59
End: 2024-01-05

## 2024-01-05 NOTE — TELEPHONE ENCOUNTER
Patient called for her xray result. Please also email her the result at [email or web address]

## 2024-01-12 DIAGNOSIS — M25.511 ACUTE PAIN OF BOTH SHOULDERS: Primary | ICD-10-CM

## 2024-01-12 DIAGNOSIS — M25.512 ACUTE PAIN OF BOTH SHOULDERS: Primary | ICD-10-CM

## 2024-01-12 RX ORDER — METHOCARBAMOL 500 MG/1
500 TABLET, FILM COATED ORAL 4 TIMES DAILY
Qty: 20 TABLET | Refills: 0 | Status: SHIPPED | OUTPATIENT
Start: 2024-01-12

## 2024-01-22 ENCOUNTER — OFFICE VISIT (OUTPATIENT)
Dept: URGENT CARE | Facility: CLINIC | Age: 59
End: 2024-01-22
Payer: COMMERCIAL

## 2024-01-22 VITALS
RESPIRATION RATE: 18 BRPM | TEMPERATURE: 97.8 F | SYSTOLIC BLOOD PRESSURE: 114 MMHG | OXYGEN SATURATION: 97 % | DIASTOLIC BLOOD PRESSURE: 78 MMHG | HEART RATE: 77 BPM

## 2024-01-22 DIAGNOSIS — M54.31 SCIATICA, RIGHT SIDE: Primary | ICD-10-CM

## 2024-01-22 PROCEDURE — 99283 EMERGENCY DEPT VISIT LOW MDM: CPT | Performed by: PHYSICIAN ASSISTANT

## 2024-01-22 PROCEDURE — G0382 LEV 3 HOSP TYPE B ED VISIT: HCPCS | Performed by: PHYSICIAN ASSISTANT

## 2024-01-22 RX ORDER — CYCLOBENZAPRINE HCL 10 MG
10 TABLET ORAL 3 TIMES DAILY PRN
Qty: 21 TABLET | Refills: 0 | Status: SHIPPED | OUTPATIENT
Start: 2024-01-22

## 2024-01-22 RX ORDER — METHYLPREDNISOLONE 4 MG/1
TABLET ORAL
Qty: 21 TABLET | Refills: 0 | Status: SHIPPED | OUTPATIENT
Start: 2024-01-22

## 2024-01-22 NOTE — PATIENT INSTRUCTIONS
Continue to monitor symptoms.  If new or worsening symptoms develop, go immediately to Er. Drink plenty of fluids.  Follow up with Family Doctor this week.    Sciatica   WHAT YOU NEED TO KNOW:   Sciatica is a condition that causes pain along your sciatic nerve. The sciatic nerve runs from your spine through both sides of your buttocks. It then runs down the back of your thigh, into your lower leg and foot. Any place along your sciatic nerve may be compressed, inflamed, irritated, or stretched.       DISCHARGE INSTRUCTIONS:   Return to the emergency department if:   You have trouble controlling your urine or bowel movements.    You have weakness in both legs.    You have numbness in your groin or buttocks.    Call your doctor if:   You have pain in your lower back at night or when resting.    You have pain in your lower back with numbness below the knee.    You have weakness in one leg only.    You have questions or concerns about your condition or care.    Medicines:  You may need any of the following:  NSAIDs , such as ibuprofen, help decrease swelling, pain, and fever. This medicine is available with or without a doctor's order. NSAIDs can cause stomach bleeding or kidney problems in certain people. If you take blood thinner medicine, always ask your healthcare provider if NSAIDs are safe for you. Always read the medicine label and follow directions.    Acetaminophen  decreases pain and fever. It is available without a doctor's order. Ask how much to take and how often to take it. Follow directions. Read the labels of all other medicines you are using to see if they also contain acetaminophen, or ask your doctor or pharmacist. Acetaminophen can cause liver damage if not taken correctly.    Muscle relaxers  help decrease pain and muscle spasms.    Take your medicine as directed.  Contact your healthcare provider if you think your medicine is not helping or if you have side effects. Tell your provider if you are  allergic to any medicine. Keep a list of the medicines, vitamins, and herbs you take. Include the amounts, and when and why you take them. Bring the list or the pill bottles to follow-up visits. Carry your medicine list with you in case of an emergency.    Manage your symptoms:   Decrease your activity as directed.  Do not lift heavy objects or twist your back for at least 6 weeks. Slowly return to your usual activity.    Apply ice to help decrease swelling and pain.  Use an ice pack, or put crushed ice in a plastic bag. Cover it with a towel before you place it on your back or leg. Apply ice for 15 to 20 minutes every hour, or as directed.    Apply heat to help decrease pain and muscle spasms.  Use a heat pack or a heating pad set on low heat. Apply heat on your back or leg for 20 to 30 minutes every 2 hours for as many days as directed.    Go to physical or occupational therapy as directed.  A physical therapist teaches you exercises to help improve movement and strength, and to decrease pain. An occupational therapist teaches you skills to help with your daily activities.    Use assistive devices, if directed.  You may need to wear back support, such as a back brace. You may need crutches, a cane, or a walker to decrease stress on your lower back and leg muscles. Ask your healthcare provider for more information about assistive devices and how to use them correctly.    Prevent sciatica:   Avoid pressure on your back and legs.  Do not  lift heavy objects, or stand or sit for long periods of time.    Lift objects safely.  Keep your back straight and bend your knees when you  an object. Do not bend or twist your back when you lift.         Maintain a healthy weight.  Ask your healthcare provider what a healthy weight is for you. Your provider can help you create a weight loss plan, if needed.    Exercise as directed.  Ask your healthcare provider about the best stretching, warmup, and exercise plan for  you.    Follow up with your doctor as directed:  Write down your questions so you remember to ask them during your visits.  © Copyright [employer] 2023 Information is for End User's use only and may not be sold, redistributed or otherwise used for commercial purposes.  The above information is an  only. It is not intended as medical advice for individual conditions or treatments. Talk to your doctor, nurse or pharmacist before following any medical regimen to see if it is safe and effective for you.

## 2024-01-22 NOTE — LETTER
January 22, 2024     Patient: Marina Land   YOB: 1965   Date of Visit: 1/22/2024       To Whom It May Concern:    It is my medical opinion that Marina Land may return to work on 1/24/2024 .    If you have any questions or concerns, please don't hesitate to call.         Sincerely,        Vince Garcia PA-C    CC: No Recipients   Breath sounds clear and equal bilaterally.

## 2024-01-22 NOTE — PROGRESS NOTES
[facility]        NAME: Marina Land is a 58 y.o. female  : 1965    MRN: [id number]  DATE: 2024  TIME: 12:21 PM    Assessment and Plan   Sciatica, right side [M54.31]  1. Sciatica, right side  cyclobenzaprine (FLEXERIL) 10 mg tablet    methylPREDNISolone 4 MG tablet therapy pack            Patient Instructions     Continue to monitor symptoms.  If new or worsening symptoms develop, go immediately to Er. Drink plenty of fluids.  Follow up with Family Doctor this week.      Chief Complaint     Chief Complaint   Patient presents with    Sciatica     Pt is having pain on right side starting at the neck and goes all the way down to feet that has started since last week. Has been getting muscle relaxers from  and has not been working         History of Present Illness       Back Pain  This is a recurrent (PMH Sciatica this feels identical to prior episodes) problem. Episode onset: Last week after shoveling show.  No acute injury or traima. The problem occurs constantly. The problem has been gradually worsening since onset. Pain location: Neck stiffness, lumbar pain in R side into R hip, R thigh, and into R lower leg. The pain is severe. The pain is The same all the time. The symptoms are aggravated by bending and twisting. Stiffness is present All day. Pertinent negatives include no abdominal pain, bladder incontinence, bowel incontinence, chest pain, dysuria, fever, numbness, paresis, paresthesias, pelvic pain, perianal numbness, tingling or weakness. Treatments tried: Called PCP who rx robaxin but that hasnt been helping.  Also taking OTC tylenol.       Review of Systems   Review of Systems   Constitutional: Negative.  Negative for chills, fatigue and fever.   HENT: Negative.     Eyes: Negative.    Respiratory: Negative.  Negative for chest tightness, shortness of breath and wheezing.    Cardiovascular: Negative.  Negative for chest pain and palpitations.   Gastrointestinal:   Negative for abdominal pain, bowel incontinence, constipation, diarrhea, nausea and vomiting.   Endocrine: Negative.    Genitourinary:  Negative for bladder incontinence, dysuria, flank pain, frequency, pelvic pain, vaginal discharge and vaginal pain.   Musculoskeletal:  Positive for back pain and neck stiffness. Negative for gait problem and joint swelling.   Skin: Negative.  Negative for pallor and rash.   Allergic/Immunologic: Negative.    Neurological: Negative.  Negative for tingling, weakness, numbness and paresthesias.   Hematological: Negative.    Psychiatric/Behavioral: Negative.           Current Medications       Current Outpatient Medications:     alendronate (Fosamax) 70 mg tablet, Take 1 tablet (70 mg total) by mouth every 7 days, Disp: 12 tablet, Rfl: 3    atorvastatin (LIPITOR) 20 mg tablet, Take 1 tablet (20 mg total) by mouth daily, Disp: 90 tablet, Rfl: 0    cyclobenzaprine (FLEXERIL) 10 mg tablet, Take 1 tablet (10 mg total) by mouth 3 (three) times a day as needed for muscle spasms, Disp: 21 tablet, Rfl: 0    hydrochlorothiazide (HYDRODIURIL) 25 mg tablet, Take 1 tablet (25 mg total) by mouth daily, Disp: 90 tablet, Rfl: 1    losartan (COZAAR) 50 mg tablet, Take 1 tablet (50 mg total) by mouth daily, Disp: 90 tablet, Rfl: 1    methocarbamol (ROBAXIN) 500 mg tablet, Take 1 tablet (500 mg total) by mouth 4 (four) times a day, Disp: 20 tablet, Rfl: 0    methylPREDNISolone 4 MG tablet therapy pack, Use as directed on package, Disp: 21 tablet, Rfl: 0    Pyridoxine HCl (VITAMIN B6 PO), Take by mouth, Disp: , Rfl:     ibuprofen (MOTRIN) 800 mg tablet, Take 1 tablet (800 mg total) by mouth 2 (two) times a day with meals (Patient not taking: Reported on 1/22/2024), Disp: 60 tablet, Rfl: 2    pyridoxine (VITAMIN B6) 50 mg tablet, Take 1 tablet (50 mg total) by mouth daily, Disp: 30 tablet, Rfl: 0    valACYclovir (VALTREX) 1,000 mg tablet, Take 1 tablet (1,000 mg total) by mouth 2 (two) times a day for 7  "days, Disp: 14 tablet, Rfl: 0    Current Allergies     Allergies as of 01/22/2024 - Reviewed 01/22/2024   Allergen Reaction Noted    Kiwi extract - food allergy Hives 11/11/2022    Other  04/10/2019    Pineapple - food allergy Hives 08/30/2017            The following portions of the patient's history were reviewed and updated as appropriate: allergies, current medications, past family history, past medical history, past social history, past surgical history and problem list.     Past Medical History:   Diagnosis Date    2019 novel coronavirus disease (COVID-19) 05/30/2020    x 2 2020 and 2022    Abnormal Pap smear of cervix     Arthritis     BRCA1 negative 2015    BRCA2 negative 2015    Gastric ulcer     Hyperlipidemia     Hypertension     Low back pain     Right trigger finger     long and ring    TB lung, latent     Varicella        Past Surgical History:   Procedure Laterality Date    ANKLE FRACTURE SURGERY Right 03/01/2021    \"ankle fx\"    APPENDECTOMY      CATARACT EXTRACTION Bilateral     COLONOSCOPY      FOOT SURGERY Right     HERNIA REPAIR      [place] TENDON SHEATH INCISION Right 7/11/2023    Procedure: RELEASE TRIGGER FINGER LONG FINGER AND RING FINGER;  Surgeon: Brittaney Montoya MD;  Location: [facility] MAIN OR;  Service: Orthopedics       Family History   Problem Relation Age of Onset    Ovarian cancer Mother 50    No Known Problems Father     Thyroid cancer Sister 47    No Known Problems Sister     No Known Problems Daughter     No Known Problems Son     No Known Problems Son     No Known Problems Son     Cancer Maternal Grandmother         unknown type or age    No Known Problems Paternal Grandmother     No Known Problems Maternal Aunt     Breast cancer Maternal Uncle         50's         Medications have been verified.        Objective   /78   Pulse 77   Temp 97.8 °F (36.6 °C)   Resp 18   SpO2 97%        Physical Exam     Physical Exam  Vitals and nursing note reviewed.   Constitutional:       " General: She is not in acute distress.     Appearance: Normal appearance. She is well-developed. She is not ill-appearing or diaphoretic.   HENT:      Head: Normocephalic and atraumatic.   Cardiovascular:      Rate and Rhythm: Normal rate and regular rhythm.      Heart sounds: Normal heart sounds.   Pulmonary:      Effort: Pulmonary effort is normal. No respiratory distress.      Breath sounds: Normal breath sounds. No wheezing, rhonchi or rales.   Musculoskeletal:      Cervical back: Normal range of motion and neck supple.      Comments: FROM cervical spine with no significant pain or limitation.  FROM lumbar spine.  No midline point tenderness to any aspect of cervical thoracic or lumbar spine.  Moderate R paraspinal muscle tenderness into R mid gluteal area.  Palpation elicits same shooting pain into R thigh.  (-)SLR (-)Sitting root.  Normal gait.  Sensation circilation intact distally.   Lymphadenopathy:      Cervical: No cervical adenopathy.   Skin:     General: Skin is warm.      Capillary Refill: Capillary refill takes less than 2 seconds.      Coloration: Skin is not pale.      Findings: No rash.   Neurological:      Mental Status: She is alert.

## 2024-02-21 ENCOUNTER — OFFICE VISIT (OUTPATIENT)
Dept: URGENT CARE | Facility: CLINIC | Age: 59
End: 2024-02-21
Payer: COMMERCIAL

## 2024-02-21 VITALS
DIASTOLIC BLOOD PRESSURE: 86 MMHG | HEART RATE: 93 BPM | SYSTOLIC BLOOD PRESSURE: 128 MMHG | OXYGEN SATURATION: 100 % | RESPIRATION RATE: 16 BRPM | TEMPERATURE: 97.6 F

## 2024-02-21 DIAGNOSIS — B00.1 COLD SORE: Primary | ICD-10-CM

## 2024-02-21 PROCEDURE — G0383 LEV 4 HOSP TYPE B ED VISIT: HCPCS | Performed by: PHYSICIAN ASSISTANT

## 2024-02-21 RX ORDER — VALACYCLOVIR HYDROCHLORIDE 1 G/1
2000 TABLET, FILM COATED ORAL 2 TIMES DAILY
Qty: 4 TABLET | Refills: 0 | Status: SHIPPED | OUTPATIENT
Start: 2024-02-21 | End: 2024-02-22

## 2024-02-21 NOTE — PROGRESS NOTES
[facility]        NAME: Marina Land is a 59 y.o. female  : 1965    MRN: [id number]  DATE: 2024  TIME: 2:06 PM      Assessment and Plan     Cold sore [B00.1]  1. Cold sore  valACYclovir (VALTREX) 1,000 mg tablet        Note:   Rx for valtrex for cold sore recurrence   Told patient to follow up with PCP if she is concerned about breakouts and is interested in taking valtrex for a suppressive dose.     Patient Instructions   There are no Patient Instructions on file for this visit.     Follow up with PCP in 3-5 days.  Go to ER if symptoms worsen.    Chief Complaint     Chief Complaint   Patient presents with    Mouth Lesions     Pt states woke up with a sore on right side of upper lip this morning.   Has hx of herpes and feels this is a breakout.          History of Present Illness     Patient presents with burning/itching of up right upper lip x this morning. She states she has a history of herpes/cold sores and feels this is a breakout. She cleaned it with peroxide and put coconut oil on it.     Mouth Lesions   Associated symptoms include mouth sores. Pertinent negatives include no fever, no abdominal pain, no diarrhea, no nausea, no vomiting, no congestion, no ear pain, no headaches, no rhinorrhea, no sore throat, no cough, no rash and no eye pain.       Review of Systems     Review of Systems   Constitutional:  Negative for chills, fatigue and fever.   HENT:  Positive for mouth sores. Negative for congestion, ear pain, postnasal drip, rhinorrhea, sinus pressure, sinus pain and sore throat.    Eyes:  Negative for pain and visual disturbance.   Respiratory:  Negative for cough, chest tightness and shortness of breath.    Cardiovascular:  Negative for chest pain and palpitations.   Gastrointestinal:  Negative for abdominal pain, diarrhea, nausea and vomiting.   Genitourinary:  Negative for dysuria and hematuria.   Musculoskeletal:  Negative for arthralgias, back pain and  myalgias.   Skin:  Negative for rash.   Neurological:  Negative for dizziness, seizures, syncope, numbness and headaches.   All other systems reviewed and are negative.        Current Medications       Current Outpatient Medications:     alendronate (Fosamax) 70 mg tablet, Take 1 tablet (70 mg total) by mouth every 7 days, Disp: 12 tablet, Rfl: 3    atorvastatin (LIPITOR) 20 mg tablet, Take 1 tablet (20 mg total) by mouth daily, Disp: 90 tablet, Rfl: 0    cyclobenzaprine (FLEXERIL) 10 mg tablet, Take 1 tablet (10 mg total) by mouth 3 (three) times a day as needed for muscle spasms, Disp: 21 tablet, Rfl: 0    hydrochlorothiazide (HYDRODIURIL) 25 mg tablet, Take 1 tablet (25 mg total) by mouth daily, Disp: 90 tablet, Rfl: 1    losartan (COZAAR) 50 mg tablet, Take 1 tablet (50 mg total) by mouth daily, Disp: 90 tablet, Rfl: 1    methocarbamol (ROBAXIN) 500 mg tablet, Take 1 tablet (500 mg total) by mouth 4 (four) times a day, Disp: 20 tablet, Rfl: 0    Pyridoxine HCl (VITAMIN B6 PO), Take by mouth, Disp: , Rfl:     valACYclovir (VALTREX) 1,000 mg tablet, Take 2 tablets (2,000 mg total) by mouth 2 (two) times a day for 1 day, Disp: 4 tablet, Rfl: 0    ibuprofen (MOTRIN) 800 mg tablet, Take 1 tablet (800 mg total) by mouth 2 (two) times a day with meals (Patient not taking: Reported on 1/22/2024), Disp: 60 tablet, Rfl: 2    methylPREDNISolone 4 MG tablet therapy pack, Use as directed on package (Patient not taking: Reported on 2/21/2024), Disp: 21 tablet, Rfl: 0    pyridoxine (VITAMIN B6) 50 mg tablet, Take 1 tablet (50 mg total) by mouth daily, Disp: 30 tablet, Rfl: 0    Current Allergies     Allergies as of 02/21/2024 - Reviewed 02/21/2024   Allergen Reaction Noted    Kiwi extract - food allergy Hives 11/11/2022    Other  04/10/2019    Pineapple - food allergy Hives 08/30/2017              The following portions of the patient's history were reviewed and updated as appropriate: allergies, current medications, past  "family history, past medical history, past social history, past surgical history, and problem list.     Past Medical History:   Diagnosis Date    2019 novel coronavirus disease (COVID-19) 05/30/2020    x 2 2020 and 2022    Abnormal Pap smear of cervix     Arthritis     BRCA1 negative 2015    BRCA2 negative 2015    Gastric ulcer     Hyperlipidemia     Hypertension     Low back pain     Right trigger finger     long and ring    TB lung, latent     Varicella        Past Surgical History:   Procedure Laterality Date    ANKLE FRACTURE SURGERY Right 03/01/2021    \"ankle fx\"    APPENDECTOMY      CATARACT EXTRACTION Bilateral     COLONOSCOPY      FOOT SURGERY Right     HERNIA REPAIR      [place] TENDON SHEATH INCISION Right 7/11/2023    Procedure: RELEASE TRIGGER FINGER LONG FINGER AND RING FINGER;  Surgeon: Brittaney Montoya MD;  Location[facility] MAIN OR;  Service: Orthopedics       Family History   Problem Relation Age of Onset    Ovarian cancer Mother 50    No Known Problems Father     Thyroid cancer Sister 47    No Known Problems Sister     No Known Problems Daughter     No Known Problems Son     No Known Problems Son     No Known Problems Son     Cancer Maternal Grandmother         unknown type or age    No Known Problems Paternal Grandmother     No Known Problems Maternal Aunt     Breast cancer Maternal Uncle         50's         Medications have been verified.        Objective     /86   Pulse 93   Temp 97.6 °F (36.4 °C)   Resp 16   SpO2 100%   No LMP recorded. Patient is postmenopausal.         Physical Exam     Physical Exam  Vitals and nursing note reviewed.   Constitutional:       Appearance: Normal appearance. She is normal weight.   Cardiovascular:      Rate and Rhythm: Normal rate.   Pulmonary:      Effort: Pulmonary effort is normal.   Skin:     General: Skin is warm and dry.      Findings: Lesion present.      Comments: Right lateral upper lip with the beginning stages of cluster of vesicles "   Neurological:      General: No focal deficit present.      Mental Status: She is alert and oriented to person, place, and time.   Psychiatric:         Mood and Affect: Mood normal.         Behavior: Behavior normal.

## 2024-02-24 ENCOUNTER — APPOINTMENT (OUTPATIENT)
Dept: RADIOLOGY | Facility: CLINIC | Age: 59
End: 2024-02-24
Payer: OTHER MISCELLANEOUS

## 2024-02-24 ENCOUNTER — OCCMED (OUTPATIENT)
Dept: URGENT CARE | Facility: CLINIC | Age: 59
End: 2024-02-24
Payer: OTHER MISCELLANEOUS

## 2024-02-24 DIAGNOSIS — M25.561 ACUTE PAIN OF RIGHT KNEE: ICD-10-CM

## 2024-02-24 DIAGNOSIS — M25.561 ACUTE PAIN OF RIGHT KNEE: Primary | ICD-10-CM

## 2024-02-24 PROCEDURE — 99283 EMERGENCY DEPT VISIT LOW MDM: CPT | Performed by: PHYSICIAN ASSISTANT

## 2024-02-24 PROCEDURE — 73564 X-RAY EXAM KNEE 4 OR MORE: CPT

## 2024-02-24 PROCEDURE — G0382 LEV 3 HOSP TYPE B ED VISIT: HCPCS | Performed by: PHYSICIAN ASSISTANT

## 2024-02-26 ENCOUNTER — OFFICE VISIT (OUTPATIENT)
Dept: HEMATOLOGY ONCOLOGY | Facility: CLINIC | Age: 59
End: 2024-02-26
Payer: COMMERCIAL

## 2024-02-26 VITALS
RESPIRATION RATE: 16 BRPM | SYSTOLIC BLOOD PRESSURE: 118 MMHG | OXYGEN SATURATION: 97 % | HEART RATE: 85 BPM | DIASTOLIC BLOOD PRESSURE: 68 MMHG | TEMPERATURE: 97.8 F

## 2024-02-26 DIAGNOSIS — D72.818 OTHER DECREASED WHITE BLOOD CELL (WBC) COUNT: Primary | ICD-10-CM

## 2024-02-26 PROCEDURE — 99243 OFF/OP CNSLTJ NEW/EST LOW 30: CPT | Performed by: PHYSICIAN ASSISTANT

## 2024-02-26 NOTE — PROGRESS NOTES
[facility] [place]  [address] [address]  Hematology Ambulatory Consult  Marina REGIS Emery, 1965, [id number]  2/26/2024      Assessment and Plan   1. Other decreased white blood cell (WBC) count  - Ambulatory Referral to Hematology / Oncology  - Chronic Hepatitis Panel; Future  - CBC and differential; Future  - Comprehensive metabolic panel; Future  - Folate; Future  - C-reactive protein; Future  - Leukemia/Lymphoma flow cytometry; Future  - Methylmalonic acid, serum; Future  - CBC and differential; Future    In summary this is a 59-year-old female with past medical history as outlined below who presents as a new consultation for leukopenia since April of 2022. Most recent labs -> WBC 3.37, hemoglobin 12.9, MCV 94, platelets 199,000.  ANC 1.38.  Patient does have history of latent tuberculosis.  Her QuantiFERON results were intermediate from 5320-3309 until January 2023 when they resulted positive. CXR was negative without evidence of active disease.  She was seen by infectious disease and completed 9 months of isoniazid and vit B6 therapy.  She also has history of HPV and herpes.  She is performing annual Paps.  Up-to-date on mammogram and colonoscopy.  Patient denies constitutional symptoms or lymphadenopathy.     Discussion/plan  Unclear etiology of leukopenia . may be secondary to chronic viral infections? Unlikely due to TB since she has completed treatment.  Will obtain additional workup as above for further evaluation.  Continue to observe WBC, repeat CBC planned for 3 months  Patient was advised to call sooner if she develops frequent infections or constitutional symptoms.    The patient is scheduled for follow-up in approximately 3   Patient voiced agreement and understanding to the above.   Patient knows to call the Hematology/Oncology office with any questions and concerns regarding the above.    Barrier(s) to care:  None.    Fidelia Marcelino PA-C  Medical Oncology/Hematology  [facility]    Subjective   No chief complaint on file.      Referring provider    Yadi Portillo PA-C  [address]   [address]  [place] [address]    History of present illness: 59-year-old female with past medical history of hypertension, osteoarthritis, TB lung latent who has been referred for evaluation of leukopenia by her primary care provider.    On chart review, leukopenia has been present since April 2022.  At this time WBC was 4.14 with mild reduction in neutrophils ANC 1.83.  No other cytopenias.  She denies history of leukopenia in the past.  Has never been seen by hematologist.    Patient has history of latent TB.  Positive QuantiFERON 01/2023.  Patient follows with infectious disease.  No signs of active disease.  Started isoniazid 300 mg daily and pyridoxine 50 mg daily 1/11/2023, completed 9 months of therapy in 10/2023.  She has history of HPV.  Receiving annual Paps.  Also endorses history of of oral herpes as a child and was dormant for many year until 12/2023 when she developed lesions on buttock. Test confirmed herpes virus 2. She now has oral lesion on right upper lip.  She has chronic joint pain without known history of autoimmune disease.  She had prior workup including RF, BERNARDINO and Lyme screen that was negative.  No history of cirrhosis.  Denies recent tick bite.  No history of HIV, hepatitis or cirrhosis.  She had HIV testing in 2023 that was negative.  Denies frequent infections requiring antibiotics.  No constitutional symptoms.  Consumes well balanced diet. Does not east red meat. She takes b12, zinc, calcium, and vitamin d daily. enies tobacco, alcohol, or drug use. Works as CNA. Last travel out of country was 4 years ago.     Up to date on mammogram, next one is scheduled for tomorrow.  History of colon polyps, up-to-date on colonoscopy.    No personal history of cancer. Mother had  "ovarian cancer. Maternal uncle breast cancer. Maternal 1st cousin had \"cancer in foot.\"     12/2023:WBC 3.37, hemoglobin 12.9, MCV 94, platelets 199,000.  ANC 1.38.  Remainder of differential is normal.    Review of Systems   Constitutional:  Negative for activity change, appetite change, fatigue, fever and unexpected weight change.        Denies night sweats     Respiratory:  Negative for shortness of breath.    Cardiovascular:  Negative for chest pain.   Gastrointestinal:  Negative for abdominal pain, blood in stool, nausea and vomiting.   Genitourinary:  Negative for hematuria.   Musculoskeletal:         +Right knee pain since Saturday. [place] a \"popping\" sound in knee when trying to move patient.    Hematological:  Negative for adenopathy. Does not bruise/bleed easily.       Past Medical History:   Diagnosis Date    2019 novel coronavirus disease (COVID-19) 05/30/2020    x 2 2020 and 2022    Abnormal Pap smear of cervix     Arthritis     BRCA1 negative 2015    BRCA2 negative 2015    Gastric ulcer     Hyperlipidemia     Hypertension     Low back pain     Right trigger finger     long and ring    TB lung, latent     Varicella      Past Surgical History:   Procedure Laterality Date    ANKLE FRACTURE SURGERY Right 03/01/2021    \"ankle fx\"    APPENDECTOMY      CATARACT EXTRACTION Bilateral     COLONOSCOPY      FOOT SURGERY Right     HERNIA REPAIR      [place] TENDON SHEATH INCISION Right 7/11/2023    Procedure: RELEASE TRIGGER FINGER LONG FINGER AND RING FINGER;  Surgeon: Brittaney Montoya MD;  Location: [facility] MAIN OR;  Service: Orthopedics     Family History   Problem Relation Age of Onset    Ovarian cancer Mother 50    No Known Problems Father     Thyroid cancer Sister 47    No Known Problems Sister     No Known Problems Daughter     No Known Problems Son     No Known Problems Son     No Known Problems Son     Cancer Maternal Grandmother         unknown type or age    No Known Problems Paternal Grandmother     No " Known Problems Maternal Aunt     Breast cancer Maternal Uncle         50's     Social History     Socioeconomic History    Marital status: /Civil Union     Spouse name: None    Number of children: None    Years of education: None    Highest education level: None   Occupational History    None   Tobacco Use    Smoking status: Never    Smokeless tobacco: Never   Vaping Use    Vaping status: Never Used   Substance and Sexual Activity    Alcohol use: Not Currently     Comment: rare    Drug use: No    Sexual activity: Yes     Partners: Male   Other Topics Concern    None   Social History Narrative    None     Social Determinants of Health     Financial Resource Strain: Not on file   Food Insecurity: Not on file   Transportation Needs: Not on file   Physical Activity: Not on file   Stress: Not on file   Social Connections: Not on file   Intimate Partner Violence: Not on file   Housing Stability: Not on file         Current Outpatient Medications:     alendronate (Fosamax) 70 mg tablet, Take 1 tablet (70 mg total) by mouth every 7 days, Disp: 12 tablet, Rfl: 3    atorvastatin (LIPITOR) 20 mg tablet, Take 1 tablet (20 mg total) by mouth daily, Disp: 90 tablet, Rfl: 0    cyclobenzaprine (FLEXERIL) 10 mg tablet, Take 1 tablet (10 mg total) by mouth 3 (three) times a day as needed for muscle spasms, Disp: 21 tablet, Rfl: 0    hydrochlorothiazide (HYDRODIURIL) 25 mg tablet, Take 1 tablet (25 mg total) by mouth daily, Disp: 90 tablet, Rfl: 1    losartan (COZAAR) 50 mg tablet, Take 1 tablet (50 mg total) by mouth daily, Disp: 90 tablet, Rfl: 1    methocarbamol (ROBAXIN) 500 mg tablet, Take 1 tablet (500 mg total) by mouth 4 (four) times a day, Disp: 20 tablet, Rfl: 0    Pyridoxine HCl (VITAMIN B6 PO), Take by mouth, Disp: , Rfl:     ibuprofen (MOTRIN) 800 mg tablet, Take 1 tablet (800 mg total) by mouth 2 (two) times a day with meals (Patient not taking: Reported on 1/22/2024), Disp: 60 tablet, Rfl: 2    methylPREDNISolone  4 MG tablet therapy pack, Use as directed on package (Patient not taking: Reported on 2/21/2024), Disp: 21 tablet, Rfl: 0    pyridoxine (VITAMIN B6) 50 mg tablet, Take 1 tablet (50 mg total) by mouth daily, Disp: 30 tablet, Rfl: 0    valACYclovir (VALTREX) 1,000 mg tablet, Take 2 tablets (2,000 mg total) by mouth 2 (two) times a day for 1 day, Disp: 4 tablet, Rfl: 0  Allergies   Allergen Reactions    Kiwi Extract - Food Allergy Hives    Other      strawberry    Pineapple - Food Allergy Hives       Objective   /68   Pulse 85   Temp 97.8 °F (36.6 °C) (Temporal)   Resp 16   SpO2 97%   Physical Exam  Vitals reviewed.   HENT:      Head: Normocephalic.   Cardiovascular:      Rate and Rhythm: Normal rate and regular rhythm.   Pulmonary:      Effort: Pulmonary effort is normal.      Breath sounds: Normal breath sounds.   Abdominal:      Palpations: Abdomen is soft.      Tenderness: There is no abdominal tenderness.   Musculoskeletal:      Cervical back: Neck supple.   Lymphadenopathy:      Cervical: No cervical adenopathy.      Upper Body:      Right upper body: No supraclavicular adenopathy.      Left upper body: No supraclavicular adenopathy.   Skin:     Findings: No rash.   Neurological:      Mental Status: She is alert.         Result Review  Labs:  No visits with results within 30 Day(s) from this visit.   Latest known visit with results is:   Appointment on 12/01/2023   Component Date Value Ref Range Status    WBC 12/01/2023 3.37 (L)  4.31 - 10.16 Thousand/uL Final    RBC 12/01/2023 4.26  3.81 - 5.12 Million/uL Final    Hemoglobin 12/01/2023 12.9  11.5 - 15.4 g/dL Final    Hematocrit 12/01/2023 39.9  34.8 - 46.1 % Final    MCV 12/01/2023 94  82 - 98 fL Final    MCH 12/01/2023 30.3  26.8 - 34.3 pg Final    MCHC 12/01/2023 32.3  31.4 - 37.4 g/dL Final    RDW 12/01/2023 13.0  11.6 - 15.1 % Final    MPV 12/01/2023 10.4  8.9 - 12.7 fL Final    Platelets 12/01/2023 199  149 - 390 Thousands/uL Final    nRBC  12/01/2023 0  /100 WBCs Final    Neutrophils Relative 12/01/2023 41 (L)  43 - 75 % Final    Immat GRANS % 12/01/2023 0  0 - 2 % Final    Lymphocytes Relative 12/01/2023 43  14 - 44 % Final    Monocytes Relative 12/01/2023 9  4 - 12 % Final    Eosinophils Relative 12/01/2023 5  0 - 6 % Final    Basophils Relative 12/01/2023 2 (H)  0 - 1 % Final    Neutrophils Absolute 12/01/2023 1.38 (L)  1.85 - 7.62 Thousands/µL Final    Immature Grans Absolute 12/01/2023 0.01  0.00 - 0.20 Thousand/uL Final    Lymphocytes Absolute 12/01/2023 1.48  0.60 - 4.47 Thousands/µL Final    Monocytes Absolute 12/01/2023 0.30  0.17 - 1.22 Thousand/µL Final    Eosinophils Absolute 12/01/2023 0.15  0.00 - 0.61 Thousand/µL Final    Basophils Absolute 12/01/2023 0.05  0.00 - 0.10 Thousands/µL Final       Imaging:   I reviewed relevant imaging  Please note:  This report has been generated by a voice recognition software system. Therefore there may be syntax, spelling, and/or grammatical errors. Please call if you have any questions.

## 2024-02-26 NOTE — PROGRESS NOTES
"[facility] [address]  [facility] [address] [place] [address]  Hematology Ambulatory Follow-Up  Marina Land, 1965, [id number]  2/26/2024      Assessment and Plan   1. Neutropenia, unspecified type (HCC)  ***  - Ambulatory Referral to Hematology / Oncology      The patient is scheduled for follow-up in approximately ***    Patient voiced agreement and understanding to the above.   Patient advised to call the Hematology/Oncology office with any questions and concerns regarding the above.    Barrier(s) to care: None  The patient is able to self care.    Fidelia Marcelino PA-C   Medical Oncology/Hematology  [facility]    Subjective   No chief complaint on file.      History of present illness:     02/26/24 :  No results found for: \"IRON\", \"TIBC\", \"FERRITIN\"  Lab Results   Component Value Date    WBC 3.37 (L) 12/01/2023    HGB 12.9 12/01/2023    HCT 39.9 12/01/2023    MCV 94 12/01/2023     12/01/2023       Interval history:    Review of Systems    Patient Active Problem List   Diagnosis    Essential hypertension    Snoring    Sciatica, right side    Calcific tendonitis of right shoulder    Cervicalgia    Dyslipidemia    Lumbar radiculopathy    Migraine headache    Varicose veins of both lower extremities with pain    Post-menopausal    Elevated C-reactive protein (CRP)    ASCUS with positive high risk HPV cervical    Closed nondisplaced fracture of lateral malleolus of right fibula    Cortical age-related cataract of both eyes    Acute bilateral low back pain without sciatica    Family history of ovarian cancer    Impaired fasting glucose    Shortness of breath    Primary osteoarthritis involving multiple joints    TB lung, latent    Trigger middle finger of right hand    Trigger ring finger of right hand    Positive QuantiFERON-TB Gold test    Other chest pain     Past Medical History:   Diagnosis Date    2019 novel " "coronavirus disease (COVID-19) 05/30/2020    x 2 2020 and 2022    Abnormal Pap smear of cervix     Arthritis     BRCA1 negative 2015    BRCA2 negative 2015    Gastric ulcer     Hyperlipidemia     Hypertension     Low back pain     Right trigger finger     long and ring    TB lung, latent     Varicella      Past Surgical History:   Procedure Laterality Date    ANKLE FRACTURE SURGERY Right 03/01/2021    \"ankle fx\"    APPENDECTOMY      CATARACT EXTRACTION Bilateral     COLONOSCOPY      FOOT SURGERY Right     HERNIA REPAIR      MS TENDON SHEATH INCISION Right 7/11/2023    Procedure: RELEASE TRIGGER FINGER LONG FINGER AND RING FINGER;  Surgeon: Brittaney Montoya MD;  [facility] MAIN OR;  Service: Orthopedics     Family History   Problem Relation Age of Onset    Ovarian cancer Mother 50    No Known Problems Father     Thyroid cancer Sister 47    No Known Problems Sister     No Known Problems Daughter     No Known Problems Son     No Known Problems Son     No Known Problems Son     Cancer Maternal Grandmother         unknown type or age    No Known Problems Paternal Grandmother     No Known Problems Maternal Aunt     Breast cancer Maternal Uncle         50's     Social History     Socioeconomic History    Marital status: /Civil Union     Spouse name: None    Number of children: None    Years of education: None    Highest education level: None   Occupational History    None   Tobacco Use    Smoking status: Never    Smokeless tobacco: Never   Vaping Use    Vaping status: Never Used   Substance and Sexual Activity    Alcohol use: Not Currently     Comment: rare    Drug use: No    Sexual activity: Yes     Partners: Male   Other Topics Concern    None   Social History Narrative    None     Social Determinants of Health     Financial Resource Strain: Not on file   Food Insecurity: Not on file   Transportation Needs: Not on file   Physical Activity: Not on file   Stress: Not on file   Social Connections: Not on " file   Intimate Partner Violence: Not on file   Housing Stability: Not on file       Current Outpatient Medications:     alendronate (Fosamax) 70 mg tablet, Take 1 tablet (70 mg total) by mouth every 7 days, Disp: 12 tablet, Rfl: 3    atorvastatin (LIPITOR) 20 mg tablet, Take 1 tablet (20 mg total) by mouth daily, Disp: 90 tablet, Rfl: 0    cyclobenzaprine (FLEXERIL) 10 mg tablet, Take 1 tablet (10 mg total) by mouth 3 (three) times a day as needed for muscle spasms, Disp: 21 tablet, Rfl: 0    hydrochlorothiazide (HYDRODIURIL) 25 mg tablet, Take 1 tablet (25 mg total) by mouth daily, Disp: 90 tablet, Rfl: 1    losartan (COZAAR) 50 mg tablet, Take 1 tablet (50 mg total) by mouth daily, Disp: 90 tablet, Rfl: 1    methocarbamol (ROBAXIN) 500 mg tablet, Take 1 tablet (500 mg total) by mouth 4 (four) times a day, Disp: 20 tablet, Rfl: 0    Pyridoxine HCl (VITAMIN B6 PO), Take by mouth, Disp: , Rfl:     ibuprofen (MOTRIN) 800 mg tablet, Take 1 tablet (800 mg total) by mouth 2 (two) times a day with meals (Patient not taking: Reported on 1/22/2024), Disp: 60 tablet, Rfl: 2    methylPREDNISolone 4 MG tablet therapy pack, Use as directed on package (Patient not taking: Reported on 2/21/2024), Disp: 21 tablet, Rfl: 0    pyridoxine (VITAMIN B6) 50 mg tablet, Take 1 tablet (50 mg total) by mouth daily, Disp: 30 tablet, Rfl: 0    valACYclovir (VALTREX) 1,000 mg tablet, Take 2 tablets (2,000 mg total) by mouth 2 (two) times a day for 1 day, Disp: 4 tablet, Rfl: 0  Allergies   Allergen Reactions    Kiwi Extract - Food Allergy Hives    Other      strawberry    Pineapple - Food Allergy Hives       Objective   /68   Pulse 85   Temp 97.8 °F (36.6 °C) (Temporal)   Resp 16   SpO2 97%    Physical Exam    Result Review  Labs:  No visits with results within 30 Day(s) from this visit.   Latest known visit with results is:   Appointment on 12/01/2023   Component Date Value Ref Range Status    WBC 12/01/2023 3.37 (L)  4.31 - 10.16  Thousand/uL Final    RBC 12/01/2023 4.26  3.81 - 5.12 Million/uL Final    Hemoglobin 12/01/2023 12.9  11.5 - 15.4 g/dL Final    Hematocrit 12/01/2023 39.9  34.8 - 46.1 % Final    MCV 12/01/2023 94  82 - 98 fL Final    MCH 12/01/2023 30.3  26.8 - 34.3 pg Final    MCHC 12/01/2023 32.3  31.4 - 37.4 g/dL Final    RDW 12/01/2023 13.0  11.6 - 15.1 % Final    MPV 12/01/2023 10.4  8.9 - 12.7 fL Final    Platelets 12/01/2023 199  149 - 390 Thousands/uL Final    nRBC 12/01/2023 0  /100 WBCs Final    Neutrophils Relative 12/01/2023 41 (L)  43 - 75 % Final    Immat GRANS % 12/01/2023 0  0 - 2 % Final    Lymphocytes Relative 12/01/2023 43  14 - 44 % Final    Monocytes Relative 12/01/2023 9  4 - 12 % Final    Eosinophils Relative 12/01/2023 5  0 - 6 % Final    Basophils Relative 12/01/2023 2 (H)  0 - 1 % Final    Neutrophils Absolute 12/01/2023 1.38 (L)  1.85 - 7.62 Thousands/µL Final    Immature Grans Absolute 12/01/2023 0.01  0.00 - 0.20 Thousand/uL Final    Lymphocytes Absolute 12/01/2023 1.48  0.60 - 4.47 Thousands/µL Final    Monocytes Absolute 12/01/2023 0.30  0.17 - 1.22 Thousand/µL Final    Eosinophils Absolute 12/01/2023 0.15  0.00 - 0.61 Thousand/µL Final    Basophils Absolute 12/01/2023 0.05  0.00 - 0.10 Thousands/µL Final       Imaging:   I reviewed relevant imaging    Please note:  This report has been generated by a voice recognition software system. Therefore there may be syntax, spelling, and/or grammatical errors. Please call if you have any questions.

## 2024-02-27 ENCOUNTER — APPOINTMENT (EMERGENCY)
Dept: CT IMAGING | Facility: HOSPITAL | Age: 59
End: 2024-02-27
Payer: OTHER MISCELLANEOUS

## 2024-02-27 ENCOUNTER — HOSPITAL ENCOUNTER (OUTPATIENT)
Dept: MAMMOGRAPHY | Facility: CLINIC | Age: 59
Discharge: HOME | End: 2024-02-27
Payer: COMMERCIAL

## 2024-02-27 ENCOUNTER — HOSPITAL ENCOUNTER (EMERGENCY)
Facility: HOSPITAL | Age: 59
Discharge: HOME | End: 2024-02-27
Attending: EMERGENCY MEDICINE
Payer: OTHER MISCELLANEOUS

## 2024-02-27 VITALS — BODY MASS INDEX: 27.16 KG/M2 | WEIGHT: 163 LBS | HEIGHT: 65 IN

## 2024-02-27 VITALS
DIASTOLIC BLOOD PRESSURE: 93 MMHG | HEART RATE: 79 BPM | OXYGEN SATURATION: 99 % | SYSTOLIC BLOOD PRESSURE: 157 MMHG | RESPIRATION RATE: 17 BRPM | TEMPERATURE: 98.1 F

## 2024-02-27 DIAGNOSIS — S89.91XA INJURY OF RIGHT KNEE, INITIAL ENCOUNTER: Primary | ICD-10-CM

## 2024-02-27 DIAGNOSIS — Z12.31 ENCOUNTER FOR SCREENING MAMMOGRAM FOR MALIGNANT NEOPLASM OF BREAST: ICD-10-CM

## 2024-02-27 PROCEDURE — 77067 SCR MAMMO BI INCL CAD: CPT

## 2024-02-27 PROCEDURE — 99284 EMERGENCY DEPT VISIT MOD MDM: CPT

## 2024-02-27 PROCEDURE — 77063 BREAST TOMOSYNTHESIS BI: CPT

## 2024-02-27 PROCEDURE — 99283 EMERGENCY DEPT VISIT LOW MDM: CPT

## 2024-02-27 PROCEDURE — 73700 CT LOWER EXTREMITY W/O DYE: CPT

## 2024-02-27 PROCEDURE — 97166 OT EVAL MOD COMPLEX 45 MIN: CPT

## 2024-02-27 PROCEDURE — 97162 PT EVAL MOD COMPLEX 30 MIN: CPT

## 2024-02-27 RX ORDER — HYDROCODONE BITARTRATE AND ACETAMINOPHEN 5; 325 MG/1; MG/1
1 TABLET ORAL EVERY 6 HOURS PRN
Qty: 8 TABLET | Refills: 0 | Status: SHIPPED | OUTPATIENT
Start: 2024-02-27

## 2024-02-27 RX ORDER — HYDROCODONE BITARTRATE AND ACETAMINOPHEN 5; 325 MG/1; MG/1
1 TABLET ORAL ONCE
Status: COMPLETED | OUTPATIENT
Start: 2024-02-27 | End: 2024-02-27

## 2024-02-27 RX ORDER — ONDANSETRON 4 MG/1
4 TABLET, ORALLY DISINTEGRATING ORAL EVERY 6 HOURS PRN
Qty: 20 TABLET | Refills: 0 | Status: SHIPPED | OUTPATIENT
Start: 2024-02-27

## 2024-02-27 RX ORDER — ONDANSETRON 4 MG/1
4 TABLET, ORALLY DISINTEGRATING ORAL ONCE
Status: COMPLETED | OUTPATIENT
Start: 2024-02-27 | End: 2024-02-27

## 2024-02-27 RX ADMIN — HYDROCODONE BITARTRATE AND ACETAMINOPHEN 1 TABLET: 5; 325 TABLET ORAL at 09:01

## 2024-02-27 RX ADMIN — ONDANSETRON 4 MG: 4 TABLET, ORALLY DISINTEGRATING ORAL at 09:01

## 2024-02-27 NOTE — PHYSICAL THERAPY NOTE
"   PHYSICAL THERAPY EVALUATION  NAME:  Marina STACY Emery  DATE: 02/27/24    AGE:   59 y.o.  Mrn:   [id number]  ADMIT DX:  No admission diagnoses are documented for this encounter.  Problem List:   Patient Active Problem List   Diagnosis    Essential hypertension    Snoring    Sciatica, right side    Calcific tendonitis of right shoulder    Cervicalgia    Dyslipidemia    Lumbar radiculopathy    Migraine headache    Varicose veins of both lower extremities with pain    Post-menopausal    Elevated C-reactive protein (CRP)    ASCUS with positive high risk HPV cervical    Closed nondisplaced fracture of lateral malleolus of right fibula    Cortical age-related cataract of both eyes    Acute bilateral low back pain without sciatica    Family history of ovarian cancer    Impaired fasting glucose    Shortness of breath    Primary osteoarthritis involving multiple joints    TB lung, latent    Trigger middle finger of right hand    Trigger ring finger of right hand    Positive QuantiFERON-TB Gold test    Other chest pain       Past Medical History  Past Medical History:   Diagnosis Date    2019 novel coronavirus disease (COVID-19) 05/30/2020    x 2 2020 and 2022    Abnormal Pap smear of cervix     Arthritis     BRCA1 negative 2015    BRCA2 negative 2015    Gastric ulcer     Hyperlipidemia     Hypertension     Low back pain     Right trigger finger     long and ring    TB lung, latent     Varicella        Past Surgical History  Past Surgical History:   Procedure Laterality Date    ANKLE FRACTURE SURGERY Right 03/01/2021    \"ankle fx\"    APPENDECTOMY      CATARACT EXTRACTION Bilateral     COLONOSCOPY      FOOT SURGERY Right     HERNIA REPAIR      [place] TENDON SHEATH INCISION Right 7/11/2023    Procedure: RELEASE TRIGGER FINGER LONG FINGER AND RING FINGER;  Surgeon: Brittaney Montoya MD;  [facility];  Service: Orthopedics       Length Of Stay: 0  Performed at least 2 patient identifiers during session: Name and " Birthday     02/27/24 1116   PT Last Visit   PT Visit Date 02/27/24   Note Type   Note type Evaluation   Pain Assessment   Pain Assessment Tool 0-10   Pain Score 3   Pain Location/Orientation Orientation: Right;Location: Knee   Pain Onset/Description Frequency: Constant/Continuous;Onset: Ongoing   Patient's Stated Pain Goal No pain   Hospital Pain Intervention(s) Repositioned;Ambulation/increased activity  (prior pain medication provided)   Multiple Pain Sites No   Restrictions/Precautions   Weight Bearing Precautions Per Order No   Braces or Orthoses   (pt reports use of back brace while working)   Other Precautions Fall Risk;Pain   Home Living   Type of Home House   Home Layout One level;Performs ADLs on one level;Able to live on main level with bedroom/bathroom;Stairs to enter with rails  (4 VINAYAK. 6 steps to access main living area)   Bathroom Shower/Tub Tub/shower unit   Bathroom Toilet Standard   Bathroom Equipment   (none per pt)   Bathroom Accessibility Accessible   Home Equipment Crutches   Additional Comments no AD used PTA   Prior Function   Level of Cole Independent with ADLs;Independent with functional mobility;Independent with IADLS   Lives With Spouse   Receives Help From Family   IADLs Independent with driving;Independent with meal prep;Independent with medication management   Falls in the last 6 months 0  (pt dneies)   Vocational Full time employment  (CNA)   General   Family/Caregiver Present Yes  (spouse)   Cognition   Overall Cognitive Status WFL   Arousal/Participation Alert   Orientation Level Oriented X4   Memory Within functional limits   Following Commands Follows all commands and directions without difficulty   Comments Pt agreeable to PT evaluation   RLE Assessment   RLE Assessment X   Strength RLE   R Hip Flexion 3-/5   R Knee Flexion 3-/5   R Knee Extension 3/5   R Ankle Dorsiflexion 3+/5   LLE Assessment   LLE Assessment WFL   Coordination   Sensation WFL   Light Touch   RLE Light  "Touch Grossly intact   LLE Light Touch Grossly intact   Bed Mobility   Supine to Sit 6  Modified independent   Additional items Increased time required   Sit to Supine 6  Modified independent   Additional items Increased time required   Additional Comments pt using LLE to guide RLE off bed. Pt without complaints of lightheadedness, SOB, chest pain, dizziness throughout session   Transfers   Sit to Stand 5  Supervision   Additional items Assist x 1;Increased time required   Stand to Sit 5  Supervision   Additional items Assist x 1;Increased time required   Additional Comments RW used during transfers. In standing, pt unable to achive full R knee extension due to pain   Ambulation/Elevation   Gait pattern Antalgic;Decreased R stance;Decreased foot clearance;Improper Weight shift;Shuffling;Short stride;Step to   Gait Assistance 5  Supervision   Additional items Assist x 1;Verbal cues   Assistive Device Rolling walker   Distance 35'x2   Stair Management Assistance 5  Supervision   Additional items Assist x 1;Verbal cues   Stair Management Technique With walker   Number of Stairs 5  (2\" step up)   Balance   Static Sitting Good   Dynamic Sitting Fair +   Static Standing Fair   Dynamic Standing Fair   Ambulatory Fair   Endurance Deficit   Endurance Deficit Yes   Endurance Deficit Description R knee pain   Activity Tolerance   Activity Tolerance Patient limited by pain   Medical Staff Made Aware Cynthia GRIER confirmed pt appropriate for PT session. CRNP made aware of session outcomes and recommendations   Assessment   Prognosis Excellent   Problem List Decreased strength;Decreased range of motion;Decreased mobility;Pain   Assessment Pt is 59 y.o. female seen for moderate-complexity PT evaluation on 2/27/2024 s/p admit to [facility] on [facility]/27/2024 w/ <principal problem not specified>. PT was consulted to assess pt's functional mobility and d/c needs. Order placed for PT eval and tx, w/ ambulate patient order. " "PTA, pt was living with her spouse in a one story home with 4+6 VINAYAK and reports independence at baseline with ADLs, IADLs, and functional mobility without AD. At time of eval, patient completed sup <> sit Ann Marie, STS supervision, and ambulated 35'x2 with RW supervision. Overall, pt ambulated with antalgic pattern and decreased stance on R due to pain. Patient navigated 2\" practice step with BUE support at RW x5 trials. Upon evaluation, pt presenting with impaired functional mobility d/t decreased strength, decreased ROM, decreased endurance, decreased mobility, and pain. Pertinent PMHx and current co-morbidities affecting pt's physical performance at time of assessment include: R knee pain, HTN, low back pain with R sciatica. Personal factors affecting pt at time of eval include: ambulating w/ assistive device and stairs to enter home. The following objective measures performed on IE also reveal limitations: AM-PAC 6-Clicks: 20/24. Pt's clinical presentation is currently evolving seen in pt's presentation of  R knee pain impacting overall mobility status and ongoing medical assessment. Overall, pt's rehab potential and prognosis to return to PLOF is good as impacted by objective findings, warranting pt to receive further skilled PT interventions to address impairments, activity limitations, and participation restrictions. Pt to benefit from continued PT services to address deficits as defined above and maximize level of functional independent mobility and consistency. From PT/mobility standpoint, recommendation at time of d/c would be level 3, minimal resource intensity in order to facilitate return to PLOF.   Goals   [facility] Expiration Date 03/08/24   Short Term Goal #1 In 10 days: Increase bilateral LE strength 1/2 grade to facilitate independent mobility, Perform all transfers modified independent to improve independence, Ambulate > 150 ft. with RW modified independent w/o LOB and w/ normalized gait pattern 100% of the " time, Navigate 10 stairs with close S with bilateral handrails to facilitate return to previous living environment, Increase all balance 1/2 grade to decrease risk for falls, and Increase R  knee flexion ROM >  degrees to safely navigate step into home to facilitate independent mobility   PT Treatment Day 0   Plan   Treatment/Interventions Functional transfer training;LE strengthening/ROM;Elevations;Therapeutic exercise;Endurance training;Gait training;Spoke to advanced practitioner;OT   PT Frequency 2-3x/wk   Discharge Recommendation   Rehab Resource Intensity Level, PT III (Minimum Resource Intensity)   Equipment Recommended Walker   Walker Package Recommended Wheeled walker   AM-PAC Basic Mobility Inpatient   Turning in Flat Bed Without Bedrails 4   Lying on Back to Sitting on Edge of Flat Bed Without Bedrails 4   Moving Bed to Chair 3   Standing Up From Chair Using Arms 3   Walk in Room 3   Climb 3-5 Stairs With Railing 3   Basic Mobility Inpatient Raw Score 20   Basic Mobility Standardized Score 43.99   Highest Level Of Mobility   JH-HLM Goal 6: Walk 10 steps or more   JH-HLM Achieved 7: Walk 25 feet or more         Time In: 1116  Time Out: 1134  Total Evaluation Minutes: 18    Jagruti Krueger, PT

## 2024-02-27 NOTE — ED PROVIDER NOTES
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 Had XR done at urgent care on Saturday with no abnormalities.     59-year-old female presents ER for evaluation of knee pain. PMH: Hyperlipidemia, hypertension, latent TB, arthritis.  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  Patient stated that she went to urgent care and had an x-ray of her knee which was negative.  Patient stated that she has been unable to bear weight on her right leg due to severe pain.  Patient took Motrin over-the-counter without relief.  No noted ecchymosis, open wound.  There is noted small effusion to the lateral aspect of her knee.  Neurovascularly intact, sensation equal bilateral.      History provided by:  Patient      Prior to Admission Medications   Prescriptions Last Dose Informant Patient Reported? Taking?   Pyridoxine HCl (VITAMIN B6 PO)  Self Yes No   Sig: Take by mouth   alendronate (Fosamax) 70 mg tablet  Self No No   Sig: Take 1 tablet (70 mg total) by mouth every 7 days   atorvastatin (LIPITOR) 20 mg tablet  Self No No   Sig: Take 1 tablet (20 mg total) by mouth daily   cyclobenzaprine (FLEXERIL) 10 mg tablet  Self No No   Sig: Take 1 tablet (10 mg total) by mouth 3 (three) times a day as needed for muscle spasms   hydrochlorothiazide (HYDRODIURIL) 25 mg tablet  Self No No   Sig: Take 1 tablet (25 mg total) by mouth daily   ibuprofen (MOTRIN) 800 mg tablet  Self No No   Sig: Take 1 tablet (800 mg total) by mouth 2 (two) times a day with meals   Patient not taking: Reported on 1/22/2024   losartan (COZAAR) 50 mg tablet  Self No No   Sig: Take 1 tablet (50 mg total) by mouth daily   methocarbamol (ROBAXIN) 500 mg tablet  Self No No   Sig: Take 1 tablet  "(500 mg total) by mouth 4 (four) times a day   methylPREDNISolone 4 MG tablet therapy pack  Self No No   Sig: Use as directed on package   Patient not taking: Reported on 2/21/2024   pyridoxine (VITAMIN B6) 50 mg tablet   No No   Sig: Take 1 tablet (50 mg total) by mouth daily   valACYclovir (VALTREX) 1,000 mg tablet   No No   Sig: Take 2 tablets (2,000 mg total) by mouth 2 (two) times a day for 1 day      Facility-Administered Medications: None       Past Medical History:   Diagnosis Date    2019 novel coronavirus disease (COVID-19) 05/30/2020    x 2 2020 and 2022    Abnormal Pap smear of cervix     Arthritis     BRCA1 negative 2015    BRCA2 negative 2015    Gastric ulcer     Hyperlipidemia     Hypertension     Low back pain     Right trigger finger     long and ring    TB lung, latent     Varicella        Past Surgical History:   Procedure Laterality Date    ANKLE FRACTURE SURGERY Right 03/01/2021    \"ankle fx\"    APPENDECTOMY      CATARACT EXTRACTION Bilateral     COLONOSCOPY      FOOT SURGERY Right     HERNIA REPAIR      [place] TENDON SHEATH INCISION Right 7/11/2023    Procedure: RELEASE TRIGGER FINGER LONG FINGER AND RING FINGER;  Surgeon: Brittaney Montoya MD;  Location: [facility] MAIN OR;  Service: Orthopedics       Family History   Problem Relation Age of Onset    Ovarian cancer Mother 50    No Known Problems Father     Thyroid cancer Sister 47    No Known Problems Sister     No Known Problems Daughter     No Known Problems Son     No Known Problems Son     No Known Problems Son     Cancer Maternal Grandmother         unknown type or age    No Known Problems Paternal Grandmother     No Known Problems Maternal Aunt     Breast cancer Maternal Uncle         50's     I have reviewed and agree with the history as documented.    E-Cigarette/Vaping    E-Cigarette Use Never User      E-Cigarette/Vaping Substances    Nicotine No     THC No     CBD No     Flavoring No     Other No     Unknown No      Social History "     Tobacco Use    Smoking status: Never    Smokeless tobacco: Never   Vaping Use    Vaping status: Never Used   Substance Use Topics    Alcohol use: Not Currently     Comment: rare    Drug use: No       Review of Systems   Constitutional:  Negative for chills and fever.   Respiratory:  Negative for cough and shortness of breath.    Cardiovascular:  Negative for chest pain and palpitations.   Gastrointestinal:  Negative for abdominal pain and vomiting.   Genitourinary:  Negative for dysuria and hematuria.   Musculoskeletal:  Positive for arthralgias (Right knee pain). Negative for back pain.   Skin:  Negative for color change and rash.   Neurological:  Negative for seizures and syncope.   All other systems reviewed and are negative.      Physical Exam  Physical Exam  Vitals and nursing note reviewed.   Constitutional:       General: She is not in acute distress.     Appearance: She is well-developed.   HENT:      Head: Normocephalic and atraumatic.      Right Ear: External ear normal.      Left Ear: External ear normal.   Eyes:      Conjunctiva/sclera: Conjunctivae normal.   Cardiovascular:      Rate and Rhythm: Normal rate and regular rhythm.      Pulses: Normal pulses.      Heart sounds: Normal heart sounds.   Pulmonary:      Effort: Pulmonary effort is normal. No respiratory distress.      Breath sounds: Normal breath sounds.   Abdominal:      Palpations: Abdomen is soft.      Tenderness: There is no abdominal tenderness.   Musculoskeletal:      Cervical back: Neck supple.      Right knee: Swelling and effusion present. No ecchymosis. Decreased range of motion. Tenderness present over the medial joint line and lateral joint line. Normal pulse.   Skin:     General: Skin is warm and dry.      Capillary Refill: Capillary refill takes less than 2 seconds.   Neurological:      Mental Status: She is alert and oriented to person, place, and time. Mental status is at baseline.   Psychiatric:         Mood and Affect: Mood  normal.         Behavior: Behavior normal.         Vital Signs  ED Triage Vitals [02/27/24 0833]   Temperature Pulse Respirations Blood Pressure SpO2   98.1 °F (36.7 °C) 79 17 157/93 99 %      Temp Source Heart Rate Source Patient Position - Orthostatic VS BP Location FiO2 (%)   Tympanic Monitor Sitting Left arm --      Pain Score       5           Vitals:    02/27/24 0833   BP: 157/93   Pulse: 79   Patient Position - Orthostatic VS: Sitting         Visual Acuity      ED Medications  Medications   HYDROcodone-acetaminophen (NORCO) 5-325 mg per tablet 1 tablet (1 tablet Oral Given 2/27/24 0901)   ondansetron (ZOFRAN-ODT) dispersible tablet 4 mg (4 mg Oral Given 2/27/24 0901)       Diagnostic Studies  Results Reviewed       None                   CT lower extremity wo contrast right   Final Result by Spencer Cornejo MD (02/27 1015)      No acute fracture.      Subcortical lucencies lateral femoral trochlea likely the underlying osteochondral lesion.      Mild tricompartment osteoarthrosis. Chondrocalcinosis lateral compartment.      Small joint effusion.         Workstation performed: [id number]                    Procedures  Procedures         ED Course  ED Course as of 02/27/24 1256   Tue Feb 27, 2024   0945 Patient unable to ambulate after pain medication, CT right knee ordered. PT/OT ordered for evaluation.    1010 Spoke with ANUEL Jansen and would like to have CT scan resulted prior to examining patient.    1026 CT lower extremity wo contrast right  No acute fracture.     Subcortical lucencies lateral femoral trochlea likely the underlying osteochondral lesion.     Mild tricompartment osteoarthrosis. Chondrocalcinosis lateral compartment.     Small joint effusion.     1134 PT/OT walked patient and she was able to ambulate with walker. Advised PT outpatient and orthopedics.                               SBIRT 20yo+      Flowsheet Row Most Recent Value   Initial Alcohol Screen: US AUDIT-C     1. How often do you have  a drink containing alcohol? 0 Filed at: 02/27/2024 0839   2. How many drinks containing alcohol do you have on a typical day you are drinking?  0 Filed at: 02/27/2024 0839   3a. Male UNDER 65: How often do you have five or more drinks on one occasion? 0 Filed at: 02/27/2024 0839   3b. FEMALE Any Age, or MALE 65+: How often do you have 4 or more drinks on one occassion? 0 Filed at: 02/27/2024 0839   Audit-C Score 0 Filed at: 02/27/2024 0839   JANNETH: How many times in the past year have you...    Used an illegal drug or used a prescription medication for non-medical reasons? Never Filed at: 02/27/2024 0839                      Medical Decision Making  59-year-old female presents ER for evaluation of right knee pain.  Patient stated that she was working on Saturday when she felt a pop in her right knee.  Patient stated she went to urgent care on Saturday and had a negative x-ray of of the right knee.  CT right knee did not show any acute fracture, positive for small effusion.  Patient given [place] and was ambulated and stated that she felt like she could not walk or put pressure on her leg.  PT OT evaluated patient with walker and given knee immobilizer and patient was able to ambulate in the hallway.  Prescription sent to pharmacy for pain relief.  Advised follow-up with orthopedics.  Return to ER symptoms worsens or questions or concerns arise at home.    Amount and/or Complexity of Data Reviewed  Radiology: ordered. Decision-making details documented in ED Course.    Risk  Prescription drug management.             Disposition  Final diagnoses:   Injury of right knee, initial encounter     Time reflects when diagnosis was documented in both MDM as applicable and the Disposition within this note       Time User Action Codes Description Comment    2/27/2024 11:35 AM Cynthia Moreau Add [S89.91XA] Injury of right knee, initial encounter           ED Disposition       ED Disposition   Discharge    Condition   Stable     Date/Time   Tue Feb 27, 2024 1135    Comment   Marina Land discharge to home/self care.                   Follow-up Information       Follow up With Specialties Details Why Contact Info    Sudhir Sánchez MD Family Medicine   [address]  [facility] [address]  [phone]      Weston Angel DO Orthopedic Surgery   [address] [facility] [address] [facility]  [address]  [place] [address]  [phone]              Discharge Medication List as of 2/27/2024 11:37 AM        CONTINUE these medications which have NOT CHANGED    Details   alendronate (Fosamax) 70 mg tablet Take 1 tablet (70 mg total) by mouth every 7 days, Starting Tue 1/2/2024, Normal      atorvastatin (LIPITOR) 20 mg tablet Take 1 tablet (20 mg total) by mouth daily, Starting Wed 12/27/2023, Normal      cyclobenzaprine (FLEXERIL) 10 mg tablet Take 1 tablet (10 mg total) by mouth 3 (three) times a day as needed for muscle spasms, Starting Mon 1/22/2024, Normal      hydrochlorothiazide (HYDRODIURIL) 25 mg tablet Take 1 tablet (25 mg total) by mouth daily, Starting Wed 12/27/2023, Normal      ibuprofen (MOTRIN) 800 mg tablet Take 1 tablet (800 mg total) by mouth 2 (two) times a day with meals, Starting Wed 3/1/2023, Normal      losartan (COZAAR) 50 mg tablet Take 1 tablet (50 mg total) by mouth daily, Starting Wed 12/27/2023, Normal      methocarbamol (ROBAXIN) 500 mg tablet Take 1 tablet (500 mg total) by mouth 4 (four) times a day, Starting Fri 1/12/2024, Normal      methylPREDNISolone 4 MG tablet therapy pack Use as directed on package, Normal      Pyridoxine HCl (VITAMIN B6 PO) Take by mouth, Historical Med      valACYclovir (VALTREX) 1,000 mg tablet Take 2 tablets (2,000 mg total) by mouth 2 (two) times a day for 1 day, Starting Wed 2/21/2024, Until Thu 2/22/2024, Normal                 PDMP Review         Value Time User    PDMP Reviewed  Yes 2/27/2024 11:56 AM MARVEL Causey            ED Provider  Electronically Signed by             Cynthia Moreau  MARVEL  02/27/24 1251

## 2024-02-27 NOTE — OCCUPATIONAL THERAPY NOTE
"    Occupational Therapy Evaluation     Patient Name: Marina Land  Today's Date: 2/27/2024  Problem List  Active Problems:  There are no active Hospital Problems.    Past Medical History  Past Medical History:   Diagnosis Date    2019 novel coronavirus disease (COVID-19) 05/30/2020    x 2 2020 and 2022    Abnormal Pap smear of cervix     Arthritis     BRCA1 negative 2015    BRCA2 negative 2015    Gastric ulcer     Hyperlipidemia     Hypertension     Low back pain     Right trigger finger     long and ring    TB lung, latent     Varicella      Past Surgical History  Past Surgical History:   Procedure Laterality Date    ANKLE FRACTURE SURGERY Right 03/01/2021    \"ankle fx\"    APPENDECTOMY      CATARACT EXTRACTION Bilateral     COLONOSCOPY      FOOT SURGERY Right     HERNIA REPAIR      [place] TENDON SHEATH INCISION Right 7/11/2023    Procedure: RELEASE TRIGGER FINGER LONG FINGER AND RING FINGER;  Surgeon: Brittaney Montoya MD;  Location: [facility] MAIN OR;  Service: Orthopedics         02/27/24 1133   OT Last Visit   OT Visit Date 02/27/24   Note Type   Note type Evaluation   Pain Assessment   Pain Assessment Tool 0-10   Pain Score 3   Pain Location/Orientation Orientation: Right;Location: Knee   Pain Onset/Description Onset: Ongoing;Frequency: Constant/Continuous   Patient's Stated Pain Goal No pain   Hospital Pain Intervention(s) Repositioned;Ambulation/increased activity   Multiple Pain Sites No   Restrictions/Precautions   Weight Bearing Precautions Per Order No   Braces or Orthoses   (pt reports hx of back brace when working)   Other Precautions Fall Risk;Pain   Home Living   Type of Home House   Home Layout One level;Performs ADLs on one level;Able to live on main level with bedroom/bathroom;Stairs to enter with rails  (4+6 VINAYAK)   Bathroom Shower/Tub Tub/shower unit   Bathroom Toilet Standard   Bathroom Equipment   (none)   Bathroom Accessibility Accessible   Home Equipment Crutches   Prior Function   Level of " [place] Independent with ADLs;Independent with functional mobility;Independent with IADLS  (no AD used PTA)   Lives With Spouse   Receives Help From Family   IADLs Independent with driving;Independent with meal prep;Independent with medication management   Falls in the last 6 months 0   Vocational Full time employment  (CNA)   General   Family/Caregiver Present Yes  (spouse)   Subjective   Subjective Pt agreeable to therapy evaluation   ADL   Where Assessed Other (Comment)  (Assist levels for some self care tasks are based on functional assessment of performance skills and deficits observed during session.)   Grooming Assistance 6  Modified Independent   Grooming Deficit Setup;Standing with assistive device;Wash/dry hands  (at sink level)   UB Dressing Assistance 6  Modified independent   UB Dressing Deficit Setup   LB Dressing Assistance 5  Supervision/Setup   LB Dressing Deficit Setup;Supervision/safety;Increased time to complete   Toileting Assistance  5  Supervision/Setup   Toileting Deficit Setup;Supervison/safety;Increased time to complete   Bed Mobility   Supine to Sit 6  Modified independent   Additional items Increased time required   Sit to Supine 6  Modified independent   Additional items Increased time required   Transfers   Sit to Stand 5  Supervision   Additional items Assist x 1   Stand to Sit 5  Supervision   Additional items Assist x 1   Stand pivot 5  Supervision   Additional items Assist x 1   Toilet transfer 5  Supervision   Additional items Assist x 1   Additional Comments with RW   Functional Mobility   Functional Mobility 5  Supervision   Additional Comments assist x1   Additional items Rolling walker   Activity Tolerance   Activity Tolerance Patient limited by pain   Medical Staff Made Aware Jagruti PT, Cynthia GRIER  (Pt seen for co-evaluation with Physical Therapist due to pt's medical complexity, functional limitations and limited activity tolerance.)   RUE Assessment   RUE  Assessment WFL   LUE Assessment   LUE Assessment WFL   Hand Function   Gross Motor Coordination Functional   Fine Motor Coordination Functional   Hand Function Comments trigger finger L hand   Sensation   Light Touch No apparent deficits   Vision-Basic Assessment   Current Vision No visual deficits   Psychosocial   Psychosocial (WDL) WDL   Cognition   Overall Cognitive Status WFL   Arousal/Participation Alert;Cooperative   Attention Within functional limits   Orientation Level Oriented X4   Memory Within functional limits   Following Commands Follows all commands and directions without difficulty   Assessment   Limitation Decreased high-level ADLs;Decreased endurance   Assessment Pt is a 59 y.o. female seen for OT evaluation s/p admit to [facility] on 2/27/2024 w/ knee pain.  Comorbidities affecting pt's functional performance at time of assessment include: HTN, obesity, previous surgery, and chronic back pain . Personal factors affecting pt at time of IE include:steps to enter environment, difficulty performing ADLS, difficulty performing IADLS , and health management . Prior to admission, pt was independent with all ADLs and functional mobility without AD. Upon evaluation: Pt requires supervision with RW for mobility, and supervision during most basic ADLs 2* the following deficits impacting occupational performance: decreased ROM, decreased balance, decreased tolerance, and increased pain. Pt performed functional mobility and ADLs well using compensatory techniques, and is planned for d/c today. From OT standpoint, recommendation at time of d/c would be no further OT needs.   Plan   OT Frequency Eval only   Discharge Recommendation   Rehab Resource Intensity Level, OT No post-acute rehabilitation needs   AM-PAC Daily Activity Inpatient   Lower Body Dressing 3   Bathing 3   Toileting 3   Upper Body Dressing 4   Grooming 4   Eating 4   Daily Activity Raw Score 21   Daily Activity Standardized Score (Calc for Raw Score  >=11) 44.27   AM-PAC Applied Cognition Inpatient   Following a Speech/Presentation 4   Understanding Ordinary Conversation 4   Taking Medications 4   Remembering Where Things Are Placed or Put Away 4   Remembering List of 4-5 Errands 4   Taking Care of Complicated Tasks 4   Applied Cognition Raw Score 24   Applied Cognition Standardized Score 62.21       Elisha Saeed, OTR/L

## 2024-02-27 NOTE — PLAN OF CARE
"  Problem: PHYSICAL THERAPY ADULT  Goal: Performs mobility at highest level of function for planned discharge setting.  See evaluation for individualized goals.  Description: Treatment/Interventions: Functional transfer training, LE strengthening/ROM, Elevations, Therapeutic exercise, Endurance training, Gait training, Spoke to advanced practitioner, OT  Equipment Recommended: Walker       See flowsheet documentation for full assessment, interventions and recommendations.  Note: Prognosis: Excellent  Problem List: Decreased strength, Decreased range of motion, Decreased mobility, Pain  Assessment: Pt is 59 y.o. female seen for moderate-complexity PT evaluation on 2/27/2024 s/p admit to [facility] on 2/27/2024 w/ <principal problem not specified>. PT was consulted to assess pt's functional mobility and d/c needs. Order placed for PT eval and tx, w/ ambulate patient order. PTA, pt was living with her spouse in a one story home with 4+6 VINAYAK and reports independence at baseline with ADLs, IADLs, and functional mobility without AD. At time of eval, patient completed sup <> sit [place], STS supervision, and ambulated 35'x2 with RW supervision. Overall, pt ambulated with antalgic pattern and decreased stance on R due to pain. Patient navigated 2\" practice step with BUE support at RW x5 trials. Upon evaluation, pt presenting with impaired functional mobility d/t decreased strength, decreased ROM, decreased endurance, decreased mobility, and pain. Pertinent PMHx and current co-morbidities affecting pt's physical performance at time of assessment include: R knee pain, HTN, low back pain with R sciatica. Personal factors affecting pt at time of eval include: ambulating w/ assistive device and stairs to enter home. The following objective measures performed on IE also reveal limitations: AM-PAC 6-Clicks: 20/24. Pt's clinical presentation is currently evolving seen in pt's presentation of  R knee pain impacting " overall mobility status and ongoing medical assessment. Overall, pt's rehab potential and prognosis to return to PLOF is good as impacted by objective findings, warranting pt to receive further skilled PT interventions to address impairments, activity limitations, and participation restrictions. Pt to benefit from continued PT services to address deficits as defined above and maximize level of functional independent mobility and consistency. From PT/mobility standpoint, recommendation at time of d/c would be level 3, minimal resource intensity in order to facilitate return to PLOF.        Rehab Resource Intensity Level, PT: III (Minimum Resource Intensity)    See flowsheet documentation for full assessment.      Jagruti Krueger; PT, DPT

## 2024-02-27 NOTE — Clinical Note
Marina Land was seen and treated in our emergency department on 2/27/2024.        No work until cleared by Family Doctor/Orthopedics        Diagnosis:     Marina  may return to work on return date.    She may return on this date:          If you have any questions or concerns, please don't hesitate to call.      MARVEL Causey    ______________________________           _______________          _______________  Hospital Representative                              Date                                Time

## 2024-02-27 NOTE — DISCHARGE INSTRUCTIONS
Rest, Elevation, Ice, Compression (Ace/immobilizer)   Motrin for moderate pain  Norco for severe pain    Follow up with orthopedics  Return to ER for worsen symptoms

## 2024-03-01 ENCOUNTER — APPOINTMENT (OUTPATIENT)
Dept: RADIOLOGY | Facility: CLINIC | Age: 59
End: 2024-03-01
Payer: COMMERCIAL

## 2024-03-01 ENCOUNTER — OFFICE VISIT (OUTPATIENT)
Dept: OBGYN CLINIC | Facility: CLINIC | Age: 59
End: 2024-03-01
Payer: OTHER MISCELLANEOUS

## 2024-03-01 VITALS
RESPIRATION RATE: 16 BRPM | HEART RATE: 97 BPM | BODY MASS INDEX: 27.16 KG/M2 | HEIGHT: 65 IN | OXYGEN SATURATION: 97 % | WEIGHT: 163 LBS | DIASTOLIC BLOOD PRESSURE: 91 MMHG | SYSTOLIC BLOOD PRESSURE: 126 MMHG | TEMPERATURE: 97.1 F

## 2024-03-01 DIAGNOSIS — S89.91XA INJURY OF RIGHT KNEE, INITIAL ENCOUNTER: ICD-10-CM

## 2024-03-01 DIAGNOSIS — M23.91 INTERNAL DERANGEMENT OF RIGHT KNEE: Primary | ICD-10-CM

## 2024-03-01 PROCEDURE — 73560 X-RAY EXAM OF KNEE 1 OR 2: CPT

## 2024-03-01 PROCEDURE — 99243 OFF/OP CNSLTJ NEW/EST LOW 30: CPT | Performed by: ORTHOPAEDIC SURGERY

## 2024-03-01 NOTE — PROGRESS NOTES
"HPI:  Patient is a 59 y.o. year old female who presents with chief complaint of right knee pain. 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 Today patient reports she felt two pops while lifting a patient at work six days ago. Since then patient reports having pain in the anterior and lateral aspect of the knee that worsens with bending and going down stairs. Patient reports hearing clicking in the knee. Patient is taking Norco for pain relief. Patient is out of work at this time. Patient reports having bruising on the posterior aspect of the knee.     ROS:   General: No fever, no chills, no weight loss, no weight gain  HEENT:  No loss of hearing, no nose bleeds, no sore throat  Eyes:  No eye pain, no red eyes, no visual disturbance  Respiratory:  No cough, no shortness of breath, no wheezing  Cardiovascular:  No chest pain, no palpitations, no edema  GI: No abdominal pain, no nausea, no vomiting  Endocrine: No frequent urination, no excessive thirst  Urinary:  No dysuria, no hematuria, no incontinence  Musculoskeletal: see HPI and PE  Skin:  No rash, no wounds  Neurological:  No dizziness, no headache, no numbness  Psychiatric:  No difficulty concentrating, no depression, no suicide thoughts, no anxiety  Review of all other systems is negative    PMH:  Past Medical History:   Diagnosis Date    2019 novel coronavirus disease (COVID-19) 05/30/2020    x 2 2020 and 2022    Abnormal Pap smear of cervix     Arthritis     BRCA1 negative 2015    BRCA2 negative 2015    Fractures 2021    Gastric ulcer     Hyperlipidemia     Hypertension     Low back pain     Right trigger finger     long and ring    TB lung, latent     Varicella        PSH:  Past Surgical History:   Procedure Laterality Date    ANKLE FRACTURE SURGERY Right 03/01/2021    \"ankle fx\"    ANKLE SURGERY  2021    APPENDECTOMY      CATARACT EXTRACTION Bilateral     " COLONOSCOPY      FOOT SURGERY Right     HERNIA REPAIR      KS TENDON SHEATH INCISION Right 07/11/2023    Procedure: RELEASE TRIGGER FINGER LONG FINGER AND RING FINGER;  Surgeon: Brittaney Montoya MD;  Location: [facility] MAIN OR;  Service: Orthopedics       Medications:  Current Outpatient Medications   Medication Sig Dispense Refill    alendronate (Fosamax) 70 mg tablet Take 1 tablet (70 mg total) by mouth every 7 days 12 tablet 3    atorvastatin (LIPITOR) 20 mg tablet Take 1 tablet (20 mg total) by mouth daily 90 tablet 0    cyclobenzaprine (FLEXERIL) 10 mg tablet Take 1 tablet (10 mg total) by mouth 3 (three) times a day as needed for muscle spasms 21 tablet 0    hydrochlorothiazide (HYDRODIURIL) 25 mg tablet Take 1 tablet (25 mg total) by mouth daily 90 tablet 1    losartan (COZAAR) 50 mg tablet Take 1 tablet (50 mg total) by mouth daily 90 tablet 1    ondansetron (ZOFRAN-ODT) 4 mg disintegrating tablet Take 1 tablet (4 mg total) by mouth every 6 (six) hours as needed for nausea or vomiting 20 tablet 0    pyridoxine (VITAMIN B6) 50 mg tablet Take 1 tablet (50 mg total) by mouth daily 30 tablet 0    Pyridoxine HCl (VITAMIN B6 PO) Take by mouth      HYDROcodone-acetaminophen (Norco) 5-325 mg per tablet Take 1 tablet by mouth every 6 (six) hours as needed for pain for up to 8 doses Max Daily Amount: 4 tablets (Patient not taking: Reported on 3/1/2024) 8 tablet 0    ibuprofen (MOTRIN) 800 mg tablet Take 1 tablet (800 mg total) by mouth 2 (two) times a day with meals (Patient not taking: Reported on 1/22/2024) 60 tablet 2    methocarbamol (ROBAXIN) 500 mg tablet Take 1 tablet (500 mg total) by mouth 4 (four) times a day (Patient not taking: Reported on 3/1/2024) 20 tablet 0    methylPREDNISolone 4 MG tablet therapy pack Use as directed on package (Patient not taking: Reported on 2/21/2024) 21 tablet 0    valACYclovir (VALTREX) 1,000 mg tablet Take 2 tablets (2,000 mg total) by mouth 2 (two) times a day for 1 day 4  "tablet 0     No current facility-administered medications for this visit.       Allergies:  Allergies   Allergen Reactions    Kiwi Extract - Food Allergy Hives    Other      strawberry    Pineapple - Food Allergy Hives       Family History:  Family History   Problem Relation Age of Onset    Ovarian cancer Mother 50    Diabetes Father     Thyroid cancer Sister 47    No Known Problems Sister     No Known Problems Daughter     No Known Problems Son     No Known Problems Son     No Known Problems Son     Cancer Maternal Grandmother         unknown type or age    No Known Problems Paternal Grandmother     No Known Problems Maternal Aunt     Breast cancer Maternal Uncle         50's       Social History:  Social History     Occupational History    Not on file   Tobacco Use    Smoking status: Never    Smokeless tobacco: Never   Vaping Use    Vaping status: Never Used   Substance and Sexual Activity    Alcohol use: Not Currently     Comment: rare    Drug use: No    Sexual activity: Yes     Partners: Female, Male     Birth control/protection: None       Physical Exam:  General :  Alert, cooperative, no distress, appears stated age  Blood pressure 126/91, pulse 97, temperature (!) 97.1 °F (36.2 °C), temperature source Temporal, resp. rate 16, height 5' 5\" (1.651 m), weight 73.9 kg (163 lb), SpO2 97%, not currently breastfeeding.   Head:  Normocephalic, without obvious abnormality, atraumatic   Eyes:  Conjunctiva/corneas clear, EOM's intact,   Ears:  Both ears normal appearance, no hearing deficits.    Nose: Nares normal, septum midline, no drainage    Neck: Supple,  trachea midline, no adenopathy, no tenderness, no mass   Back:   Symmetric, no curvature, ROM normal, no tenderness   Lungs:   Respirations unlabored   Chest Wall:  No tenderness or deformity   Extremities: Extremities normal, atraumatic, no cyanosis or edema      Pulses: 2+ and symmetric   Skin: Skin color, texture, turgor normal, no rashes or lesions    "   Neurologic: Normal           Right Knee Exam     Muscle Strength   The patient has normal right knee strength.    Tenderness   The patient is experiencing tenderness in the lateral joint line (and at the patellar insertion of the MPFL).    Range of Motion   Extension:  0   Flexion:  110 (with discomfort.)     Tests   Genet:  Medial - negative Lateral - positive  Varus: negative Valgus: negative  Lachman:  Anterior - negative      Drawer:      Posterior - negative    Other   Erythema: absent  Scars: absent  Sensation: normal  Swelling: moderate  Effusion: effusion present    Comments:  No increased lateral translation of patella and patella tracks normally.            Imaging Studies:     The following imaging studies were reviewed in office today. My findings are noted.  Xrays of right knee obtained on 2/24/2024 show no acute fracture or dislocation. No signfiicant degenerative changes.     CT of right knee shows no fracture, dislocation or destructive osseous lesion. Subcortical lucencies along the lateral femoral trochlea likely reflect underlying osteochondral lesions. Mild tricompartmental osteoarthrosis.     Xrays of right knee obtained today show minimal degenerative changes. No acute fracture or dislocation.       Assessment  Encounter Diagnoses   Name Primary?    Injury of right knee, initial encounter     Internal derangement of right knee Yes         Plan:  Patient is a 59-year-old female with internal derangement of the right knee, likely lateral meniscus tear and MPFL strain, DOI: 2/24/2024.   *Imaging of right knee was reviewed in the office today.   *It was discussed that patient likely has a lateral meniscus tear and MPFL strain. We will order an MRI of right knee for further evaluation.   *In the meantime, patient is instructed to wear knee immobilizer.   *Patient will remain out of work until next appointment.   *Patient will follow-up once MRI results.      Scribe Attestation      I,:   Chelsey Aquino PA-C am acting as a scribe while in the presence of the attending physician.:       I,:  Bettye Rizzo MD personally performed the services described in this documentation    as scribed in my presence.:

## 2024-03-01 NOTE — LETTER
March 1, 2024     Patient: Marina Land  YOB: 1965  Date of Visit: 3/1/2024      To Whom it May Concern:    Marina Land is under my professional care. Marina was seen in my office on 3/1/2024. Marina should remain out of work until next follow-up appointment.     If you have any questions or concerns, please don't hesitate to call.         Sincerely,          Bettye Rizzo MD        CC: No Recipients

## 2024-03-04 ENCOUNTER — OFFICE VISIT (OUTPATIENT)
Dept: FAMILY MEDICINE CLINIC | Facility: CLINIC | Age: 59
End: 2024-03-04
Payer: COMMERCIAL

## 2024-03-04 ENCOUNTER — APPOINTMENT (OUTPATIENT)
Dept: LAB | Facility: CLINIC | Age: 59
End: 2024-03-04
Payer: COMMERCIAL

## 2024-03-04 ENCOUNTER — APPOINTMENT (OUTPATIENT)
Dept: RADIOLOGY | Facility: CLINIC | Age: 59
End: 2024-03-04
Payer: COMMERCIAL

## 2024-03-04 VITALS
SYSTOLIC BLOOD PRESSURE: 140 MMHG | TEMPERATURE: 99 F | OXYGEN SATURATION: 99 % | HEART RATE: 81 BPM | DIASTOLIC BLOOD PRESSURE: 86 MMHG

## 2024-03-04 DIAGNOSIS — D72.818 OTHER DECREASED WHITE BLOOD CELL (WBC) COUNT: ICD-10-CM

## 2024-03-04 DIAGNOSIS — M54.40 BACK PAIN OF LUMBAR REGION WITH SCIATICA: Primary | ICD-10-CM

## 2024-03-04 DIAGNOSIS — B00.1 RECURRENT HERPES LABIALIS: ICD-10-CM

## 2024-03-04 DIAGNOSIS — M54.40 BACK PAIN OF LUMBAR REGION WITH SCIATICA: ICD-10-CM

## 2024-03-04 LAB
ALBUMIN SERPL BCP-MCNC: 4 G/DL (ref 3.5–5)
ALP SERPL-CCNC: 76 U/L (ref 34–104)
ALT SERPL W P-5'-P-CCNC: 25 U/L (ref 7–52)
ANION GAP SERPL CALCULATED.3IONS-SCNC: 7 MMOL/L
AST SERPL W P-5'-P-CCNC: 24 U/L (ref 13–39)
BILIRUB SERPL-MCNC: 0.51 MG/DL (ref 0.2–1)
BUN SERPL-MCNC: 15 MG/DL (ref 5–25)
CALCIUM SERPL-MCNC: 9.1 MG/DL (ref 8.4–10.2)
CHLORIDE SERPL-SCNC: 106 MMOL/L (ref 96–108)
CO2 SERPL-SCNC: 27 MMOL/L (ref 21–32)
CREAT SERPL-MCNC: 0.55 MG/DL (ref 0.6–1.3)
CRP SERPL QL: 7.4 MG/L
FOLATE SERPL-MCNC: >22.3 NG/ML
GFR SERPL CREATININE-BSD FRML MDRD: 102 ML/MIN/1.73SQ M
GLUCOSE SERPL-MCNC: 91 MG/DL (ref 65–140)
POTASSIUM SERPL-SCNC: 3.9 MMOL/L (ref 3.5–5.3)
PROT SERPL-MCNC: 6.8 G/DL (ref 6.4–8.4)
SODIUM SERPL-SCNC: 140 MMOL/L (ref 135–147)

## 2024-03-04 PROCEDURE — 86140 C-REACTIVE PROTEIN: CPT

## 2024-03-04 PROCEDURE — 86803 HEPATITIS C AB TEST: CPT

## 2024-03-04 PROCEDURE — 88184 FLOWCYTOMETRY/ TC 1 MARKER: CPT

## 2024-03-04 PROCEDURE — 88185 FLOWCYTOMETRY/TC ADD-ON: CPT | Performed by: PHYSICIAN ASSISTANT

## 2024-03-04 PROCEDURE — 36415 COLL VENOUS BLD VENIPUNCTURE: CPT

## 2024-03-04 PROCEDURE — 87340 HEPATITIS B SURFACE AG IA: CPT

## 2024-03-04 PROCEDURE — 83918 ORGANIC ACIDS TOTAL QUANT: CPT

## 2024-03-04 PROCEDURE — 72110 X-RAY EXAM L-2 SPINE 4/>VWS: CPT

## 2024-03-04 PROCEDURE — 86704 HEP B CORE ANTIBODY TOTAL: CPT

## 2024-03-04 PROCEDURE — 99214 OFFICE O/P EST MOD 30 MIN: CPT

## 2024-03-04 PROCEDURE — 80053 COMPREHEN METABOLIC PANEL: CPT

## 2024-03-04 PROCEDURE — 86705 HEP B CORE ANTIBODY IGM: CPT

## 2024-03-04 PROCEDURE — 82746 ASSAY OF FOLIC ACID SERUM: CPT

## 2024-03-04 RX ORDER — METHYLPREDNISOLONE 4 MG/1
TABLET ORAL
Qty: 21 EACH | Refills: 0 | Status: SHIPPED | OUTPATIENT
Start: 2024-03-04

## 2024-03-04 RX ORDER — VALACYCLOVIR HYDROCHLORIDE 500 MG/1
500 TABLET, FILM COATED ORAL DAILY
Qty: 30 TABLET | Refills: 0 | Status: SHIPPED | OUTPATIENT
Start: 2024-03-04 | End: 2024-04-03

## 2024-03-04 NOTE — PROGRESS NOTES
Name: Marina Land      : 1965      MRN: [id number]  Encounter Provider: Yadi Portillo PA-C  Encounter Date: 3/4/2024   Encounter department: [facility]    Assessment & Plan     1. Back pain of lumbar region with sciatica  -     Ambulatory Referral to Physical Therapy; Future  -     methylPREDNISolone 4 MG tablet therapy pack; Use as directed on package  -     XR spine lumbar minimum 4 views non injury; Future; Expected date: 2024    2. Recurrent herpes labialis  -     valACYclovir (VALTREX) 500 mg tablet; Take 1 tablet (500 mg total) by mouth daily    Patient presents for evaluation of lumbar pain with sciatica that she has been experiencing for the last few months. Patient currently in right leg brace due to knee injury which she is following with orthopedics for; therefore ROM limited. Patient previously on Medrol which helped with pain; therefore prescribed medrol for the pain, XR of the lumbar spine for further evaluation, and placed referral for PT. Will make further treatment recommendations once XR results are obtained. Otherwise continue supportive care. To call if symptoms worsen/persist.     Recurrent herpes labialis: patient getting cold sore outbreak almost every month. Would like to start suppressive therapy - discussed risk/benefit and potential side effects. Therefore will start on Valtrex 500 mg QD. To follow up in one month or sooner as needed.        Subjective      CC: lower back pain, recurrent cold sores     Lumbar pain: patient has hx of lumbar back pain with sciatica on right side. Patient injured her right knee a few weeks ago and since her sciatic and lumbar back pain has been a lot worse. Notes she was given Medrol about 2-3 months ago which helped a lot. Numbness continues to radiate down her right leg. No loss of bladder control, bowel control, or saddle anesthesia.     Patient complaining of recurrent cold sores on her lips. Patient was dx  with HSV on anus a few months ago which was treated appropriately. No reoccurrence of that however getting recurrent herpes labialis. Saw UC two weeks ago when she first noticed the lesion and got one day of valtrex however interested in starting suppressive daily therapy for suppression.       Review of Systems   Constitutional:  Negative for appetite change, chills, diaphoresis, fatigue and fever.   Respiratory:  Negative for cough, chest tightness, shortness of breath and wheezing.    Cardiovascular:  Negative for chest pain and palpitations.   Musculoskeletal:  Positive for arthralgias, back pain and gait problem. Negative for joint swelling, neck pain and neck stiffness.   Skin:         Cold sore on lip   Neurological:  Positive for numbness (down right leg). Negative for dizziness, light-headedness and headaches.       Current Outpatient Medications on File Prior to Visit   Medication Sig    alendronate (Fosamax) 70 mg tablet Take 1 tablet (70 mg total) by mouth every 7 days    atorvastatin (LIPITOR) 20 mg tablet Take 1 tablet (20 mg total) by mouth daily    cyclobenzaprine (FLEXERIL) 10 mg tablet Take 1 tablet (10 mg total) by mouth 3 (three) times a day as needed for muscle spasms    hydrochlorothiazide (HYDRODIURIL) 25 mg tablet Take 1 tablet (25 mg total) by mouth daily    HYDROcodone-acetaminophen (Norco) 5-325 mg per tablet Take 1 tablet by mouth every 6 (six) hours as needed for pain for up to 8 doses Max Daily Amount: 4 tablets (Patient not taking: Reported on 3/1/2024)    ibuprofen (MOTRIN) 800 mg tablet Take 1 tablet (800 mg total) by mouth 2 (two) times a day with meals (Patient not taking: Reported on 1/22/2024)    losartan (COZAAR) 50 mg tablet Take 1 tablet (50 mg total) by mouth daily    methocarbamol (ROBAXIN) 500 mg tablet Take 1 tablet (500 mg total) by mouth 4 (four) times a day (Patient not taking: Reported on 3/1/2024)    ondansetron (ZOFRAN-ODT) 4 mg disintegrating tablet Take 1 tablet (4  mg total) by mouth every 6 (six) hours as needed for nausea or vomiting    pyridoxine (VITAMIN B6) 50 mg tablet Take 1 tablet (50 mg total) by mouth daily    Pyridoxine HCl (VITAMIN B6 PO) Take by mouth    [DISCONTINUED] methylPREDNISolone 4 MG tablet therapy pack Use as directed on package (Patient not taking: Reported on 2/21/2024)    [DISCONTINUED] valACYclovir (VALTREX) 1,000 mg tablet Take 2 tablets (2,000 mg total) by mouth 2 (two) times a day for 1 day       Objective     /86 (BP Location: Left arm, Patient Position: Sitting, Cuff Size: Standard)   Pulse 81   Temp 99 °F (37.2 °C)   SpO2 99%     Physical Exam  Constitutional:       General: She is not in acute distress.     Appearance: Normal appearance. She is not ill-appearing or diaphoretic.   HENT:      Head: Normocephalic and atraumatic.      Right Ear: External ear normal.      Left Ear: External ear normal.      Nose: Nose normal.      Mouth/Throat:      Mouth: Mucous membranes are moist.   Eyes:      General:         Right eye: No discharge.         Left eye: No discharge.      Conjunctiva/sclera: Conjunctivae normal.   Cardiovascular:      Rate and Rhythm: Normal rate and regular rhythm.      Pulses: Normal pulses.      Heart sounds: Normal heart sounds. No murmur heard.  Pulmonary:      Effort: Pulmonary effort is normal. No respiratory distress.      Breath sounds: Normal breath sounds. No wheezing, rhonchi or rales.   Musculoskeletal:        Arms:       Cervical back: Normal range of motion and neck supple.      Comments: Right leg in long brace and patient using walker due to right knee injury.    Skin:     General: Skin is warm.   Neurological:      General: No focal deficit present.      Mental Status: She is alert.      Gait: Gait abnormal.   Psychiatric:         Mood and Affect: Mood normal.       Yadi Portillo PA-C

## 2024-03-05 DIAGNOSIS — M43.10 RETROLISTHESIS OF VERTEBRAE: ICD-10-CM

## 2024-03-05 DIAGNOSIS — M47.9 SPONDYLOSIS: Primary | ICD-10-CM

## 2024-03-05 DIAGNOSIS — M54.40 BACK PAIN OF LUMBAR REGION WITH SCIATICA: ICD-10-CM

## 2024-03-05 LAB
HBV CORE AB SER QL: NORMAL
HBV CORE IGM SER QL: NORMAL
HBV SURFACE AG SER QL: NORMAL
HCV AB SER QL: NORMAL

## 2024-03-07 LAB — SCAN RESULT: NORMAL

## 2024-03-11 LAB — METHYLMALONATE SERPL-SCNC: 98 NMOL/L (ref 0–378)

## 2024-03-14 ENCOUNTER — HOSPITAL ENCOUNTER (OUTPATIENT)
Dept: MRI IMAGING | Facility: HOSPITAL | Age: 59
End: 2024-03-14
Payer: OTHER MISCELLANEOUS

## 2024-03-14 DIAGNOSIS — M23.91 INTERNAL DERANGEMENT OF RIGHT KNEE: ICD-10-CM

## 2024-03-14 PROCEDURE — 73721 MRI JNT OF LWR EXTRE W/O DYE: CPT

## 2024-03-20 ENCOUNTER — OFFICE VISIT (OUTPATIENT)
Dept: PAIN MEDICINE | Facility: CLINIC | Age: 59
End: 2024-03-20
Payer: COMMERCIAL

## 2024-03-20 VITALS
WEIGHT: 165 LBS | BODY MASS INDEX: 27.49 KG/M2 | HEIGHT: 65 IN | HEART RATE: 70 BPM | SYSTOLIC BLOOD PRESSURE: 128 MMHG | DIASTOLIC BLOOD PRESSURE: 84 MMHG

## 2024-03-20 DIAGNOSIS — M54.16 LUMBAR RADICULOPATHY: ICD-10-CM

## 2024-03-20 DIAGNOSIS — G89.29 CHRONIC RIGHT-SIDED LOW BACK PAIN WITH RIGHT-SIDED SCIATICA: Primary | ICD-10-CM

## 2024-03-20 DIAGNOSIS — M47.9 SPONDYLOSIS: ICD-10-CM

## 2024-03-20 DIAGNOSIS — M43.10 RETROLISTHESIS OF VERTEBRAE: ICD-10-CM

## 2024-03-20 DIAGNOSIS — M54.41 CHRONIC RIGHT-SIDED LOW BACK PAIN WITH RIGHT-SIDED SCIATICA: Primary | ICD-10-CM

## 2024-03-20 PROCEDURE — 99214 OFFICE O/P EST MOD 30 MIN: CPT | Performed by: STUDENT IN AN ORGANIZED HEALTH CARE EDUCATION/TRAINING PROGRAM

## 2024-03-20 NOTE — PROGRESS NOTES
Pain Medicine Follow-Up Note    Assessment:  1. Chronic right-sided low back pain with right-sided sciatica    2. Spondylosis    3. Retrolisthesis of vertebrae    4. Lumbar radiculopathy        Plan:  Orders Placed This Encounter   Procedures    MRI lumbar spine without contrast     Standing Status:   Future     Standing Expiration Date:   3/20/2028     Scheduling Instructions:      There is no preparation for this test. Please leave your jewelry and valuables at home, wedding rings are the exception. All patients will be required to change into a hospital gown and pants.  Street clothes are not permitted in the MRI.  Magnetic nail polish must be removed prior to arrival for your test. Please bring your insurance cards, a form of photo ID and a list of your medications with you. Arrive 15 minutes prior to your appointment time in order to register. Please bring any prior CT or MRI studies of this area that were not performed at a [facility].            To schedule this appointment, please contact Central Scheduling at [phone].            Prior to your appointment, please make sure you complete the MRI Screening Form when you e-Check in for your appointment. This will be available starting 7 days before your appointment in [employer]. You may receive an e-mail with an activation code if you do not have a [employer] account. If you do not have access to a device, we will complete your screening at your appointment.     Order Specific Question:   What is the patient's sedation requirement? If Medication for Claustrophobia is selected, order medication at this point.     Answer:   No Sedation     Order Specific Question:   Is the patient pregnant?     Answer:   No     Order Specific Question:   Does this procedure require the 3T MRI at [place] or [place]?     Answer:   No     Order Specific Question:   Release to patient through [employer]     Answer:   Immediate     Order Specific Question:   Is order priority  selected as STAT?     Answer:   No     Order Specific Question:   Reason for Exam     Answer:   right L5 Radiculopathy       No orders of the defined types were placed in this encounter.      My impressions and treatment recommendations were discussed in detail with the patient who verbalized understanding and had no further questions.      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  She is neurologic intact on x-ray, however differential diagnosis includes lumbar radiculopathy.    Will provide the patient home exercise program to start for period of at least 6 weeks.    Order MRI of the lumbar spine to help with determining underlying compressive pathology.  This will help to determine next course of action which may include epidural steroid injection which was discussed with the patient today.  She will hold off any medications at this time, though I did offer gabapentin for her neuropathic pain. EMG may be an option if MRI negative    [place] Prescription Drug Monitoring Program report was reviewed and was appropriate       Complete risks and benefits including bleeding, infection, tissue reaction, nerve injury and allergic reaction were discussed. The approach was demonstrated using models and literature was provided. Verbal and written consent was obtained.    Discharge instructions were provided. I personally saw and examined the patient and I agree with the above discussed plan of care.    History of Present Illness:    Marina Land is a 59 y.o. female who presents to [facility] for interval re-evaluation of the above stated pain complaints. The patient has a past medical and chronic pain history as outlined in the assessment section. She was last seen on 7/23/2021.  She is here now with complaints of right-sided low  "back pain rating down the posterior right lower extremity.    5 out of 10 pain.  Worse in the morning.  Constant, dull/aching, cramping, numbness.    She reports history of chronic manageable back pain with flare in Jan 2023 associated with RLE pain and numbness. This improved with MDP at  but symptoms have returned.     The pain starts in the right buttock region, down the posterolateral thigh, past the knee and into the posterolateral calf and into the dorsum of the foot.    Ibuprofen has helped to manage her symptoms.     Other than as stated above, the patient denies any interval changes in medications, medical condition, mental condition, symptoms, or allergies since the last office visit.         Review of Systems:    Review of Systems   Musculoskeletal:  Positive for back pain and gait problem.        Joint stiffness         Past Medical History:   Diagnosis Date    2019 novel coronavirus disease (COVID-19) 05/30/2020    x 2 2020 and 2022    Abnormal Pap smear of cervix     Arthritis     BRCA1 negative 2015    BRCA2 negative 2015    Fractures 2021    Gastric ulcer     Hyperlipidemia     Hypertension     Low back pain     Right trigger finger     long and ring    TB lung, latent     Varicella        Past Surgical History:   Procedure Laterality Date    ANKLE FRACTURE SURGERY Right 03/01/2021    \"ankle fx\"    ANKLE SURGERY  2021    APPENDECTOMY      CATARACT EXTRACTION Bilateral     COLONOSCOPY      FOOT SURGERY Right     HERNIA REPAIR      AR TENDON SHEATH INCISION Right 07/11/2023    Procedure: RELEASE TRIGGER FINGER LONG FINGER AND RING FINGER;  Surgeon: Brittaney Montoya MD;  Location: [facility];  Service: Orthopedics       Family History   Problem Relation Age of Onset    Ovarian cancer Mother 50    Diabetes Father     Thyroid cancer Sister 47    No Known Problems Sister     No Known Problems Daughter     No Known Problems Son     No Known Problems Son     No Known Problems Son     Cancer Maternal " Grandmother         unknown type or age    No Known Problems Paternal Grandmother     No Known Problems Maternal Aunt     Breast cancer Maternal Uncle         50's       Social History     Occupational History    Not on file   Tobacco Use    Smoking status: Never    Smokeless tobacco: Never   Vaping Use    Vaping status: Never Used   Substance and Sexual Activity    Alcohol use: Not Currently     Comment: rare    Drug use: No    Sexual activity: Yes     Partners: Female, Male     Birth control/protection: None         Current Outpatient Medications:     alendronate (Fosamax) 70 mg tablet, Take 1 tablet (70 mg total) by mouth every 7 days, Disp: 12 tablet, Rfl: 3    atorvastatin (LIPITOR) 20 mg tablet, Take 1 tablet (20 mg total) by mouth daily, Disp: 90 tablet, Rfl: 0    cyclobenzaprine (FLEXERIL) 10 mg tablet, Take 1 tablet (10 mg total) by mouth 3 (three) times a day as needed for muscle spasms, Disp: 21 tablet, Rfl: 0    hydrochlorothiazide (HYDRODIURIL) 25 mg tablet, Take 1 tablet (25 mg total) by mouth daily, Disp: 90 tablet, Rfl: 1    ibuprofen (MOTRIN) 800 mg tablet, Take 1 tablet (800 mg total) by mouth 2 (two) times a day with meals, Disp: 60 tablet, Rfl: 2    losartan (COZAAR) 50 mg tablet, Take 1 tablet (50 mg total) by mouth daily, Disp: 90 tablet, Rfl: 1    ondansetron (ZOFRAN-ODT) 4 mg disintegrating tablet, Take 1 tablet (4 mg total) by mouth every 6 (six) hours as needed for nausea or vomiting, Disp: 20 tablet, Rfl: 0    Pyridoxine HCl (VITAMIN B6 PO), Take by mouth, Disp: , Rfl:     valACYclovir (VALTREX) 500 mg tablet, Take 1 tablet (500 mg total) by mouth daily, Disp: 30 tablet, Rfl: 0    HYDROcodone-acetaminophen (Norco) 5-325 mg per tablet, Take 1 tablet by mouth every 6 (six) hours as needed for pain for up to 8 doses Max Daily Amount: 4 tablets (Patient not taking: Reported on 3/1/2024), Disp: 8 tablet, Rfl: 0    methocarbamol (ROBAXIN) 500 mg tablet, Take 1 tablet (500 mg total) by mouth 4  "(four) times a day (Patient not taking: Reported on 3/1/2024), Disp: 20 tablet, Rfl: 0    methylPREDNISolone 4 MG tablet therapy pack, Use as directed on package (Patient not taking: Reported on 3/20/2024), Disp: 21 each, Rfl: 0    pyridoxine (VITAMIN B6) 50 mg tablet, Take 1 tablet (50 mg total) by mouth daily, Disp: 30 tablet, Rfl: 0    Allergies   Allergen Reactions    Kiwi Extract - Food Allergy Hives    Other      strawberry    Pineapple - Food Allergy Hives       Physical Exam:    /84   Pulse 70   Ht 5' 5\" (1.651 m)   Wt 74.8 kg (165 lb)   BMI 27.46 kg/m²     Constitutional:normal, well developed, well nourished, alert, in no distress and non-toxic and no overt pain behavior.  Eyes:anicteric  HEENT:grossly intact  Neck:supple, symmetric, trachea midline and no masses   Pulmonary:even and unlabored  Cardiovascular:No edema or pitting edema present  Skin:Normal without rashes or lesions and well hydrated  Psychiatric:Mood and affect appropriate  Neurologic:Cranial Nerves II-XII grossly intact  Musculoskeletal:normal      Lumbar Spine Exam    Appearance:  Normal lordosis  Palpation/Tenderness:  no tenderness or spasm  Sensory:  no sensory deficits noted  Motor Strength:  Left hip flexion:  5/5  Left hip extension:  5/5  Right hip flexion:  5/5  Right hip extension:  5/5  Left knee flexion:  5/5  Left knee extension:  5/5  Right knee flexion:  5/5  Right knee extension:  5/5  Left foot dorsiflexion:  5/5  Left foot plantar flexion:  5/5  Right foot dorsiflexion:  5/5  Right foot plantar flexion:  5/5  Reflexes:  Left Patellar:  2+   Right Patellar:  2+   Left Achilles:  2+   Right Achilles:  2+         Imaging    LUMBAR SPINE        3/4/24     INDICATION:   Lumbago with sciatica, unspecified side.      COMPARISON:  None.     VIEWS:  XR SPINE LUMBAR MINIMUM 4 VIEWS NON INJURY  Images: 5     FINDINGS:     There are 5 non rib bearing lumbar vertebral bodies.      There is no evidence of acute fracture or " destructive osseous lesion.     There is mild retrolisthesis of L2 on L3..      There is mild facet disease in the lower lumbar spine. There is mild osteophytosis at the L2-L3 level. Otherwise no significant lumbar degenerative change noted.     The pedicles appear intact.     Soft tissues are unremarkable.     IMPRESSION:        Mild retrolisthesis of L2 on L3 with mild spondylosis at this level.        MRI lumbar spine without contrast    (Results Pending)         Orders Placed This Encounter   Procedures    MRI lumbar spine without contrast

## 2024-03-20 NOTE — PATIENT INSTRUCTIONS
Lower Back Exercises   WHAT YOU NEED TO KNOW:   Lower back exercises help heal and strengthen your back muscles to prevent another injury. Ask your healthcare provider if you need to see a physical therapist for more advanced exercises.   DISCHARGE INSTRUCTIONS:   Return to the emergency department if:   You have severe pain that prevents you from moving.       Call your doctor if:   Your pain becomes worse.    You have new pain.    You have questions or concerns about your condition or care.    Do lower back exercises safely:   Do the exercises on a mat or firm surface (not on a bed).  A firm surface will support your spine and prevent low back pain.    Move slowly and smoothly.  Avoid fast or jerky motions.    Breathe normally.  Do not hold your breath.    Stop if you feel pain.  It is normal to feel some discomfort at first, but you should not feel pain. Regular exercise will help decrease your discomfort over time.    Lower back exercises:  Your healthcare provider may recommend that you do back exercises 10 to 30 minutes each day. He or she may also recommend that you do exercises 1 to 3 times each day. Ask your provider which exercises are best for you and how often to do them.  Ankle pumps:  Lie on your back. Move your foot up (with your toes pointing toward your head). Then, move your foot down (with your toes pointing away from you). Repeat this exercise 10 times on each side.         Heel slides:  Lie on your back. Slowly bend one leg and then straighten it. Next, bend the other leg and then straighten it. Repeat 10 times on each side.         Pelvic tilt:  Lie on your back with your knees bent and feet flat on the floor. Place your arms in a relaxed position beside your body. Tighten the muscles of your abdomen and flatten your back against the floor. Hold for 5 seconds. Repeat 5 times.         Back stretch:  Lie on your back with your hands behind your head. Bend your knees and turn the lower half of  your body to one side. Hold this position for 10 seconds. Repeat 3 times on each side.         Straight leg raises:  Lie on your back with one leg straight. Bend the other knee. Tighten your abdomen and then slowly lift the straight leg up about 6 to 12 inches off the floor. Hold for 1 to 5 seconds. Lower your leg slowly. Repeat 10 times on each leg.         Knee-to-chest:  Lie on your back with your knees bent and feet flat on the floor. Pull one of your knees toward your chest and hold it there for 5 seconds. Return your leg to the starting position. Lift the other knee toward your chest and hold for 5 seconds. Do this 5 times on each side.         Cat and camel:  Place your hands and knees on the floor. Arch your back upward toward the ceiling and lower your head. Round out your spine as much as you can. Hold for 5 seconds. Lift your head upward and push your chest downward toward the floor. Hold for 5 seconds. Do 3 sets or as directed.         Wall squats:  Stand with your back against a wall. Tighten the muscles of your abdomen. Slowly lower your body until your knees are bent at a 45 degree angle. Hold this position for 5 seconds. Slowly move back up to a standing position. Repeat 10 times.         Curl up:  Lie on your back with your knees bent and feet flat on the floor. Place your hands, palms down, underneath the curve in your lower back. Next, with your elbows on the floor, lift your shoulders and chest 2 to 3 inches. Keep your head in line with your shoulders. Hold this position for 5 seconds. When you can do this exercise without pain for 10 to 15 seconds, you may add a rotation. While your shoulders and chest are lifted off the ground, turn slightly to the left and hold. Repeat on the other side.         Bird dog:  Place your hands and knees on the floor. Keep your wrists directly below your shoulders and your knees directly below your hips. Pull your belly button in toward your spine. Do not flatten  or arch your back. Tighten your abdominal muscles. Raise one arm straight out so that it is aligned with your head. Next, raise the leg opposite your arm. Hold this position for 15 seconds. Lower your arm and leg slowly and change sides. Do 5 sets.       Follow up with your doctor as directed:  Write down your questions so you remember to ask them during your visits.  © Copyright [employer] 2023 Information is for End User's use only and may not be sold, redistributed or otherwise used for commercial purposes.  The above information is an  only. It is not intended as medical advice for individual conditions or treatments. Talk to your doctor, nurse or pharmacist before following any medical regimen to see if it is safe and effective for you.  Epidural Steroid Injection   AMBULATORY CARE:   What you need to know about an epidural steroid injection (DILAN):  An DILAN is a procedure to inject steroid medicine into the epidural space. The epidural space is between your spinal cord and vertebrae. Steroids reduce inflammation and fluid buildup in your spine that may be causing pain. You may be given pain medicine along with the steroids.        How to prepare for an DILAN:  Your provider will talk to you about how to prepare for your procedure. He or she will tell you what medicines to take or not take on the day of your procedure. You may need to stop taking blood thinners or other medicines several days before your procedure. You may need to adjust any diabetes medicine you take on the day of your procedure. Steroid medicine can increase your blood sugar level. Arrange for someone to drive you home when you are discharged.  What will happen during an DLIAN:   You will be given medicine to numb the procedure area. You will be awake for the procedure, but you will not feel pain. You may also be given medicine to help you relax. Contrast liquid will be used to help your provider see the area better. Tell the provider  if you have ever had an allergic reaction to contrast liquid.    Your provider may place the needle into your neck area, middle of your back, or tailbone area. He or she may inject the medicine next to the nerves that are causing your pain. He or she may instead inject the medicine into a larger area of the epidural space. This helps the medicine spread to more nerves. Your provider will use a fluoroscope to help guide the needle to the right place. A fluoroscope is a type of x-ray. After the procedure, a bandage will be placed over the injection site to prevent infection.    What will happen after an DILAN:  You will have a bandage over the injection site to prevent infection. Your provider will tell you when you can bathe and any activity guidelines. You will be able to go home.  Risks of an DILAN:  You may have temporary or permanent nerve damage or paralysis. You may have bleeding or develop a serious infection, such as meningitis (swelling of the brain coverings). An abscess may also develop. An abscess is a pus-filled area under the skin. You may need surgery to fix the abscess. You may have a seizure, anxiety, or trouble sleeping. If you are a man, you may have temporary erectile dysfunction (not able to have an erection).   Call your local emergency number (911 in the [place]) if:   You have a seizure.    You have trouble moving your legs.    Seek care immediately if:   Blood soaks through your bandage.    You have a fever or chills, severe back pain, and the procedure area is sensitive to the touch.    You cannot control when you urinate or have a bowel movement.    Call your doctor if:   You have weakness or numbness in your legs.    Your wound is red, swollen, or draining pus.    You have nausea or are vomiting.    Your face or neck is red and you feel warm.    You have more pain than you had before the procedure.    You have swelling in your hands or feet.    You have questions or concerns about your condition or  care.    Care for your wound as directed:  You may remove the bandage before you go to bed the day of your procedure. You may take a shower, but do not take a bath for at least 24 hours.   Self-care:   Do not drive,  use machines, or do strenuous activity for 24 hours after your procedure or as directed.     Continue other treatments  as directed. Steroid injections alone will not control your pain. The injections are meant to be used with other treatments, such as physical therapy.    Follow up with your doctor as directed:  Write down your questions so you remember to ask them during your visits.   © Copyright [employer] 2023 Information is for End User's use only and may not be sold, redistributed or otherwise used for commercial purposes.  The above information is an  only. It is not intended as medical advice for individual conditions or treatments. Talk to your doctor, nurse or pharmacist before following any medical regimen to see if it is safe and effective for you.

## 2024-03-22 ENCOUNTER — TELEPHONE (OUTPATIENT)
Dept: OBGYN CLINIC | Facility: CLINIC | Age: 59
End: 2024-03-22

## 2024-03-22 ENCOUNTER — OFFICE VISIT (OUTPATIENT)
Dept: OBGYN CLINIC | Facility: CLINIC | Age: 59
End: 2024-03-22
Payer: OTHER MISCELLANEOUS

## 2024-03-22 VITALS
OXYGEN SATURATION: 96 % | HEIGHT: 65 IN | TEMPERATURE: 97.3 F | DIASTOLIC BLOOD PRESSURE: 81 MMHG | RESPIRATION RATE: 18 BRPM | BODY MASS INDEX: 27.32 KG/M2 | WEIGHT: 164 LBS | HEART RATE: 79 BPM | SYSTOLIC BLOOD PRESSURE: 112 MMHG

## 2024-03-22 DIAGNOSIS — M17.11 PRIMARY OSTEOARTHRITIS OF RIGHT KNEE: Primary | ICD-10-CM

## 2024-03-22 DIAGNOSIS — S80.01XD CONTUSION OF RIGHT KNEE, SUBSEQUENT ENCOUNTER: ICD-10-CM

## 2024-03-22 PROCEDURE — 99213 OFFICE O/P EST LOW 20 MIN: CPT | Performed by: ORTHOPAEDIC SURGERY

## 2024-03-22 PROCEDURE — 20610 DRAIN/INJ JOINT/BURSA W/O US: CPT | Performed by: ORTHOPAEDIC SURGERY

## 2024-03-22 RX ORDER — LIDOCAINE HYDROCHLORIDE 10 MG/ML
2 INJECTION, SOLUTION EPIDURAL; INFILTRATION; INTRACAUDAL; PERINEURAL
Status: COMPLETED | OUTPATIENT
Start: 2024-03-22 | End: 2024-03-22

## 2024-03-22 RX ORDER — BUPIVACAINE HYDROCHLORIDE 2.5 MG/ML
2 INJECTION, SOLUTION INFILTRATION; PERINEURAL
Status: COMPLETED | OUTPATIENT
Start: 2024-03-22 | End: 2024-03-22

## 2024-03-22 RX ORDER — METHYLPREDNISOLONE ACETATE 40 MG/ML
2 INJECTION, SUSPENSION INTRA-ARTICULAR; INTRALESIONAL; INTRAMUSCULAR; SOFT TISSUE
Status: COMPLETED | OUTPATIENT
Start: 2024-03-22 | End: 2024-03-22

## 2024-03-22 RX ADMIN — METHYLPREDNISOLONE ACETATE 2 ML: 40 INJECTION, SUSPENSION INTRA-ARTICULAR; INTRALESIONAL; INTRAMUSCULAR; SOFT TISSUE at 10:45

## 2024-03-22 RX ADMIN — LIDOCAINE HYDROCHLORIDE 2 ML: 10 INJECTION, SOLUTION EPIDURAL; INFILTRATION; INTRACAUDAL; PERINEURAL at 10:45

## 2024-03-22 RX ADMIN — BUPIVACAINE HYDROCHLORIDE 2 ML: 2.5 INJECTION, SOLUTION INFILTRATION; PERINEURAL at 10:45

## 2024-03-22 NOTE — PROGRESS NOTES
"HPI:  Patient is a 59 y.o. year old  female who presents for follow up evaluation of right knee pain. She was last seen in the office on 3/1/24 when MRI study was ordered and she was instructed to wear knee immobilizer. She is feeling better today. The patient is not complaining of any pain. She notes some sense instability and some anterior knee discomfort if she is weight bearing. She has not been working.      ROS:   General: No fever, no chills, no weight loss, no weight gain  HEENT:  No loss of hearing, no nose bleeds, no sore throat  Eyes:  No eye pain, no red eyes, no visual disturbance  Respiratory:  No cough, no shortness of breath, no wheezing  Cardiovascular:  No chest pain, no palpitations, no edema  GI: No abdominal pain, no nausea, no vomiting  Endocrine: No frequent urination, no excessive thirst  Urinary:  No dysuria, no hematuria, no incontinence  Musculoskeletal: see HPI and PE  Skin:  No rash, no wounds  Neurological:  No dizziness, no headache, no numbness  Psychiatric:  No difficulty concentrating, no depression, no suicide thoughts, no anxiety  Review of all other systems is negative    PMH:  Past Medical History:   Diagnosis Date    2019 novel coronavirus disease (COVID-19) 05/30/2020    x 2 2020 and 2022    Abnormal Pap smear of cervix     Arthritis     BRCA1 negative 2015    BRCA2 negative 2015    Fractures 2021    Gastric ulcer     Hyperlipidemia     Hypertension     Low back pain     Right trigger finger     long and ring    TB lung, latent     Varicella        PSH:  Past Surgical History:   Procedure Laterality Date    ANKLE FRACTURE SURGERY Right 03/01/2021    \"ankle fx\"    ANKLE SURGERY  2021    APPENDECTOMY      CATARACT EXTRACTION Bilateral     COLONOSCOPY      FOOT SURGERY Right     HERNIA REPAIR      WI TENDON SHEATH INCISION Right 07/11/2023    Procedure: RELEASE TRIGGER FINGER LONG FINGER AND RING FINGER;  Surgeon: Brittaney Montoya MD;  Location: [facility];  Service: " Orthopedics       Medications:  Current Outpatient Medications   Medication Sig Dispense Refill    alendronate (Fosamax) 70 mg tablet Take 1 tablet (70 mg total) by mouth every 7 days 12 tablet 3    atorvastatin (LIPITOR) 20 mg tablet Take 1 tablet (20 mg total) by mouth daily 90 tablet 0    cyclobenzaprine (FLEXERIL) 10 mg tablet Take 1 tablet (10 mg total) by mouth 3 (three) times a day as needed for muscle spasms 21 tablet 0    hydrochlorothiazide (HYDRODIURIL) 25 mg tablet Take 1 tablet (25 mg total) by mouth daily 90 tablet 1    losartan (COZAAR) 50 mg tablet Take 1 tablet (50 mg total) by mouth daily 90 tablet 1    methocarbamol (ROBAXIN) 500 mg tablet Take 1 tablet (500 mg total) by mouth 4 (four) times a day (Patient taking differently: Take 500 mg by mouth 4 (four) times a day as needed) 20 tablet 0    Pyridoxine HCl (VITAMIN B6 PO) Take by mouth      ibuprofen (MOTRIN) 800 mg tablet Take 1 tablet (800 mg total) by mouth 2 (two) times a day with meals 60 tablet 2    ondansetron (ZOFRAN-ODT) 4 mg disintegrating tablet Take 1 tablet (4 mg total) by mouth every 6 (six) hours as needed for nausea or vomiting (Patient not taking: Reported on 3/22/2024) 20 tablet 0    valACYclovir (VALTREX) 500 mg tablet Take 1 tablet (500 mg total) by mouth daily (Patient not taking: Reported on 3/22/2024) 30 tablet 0     No current facility-administered medications for this visit.       Allergies:  Allergies   Allergen Reactions    Kiwi Extract - Food Allergy Hives    Other      strawberry    Pineapple - Food Allergy Hives       Family History:  Family History   Problem Relation Age of Onset    Ovarian cancer Mother 50    Diabetes Father     Thyroid cancer Sister 47    No Known Problems Sister     No Known Problems Daughter     No Known Problems Son     No Known Problems Son     No Known Problems Son     Cancer Maternal Grandmother         unknown type or age    No Known Problems Paternal Grandmother     No Known Problems  "Maternal Aunt     Breast cancer Maternal Uncle         50's       Social History:  Social History     Occupational History    Not on file   Tobacco Use    Smoking status: Never    Smokeless tobacco: Never   Vaping Use    Vaping status: Never Used   Substance and Sexual Activity    Alcohol use: Not Currently     Comment: rare    Drug use: No    Sexual activity: Yes     Partners: Female, Male     Birth control/protection: None       Physical Exam:  General :  Alert, cooperative, no distress, appears stated age  Blood pressure 112/81, pulse 79, temperature (!) 97.3 °F (36.3 °C), resp. rate 18, height 5' 5\" (1.651 m), weight 74.4 kg (164 lb), SpO2 96%, not currently breastfeeding.   Head:  Normocephalic, without obvious abnormality, atraumatic   Eyes:  Conjunctiva/corneas clear, EOM's intact,   Ears:  Both ears normal appearance, no hearing deficits.    Nose: Nares normal, septum midline, no drainage    Neck: Supple,  trachea midline, no adenopathy, no tenderness, no mass   Back:   Symmetric, no curvature, ROM normal, no tenderness   Lungs:   Respirations unlabored   Chest Wall:  No tenderness or deformity   Extremities: Extremities normal, atraumatic, no cyanosis or edema      Pulses: 2+ and symmetric   Skin: Skin color, texture, turgor normal, no rashes or lesions      Neurologic: Normal           Ortho Exam  Right Knee  Range of motion from 0 to 110.    There is mild crepitus with range of motion.   There is no effusion.    There is tenderness over the anterolateral knee.    There is 4+/5 quadriceps strength and equal tone.    The patient is able to perform a straight leg raise.    Varus stress testing reveals no instability at 0 and 30 degrees   Valgus stress testing reveals no instability at 0 and 30 degrees  The patient is neurovascular intact distally.    Imaging Studies:     The following imaging studies were reviewed in office today. My findings are noted.  MRI of the right knee obtained 3/14/2024 demonstrates " "grade 4 chondrosis inferior lateral trochlea, mild edema posterior and lateral tibial plateau     Assessment  Encounter Diagnoses   Name Primary?    Primary osteoarthritis of right knee Yes    Contusion of right knee, subsequent encounter      Large joint arthrocentesis: R knee  Universal Protocol:  Consent: Verbal consent obtained.  Risks and benefits: risks, benefits and alternatives were discussed  Consent given by: patient  Time out: Immediately prior to procedure a \"time out\" was called to verify the correct patient, procedure, equipment, support staff and site/side marked as required.  Patient understanding: patient states understanding of the procedure being performed  Site marked: the operative site was marked  Patient identity confirmed: verbally with patient  Supporting Documentation  Indications: pain   Procedure Details  Location: knee - R knee  Preparation: Patient was prepped and draped in the usual sterile fashion  Needle size: 22 G  Ultrasound guidance: no  Approach: anterolateral  Medications administered: 2 mL bupivacaine 0.25 %; 2 mL methylPREDNISolone acetate 40 mg/mL; 2 mL lidocaine (PF) 1 %    Patient tolerance: patient tolerated the procedure well with no immediate complications  Dressing:  Sterile dressing applied            Plan:  59 y.o. female with right knee osteoarthritis  MRI study reviewed in the office today  Discontinue use of knee immobilizer at this time  She was referred to outpatient physical therapy  The patient should avoid squatting and heavy lifting. A work note was provided  The patient was offered a corticosteroid injection for their right knee. They tolerated the procedure well.    The patient was educated they may have some irritation in the next few days and should rest, ice, elevate and perform gentle range of motion exercises. They were advised the medicine should begin to work in a few days time.    She will follow up with Dr. Smith after therapy    Scribe Attestation  "     I,:  Maria T Woo am acting as a scribe while in the presence of the attending physician.:       I,:  Bettye Rizzo MD personally performed the services described in this documentation    as scribed in my presence.:

## 2024-03-22 NOTE — LETTER
March 22, 2024     Patient: Marina Land  YOB: 1965  Date of Visit: 3/22/2024      To Whom it May Concern:    Marina Land is under my professional care. Marina was seen in my office on 3/22/2024. Marina may work with light duty restrictions including no squatting, no lifting more than 25 lbs.     If you have any questions or concerns, please don't hesitate to call.         Sincerely,          Bettye Rizzo MD        CC: No Recipients

## 2024-03-24 DIAGNOSIS — E78.2 MIXED HYPERLIPIDEMIA: ICD-10-CM

## 2024-03-25 RX ORDER — ATORVASTATIN CALCIUM 20 MG/1
20 TABLET, FILM COATED ORAL DAILY
Qty: 90 TABLET | Refills: 0 | Status: SHIPPED | OUTPATIENT
Start: 2024-03-25

## 2024-03-27 ENCOUNTER — TELEPHONE (OUTPATIENT)
Dept: OBGYN CLINIC | Facility: HOSPITAL | Age: 59
End: 2024-03-27

## 2024-03-28 ENCOUNTER — OFFICE VISIT (OUTPATIENT)
Dept: FAMILY MEDICINE CLINIC | Facility: CLINIC | Age: 59
End: 2024-03-28
Payer: COMMERCIAL

## 2024-03-28 ENCOUNTER — APPOINTMENT (OUTPATIENT)
Dept: LAB | Facility: CLINIC | Age: 59
End: 2024-03-28

## 2024-03-28 ENCOUNTER — TELEPHONE (OUTPATIENT)
Age: 59
End: 2024-03-28

## 2024-03-28 VITALS
HEART RATE: 64 BPM | OXYGEN SATURATION: 99 % | HEIGHT: 65 IN | BODY MASS INDEX: 27.49 KG/M2 | DIASTOLIC BLOOD PRESSURE: 84 MMHG | WEIGHT: 165 LBS | SYSTOLIC BLOOD PRESSURE: 118 MMHG | TEMPERATURE: 98 F

## 2024-03-28 DIAGNOSIS — R42 EPISODIC LIGHTHEADEDNESS: Primary | ICD-10-CM

## 2024-03-28 DIAGNOSIS — E16.2 HYPOGLYCEMIA: ICD-10-CM

## 2024-03-28 DIAGNOSIS — E53.8 VITAMIN B12 DEFICIENCY: ICD-10-CM

## 2024-03-28 DIAGNOSIS — B00.1 RECURRENT HERPES LABIALIS: ICD-10-CM

## 2024-03-28 DIAGNOSIS — E55.9 VITAMIN D DEFICIENCY: ICD-10-CM

## 2024-03-28 PROCEDURE — 93000 ELECTROCARDIOGRAM COMPLETE: CPT

## 2024-03-28 PROCEDURE — 99214 OFFICE O/P EST MOD 30 MIN: CPT

## 2024-03-28 RX ORDER — LANCETS 33 GAUGE
EACH MISCELLANEOUS
Qty: 100 EACH | Refills: 3 | Status: SHIPPED | OUTPATIENT
Start: 2024-03-28

## 2024-03-28 RX ORDER — BLOOD-GLUCOSE METER
KIT MISCELLANEOUS
Qty: 1 KIT | Refills: 0 | Status: SHIPPED | OUTPATIENT
Start: 2024-03-28

## 2024-03-28 RX ORDER — VALACYCLOVIR HYDROCHLORIDE 500 MG/1
500 TABLET, FILM COATED ORAL DAILY
Qty: 30 TABLET | Refills: 0 | Status: SHIPPED | OUTPATIENT
Start: 2024-03-28 | End: 2024-04-27

## 2024-03-28 RX ORDER — BLOOD SUGAR DIAGNOSTIC
STRIP MISCELLANEOUS
Qty: 100 EACH | Refills: 3 | Status: SHIPPED | OUTPATIENT
Start: 2024-03-28

## 2024-03-28 NOTE — TELEPHONE ENCOUNTER
Caller: Eduardo    Doctor: Matthias    Reason for call: please print out 3/22 ov note, work note, PT order and MRI report and fax to her @ [phone]    Call back#: [phone]

## 2024-03-28 NOTE — PROGRESS NOTES
Name: Marina Land      : 1965      MRN: [id number]  Encounter Provider: Yadi Portillo PA-C  Encounter Date: 3/28/2024   Encounter department: [facility]    Assessment & Plan     1. Episodic lightheadedness  -     Vitamin D 25 hydroxy; Future  -     Vitamin B12; Future  -     TSH, 3rd generation with Free T4 reflex; Future  -     CBC and differential; Future  -     Iron Panel (Includes Ferritin, Iron Sat%, Iron, and TIBC); Future  -     POCT ECG    2. Vitamin D deficiency  -     Vitamin D 25 hydroxy; Future    3. Vitamin B12 deficiency  -     Vitamin B12; Future    4. Hypoglycemia  -     Blood Glucose Monitoring Suppl (OneTouch Verio Reflect) w/Device KIT; Check blood sugars once daily. Please substitute with appropriate alternative as covered by patient's insurance. Dx: E11.65  -     glucose blood (OneTouch Verio) test strip; Check blood sugars once daily. Please substitute with appropriate alternative as covered by patient's insurance. Dx: E11.65  -     OneTouch Delica Lancets 33G MISC; Check blood sugars once daily. Please substitute with appropriate alternative as covered by patient's insurance. Dx: E11.65    5. Recurrent herpes labialis  -     valACYclovir (VALTREX) 500 mg tablet; Take 1 tablet (500 mg total) by mouth daily    Patient presents for evaluation of episodic dizziness and lightheadedness, no loss of consciousness. Orange juice improved symptoms; concerned for low blood sugar.   - Neurological exam completed today which was normal; no concerning findings   - No red flag symptoms or exam findings  - Ordered above lab work to rule out underlying cause of symptoms  - EKG completed in office today to rule out cardiac cause; NSR with no abnormalities  - discussed Holter monitor, patient defers wanting to get labs done first   - ordered glucose monitor; recommended taking blood sugar next time she feels this way to see if her blood sugar is low   - discussed staying  hydrating and making sure she eats three meals a day   - follow up in four weeks for reassessment or sooner as needed  - advised ER if symptoms worsened or if she ever lost consciousness. Patient agreeable     Refilled Valtrex for recurrent HSV per patient's request. Doing well on the medication; no outbreaks since starting.        Subjective      CC: episodes of dizziness/lightheadedness     Patient presents to discuss episodes of dizziness and lightheadedness. Patient notes she bent down to plug her phone in yesterday and felt very lightheaded/dizzy like she was going to pass out - sat down on the floor and drank some orange juice and the symptoms subsided and she has been fine since. Notes this has happened a few times before and she is concerned it may be related to her blood sugar. Has never actually lost consciousness when this occurs. Denies any chest pain, sob, palpitations during these episodes.   Of note, patient notes she took her BP when this happened and it was normal; not low.       Review of Systems   Constitutional:  Negative for appetite change, chills, diaphoresis and fever.   Eyes:  Negative for visual disturbance.   Respiratory:  Negative for cough, chest tightness, shortness of breath and wheezing.    Cardiovascular:  Negative for chest pain, palpitations and leg swelling.   Gastrointestinal:  Negative for abdominal pain, diarrhea, nausea and vomiting.   Genitourinary:  Negative for difficulty urinating.   Neurological:  Positive for dizziness and light-headedness. Negative for syncope, speech difficulty, weakness, numbness and headaches.       Current Outpatient Medications on File Prior to Visit   Medication Sig    alendronate (Fosamax) 70 mg tablet Take 1 tablet (70 mg total) by mouth every 7 days    atorvastatin (LIPITOR) 20 mg tablet Take 1 tablet (20 mg total) by mouth daily    cyclobenzaprine (FLEXERIL) 10 mg tablet Take 1 tablet (10 mg total) by mouth 3 (three) times a day as needed for  "muscle spasms    hydrochlorothiazide (HYDRODIURIL) 25 mg tablet Take 1 tablet (25 mg total) by mouth daily    ibuprofen (MOTRIN) 800 mg tablet Take 1 tablet (800 mg total) by mouth 2 (two) times a day with meals    losartan (COZAAR) 50 mg tablet Take 1 tablet (50 mg total) by mouth daily    methocarbamol (ROBAXIN) 500 mg tablet Take 1 tablet (500 mg total) by mouth 4 (four) times a day (Patient taking differently: Take 500 mg by mouth 4 (four) times a day as needed)    ondansetron (ZOFRAN-ODT) 4 mg disintegrating tablet Take 1 tablet (4 mg total) by mouth every 6 (six) hours as needed for nausea or vomiting (Patient not taking: Reported on 3/22/2024)    Pyridoxine HCl (VITAMIN B6 PO) Take by mouth    [DISCONTINUED] valACYclovir (VALTREX) 500 mg tablet Take 1 tablet (500 mg total) by mouth daily (Patient not taking: Reported on 3/22/2024)       Objective     /84   Pulse 64   Temp 98 °F (36.7 °C)   Ht 5' 5\" (1.651 m)   Wt 74.8 kg (165 lb)   SpO2 99%   BMI 27.46 kg/m²     Physical Exam  Vitals reviewed.   Constitutional:       General: She is not in acute distress.     Appearance: Normal appearance. She is not ill-appearing or diaphoretic.   HENT:      Head: Normocephalic and atraumatic.      Right Ear: Tympanic membrane, ear canal and external ear normal. There is no impacted cerumen.      Left Ear: Tympanic membrane, ear canal and external ear normal. There is no impacted cerumen.      Nose: Nose normal. No congestion or rhinorrhea.      Mouth/Throat:      Mouth: Mucous membranes are moist.      Pharynx: Oropharynx is clear. No oropharyngeal exudate or posterior oropharyngeal erythema.   Eyes:      General: No visual field deficit.        Right eye: No discharge.         Left eye: No discharge.      Extraocular Movements: Extraocular movements intact.      Conjunctiva/sclera: Conjunctivae normal.      Pupils: Pupils are equal, round, and reactive to light.   Cardiovascular:      Rate and Rhythm: Normal " rate and regular rhythm.      Pulses: Normal pulses.      Heart sounds: Normal heart sounds. No murmur heard.  Pulmonary:      Effort: Pulmonary effort is normal. No respiratory distress.      Breath sounds: Normal breath sounds. No wheezing, rhonchi or rales.   Musculoskeletal:         General: Normal range of motion.      Cervical back: Normal range of motion and neck supple.      Right lower leg: No edema.      Left lower leg: No edema.   Lymphadenopathy:      Cervical: No cervical adenopathy.   Skin:     General: Skin is warm.   Neurological:      General: No focal deficit present.      Mental Status: She is alert and oriented to person, place, and time.      Cranial Nerves: Cranial nerves 2-12 are intact. No cranial nerve deficit, dysarthria or facial asymmetry.      Sensory: Sensation is intact. No sensory deficit.      Motor: Motor function is intact. No weakness, tremor, atrophy, abnormal muscle tone, seizure activity or pronator drift.      Coordination: Coordination is intact. Romberg sign negative. Coordination normal. Finger-Nose-Finger Test and Heel to Shin Test normal.      Gait: Gait is intact. Gait normal.   Psychiatric:         Mood and Affect: Mood normal.       Yadi Portillo PA-C

## 2024-04-01 ENCOUNTER — EVALUATION (OUTPATIENT)
Dept: PHYSICAL THERAPY | Facility: CLINIC | Age: 59
End: 2024-04-01
Payer: OTHER MISCELLANEOUS

## 2024-04-01 DIAGNOSIS — M17.11 PRIMARY OSTEOARTHRITIS OF RIGHT KNEE: Primary | ICD-10-CM

## 2024-04-01 PROCEDURE — 97110 THERAPEUTIC EXERCISES: CPT | Performed by: PHYSICAL THERAPIST

## 2024-04-01 PROCEDURE — 97140 MANUAL THERAPY 1/> REGIONS: CPT | Performed by: PHYSICAL THERAPIST

## 2024-04-01 PROCEDURE — 97112 NEUROMUSCULAR REEDUCATION: CPT | Performed by: PHYSICAL THERAPIST

## 2024-04-01 NOTE — PROGRESS NOTES
PT Evaluation     Today's date: 2024  Patient name: Marina Land  : 1965  MRN: [id number]  Referring provider: Weston Angel DO  Dx:   Encounter Diagnosis     ICD-10-CM    1. Primary osteoarthritis of right knee  M17.11 Ambulatory Referral to Physical Therapy          Start Time: 1800  Stop Time: 1845  Total time in clinic (min): 45 minutes    Assessment  Assessment details: Pt is a 60 y/o female presenting to physical therapy with R knee pain. Upon examination, pt presents with impairments of decreased strength, decreased ROM, and increased pain of pt's R knee and hip resulting in limitations of self-care/ADLs, household activities, lifting objects, and bending. Pt plan of care will focus on improving strength and ROM of pt's R knee and hip as well as decreasing pain and improving tolerance to functional activities. Pt would benefit from skilled physical therapy to facilitate a return to her prior level of function. Pt plan of care will be sent to Dr. Angel due to Dr. Rizzo halfway.         Impairments: abnormal or restricted ROM, activity intolerance, impaired physical strength, lacks appropriate home exercise program and pain with function  Functional limitations: self-care/ADLs, household activities, lifting objects, and bendingUnderstanding of Dx/Px/POC: good   Prognosis: good    Goals  STG - 4 weeks  1. Pt will have a subjective decrease in pain of pt's R knee by 2 levels or more during standing longer than 20 minutes  2. Pt will demonstrate 0 degrees R knee extension to facilitate a return to her prior level of function    LTG - 8 weeks  1. Pt will demonstrate 4+/5 gross strength or better of her R knee and hip in all planes to facilitate a return to her prior level of function  2. Pt's FOTO score will improve by 10 points or better to facilitate a return to pt's prior level of function.      Plan  Patient would benefit from: PT eval and skilled physical therapy  Planned  modality interventions: cryotherapy, thermotherapy: hydrocollator packs and unattended electrical stimulation  Planned therapy interventions: activity modification, ADL training, abdominal trunk stabilization, behavior modification, flexibility, functional ROM exercises, graded exercise, home exercise program, IASTM, joint mobilization, kinesiology taping, manual therapy, massage, Thurston taping, nerve gliding, neuromuscular re-education, patient education, postural training, self care, strengthening, stretching, therapeutic activities and therapeutic exercise  Frequency: 2x week  Duration in weeks: 8  Plan of Care beginning date: 2024  Plan of Care expiration date: 2024        Subjective Evaluation    History of Present Illness  Mechanism of injury: Pt is a 60 y/o female presenting to physical therapy with R knee pain. 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 Pt reports having severe pain and swelling of the R knee at the time of injury resulting in her going to the urgent care where she got an x-ray. Pt reports she was unable to bend after the injury, she then went to the ER and received a brace and walker. Pt reports then seeing Dr. Rizzo where she got an injection and MRI revealing arthritis and effusion of the R knee. Pt reports she will have increased pain with prolonged standing, walking, and driving. Pt reports she only has a tolerance of 25 minutes of standing right now before she needs to sit down. Pt reports she is unable to fully straighten her R knee. Pt reports she will have decreased pain with ibuprofen and sitting. Pt reports she is going up stairs one step at at time.   Patient Goals  Patient goals for therapy: decreased pain, increased motion and increased strength    Pain  Current pain ratin  At best pain ratin  At worst pain ratin  Location: R knee  Quality: dull ache  Relieving factors:  "medications and rest  Aggravating factors: standing, walking and stair climbing    Treatments  Previous treatment: medication  Current treatment: medication and physical therapy        Objective     Tenderness     Right Knee   Tenderness in the lateral joint line, lateral patella, medial joint line, medial patella and patellar tendon.     Active Range of Motion   Left Knee   Flexion: 135 degrees   Extension: 0 degrees     Right Knee   Flexion: 130 degrees   Extension: -4 degrees with pain    Mobility   Patellar Mobility:     Right Knee   Hypomobile: medial, lateral, superior and inferior     Strength/Myotome Testing     Left Hip   Planes of Motion   Flexion: 4+  Extension: 4+  Abduction: 4+  Adduction: 4+  External rotation: 4  Internal rotation: 4    Right Hip   Planes of Motion   Flexion: 3+  Extension: 3+  Abduction: 3+  Adduction: 3+  External rotation: 3+  Internal rotation: 3+    Left Knee   Flexion: 4+  Extension: 4+    Right Knee   Flexion: 3+  Extension: 3+             Precautions: HTN    POC expires Unit limit Auth Expiration date PT/OT + Visit Limit?   5/27 N/A 5/27 BOMN                 Visit/Unit Tracking  AUTH Status:  Date 4/1              N/A Used 1               Remaining                  FOTO      Manuals 4/1            Hamstring stretch KILEY                                                   Neuro Re-Ed             Education on POC, diagnosis, and HEP 5'            Quad set 1x10 5\"            SLR 3x5                                                                Ther Ex             Seated hamstring stretch 5x 20\"            Slantboard stretch 10x 10\"            Mini squat 1x10                                                                             Ther Activity                                       Gait Training                                       Modalities                                            "

## 2024-04-03 ENCOUNTER — TELEPHONE (OUTPATIENT)
Age: 59
End: 2024-04-03

## 2024-04-03 NOTE — TELEPHONE ENCOUNTER
85 YO M PMHx HTN, HLD, CAD s/p stents, MI, COPD not on baseline O2, prostate cancer, cerebellar infarction in 2020 with no residual deficits BIBEMS to the ED c/o diaphoresis, shortness of breath, and hypoxia. Per EMS, pt was found to be hypoxic to 84% on RA improved to 93% on 2L NC O2. Wife at bedside reports pt was fatigued on Sunday however thought nothing of it. This morning wife noted pt appeared to be "gray" and diaphoretic, so called EMS. Wife states pt today looks similar to when he had stroke 3 years ago. Pt with baseline lower extremity swelling and cough, no recent changes. Admitted for acute hypoxic resp failure 2/2 acute pulmonary embolism, cardiogenic/ obstructive shock POA 2/2 hypokalemia. FULL CODE - palliative care following.    Pt pleasant but a poor historian, unable to provide detailed diet/ wt hx. Pt sleeping at time of RD visit, breakfast tray just sitting at bed side untouched. Woke up when RD said name and wanted to eat but states he has difficulty feeding self. States he does not have a good appetite. Believes he lost 8-9# but unsure of time frame/ reasoning or UBW. RD took bed scale wt on 10/13 at 205# - with moderate edema likely skewing wt/ appearance. Per IDR, pt lives a very sedentary lifestyle and smokes daily. Appears overwt, NFPE reveals moderate muscle/ fat wasting. Severe swelling/ edema noted in B/L LE. Pt on DASH/TLC diet, would rec'd to Liberalize diet to regular to maximize caloric and nutrient intake. Will also trial premier protein shake BID, pt receptive. See additional recs below.  Provided number for [facility] Nita James.

## 2024-04-04 ENCOUNTER — OFFICE VISIT (OUTPATIENT)
Dept: PHYSICAL THERAPY | Facility: CLINIC | Age: 59
End: 2024-04-04
Payer: OTHER MISCELLANEOUS

## 2024-04-04 DIAGNOSIS — M17.11 PRIMARY OSTEOARTHRITIS OF RIGHT KNEE: Primary | ICD-10-CM

## 2024-04-04 PROCEDURE — 97110 THERAPEUTIC EXERCISES: CPT | Performed by: PHYSICAL THERAPIST

## 2024-04-04 PROCEDURE — 97112 NEUROMUSCULAR REEDUCATION: CPT | Performed by: PHYSICAL THERAPIST

## 2024-04-04 PROCEDURE — 97140 MANUAL THERAPY 1/> REGIONS: CPT | Performed by: PHYSICAL THERAPIST

## 2024-04-04 NOTE — PROGRESS NOTES
"Daily Note     Today's date: 2024  Patient name: Marina Land  : 1965  MRN: [id number]  Referring provider: Betyte Rizzo MD  Dx:   Encounter Diagnosis     ICD-10-CM    1. Primary osteoarthritis of right knee  M17.11                      Subjective: Pt reports that she had a lot of pain when she went to go out of bed to go to the bathroom.       Objective: See treatment diary below      Assessment: Tolerated treatment well. Patient demonstrated fatigue post treatment, exhibited good technique with therapeutic exercises, and would benefit from continued PT. She was fatigued with quad strengthening she did require verbal cues in order to facilitate TKE. She did have hip flexor compensations with SL hip abduction and required verbal cues to inhibit hip flexor activation.       Plan: Continue per plan of care.  Progress treatment as tolerated.       Precautions: HTN    POC expires Unit limit Auth Expiration date PT/OT + Visit Limit?    N/A  BOMN                 Visit/Unit Tracking  AUTH Status:  Date              N/A Used 1 2              Remaining                  FOTO      Manuals            Hamstring stretch KILEY KB           Patellar mobs  KB gr 2                                     Neuro Re-Ed             Education on POC, diagnosis, and HEP 5'            Quad set 1x10 5\" 2x10 5\"           SLR 3x5 3x5           SAQ  2x10 3\"            SL clamshells   2x10 3\"            SL Hip abd  3\" x10                        Ther Ex             Seated hamstring stretch 5x 20\" 5x 20\"           Slantboard stretch 10x 10\" 10x 10\"           Mini squat 1x10 1x10                                                                            Ther Activity                                       Gait Training                                       Modalities                                            "

## 2024-04-08 ENCOUNTER — OFFICE VISIT (OUTPATIENT)
Dept: PHYSICAL THERAPY | Facility: CLINIC | Age: 59
End: 2024-04-08
Payer: OTHER MISCELLANEOUS

## 2024-04-08 DIAGNOSIS — M17.11 PRIMARY OSTEOARTHRITIS OF RIGHT KNEE: Primary | ICD-10-CM

## 2024-04-08 PROCEDURE — 97112 NEUROMUSCULAR REEDUCATION: CPT

## 2024-04-08 PROCEDURE — 97140 MANUAL THERAPY 1/> REGIONS: CPT

## 2024-04-08 PROCEDURE — 97110 THERAPEUTIC EXERCISES: CPT

## 2024-04-08 NOTE — PROGRESS NOTES
"Daily Note     Today's date: 2024  Patient name: Marina Land  : 1965  MRN: [id number]  Referring provider: Bettye Rizzo MD  Dx:   Encounter Diagnosis     ICD-10-CM    1. Primary osteoarthritis of right knee  M17.11           Start Time: 1755  Stop Time: 1840  Total time in clinic (min): 45 minutes    Subjective: Pt reports she was not very sore after last session but knee continues to have \"good days and bad days.\"      Objective: See treatment diary below      Assessment: Tolerated treatment well. Patient demonstrated fatigue post treatment, exhibited good technique with therapeutic exercises, and would benefit from continued PT. Continued to progress with increased reps for multiple exercises to further improve LE strength. Bridges were added this session to continue improving glute strengthening with no increase in symptoms noted. Trialed k-tape for patella support post session and will assess pt response next visit.       Plan: Continue per plan of care.      Precautions: HTN    POC expires Unit limit Auth Expiration date PT/OT + Visit Limit?    N/A  BOMN                 Visit/Unit Tracking  AUTH Status:  Date             N/A Used 1 2 3             Remaining                  FOTO      Manuals           Hamstring stretch KILEY KB CG          Patellar mobs  KB gr 2           CFM medial knee joint   CG          K tape for patella support   CG          Neuro Re-Ed             Education on POC, diagnosis, and HEP 5'            Quad set 1x10 5\" 2x10 5\" 2x10 5\"          SLR 3x5 3x5 4x5          SAQ  2x10 3\"  2x10 3\"          SL clamshells   2x10 3\"  2x10 3\"          SL Hip abd  3\" x10 2x10 3\"          Bridges   3\" x10 Add TB         Ther Ex             Seated hamstring stretch 5x 20\" 5x 20\" 5x20\"          Slantboard stretch 10x 10\" 10x 10\" 10x 10\"          Mini squat 1x10 1x10 1x15                                                                           Ther Activity  "                                      Gait Training                                       Modalities

## 2024-04-11 ENCOUNTER — OFFICE VISIT (OUTPATIENT)
Dept: PHYSICAL THERAPY | Facility: CLINIC | Age: 59
End: 2024-04-11
Payer: OTHER MISCELLANEOUS

## 2024-04-11 DIAGNOSIS — M17.11 PRIMARY OSTEOARTHRITIS OF RIGHT KNEE: Primary | ICD-10-CM

## 2024-04-11 PROCEDURE — 97140 MANUAL THERAPY 1/> REGIONS: CPT | Performed by: PHYSICAL THERAPIST

## 2024-04-11 PROCEDURE — 97110 THERAPEUTIC EXERCISES: CPT | Performed by: PHYSICAL THERAPIST

## 2024-04-11 PROCEDURE — 97112 NEUROMUSCULAR REEDUCATION: CPT | Performed by: PHYSICAL THERAPIST

## 2024-04-11 NOTE — PROGRESS NOTES
"Daily Note     Today's date: 2024  Patient name: Marina Land  : 1965  MRN: [id number]  Referring provider: Bettye Rizzo MD  Dx:   Encounter Diagnosis     ICD-10-CM    1. Primary osteoarthritis of right knee  M17.11                      Subjective: Pt reports that she still needs a lot of rest breaks         Objective: See treatment diary below      Assessment: Tolerated treatment well. Patient demonstrated fatigue post treatment, exhibited good technique with therapeutic exercises, and would benefit from continued PT. Pt was challenged with quad strengthening and required verbal cues to facilitate TKE.       Plan: Continue per plan of care.  Progress treatment as tolerated.       Precautions: HTN    POC expires Unit limit Auth Expiration date PT/OT + Visit Limit?    N/A  BOMN                 Visit/Unit Tracking  AUTH Status:  Date            N/A Used 1 2 3 4            Remaining                  FOTO      Manuals          Hamstring stretch KILEY KB CG KB         Patellar mobs  KB gr 2           CFM medial knee joint   CG          K tape for patella support   CG KB         Neuro Re-Ed             Education on POC, diagnosis, and HEP 5'            Quad set 1x10 5\" 2x10 5\" 2x10 5\" 2x10 5\"         SLR 3x5 3x5 4x5 4x5         SAQ  2x10 3\"  2x10 3\" 2x10 3\"         SL clamshells   2x10 3\"  2x10 3\" 2x10 3\"         SL Hip abd  3\" x10 2x10 3\" 2x10 3\"         Bridges   3\" x10 3\" x10 TB        Ther Ex             Seated hamstring stretch 5x 20\" 5x 20\" 5x20\" 5x20\"      s   Slantboard stretch 10x 10\" 10x 10\" 10x 10\" 10x 10\"         Mini squat 1x10 1x10 1x15 1x15                                                                          Ther Activity                                       Gait Training                                       Modalities                                                "

## 2024-04-15 ENCOUNTER — APPOINTMENT (OUTPATIENT)
Dept: PHYSICAL THERAPY | Facility: CLINIC | Age: 59
End: 2024-04-15
Payer: OTHER MISCELLANEOUS

## 2024-04-18 ENCOUNTER — OFFICE VISIT (OUTPATIENT)
Dept: PHYSICAL THERAPY | Facility: CLINIC | Age: 59
End: 2024-04-18
Payer: OTHER MISCELLANEOUS

## 2024-04-18 DIAGNOSIS — M17.11 PRIMARY OSTEOARTHRITIS OF RIGHT KNEE: Primary | ICD-10-CM

## 2024-04-18 PROCEDURE — 97110 THERAPEUTIC EXERCISES: CPT | Performed by: PHYSICAL THERAPIST

## 2024-04-18 PROCEDURE — 97112 NEUROMUSCULAR REEDUCATION: CPT | Performed by: PHYSICAL THERAPIST

## 2024-04-18 PROCEDURE — 97140 MANUAL THERAPY 1/> REGIONS: CPT | Performed by: PHYSICAL THERAPIST

## 2024-04-18 NOTE — PROGRESS NOTES
"Daily Note     Today's date: 2024  Patient name: Marina Land  : 1965  MRN: [id number]  Referring provider: Weston Angel DO  Dx:   Encounter Diagnosis     ICD-10-CM    1. Primary osteoarthritis of right knee  M17.11           Start Time: 1800  Stop Time: 1845  Total time in clinic (min): 45 minutes    Subjective: Pt reports her R knee has been bothering her today and having trouble with standing long periods.       Objective: See treatment diary below      Assessment: Tolerated treatment well. Patient demonstrated fatigue post treatment, exhibited good technique with therapeutic exercises, and would benefit from continued PT. Pt was able to progress today with inclusion of heel raises into pt's exercise program. Pt required min verbal cues to facilitate performance of proper technique. Assess pt response to treatment at next visit.      Plan: Continue per plan of care.      Precautions: HTN    POC expires Unit limit Auth Expiration date PT/OT + Visit Limit?    N/A  BOMN                 Visit/Unit Tracking  AUTH Status:  Date           N/A Used 1 2 3 4 5           Remaining                  FOTO, do NV      Manuals         Hamstring stretch KILEY KB CG KB KILEY        Patellar mobs  KB gr 2           CFM medial knee joint   CG          K tape for patella support   CG KB         Neuro Re-Ed             Pt education 5'    5'        Quad set 1x10 5\" 2x10 5\" 2x10 5\" 2x10 5\" 2x10 5\"        SLR 3x5 3x5 4x5 4x5 4x5        Heavy band TKEs     2x10 grn        SL clamshells   2x10 3\"  2x10 3\" 2x10 3\" 2x10 3\"        SL Hip abd  3\" x10 2x10 3\" 2x10 3\" 2x10 3\"        Bridges   3\" x10 3\" x10 TB        Ther Ex             Seated hamstring stretch 5x 20\" 5x 20\" 5x20\" 5x20\" 5x 20\"        Slantboard stretch 10x 10\" 10x 10\" 10x 10\" 10x 10\" 10x 10\"        Mini squat 1x10 1x10 1x15 1x15 2x10        Heel raises     2x10        Recumbent bike for LE strength     6'      "                                          Ther Activity                                       Gait Training                                       Modalities

## 2024-04-22 ENCOUNTER — APPOINTMENT (OUTPATIENT)
Dept: LAB | Facility: CLINIC | Age: 59
End: 2024-04-22
Payer: COMMERCIAL

## 2024-04-22 ENCOUNTER — OFFICE VISIT (OUTPATIENT)
Dept: PHYSICAL THERAPY | Facility: CLINIC | Age: 59
End: 2024-04-22
Payer: OTHER MISCELLANEOUS

## 2024-04-22 DIAGNOSIS — E53.8 VITAMIN B12 DEFICIENCY: ICD-10-CM

## 2024-04-22 DIAGNOSIS — M17.11 PRIMARY OSTEOARTHRITIS OF RIGHT KNEE: Primary | ICD-10-CM

## 2024-04-22 DIAGNOSIS — E55.9 VITAMIN D DEFICIENCY: ICD-10-CM

## 2024-04-22 DIAGNOSIS — R42 EPISODIC LIGHTHEADEDNESS: ICD-10-CM

## 2024-04-22 LAB
25(OH)D3 SERPL-MCNC: 39.2 NG/ML (ref 30–100)
BASOPHILS # BLD AUTO: 0.05 THOUSANDS/ÂΜL (ref 0–0.1)
BASOPHILS NFR BLD AUTO: 1 % (ref 0–1)
EOSINOPHIL # BLD AUTO: 0.11 THOUSAND/ÂΜL (ref 0–0.61)
EOSINOPHIL NFR BLD AUTO: 2 % (ref 0–6)
ERYTHROCYTE [DISTWIDTH] IN BLOOD BY AUTOMATED COUNT: 12.9 % (ref 11.6–15.1)
FERRITIN SERPL-MCNC: 36 NG/ML (ref 11–307)
HCT VFR BLD AUTO: 42.8 % (ref 34.8–46.1)
HGB BLD-MCNC: 13.9 G/DL (ref 11.5–15.4)
IMM GRANULOCYTES # BLD AUTO: 0.02 THOUSAND/UL (ref 0–0.2)
IMM GRANULOCYTES NFR BLD AUTO: 0 % (ref 0–2)
IRON SATN MFR SERPL: 31 % (ref 15–50)
IRON SERPL-MCNC: 94 UG/DL (ref 50–212)
LYMPHOCYTES # BLD AUTO: 1.69 THOUSANDS/ÂΜL (ref 0.6–4.47)
LYMPHOCYTES NFR BLD AUTO: 36 % (ref 14–44)
MCH RBC QN AUTO: 30 PG (ref 26.8–34.3)
MCHC RBC AUTO-ENTMCNC: 32.5 G/DL (ref 31.4–37.4)
MCV RBC AUTO: 92 FL (ref 82–98)
MONOCYTES # BLD AUTO: 0.4 THOUSAND/ÂΜL (ref 0.17–1.22)
MONOCYTES NFR BLD AUTO: 9 % (ref 4–12)
NEUTROPHILS # BLD AUTO: 2.41 THOUSANDS/ÂΜL (ref 1.85–7.62)
NEUTS SEG NFR BLD AUTO: 52 % (ref 43–75)
NRBC BLD AUTO-RTO: 0 /100 WBCS
PLATELET # BLD AUTO: 203 THOUSANDS/UL (ref 149–390)
PMV BLD AUTO: 10.2 FL (ref 8.9–12.7)
RBC # BLD AUTO: 4.64 MILLION/UL (ref 3.81–5.12)
TIBC SERPL-MCNC: 307 UG/DL (ref 250–450)
TSH SERPL DL<=0.05 MIU/L-ACNC: 1.06 UIU/ML (ref 0.45–4.5)
UIBC SERPL-MCNC: 213 UG/DL (ref 155–355)
VIT B12 SERPL-MCNC: 593 PG/ML (ref 180–914)
WBC # BLD AUTO: 4.68 THOUSAND/UL (ref 4.31–10.16)

## 2024-04-22 PROCEDURE — 82306 VITAMIN D 25 HYDROXY: CPT

## 2024-04-22 PROCEDURE — 97112 NEUROMUSCULAR REEDUCATION: CPT | Performed by: PHYSICAL THERAPIST

## 2024-04-22 PROCEDURE — 83540 ASSAY OF IRON: CPT

## 2024-04-22 PROCEDURE — 97140 MANUAL THERAPY 1/> REGIONS: CPT | Performed by: PHYSICAL THERAPIST

## 2024-04-22 PROCEDURE — 83550 IRON BINDING TEST: CPT

## 2024-04-22 PROCEDURE — 84443 ASSAY THYROID STIM HORMONE: CPT

## 2024-04-22 PROCEDURE — 85025 COMPLETE CBC W/AUTO DIFF WBC: CPT

## 2024-04-22 PROCEDURE — 82728 ASSAY OF FERRITIN: CPT

## 2024-04-22 PROCEDURE — 36415 COLL VENOUS BLD VENIPUNCTURE: CPT

## 2024-04-22 PROCEDURE — 82607 VITAMIN B-12: CPT

## 2024-04-22 PROCEDURE — 97110 THERAPEUTIC EXERCISES: CPT | Performed by: PHYSICAL THERAPIST

## 2024-04-22 NOTE — PROGRESS NOTES
"Daily Note     Today's date: 2024  Patient name: Marina Land  : 1965  MRN: [id number]  Referring provider: Weston Angel DO  Dx:   Encounter Diagnosis     ICD-10-CM    1. Primary osteoarthritis of right knee  M17.11           Start Time: 928  Stop Time: 1015  Total time in clinic (min): 47 minutes    Subjective: Pt reports having a significant increase in pain of her R knee which caused her to not sleep well.       Objective: See treatment diary below      Assessment: Tolerated treatment fair. Patient demonstrated fatigue post treatment, exhibited good technique with therapeutic exercises, and would benefit from continued PT. Performed tibiofemoral mobilizations to decrease pain and improve motion of pt's R knee. Pt required min verbal cues to facilitate performance of proper technique. Assess pt response to treatment at next visit.      Plan: Continue per plan of care.      Precautions: HTN    POC expires Unit limit Auth Expiration date PT/OT + Visit Limit?    N/A  BOMN                 Visit/Unit Tracking  AUTH Status:  Date          N/A Used 1 2 3 4 5 6          Remaining                  FOTO, do NV      Manuals        Hamstring stretch KILEY KB CG KB KILEY KILEY       Patellar mobs  KB gr 2    KILEY       Tibiofemoral grade III/IV mobs      KILEY       K tape for patella support   CG KB         Neuro Re-Ed             Pt education 5'    5' 5'       Quad set 1x10 5\" 2x10 5\" 2x10 5\" 2x10 5\" 2x10 5\" 2x10 5\"       SLR 3x5 3x5 4x5 4x5 4x5 4x5       Heavy band TKEs     2x10 grn        SL clamshells   2x10 3\"  2x10 3\" 2x10 3\" 2x10 3\" 2x10 3\" grn       SL Hip abd  3\" x10 2x10 3\" 2x10 3\" 2x10 3\" 2x10 3\"       Bridges   3\" x10 3\" x10 TB        Ther Ex             Seated hamstring stretch 5x 20\" 5x 20\" 5x20\" 5x20\" 5x 20\" 5x 20\"       Slantboard stretch 10x 10\" 10x 10\" 10x 10\" 10x 10\" 10x 10\" 10x 10\"       Mini squat 1x10 1x10 1x15 1x15 2x10 NP due to p! "       Heel raises     2x10 2x10       Recumbent bike for LE strength     6'                                               Ther Activity                                       Gait Training                                       Modalities

## 2024-04-25 ENCOUNTER — OFFICE VISIT (OUTPATIENT)
Dept: PHYSICAL THERAPY | Facility: CLINIC | Age: 59
End: 2024-04-25
Payer: OTHER MISCELLANEOUS

## 2024-04-25 ENCOUNTER — OFFICE VISIT (OUTPATIENT)
Dept: FAMILY MEDICINE CLINIC | Facility: CLINIC | Age: 59
End: 2024-04-25
Payer: COMMERCIAL

## 2024-04-25 VITALS
HEART RATE: 76 BPM | TEMPERATURE: 97.2 F | WEIGHT: 160 LBS | SYSTOLIC BLOOD PRESSURE: 128 MMHG | BODY MASS INDEX: 26.66 KG/M2 | DIASTOLIC BLOOD PRESSURE: 88 MMHG | HEIGHT: 65 IN | OXYGEN SATURATION: 98 %

## 2024-04-25 DIAGNOSIS — M25.562 ACUTE PAIN OF LEFT KNEE: Primary | ICD-10-CM

## 2024-04-25 DIAGNOSIS — M17.11 PRIMARY OSTEOARTHRITIS OF RIGHT KNEE: Primary | ICD-10-CM

## 2024-04-25 DIAGNOSIS — R73.03 PREDIABETES: ICD-10-CM

## 2024-04-25 PROCEDURE — 97140 MANUAL THERAPY 1/> REGIONS: CPT

## 2024-04-25 PROCEDURE — 99213 OFFICE O/P EST LOW 20 MIN: CPT

## 2024-04-25 PROCEDURE — 97110 THERAPEUTIC EXERCISES: CPT

## 2024-04-25 PROCEDURE — 97112 NEUROMUSCULAR REEDUCATION: CPT

## 2024-04-25 NOTE — PROGRESS NOTES
Name: Marina Land      : 1965      MRN: [id number]  Encounter Provider: Yadi Portillo PA-C  Encounter Date: 2024   Encounter department: [facility]    Assessment & Plan     1. Acute pain of left knee  -     Ambulatory Referral to Physical Therapy; Future    2. Prediabetes  -     Hemoglobin A1C; Future      Patient presents to discuss her recent lab results   - Vitamin B12 and D are normal   - CBC WNL   - TSH normal   - iron panel normal     Patient is due to recheck A1C for her prediabetes next month, ordered today.   Also needs new referral for PT for her knee pain; referral provided today   No longer experiencing episodes of dizziness/lightheadedness. Advised to continue monitoring her sugar and make sure she continues to eat three meals a day and stay hydrated.   Advised to call if symptoms return. Patient agreeable.   Otherwise follow up in three months or sooner as needed.        Subjective      CC: lab review     Patient presents to discuss lab results.   Patient also presents for follow up of episode of dizziness/light headedness she had one month ago. Patient has been making sure to eat and has been checking her sugar at home and making sure it does not drop. Since doing this she has not had another episode and is feeling back to normal.   No acute complaints to discuss today.   Needs new referral to PT for her knee.       Review of Systems   Constitutional:  Negative for appetite change, chills, diaphoresis and fever.   Eyes:  Negative for visual disturbance.   Respiratory:  Negative for cough, chest tightness, shortness of breath and wheezing.    Cardiovascular:  Negative for chest pain and palpitations.   Neurological:  Negative for dizziness, light-headedness and headaches.       Current Outpatient Medications on File Prior to Visit   Medication Sig    alendronate (Fosamax) 70 mg tablet Take 1 tablet (70 mg total) by mouth every 7 days    atorvastatin  "(LIPITOR) 20 mg tablet Take 1 tablet (20 mg total) by mouth daily    Blood Glucose Monitoring Suppl (OneTouch Verio Reflect) w/Device KIT Check blood sugars once daily. Please substitute with appropriate alternative as covered by patient's insurance. Dx: E11.65    cyclobenzaprine (FLEXERIL) 10 mg tablet Take 1 tablet (10 mg total) by mouth 3 (three) times a day as needed for muscle spasms    glucose blood (OneTouch Verio) test strip Check blood sugars once daily. Please substitute with appropriate alternative as covered by patient's insurance. Dx: E11.65    hydrochlorothiazide (HYDRODIURIL) 25 mg tablet Take 1 tablet (25 mg total) by mouth daily    ibuprofen (MOTRIN) 800 mg tablet Take 1 tablet (800 mg total) by mouth 2 (two) times a day with meals    losartan (COZAAR) 50 mg tablet Take 1 tablet (50 mg total) by mouth daily    methocarbamol (ROBAXIN) 500 mg tablet Take 1 tablet (500 mg total) by mouth 4 (four) times a day (Patient taking differently: Take 500 mg by mouth 4 (four) times a day as needed)    ondansetron (ZOFRAN-ODT) 4 mg disintegrating tablet Take 1 tablet (4 mg total) by mouth every 6 (six) hours as needed for nausea or vomiting (Patient not taking: Reported on 3/22/2024)    OneTouch Delica Lancets 33G MISC Check blood sugars once daily. Please substitute with appropriate alternative as covered by patient's insurance. Dx: E11.65    Pyridoxine HCl (VITAMIN B6 PO) Take by mouth    valACYclovir (VALTREX) 500 mg tablet Take 1 tablet (500 mg total) by mouth daily       Objective     /88   Pulse 76   Temp (!) 97.2 °F (36.2 °C)   Ht 5' 5\" (1.651 m)   Wt 72.6 kg (160 lb)   SpO2 98%   BMI 26.63 kg/m²     Physical Exam  Vitals reviewed.   Constitutional:       General: She is not in acute distress.     Appearance: Normal appearance. She is not ill-appearing or diaphoretic.   HENT:      Head: Normocephalic and atraumatic.      Right Ear: External ear normal.      Left Ear: External ear normal.      " Nose: Nose normal.      Mouth/Throat:      Mouth: Mucous membranes are moist.   Eyes:      General:         Right eye: No discharge.         Left eye: No discharge.      Conjunctiva/sclera: Conjunctivae normal.   Cardiovascular:      Rate and Rhythm: Normal rate and regular rhythm.      Pulses: Normal pulses.      Heart sounds: No murmur heard.  Pulmonary:      Effort: Pulmonary effort is normal. No respiratory distress.      Breath sounds: Normal breath sounds. No wheezing, rhonchi or rales.   Musculoskeletal:         General: Normal range of motion.      Cervical back: Normal range of motion.      Right lower leg: No edema.      Left lower leg: No edema.   Skin:     General: Skin is warm.   Neurological:      General: No focal deficit present.      Mental Status: She is alert.      Gait: Gait normal.   Psychiatric:         Mood and Affect: Mood normal.       Yadi Portillo PA-C

## 2024-04-25 NOTE — PROGRESS NOTES
"Daily Note     Today's date: 2024  Patient name: Marina Land  : 1965  MRN: [id number]  Referring provider: Bettye Rizzo MD  Dx:   Encounter Diagnosis     ICD-10-CM    1. Primary osteoarthritis of right knee  M17.11                      Subjective: Pt reports her knee feels ok, but it hurts more when its bent.       Objective: See treatment diary below      Assessment: Tolerated treatment well. Some discomfort noted with sidelying for clamshells with her knees bent. She tolerates mini squats again today. Continue to progress as tolerated. Pt demonstrates good effort throughout session. Minimal curing given to facilitate proper exercise performance and technique.  Patient demonstrated fatigue post treatment, exhibited good technique with therapeutic exercises, and would benefit from continued PT      Plan: Continue per plan of care.      Precautions: HTN    POC expires Unit limit Auth Expiration date PT/OT + Visit Limit?    N/A  BOMN                 Visit/Unit Tracking  AUTH Status:  Date         N/A Used 1 2 3 4 5 6 7         Remaining                  FOTO, done       Manuals       Hamstring stretch KILEY KB CG KB KILEY KILEY KILEY      Patellar mobs  KB gr 2    KILEY KILEY      Tibiofemoral grade III/IV mobs      KILEY KILEY      K tape for patella support   CG KB         Neuro Re-Ed             Pt education 5'    5' 5' 5'      Quad set 1x10 5\" 2x10 5\" 2x10 5\" 2x10 5\" 2x10 5\" 2x10 5\" 2x10 5\"      SLR 3x5 3x5 4x5 4x5 4x5 4x5 4x5      Heavy band TKEs     2x10 grn        SL clamshells   2x10 3\"  2x10 3\" 2x10 3\" 2x10 3\" 2x10 3\" grn 2x10 3\" grn      SL Hip abd  3\" x10 2x10 3\" 2x10 3\" 2x10 3\" 2x10 3\" 2x10 3\"      Bridges   3\" x10 3\" x10 TB        Ther Ex             Seated hamstring stretch 5x 20\" 5x 20\" 5x20\" 5x20\" 5x 20\" 5x 20\" 5x20\"      Slantboard stretch 10x 10\" 10x 10\" 10x 10\" 10x 10\" 10x 10\" 10x 10\"       Mini squat 1x10 1x10 1x15 1x15 2x10 " NP due to p! 1x15      Heel raises     2x10 2x10 3x10      Recumbent bike for LE strength     6'                                               Ther Activity                                       Gait Training                                       Modalities

## 2024-04-29 ENCOUNTER — OFFICE VISIT (OUTPATIENT)
Dept: PHYSICAL THERAPY | Facility: CLINIC | Age: 59
End: 2024-04-29
Payer: OTHER MISCELLANEOUS

## 2024-04-29 DIAGNOSIS — M17.11 PRIMARY OSTEOARTHRITIS OF RIGHT KNEE: Primary | ICD-10-CM

## 2024-04-29 PROCEDURE — 97110 THERAPEUTIC EXERCISES: CPT | Performed by: PHYSICAL THERAPIST

## 2024-04-29 PROCEDURE — 97140 MANUAL THERAPY 1/> REGIONS: CPT | Performed by: PHYSICAL THERAPIST

## 2024-04-29 PROCEDURE — 97112 NEUROMUSCULAR REEDUCATION: CPT | Performed by: PHYSICAL THERAPIST

## 2024-04-29 NOTE — PROGRESS NOTES
"Daily Note     Today's date: 2024  Patient name: Marina Land  : 1965  MRN: [id number]  Referring provider: Weston Angel DO  Dx:   Encounter Diagnosis     ICD-10-CM    1. Primary osteoarthritis of right knee  M17.11           Start Time: 0758  Stop Time: 0845  Total time in clinic (min): 47 minutes    Subjective: Pt reports her R knee is feeling better today with decreased pain, however, she has decreased her activities.       Objective: See treatment diary below      Assessment: Tolerated treatment well. Patient demonstrated fatigue post treatment, exhibited good technique with therapeutic exercises, and would benefit from continued PT. Pt was able to progress today with inclusion of SL leg press and step downs into pt's exercise program. Pt required min verbal cues to facilitate performance of proper technique. Assess pt response to treatment at next visit.      Plan: Continue per plan of care.      Precautions: HTN    POC expires Unit limit Auth Expiration date PT/OT + Visit Limit?    N/A  BOMN                 Visit/Unit Tracking  AUTH Status:  Date        N/A Used 1 2 3 4 5 6 7 8        Remaining                  FOTO, done       Manuals      Hamstring stretch KILEY KB CG KB KILEY KILEY KILEY KILEY     Patellar mobs  KB gr 2    KILEY KILEY KILEY     Tibiofemoral grade III/IV mobs      KILEY KILEY KILEY     K tape for patella support   CG KB         Neuro Re-Ed             Pt education 5'    5' 5' 5' 5'     Quad set 1x10 5\" 2x10 5\" 2x10 5\" 2x10 5\" 2x10 5\" 2x10 5\" 2x10 5\"      SLR 3x5 3x5 4x5 4x5 4x5 4x5 4x5 3x10     Heavy band TKEs     2x10 grn        SL clamshells   2x10 3\"  2x10 3\" 2x10 3\" 2x10 3\" 2x10 3\" grn 2x10 3\" grn 2x10 blue      SL Hip abd  3\" x10 2x10 3\" 2x10 3\" 2x10 3\" 2x10 3\" 2x10 3\" 3x10     Bridges   3\" x10 3\" x10 TB        Ther Ex             Seated hamstring stretch 5x 20\" 5x 20\" 5x20\" 5x20\" 5x 20\" 5x 20\" 5x20\" 5x 20\"   " "  Slantboard stretch 10x 10\" 10x 10\" 10x 10\" 10x 10\" 10x 10\" 10x 10\"       Mini squat 1x10 1x10 1x15 1x15 2x10 NP due to p! 1x15 2x10     Heel raises     2x10 2x10 3x10 3x10     Recumbent bike for LE strength     6'   6'     SL leg press        2x10 55#                               Ther Activity             Step downs        2x5 4\"                  Gait Training                                       Modalities                                                        "

## 2024-04-30 ENCOUNTER — OFFICE VISIT (OUTPATIENT)
Dept: OBGYN CLINIC | Facility: CLINIC | Age: 59
End: 2024-04-30
Payer: OTHER MISCELLANEOUS

## 2024-04-30 VITALS
OXYGEN SATURATION: 98 % | WEIGHT: 160 LBS | BODY MASS INDEX: 26.66 KG/M2 | DIASTOLIC BLOOD PRESSURE: 88 MMHG | SYSTOLIC BLOOD PRESSURE: 136 MMHG | HEIGHT: 65 IN | HEART RATE: 88 BPM | RESPIRATION RATE: 18 BRPM | TEMPERATURE: 98 F

## 2024-04-30 DIAGNOSIS — M17.11 PRIMARY OSTEOARTHRITIS OF RIGHT KNEE: Primary | ICD-10-CM

## 2024-04-30 PROCEDURE — 99203 OFFICE O/P NEW LOW 30 MIN: CPT | Performed by: FAMILY MEDICINE

## 2024-04-30 NOTE — PROGRESS NOTES
Subjective:    Chief Complaint   Patient presents with    Right Knee - Pain, Swelling, Locking, Clicking       Marina Land is a 59 y.o. female complains of right knee pain. Onset of the symptoms was several months ago.  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 Aggravating factors: walking  and weight bearing. Treatment to date: corticosteroid injection which was somewhat effective, prescription NSAIDS which are somewhat effective, and PT which was somewhat effective. Symptoms have progressed to a point and plateaued.      The following portions were reviewed and updated as needed: allergies, current medications, past medical history, past social history, past surgical history and problem list.    Review of Systems   Constitutional: Negative for fever.   HENT: Negative for dental problem and headaches.    Eyes: Negative for vision loss.   Respiratory: Negative for cough and shortness of breath.    Cardiovascular: Negative for leg swelling and palpitations.   Gastrointestinal: Negative for constipation and diarrhea.   Genitourinary: Negative for bladder incontinence and difficulty urinating.   Musculoskeletal: Negative for back pain and difficulty walking.   Skin: Negative for rash and ulcer.   Neurological: Negative for dizziness and headaches.   Hem/Lymph/Immuno: Negative for blood clots. Does not bruise/bleed easily.   Psychiatric/Behavioral: Negative for confusion.         Objective:  General: no acute distress, non toxic, AAO x3   Skin: no skin changes, no rashes, no wounds or laceration  Vasculature: normal cap refill, no LE edema, normal popliteal and dorsalis pedis pulse  Neurologic:   Musculoskeletal: right KNEE EXAM  Gait: limping gait negative, able to weight bear without difficulty  Inspection: No gross deformity, no redness or warmth   Effusion: negative   Medial joint line TTP: +  Lateral joint line TTP: +  ROM: Full flexion and extension  Anny's:  negative,   Instability to varus/valgus stress: negative  Anterior Drawer: negative   Lachman's test: negative  Posterior Drawer: negative  Crepitus: negative          Imaging:       Assessment/Plan:  1. Primary osteoarthritis of right knee    59-year-old female patient presenting today for a follow-up visit in regards to right knee pain.  Patient was seen and following with Dr. Rizzo orthopedic surgery for right knee osteoarthritis and provided a corticosteroid injection and referred to physical therapy.  Pain symptoms have improved however continues to have pain and swelling after performing work activity throughout the day.   Hyaluronic acid injection therapy was ordered as patient has ongoing knee pain and stiffness which is affecting daily function.  Examination reveals notable crepitus limitation with range of motion and pain is rated about a 5 out of 10 in severity.  Patient has trialed other conservative treatment options in the form of corticosteroid injection, NSAIDs and physical therapy without relief.  I believe patient would be a good candidate to consider viscosupplementation

## 2024-05-02 ENCOUNTER — TELEPHONE (OUTPATIENT)
Age: 59
End: 2024-05-02

## 2024-05-02 ENCOUNTER — OFFICE VISIT (OUTPATIENT)
Dept: PHYSICAL THERAPY | Facility: CLINIC | Age: 59
End: 2024-05-02
Payer: OTHER MISCELLANEOUS

## 2024-05-02 DIAGNOSIS — M17.11 PRIMARY OSTEOARTHRITIS OF RIGHT KNEE: Primary | ICD-10-CM

## 2024-05-02 PROCEDURE — 97140 MANUAL THERAPY 1/> REGIONS: CPT | Performed by: PHYSICAL THERAPIST

## 2024-05-02 PROCEDURE — 97112 NEUROMUSCULAR REEDUCATION: CPT | Performed by: PHYSICAL THERAPIST

## 2024-05-02 PROCEDURE — 97110 THERAPEUTIC EXERCISES: CPT | Performed by: PHYSICAL THERAPIST

## 2024-05-02 NOTE — PROGRESS NOTES
"Daily Note     Today's date: 2024  Patient name: Marina Land  : 1965  MRN: [id number]  Referring provider: Bettye Rizzo MD  Dx:   Encounter Diagnosis     ICD-10-CM    1. Primary osteoarthritis of right knee  M17.11           Start Time: 0800  Stop Time: 0848  Total time in clinic (min): 48 minutes    Subjective: Pt reports having increased soreness in her legs after her last therapy session.       Objective: See treatment diary below      Assessment: Tolerated treatment well. Patient demonstrated fatigue post treatment, exhibited good technique with therapeutic exercises, and would benefit from continued PT. Pt program modified today secondary to pt's increased pain of her R knee after her last session. However, pt was able to progress by increasing sets and reps for her step down exercise. Pt required min verbal cues to facilitate performance of proper technique. Assess pt response to treatment at next visit.      Plan: Continue per plan of care.      Precautions: HTN    POC expires Unit limit Auth Expiration date PT/OT + Visit Limit?    N/A  BOMN                 Visit/Unit Tracking  AUTH Status:  Date  5/      N/A Used 1 2 3 4 5 6 7 8 9       Remaining                  FOTO, done       Manuals     Hamstring stretch KILEY KB CG KB KILEY KILEY KILEY KILEY KILEY    Patellar mobs  KB gr 2    KILEY KILEY KILEY KILEY    Tibiofemoral grade III/IV mobs      KILEY KILEY KILEY KILEY    K tape for patella support   CG ABDI         Neuro Re-Ed             Pt education 5'    5' 5' 5' 5' 5'    Quad set 1x10 5\" 2x10 5\" 2x10 5\" 2x10 5\" 2x10 5\" 2x10 5\" 2x10 5\"      SLR 3x5 3x5 4x5 4x5 4x5 4x5 4x5 3x10 2x10    Heavy band TKEs     2x10 grn        SL clamshells   2x10 3\"  2x10 3\" 2x10 3\" 2x10 3\" 2x10 3\" grn 2x10 3\" grn 2x10 blue  2x10 blue    SL Hip abd  3\" x10 2x10 3\" 2x10 3\" 2x10 3\" 2x10 3\" 2x10 3\" 3x10 2x10    Bridges   3\" x10 3\" x10 TB        Ther Ex           " "  Seated hamstring stretch 5x 20\" 5x 20\" 5x20\" 5x20\" 5x 20\" 5x 20\" 5x20\" 5x 20\" 5x 20\"    Slantboard stretch 10x 10\" 10x 10\" 10x 10\" 10x 10\" 10x 10\" 10x 10\"   1x10 10\"    Mini squat 1x10 1x10 1x15 1x15 2x10 NP due to p! 1x15 2x10 2x10    Heel raises     2x10 2x10 3x10 3x10 3x10    Recumbent bike for LE strength     6'   6' 6'    SL leg press        2x10 55# 2x10 55#                              Ther Activity             Step downs        2x5 4\" X25 4\"                 Gait Training                                       Modalities                                                          "

## 2024-05-02 NOTE — TELEPHONE ENCOUNTER
Caller: Ida with [facility]    Doctor/Office: Luis      What needs to be faxed: IVAN 4/30    ATTN to: Ida    Fax#: [phone]      Documents were successfully e-faxed

## 2024-05-06 ENCOUNTER — OFFICE VISIT (OUTPATIENT)
Dept: PHYSICAL THERAPY | Facility: CLINIC | Age: 59
End: 2024-05-06
Payer: OTHER MISCELLANEOUS

## 2024-05-06 DIAGNOSIS — M17.11 PRIMARY OSTEOARTHRITIS OF RIGHT KNEE: Primary | ICD-10-CM

## 2024-05-06 PROCEDURE — 97110 THERAPEUTIC EXERCISES: CPT | Performed by: PHYSICAL THERAPIST

## 2024-05-06 NOTE — PROGRESS NOTES
"Daily Note     Today's date: 2024  Patient name: Marina Land  : 1965  MRN: [id number]  Referring provider: Bettye Rizzo MD  Dx:   Encounter Diagnosis     ICD-10-CM    1. Primary osteoarthritis of right knee  M17.11           Start Time: 0759  Stop Time: 0807  Total time in clinic (min): 8 minutes    Subjective: Pt reports her R knee is feeling better today, however, she woke up with a splitting headache.       Objective: See treatment diary below      Assessment: Tolerated treatment poor. Patient  performed recumbent bike today, however, pt then became nauseous and deferred treatment for the rest of the session.  Monitored pt during this time, will attempt to return to full program at next session.       Plan: Continue per plan of care.      Precautions: HTN    POC expires Unit limit Auth Expiration date PT/OT + Visit Limit?    N/A  BOMN                 Visit/Unit Tracking  AUTH Status:  Date  5/6     N/A Used 1 2 3 4 5 6 7 8 9 10      Remaining                  FOTO, done       Manuals  5/6   Hamstring stretch KILEY KB CG KB KILEY KILEY KILEY KILEY KILEY    Patellar mobs  KB gr 2    KILEY KILEY KILEY KILEY    Tibiofemoral grade III/IV mobs      KILEY KILEY KILEY KILEY    K tape for patella support   CG KB         Neuro Re-Ed             Pt education 5'    5' 5' 5' 5' 5' 4'   Quad set 1x10 5\" 2x10 5\" 2x10 5\" 2x10 5\" 2x10 5\" 2x10 5\" 2x10 5\"      SLR 3x5 3x5 4x5 4x5 4x5 4x5 4x5 3x10 2x10    Heavy band TKEs     2x10 grn        SL clamshells   2x10 3\"  2x10 3\" 2x10 3\" 2x10 3\" 2x10 3\" grn 2x10 3\" grn 2x10 blue  2x10 blue    SL Hip abd  3\" x10 2x10 3\" 2x10 3\" 2x10 3\" 2x10 3\" 2x10 3\" 3x10 2x10    Bridges   3\" x10 3\" x10 TB        Ther Ex             Seated hamstring stretch 5x 20\" 5x 20\" 5x20\" 5x20\" 5x 20\" 5x 20\" 5x20\" 5x 20\" 5x 20\"    Slantboard stretch 10x 10\" 10x 10\" 10x 10\" 10x 10\" 10x 10\" 10x 10\"   1x10 10\"    Mini squat 1x10 1x10 1x15 1x15 2x10 " "NP due to p! 1x15 2x10 2x10    Heel raises     2x10 2x10 3x10 3x10 3x10    Recumbent bike for LE strength     6'   6' 6' 6'   SL leg press        2x10 55# 2x10 55#                              Ther Activity             Step downs        2x5 4\" X25 4\"                 Gait Training                                       Modalities                                                            "

## 2024-05-07 ENCOUNTER — APPOINTMENT (OUTPATIENT)
Dept: PHYSICAL THERAPY | Facility: CLINIC | Age: 59
End: 2024-05-07
Payer: OTHER MISCELLANEOUS

## 2024-05-09 ENCOUNTER — TELEPHONE (OUTPATIENT)
Dept: FAMILY MEDICINE CLINIC | Facility: CLINIC | Age: 59
End: 2024-05-09

## 2024-05-09 ENCOUNTER — OFFICE VISIT (OUTPATIENT)
Dept: PHYSICAL THERAPY | Facility: CLINIC | Age: 59
End: 2024-05-09
Payer: OTHER MISCELLANEOUS

## 2024-05-09 DIAGNOSIS — M17.11 PRIMARY OSTEOARTHRITIS OF RIGHT KNEE: Primary | ICD-10-CM

## 2024-05-09 PROCEDURE — 97530 THERAPEUTIC ACTIVITIES: CPT | Performed by: PHYSICAL THERAPIST

## 2024-05-09 PROCEDURE — 97112 NEUROMUSCULAR REEDUCATION: CPT | Performed by: PHYSICAL THERAPIST

## 2024-05-09 PROCEDURE — 97110 THERAPEUTIC EXERCISES: CPT | Performed by: PHYSICAL THERAPIST

## 2024-05-09 NOTE — PROGRESS NOTES
"Daily Note     Today's date: 2024  Patient name: Marina Land  : 1965  MRN: [id number]  Referring provider: Bettye Rizzo MD  Dx:   Encounter Diagnosis     ICD-10-CM    1. Primary osteoarthritis of right knee  M17.11           Start Time: 075  Stop Time: 0846  Total time in clinic (min): 47 minutes    Subjective: Pt reports her R knee continues to feel better with decreased pain.       Objective: See treatment diary below      Assessment: Tolerated treatment well. Patient demonstrated fatigue post treatment, exhibited good technique with therapeutic exercises, and would benefit from continued PT. Pt was able to progress today with inclusion of step ups into pt's exercise program. Pt required min verbal cues to facilitate performance of proper technique. Assess pt response to treatment at next visit.      Plan: Continue per plan of care.      Precautions: HTN    POC expires Unit limit Auth Expiration date PT/OT + Visit Limit?    N/A  BOMN                 Visit/Unit Tracking  AUTH Status:  Date  5    N/A Used 1 2 3 4 5 6 7 8 9 10 11     Remaining                  FOTO, done       Manuals    Hamstring stretch      KILEY KILEY KILEY KILEY    Patellar mobs      KILEY KILEY KILEY KILEY    Tibiofemoral grade III/IV mobs      KILEY KILEY KILEY KILEY    K tape for patella support             Neuro Re-Ed             Pt education 4'     5' 5' 5' 5' 4'   Quad set      2x10 5\" 2x10 5\"      SLR 3x10     4x5 4x5 3x10 2x10    Heavy band TKEs             SL clamshells  3x10 blue     2x10 3\" grn 2x10 3\" grn 2x10 blue  2x10 blue    SL Hip abd 3x10     2x10 3\" 2x10 3\" 3x10 2x10    Bridges             Ther Ex             Seated hamstring stretch 5x 20\"     5x 20\" 5x20\" 5x 20\" 5x 20\"    Slantboard stretch      10x 10\"   1x10 10\"    Mini squat 2x10     NP due to p! 1x15 2x10 2x10    Heel raises 3x10     2x10 3x10 3x10 3x10    Recumbent bike for LE strength 6'       " "6' 6' 6'   SL leg press 4x10 55#       2x10 55# 2x10 55#                              Ther Activity             Step downs 2x10 4\"       2x5 4\" X25 4\"    Step ups  2x10 6\"            Gait Training                                       Modalities                                                              "

## 2024-05-09 NOTE — TELEPHONE ENCOUNTER
Patient is inquiring about the status on Prior Auth to get her injections. Please give patient a call back. Please advise, Thank you

## 2024-05-13 ENCOUNTER — EVALUATION (OUTPATIENT)
Dept: PHYSICAL THERAPY | Facility: CLINIC | Age: 59
End: 2024-05-13
Payer: OTHER MISCELLANEOUS

## 2024-05-13 DIAGNOSIS — M17.11 PRIMARY OSTEOARTHRITIS OF RIGHT KNEE: Primary | ICD-10-CM

## 2024-05-13 PROCEDURE — 97112 NEUROMUSCULAR REEDUCATION: CPT | Performed by: PHYSICAL THERAPIST

## 2024-05-13 PROCEDURE — 97110 THERAPEUTIC EXERCISES: CPT | Performed by: PHYSICAL THERAPIST

## 2024-05-13 PROCEDURE — 97530 THERAPEUTIC ACTIVITIES: CPT | Performed by: PHYSICAL THERAPIST

## 2024-05-13 NOTE — PROGRESS NOTES
PT Re-Evaluation     Today's date: 2024  Patient name: Marina Land  : 1965  MRN: [id number]  Referring provider: Weston Angel DO  Dx:   Encounter Diagnosis     ICD-10-CM    1. Primary osteoarthritis of right knee  M17.11           Start Time: 0800  Stop Time: 0848  Total time in clinic (min): 48 minutes    Assessment  Assessment details: Pt is a 58 y/o female presenting to physical therapy with R knee pain. Upon re-examination, pt has improved strength and ROM of pt's R knee and hip as well as decreased pain of the same regions. Pt's FOTO score has improved, demonstrating an improved ability to perform functional activities. However, pt continues to have deficits in strength and ROM of pt's R knee and hip as well as pain and difficulty with functional activities. Pt plan of care will focus on improving the previously mentioned deficits and limitations. Pt would benefit from skilled physical therapy to facilitate a return to her prior level of function. Pt plan of care will be sent to Dr. Angel due to Dr. Rizzo [facility].         Impairments: abnormal or restricted ROM, activity intolerance, impaired physical strength, lacks appropriate home exercise program and pain with function  Functional limitations: self-care/ADLs, household activities, lifting objects, and bendingUnderstanding of Dx/Px/POC: good   Prognosis: good    Goals  STG - 4 weeks  1. Pt will have a subjective decrease in pain of pt's R knee by 2 levels or more during standing longer than 20 minutes Ongoing  2. Pt will demonstrate 0 degrees R knee extension to facilitate a return to her prior level of function Ongoing    LTG - 8 weeks  1. Pt will demonstrate 4+/5 gross strength or better of her R knee and hip in all planes to facilitate a return to her prior level of function Ongoing  2. Pt's FOTO score will improve by 10 points or better to facilitate a return to pt's prior level of function. MET      Plan  Patient would  benefit from: PT eval and skilled physical therapy  Planned modality interventions: cryotherapy, thermotherapy: hydrocollator packs and unattended electrical stimulation  Planned therapy interventions: activity modification, ADL training, abdominal trunk stabilization, behavior modification, flexibility, functional ROM exercises, graded exercise, home exercise program, IASTM, joint mobilization, kinesiology taping, manual therapy, massage, Thurston taping, nerve gliding, neuromuscular re-education, patient education, postural training, self care, strengthening, stretching, therapeutic activities and therapeutic exercise  Frequency: 2x week  Duration in weeks: 8  Plan of Care beginning date: 2024  Plan of Care expiration date: 2024        Subjective Evaluation    History of Present Illness  Mechanism of injury: Pt is a 60 y/o female presenting to physical therapy with R knee pain. Pt reports having reduction in pain of her R knee at rest and with activity. Pt reports having improvements in strength and ROM of her R knee since starting therapy. Pt reports she has been able to perform activities around her home with greater ease now. However, pt reports having continued difficulties with going up stairs due to pain in her R knee. Pt reports she will also have increased pain with prolonged standing or walking. Pt reports she has overall improved since starting therapy.   Patient Goals  Patient goals for therapy: decreased pain, increased motion and increased strength    Pain  Current pain ratin  At best pain ratin  At worst pain rating: 3  Location: R knee  Quality: dull ache  Relieving factors: medications and rest  Aggravating factors: standing, walking and stair climbing    Treatments  Previous treatment: medication  Current treatment: medication and physical therapy        Objective     Tenderness     Right Knee   Tenderness in the lateral joint line, lateral patella, medial joint line, medial  "patella and patellar tendon.     Active Range of Motion   Left Knee   Flexion: 135 degrees   Extension: 0 degrees     Right Knee   Flexion: 140 degrees   Extension: -1 degrees     Mobility   Patellar Mobility:     Right Knee   WFL: medial, lateral, superior and inferior    Strength/Myotome Testing     Left Hip   Planes of Motion   Flexion: 4+  Extension: 4+  Abduction: 4+  Adduction: 4+  External rotation: 4  Internal rotation: 4    Right Hip   Planes of Motion   Flexion: 4  Extension: 4-  Abduction: 4-  Adduction: 4  External rotation: 4-  Internal rotation: 4    Left Knee   Flexion: 4+  Extension: 4+    Right Knee   Flexion: 4  Extension: 4-             Precautions: HTN    POC expires Unit limit Auth Expiration date PT/OT + Visit Limit?   7/8 N/A 7/8 BOMN                 Visit/Unit Tracking  AUTH Status:  Date 4/1 4/4 4/8 4/11 4/18 4/22 4/25 4/29 5/2 5/6 5/9 5/13   N/A Used 1 2 3 4 5 6 7 8 9 10 11 12    Remaining                  FOTO, done 4/25      Manuals 5/9 5/13 4/22 4/25 4/29 5/2 5/6   Hamstring stretch      KILEY KILEY KILEY KILEY    Patellar mobs      KILEY KILEY KILEY KILEY    Tibiofemoral grade III/IV mobs      KILEY KILEY KILEY KILEY    K tape for patella support             Neuro Re-Ed             Pt education 4' 4'    5' 5' 5' 5' 4'   Quad set      2x10 5\" 2x10 5\"      SLR 3x10 3x10    4x5 4x5 3x10 2x10    Heavy band TKEs             SL clamshells  3x10 blue 3x10 blk    2x10 3\" grn 2x10 3\" grn 2x10 blue  2x10 blue    SL Hip abd 3x10 3x10    2x10 3\" 2x10 3\" 3x10 2x10    Bridges             Ther Ex             Seated hamstring stretch 5x 20\" 5x 20\"    5x 20\" 5x20\" 5x 20\" 5x 20\"    Slantboard stretch      10x 10\"   1x10 10\"    Mini squat 2x10 2x10    NP due to p! 1x15 2x10 2x10    Heel raises 3x10 3x10    2x10 3x10 3x10 3x10    Recumbent bike for LE strength 6' 6'      6' 6' 6'   SL leg press 4x10 55# 4x10 55#      2x10 55# 2x10 55#                              Ther Activity             Step downs 2x10 4\" 2x10 4\"      2x5 4\" X25 4\"  " "  Step ups  2x10 6\" 2x10 6\"           Gait Training                                       Modalities                                              "

## 2024-05-14 ENCOUNTER — TELEPHONE (OUTPATIENT)
Age: 59
End: 2024-05-14

## 2024-05-14 ENCOUNTER — APPOINTMENT (OUTPATIENT)
Dept: PHYSICAL THERAPY | Facility: CLINIC | Age: 59
End: 2024-05-14
Payer: OTHER MISCELLANEOUS

## 2024-05-14 NOTE — TELEPHONE ENCOUNTER
"Speech Treatment Note    Today's date: 2023  Patient name: Jessie Bush  : 2020  MRN: [id number]  Referring provider: Wade Marie DO  Dx:   Encounter Diagnosis     ICD-10-CM    1. Mixed receptive-expressive language disorder  F80.2                        Visit Number:  HW    Subjective/Behavioral: Arrived 10 min late with father who was observing through 1 way window throughout session. Jessie participated well seated at table with all presented tasks.       Short Term Goals:  1. Patient will follow 2 step verbally presented commands with 2+ age appropriate modifiers in 8/10 opps. Partially met  Not directly targeted this date; previous session data as follows:  With color + quantity modifiers (e.g. \"make two green dots\") Jessie followed directive JANEEN in 2/6 opps; improving to 6/6 with repetition. At times, she would imitate the quantity but then use an incorrect quantity when actively painting.    2. Patient will answer WH and yes/ no questions without imitation in 8/10 opps. Partially met  ID or label craft items and actions in 75% opps using visual schedule (words and pictures) for craft order.  Where and who questions in sensory bin Trick or Seek activity: <50% indep, inc to ~65% w/ question repetitions and phrase completion opps   3. Patient will demonstrate use and understanding of age appropriate concepts and language structure (prepositions, pronouns, spatial concepts, plurals, present progressive, etc.). Partially met  Noted indep acc ID and labeling the following w/ at least 75% accuracy: middle, bottom, top, big, little.   Did not target 'all' vs 'one' today - target next session.   4. Patient will provide descriptions of actions in pictures or those completed herself in 8/10 opps in order to better explain emotions and desires.   Indep labeling and ID of happy and mad when crating mummy faces. Did not further discuss her emotions this date.  5. Patient will use age appropriate " Caller: Ida BARILLAS     Doctor: Luis    Reason for call: Calling to request work status note for patient updated per 4/30 visit w/ Dr Smith.    Fax#: [phone] Isaíasn @ [facility]    Call back#: [phone]     "sentence structure including use of nouns, verbs, etc. in a variety of activities in 8/10 opps when provided indirect models.  Not directly targeted this date; previous session data as follows:  Jessie consistently referred to herself as \"Jessie\" or \"your turn\" (during \"my turn\"). Therapist rephrased and used gestures to assist with accurate production of \"I\" and \"MY turn\"- Jessie then utilizing correct pronouns more consistently and JANEEN as session progressed!     Targeted /s/ in salient vocab. Pt was able to produce w/ ST cues and models, attempting to carry over into individual productions but limited success, continuing to allow tongue to slip through teeth. Noted great attention to modeled targets and visually attending to therapist models for productions. Again - great job w/ this; consider adding in new goal.     **Additional goals may be made the discretion of the treating therapist based on diagnotic therapy and speed of progress.    Long Term Goals:  1. Patient will demonstrate expressive language skills WFL for her age and gender  2. Patient will demonstrate receptive language skills WNL for her age and gender    Intervention certification from: 23  Intervention certification to: 23  Testin24    Other:Patient's family member was present was present during today's session., Patient was provided with home exercises/ activies to target goals in plan of care. and Discussed session and patient progress with caregiver/family member after today's session.     Recommendations:Continue with Plan of Care  "

## 2024-05-15 NOTE — TELEPHONE ENCOUNTER
Call to Ida @ [phone] no answer left message to relay Dr Smith's note given call back number if any further questions

## 2024-05-16 ENCOUNTER — APPOINTMENT (OUTPATIENT)
Dept: PHYSICAL THERAPY | Facility: CLINIC | Age: 59
End: 2024-05-16
Payer: OTHER MISCELLANEOUS

## 2024-05-16 NOTE — TELEPHONE ENCOUNTER
Patient is requesting a Return back to letter stating no restriction.   Please advise, danish you

## 2024-05-20 ENCOUNTER — APPOINTMENT (OUTPATIENT)
Dept: PHYSICAL THERAPY | Facility: CLINIC | Age: 59
End: 2024-05-20
Payer: OTHER MISCELLANEOUS

## 2024-05-21 ENCOUNTER — APPOINTMENT (OUTPATIENT)
Dept: PHYSICAL THERAPY | Facility: CLINIC | Age: 59
End: 2024-05-21
Payer: OTHER MISCELLANEOUS

## 2024-05-22 DIAGNOSIS — B00.1 RECURRENT HERPES LABIALIS: ICD-10-CM

## 2024-05-23 ENCOUNTER — APPOINTMENT (OUTPATIENT)
Dept: PHYSICAL THERAPY | Facility: CLINIC | Age: 59
End: 2024-05-23
Payer: OTHER MISCELLANEOUS

## 2024-05-23 RX ORDER — VALACYCLOVIR HYDROCHLORIDE 500 MG/1
500 TABLET, FILM COATED ORAL DAILY
Qty: 30 TABLET | Refills: 0 | Status: SHIPPED | OUTPATIENT
Start: 2024-05-23 | End: 2024-06-22

## 2024-05-28 ENCOUNTER — APPOINTMENT (OUTPATIENT)
Dept: PHYSICAL THERAPY | Facility: CLINIC | Age: 59
End: 2024-05-28
Payer: OTHER MISCELLANEOUS

## 2024-05-30 ENCOUNTER — APPOINTMENT (OUTPATIENT)
Dept: PHYSICAL THERAPY | Facility: CLINIC | Age: 59
End: 2024-05-30
Payer: OTHER MISCELLANEOUS

## 2024-05-31 ENCOUNTER — OFFICE VISIT (OUTPATIENT)
Dept: HEMATOLOGY ONCOLOGY | Facility: CLINIC | Age: 59
End: 2024-05-31
Payer: COMMERCIAL

## 2024-05-31 VITALS
OXYGEN SATURATION: 99 % | SYSTOLIC BLOOD PRESSURE: 118 MMHG | HEIGHT: 65 IN | TEMPERATURE: 97.3 F | BODY MASS INDEX: 25.87 KG/M2 | DIASTOLIC BLOOD PRESSURE: 70 MMHG | RESPIRATION RATE: 18 BRPM | WEIGHT: 155.3 LBS | HEART RATE: 89 BPM

## 2024-05-31 DIAGNOSIS — R79.82 ELEVATED C-REACTIVE PROTEIN (CRP): Primary | ICD-10-CM

## 2024-05-31 DIAGNOSIS — D70.3 NEUTROPENIA ASSOCIATED WITH INFECTION (HCC): ICD-10-CM

## 2024-05-31 PROCEDURE — 99212 OFFICE O/P EST SF 10 MIN: CPT | Performed by: PHYSICIAN ASSISTANT

## 2024-05-31 NOTE — PROGRESS NOTES
[facility]AT[facility] [place]  [address] [address]  Hematology Ambulatory Follow-Up  Marina STACY Emery, 1965, [id number]  5/31/2024      Assessment and Plan   1. Elevated C-reactive protein (CRP)  2. Neutropenia associated with infection (HCC)    In summary this is a 59-year-old female with past medical history as outlined below who presents as a new consultation for leukopenia since April of 2022. Most recent labs -> WBC 3.37, hemoglobin 12.9, MCV 94, platelets 199,000.  ANC 1.38.  Patient does have history of latent tuberculosis.  Her QuantiFERON results were intermediate from 7031-9686 until January 2023 when they resulted positive. CXR was negative without evidence of active disease.  She was seen by infectious disease and completed 9 months of isoniazid and vit B6 therapy.  She also has history of HPV and herpes.  She is performing annual Paps.  Up-to-date on mammogram and colonoscopy.  Patient denies constitutional symptoms or lymphadenopathy.     Recent workup including chronic hepatitis panel, CRP, MMA, B12, TSH, folate, TSH, leukemia/lymphoma flow cytology were unrevealing.  She has no constitutional symptoms. Recent  increase in genital herpes outbreak, started suppressive valtrex. Most recent CBC -> WBC 4.68, hemoglobin 13.9, platelets 203,000.    Discussion/Plan   She does not want to purse further work-up for leukopenia ie abdominal imaging, bone marrow biopsy at this time due to its chronicity and prior unrevealing work-up.   Pt would like to have her PCP to have her PCP follow her blood counts which is reasonable.   Recommend CBC-D and peripheral smear every 6 months. I explained to the patient she will need referred back to our office with worsening leukopenia, ANC persistently <1.0 and/or development of other cytopenias or constitutional symptoms, frequent infection etc. She voices understanding and is in agreement with plan  Will send communication to PCP   Patient was advised to return to clinic if she develops any frequent infections, unintentional weight loss, fever, chills, night sweats, lymphadenopathy or significant fatigue.  RTC prn     Patient voiced agreement and understanding to the above.   Patient advised to call the Hematology/Oncology office with any questions and concerns regarding the above.    Barrier(s) to care: None  The patient is able to self care.    Fidelia Marcelino PA-C   Medical Oncology/Hematology  [facility]    Subjective     Chief Complaint   Patient presents with    Follow-up     3 month follow-up with labs         History of present illness: 59-year-old female with past medical history of hypertension, osteoarthritis, TB lung latent who has been referred for evaluation of leukopenia by her primary care provider.     On chart review, leukopenia has been present since April 2022.  At this time WBC was 4.14 with mild reduction in neutrophils ANC 1.83.  No other cytopenias.  She denies history of leukopenia in the past.  Has never been seen by hematologist.     Patient has history of latent TB.  Positive QuantiFERON 01/2023.  Patient follows with infectious disease.  No signs of active disease.  Started isoniazid 300 mg daily and pyridoxine 50 mg daily 1/11/2023, completed 9 months of therapy in 10/2023.  She has history of HPV.  Receiving annual Paps.  Also endorses history of of oral herpes as a child and was dormant for many year until 12/2023 when she developed lesions on buttock. Test confirmed herpes virus 2. She now has oral lesion on right upper lip.  She has chronic joint pain without known history of autoimmune disease.  She had prior workup including RF, BERNARDINO and Lyme screen that was negative.  No history of cirrhosis.  Denies recent tick bite.  No history of HIV, hepatitis or cirrhosis.  She had HIV testing in 2023 that was negative.  Denies frequent infections requiring  "antibiotics.  No constitutional symptoms.  Consumes well balanced diet. Does not east red meat. She takes b12, zinc, calcium, and vitamin d daily. enies tobacco, alcohol, or drug use. Works as CNA. Last travel out of country was 4 years ago.      Up to date on mammogram, next one is scheduled for tomorrow.  History of colon polyps, up-to-date on colonoscopy.     No personal history of cancer. Mother had ovarian cancer. Maternal uncle breast cancer. Maternal 1st cousin had \"cancer in foot.\"      12/2023:WBC 3.37, hemoglobin 12.9, MCV 94, platelets 199,000.  ANC 1.38.  Remainder of differential is normal.     Lab Results   Component Value Date    WBC 4.68 04/22/2024    HGB 13.9 04/22/2024    HCT 42.8 04/22/2024    MCV 92 04/22/2024     04/22/2024         Review of Systems   Constitutional:  Negative for activity change, appetite change, fatigue, fever and unexpected weight change.        Denies night sweats  Respiratory:  Negative for shortness of breath.    Cardiovascular:  Negative for chest pain.   Gastrointestinal:  Negative for abdominal pain, blood in stool, nausea and vomiting.   Genitourinary:  Negative for hematuria.   Musculoskeletal:         +Right knee pain since Saturday. Sevier a \"popping\" sound in knee when trying to move patient.    Hematological:  Negative for adenopathy. Does not bruise/bleed easily.     05/31/24 :  Lab Results   Component Value Date    IRON 94 04/22/2024    TIBC 307 04/22/2024    FERRITIN 36 04/22/2024     Lab Results   Component Value Date    WBC 4.68 04/22/2024    HGB 13.9 04/22/2024    HCT 42.8 04/22/2024    MCV 92 04/22/2024     04/22/2024       Interval history:  Hx of genital herpes. Frequent outbreaks this year. Started suppressive valtrex.  Denies fever, unintentional weight loss, night sweats, lymphadenopathy or significant fatigue.       Review of Systems   All other systems reviewed and are negative.      Patient Active Problem List   Diagnosis    Essential " "hypertension    Snoring    Sciatica, right side    Calcific tendonitis of right shoulder    Cervicalgia    Dyslipidemia    Lumbar radiculopathy    Migraine headache    Varicose veins of both lower extremities with pain    Post-menopausal    Elevated C-reactive protein (CRP)    ASCUS with positive high risk HPV cervical    Closed nondisplaced fracture of lateral malleolus of right fibula    Cortical age-related cataract of both eyes    Acute bilateral low back pain without sciatica    Family history of ovarian cancer    Impaired fasting glucose    Shortness of breath    Primary osteoarthritis involving multiple joints    TB lung, latent    Trigger middle finger of right hand    Trigger ring finger of right hand    Positive QuantiFERON-TB Gold test    Other chest pain     Past Medical History:   Diagnosis Date    2019 novel coronavirus disease (COVID-19) 05/30/2020    x 2 2020 and 2022    Abnormal Pap smear of cervix     Arthritis     BRCA1 negative 2015    BRCA2 negative 2015    Fractures 2021    Gastric ulcer     Hyperlipidemia     Hypertension     Low back pain     Right trigger finger     long and ring    TB lung, latent     Varicella      Past Surgical History:   Procedure Laterality Date    ANKLE FRACTURE SURGERY Right 03/01/2021    \"ankle fx\"    ANKLE SURGERY  2021    APPENDECTOMY      CATARACT EXTRACTION Bilateral     COLONOSCOPY      FOOT SURGERY Right     HERNIA REPAIR      PA TENDON SHEATH INCISION Right 07/11/2023    Procedure: RELEASE TRIGGER FINGER LONG FINGER AND RING FINGER;  Surgeon: Brittaney Montoya MD;  [facility];  Service: Orthopedics     Family History   Problem Relation Age of Onset    Ovarian cancer Mother 50    Diabetes Father     Thyroid cancer Sister 47    No Known Problems Sister     No Known Problems Daughter     No Known Problems Son     No Known Problems Son     No Known Problems Son     Cancer Maternal Grandmother         unknown type or age    No Known Problems " Paternal Grandmother     No Known Problems Maternal Aunt     Breast cancer Maternal Uncle         50's     Social History     Socioeconomic History    Marital status: /Civil Union     Spouse name: None    Number of children: None    Years of education: None    Highest education level: None   Occupational History    None   Tobacco Use    Smoking status: Never    Smokeless tobacco: Never   Vaping Use    Vaping status: Never Used   Substance and Sexual Activity    Alcohol use: Not Currently     Comment: rare    Drug use: No    Sexual activity: Yes     Partners: Female, Male     Birth control/protection: None   Other Topics Concern    None   Social History Narrative    None     Social Determinants of Health     Financial Resource Strain: Not on file   Food Insecurity: Not on file   Transportation Needs: Not on file   Physical Activity: Not on file   Stress: Not on file   Social Connections: Not on file   Intimate Partner Violence: Not on file   Housing Stability: Not on file       Current Outpatient Medications:     alendronate (Fosamax) 70 mg tablet, Take 1 tablet (70 mg total) by mouth every 7 days, Disp: 12 tablet, Rfl: 3    atorvastatin (LIPITOR) 20 mg tablet, Take 1 tablet (20 mg total) by mouth daily, Disp: 90 tablet, Rfl: 0    Blood Glucose Monitoring Suppl (OneTouch Verio Reflect) w/Device KIT, Check blood sugars once daily. Please substitute with appropriate alternative as covered by patient's insurance. Dx: E11.65, Disp: 1 kit, Rfl: 0    glucose blood (OneTouch Verio) test strip, Check blood sugars once daily. Please substitute with appropriate alternative as covered by patient's insurance. Dx: E11.65, Disp: 100 each, Rfl: 3    hydrochlorothiazide (HYDRODIURIL) 25 mg tablet, Take 1 tablet (25 mg total) by mouth daily, Disp: 90 tablet, Rfl: 1    ibuprofen (MOTRIN) 800 mg tablet, Take 1 tablet (800 mg total) by mouth 2 (two) times a day with meals, Disp: 60 tablet, Rfl: 2    losartan (COZAAR) 50 mg  "tablet, Take 1 tablet (50 mg total) by mouth daily, Disp: 90 tablet, Rfl: 1    methocarbamol (ROBAXIN) 500 mg tablet, Take 1 tablet (500 mg total) by mouth 4 (four) times a day (Patient taking differently: Take 500 mg by mouth 4 (four) times a day as needed), Disp: 20 tablet, Rfl: 0    OneTouch Delica Lancets 33G MISC, Check blood sugars once daily. Please substitute with appropriate alternative as covered by patient's insurance. Dx: E11.65, Disp: 100 each, Rfl: 3    Pyridoxine HCl (VITAMIN B6 PO), Take by mouth, Disp: , Rfl:     valACYclovir (VALTREX) 500 mg tablet, Take 1 tablet (500 mg total) by mouth daily, Disp: 30 tablet, Rfl: 0    cyclobenzaprine (FLEXERIL) 10 mg tablet, Take 1 tablet (10 mg total) by mouth 3 (three) times a day as needed for muscle spasms (Patient not taking: Reported on 5/31/2024), Disp: 21 tablet, Rfl: 0    ondansetron (ZOFRAN-ODT) 4 mg disintegrating tablet, Take 1 tablet (4 mg total) by mouth every 6 (six) hours as needed for nausea or vomiting (Patient not taking: Reported on 4/30/2024), Disp: 20 tablet, Rfl: 0  Allergies   Allergen Reactions    Kiwi Extract - Food Allergy Hives    Other      strawberry    Pineapple - Food Allergy Hives       Objective   /70   Pulse 89   Temp (!) 97.3 °F (36.3 °C) (Temporal)   Resp 18   Ht 5' 5\" (1.651 m)   Wt 70.4 kg (155 lb 4.8 oz)   SpO2 99%   BMI 25.84 kg/m²    Physical Exam  Vitals reviewed.   HENT:      Head: Normocephalic.   Cardiovascular:      Rate and Rhythm: Normal rate and regular rhythm.   Pulmonary:      Effort: Pulmonary effort is normal.   Musculoskeletal:      Cervical back: Neck supple.   Skin:     Findings: No rash.   Neurological:      Mental Status: She is alert.         Result Review  Labs:  No visits with results within 30 Day(s) from this visit.   Latest known visit with results is:   Appointment on 04/22/2024   Component Date Value Ref Range Status    Vit D, 25-Hydroxy 04/22/2024 39.2  30.0 - 100.0 ng/mL Final    " Vitamin D guidelines established by Clinical Guidelines Subcommittee  of the Endocrine Society Task Force, 2011    Deficiency <20ng/ml   Insufficiency 20-30ng/ml   Sufficient  ng/ml     Vitamin B-12 04/22/2024 593  180 - 914 pg/mL Final    TSH 3RD GENERATON 04/22/2024 1.064  0.450 - 4.500 uIU/mL Final    The recommended reference ranges for TSH during pregnancy are as follows:   First trimester 0.100 to 2.500 uIU/mL   Second trimester  0.200 to 3.000 uIU/mL   Third trimester 0.300 to 3.000 uIU/m    Note: Normal ranges may not apply to patients who are transgender, non-binary, or whose legal sex, sex at birth, and gender identity differ.  Adult TSH (3rd generation) reference range follows the recommended guidelines of the American Thyroid Association, January, 2020.    Iron Saturation 04/22/2024 31  15 - 50 % Final    TIBC 04/22/2024 307  250 - 450 ug/dL Final    Iron 04/22/2024 94  50 - 212 ug/dL Final    Patients treated with metal-binding drugs (ie. Deferoxamine) may have depressed iron values.    UIBC 04/22/2024 213  155 - 355 ug/dL Final    Ferritin 04/22/2024 36  11 - 307 ng/mL Final       Imaging:   I reviewed relevant imaging    Please note:  This report has been generated by a voice recognition software system. Therefore there may be syntax, spelling, and/or grammatical errors. Please call if you have any questions.

## 2024-06-12 ENCOUNTER — TELEPHONE (OUTPATIENT)
Dept: FAMILY MEDICINE CLINIC | Facility: CLINIC | Age: 59
End: 2024-06-12

## 2024-06-12 NOTE — TELEPHONE ENCOUNTER
Rina was instructed to visit the office to inquire about the delivery of her injection. The Ortho Nurse is unavailable today. Kindly give her a call. Thank you.

## 2024-06-13 NOTE — TELEPHONE ENCOUNTER
Call to this patient to make her aware the gel injection were received at the end of the day on Tuesday and her appointments are in place.

## 2024-06-18 DIAGNOSIS — E78.2 MIXED HYPERLIPIDEMIA: ICD-10-CM

## 2024-06-18 RX ORDER — ATORVASTATIN CALCIUM 20 MG/1
20 TABLET, FILM COATED ORAL DAILY
Qty: 90 TABLET | Refills: 1 | Status: SHIPPED | OUTPATIENT
Start: 2024-06-18

## 2024-06-26 ENCOUNTER — TELEPHONE (OUTPATIENT)
Age: 59
End: 2024-06-26

## 2024-06-26 NOTE — TELEPHONE ENCOUNTER
Patient called to get a copy of her flu shot from last year.  She was able to download and print from [employer] was able to complete the task.

## 2024-07-01 ENCOUNTER — PROCEDURE VISIT (OUTPATIENT)
Dept: OBGYN CLINIC | Facility: CLINIC | Age: 59
End: 2024-07-01
Payer: COMMERCIAL

## 2024-07-01 VITALS
TEMPERATURE: 98.3 F | SYSTOLIC BLOOD PRESSURE: 119 MMHG | OXYGEN SATURATION: 99 % | HEIGHT: 65 IN | DIASTOLIC BLOOD PRESSURE: 81 MMHG | HEART RATE: 66 BPM | RESPIRATION RATE: 18 BRPM | BODY MASS INDEX: 26.76 KG/M2 | WEIGHT: 160.6 LBS

## 2024-07-01 DIAGNOSIS — M17.11 PRIMARY OSTEOARTHRITIS OF RIGHT KNEE: Primary | ICD-10-CM

## 2024-07-01 PROCEDURE — 20610 DRAIN/INJ JOINT/BURSA W/O US: CPT | Performed by: FAMILY MEDICINE

## 2024-07-01 RX ORDER — HYALURONATE SODIUM 10 MG/ML
20 SYRINGE (ML) INTRAARTICULAR
Status: COMPLETED | OUTPATIENT
Start: 2024-07-01 | End: 2024-07-01

## 2024-07-01 RX ADMIN — Medication 20 MG: at 10:00

## 2024-07-01 NOTE — PROGRESS NOTES
Large joint arthrocentesis: R knee  Universal Protocol:  Consent: Verbal consent obtained.  Risks and benefits: risks, benefits and alternatives were discussed  Consent given by: patient  Patient identity confirmed: verbally with patient  Supporting Documentation  Indications: pain   Procedure Details  Location: knee - R knee  Preparation: Patient was prepped and draped in the usual sterile fashion  Needle size: 22 G  Ultrasound guidance: no  Approach: anterolateral  Medications administered: 20 mg Sodium Hyaluronate (Viscosup) 20 MG/2ML    Patient tolerance: patient tolerated the procedure well with no immediate complications

## 2024-07-08 ENCOUNTER — PROCEDURE VISIT (OUTPATIENT)
Dept: OBGYN CLINIC | Facility: CLINIC | Age: 59
End: 2024-07-08
Payer: COMMERCIAL

## 2024-07-08 VITALS
BODY MASS INDEX: 27.16 KG/M2 | OXYGEN SATURATION: 98 % | WEIGHT: 163 LBS | TEMPERATURE: 93.6 F | SYSTOLIC BLOOD PRESSURE: 113 MMHG | RESPIRATION RATE: 18 BRPM | HEART RATE: 65 BPM | DIASTOLIC BLOOD PRESSURE: 76 MMHG | HEIGHT: 65 IN

## 2024-07-08 DIAGNOSIS — M17.11 PRIMARY OSTEOARTHRITIS OF RIGHT KNEE: Primary | ICD-10-CM

## 2024-07-08 PROCEDURE — 20610 DRAIN/INJ JOINT/BURSA W/O US: CPT | Performed by: FAMILY MEDICINE

## 2024-07-08 RX ORDER — HYALURONATE SODIUM 10 MG/ML
20 SYRINGE (ML) INTRAARTICULAR
Status: COMPLETED | OUTPATIENT
Start: 2024-07-08 | End: 2024-07-08

## 2024-07-08 RX ADMIN — Medication 20 MG: at 10:00

## 2024-07-11 DIAGNOSIS — B00.1 RECURRENT HERPES LABIALIS: ICD-10-CM

## 2024-07-11 RX ORDER — VALACYCLOVIR HYDROCHLORIDE 500 MG/1
500 TABLET, FILM COATED ORAL DAILY
Qty: 30 TABLET | Refills: 5 | Status: SHIPPED | OUTPATIENT
Start: 2024-07-11 | End: 2025-01-07

## 2024-07-14 DIAGNOSIS — I10 ESSENTIAL HYPERTENSION: ICD-10-CM

## 2024-07-14 RX ORDER — HYDROCHLOROTHIAZIDE 25 MG/1
25 TABLET ORAL DAILY
Qty: 100 TABLET | Refills: 1 | Status: SHIPPED | OUTPATIENT
Start: 2024-07-14

## 2024-07-15 ENCOUNTER — PROCEDURE VISIT (OUTPATIENT)
Dept: OBGYN CLINIC | Facility: CLINIC | Age: 59
End: 2024-07-15
Payer: COMMERCIAL

## 2024-07-15 VITALS
WEIGHT: 161 LBS | DIASTOLIC BLOOD PRESSURE: 83 MMHG | OXYGEN SATURATION: 99 % | HEART RATE: 69 BPM | RESPIRATION RATE: 18 BRPM | SYSTOLIC BLOOD PRESSURE: 120 MMHG | TEMPERATURE: 98.2 F | BODY MASS INDEX: 26.82 KG/M2 | HEIGHT: 65 IN

## 2024-07-15 DIAGNOSIS — M54.31 SCIATICA, RIGHT SIDE: ICD-10-CM

## 2024-07-15 DIAGNOSIS — M17.11 PRIMARY OSTEOARTHRITIS OF RIGHT KNEE: Primary | ICD-10-CM

## 2024-07-15 PROCEDURE — 20610 DRAIN/INJ JOINT/BURSA W/O US: CPT | Performed by: FAMILY MEDICINE

## 2024-07-15 RX ORDER — METHYLPREDNISOLONE 4 MG/1
TABLET ORAL
Qty: 1 EACH | Refills: 0 | Status: SHIPPED | OUTPATIENT
Start: 2024-07-15 | End: 2024-07-25

## 2024-07-15 RX ORDER — HYALURONATE SODIUM 10 MG/ML
20 SYRINGE (ML) INTRAARTICULAR
Status: COMPLETED | OUTPATIENT
Start: 2024-07-15 | End: 2024-07-15

## 2024-07-15 RX ADMIN — Medication 20 MG: at 10:00

## 2024-07-15 NOTE — LETTER
July 15, 2024     Patient: Marina Land  YOB: 1965  Date of Visit: 7/15/2024      To Whom it May Concern:    Marina Land is under my professional care. Marina was seen in my office on 7/15/2024. I am recommending that patient remain on light desk duty for the next 3 weeks at which time will return for re-evaluation.     If you have any questions or concerns, please don't hesitate to call.         Sincerely,          Sumit Smith DO        CC: No Recipients

## 2024-07-15 NOTE — PROGRESS NOTES
The patient was advised to follow-up with spine and pain management for further recommendations for ongoing lower back pain she was referred for an MRI and did encourage her to keep this appointment for MRI of the lumbar spine.

## 2024-07-24 ENCOUNTER — APPOINTMENT (OUTPATIENT)
Dept: LAB | Facility: CLINIC | Age: 59
End: 2024-07-24
Payer: COMMERCIAL

## 2024-07-24 DIAGNOSIS — R73.03 PREDIABETES: ICD-10-CM

## 2024-07-24 LAB
EST. AVERAGE GLUCOSE BLD GHB EST-MCNC: 120 MG/DL
HBA1C MFR BLD: 5.8 %

## 2024-07-24 PROCEDURE — 36415 COLL VENOUS BLD VENIPUNCTURE: CPT

## 2024-07-24 PROCEDURE — 83036 HEMOGLOBIN GLYCOSYLATED A1C: CPT

## 2024-07-25 ENCOUNTER — OFFICE VISIT (OUTPATIENT)
Dept: FAMILY MEDICINE CLINIC | Facility: CLINIC | Age: 59
End: 2024-07-25
Payer: COMMERCIAL

## 2024-07-25 VITALS
TEMPERATURE: 96.7 F | WEIGHT: 164 LBS | DIASTOLIC BLOOD PRESSURE: 80 MMHG | OXYGEN SATURATION: 99 % | HEIGHT: 65 IN | SYSTOLIC BLOOD PRESSURE: 122 MMHG | BODY MASS INDEX: 27.32 KG/M2 | HEART RATE: 68 BPM

## 2024-07-25 DIAGNOSIS — R73.03 PREDIABETES: ICD-10-CM

## 2024-07-25 DIAGNOSIS — Z13.220 LIPID SCREENING: ICD-10-CM

## 2024-07-25 DIAGNOSIS — M15.9 PRIMARY OSTEOARTHRITIS INVOLVING MULTIPLE JOINTS: ICD-10-CM

## 2024-07-25 DIAGNOSIS — I10 ESSENTIAL HYPERTENSION: Primary | ICD-10-CM

## 2024-07-25 PROCEDURE — 99213 OFFICE O/P EST LOW 20 MIN: CPT

## 2024-07-25 NOTE — PROGRESS NOTES
Ambulatory Visit  Name: Marina Land      : 1965      MRN: [id number]  Encounter Provider: Yadi Portillo PA-C  Encounter Date: 2024   Encounter department: [facility]    Assessment & Plan   1. Essential hypertension  Assessment & Plan:  - BP WNL today at 122/80 mmHg  - remains well controlled on current losartan 50 mg QD and HCTZ 25 mg QD  - update CBC and CMP before next visit   Orders:  -     CBC and differential; Future  -     Comprehensive metabolic panel; Future  2. Lipid screening  -     Lipid panel; Future  3. Primary osteoarthritis involving multiple joints  Assessment & Plan:  - continue following with orthopedics for management   4. Prediabetes  Assessment & Plan:  - A1C completed yesterday was 5.8  - remains stable in prediabetic range  - recommended low carb/low sugar diet, increase fruits/veggies and increase daily exercise  - continue to monitor every six months     Follow up in three months or sooner as needed. To call with any questions or concerns.      History of Present Illness     CC: three month HTN follow up    Patient presents for three month follow up.   HTN remains well controlled on current HCTZ and losartan.   Since last visit, she has seen orthopedics, Dr. Smith, for OA of the right knee and has been receiving injections in the knee - reports improvement in pain.   She also had a visit with her hematologist in May for her chronic neutropenia  Had A1C completed yesterday which was 5.8; remains stable   No acute complaints today; reports she has been doing well         Review of Systems   Constitutional:  Negative for chills, diaphoresis and fever.   Respiratory:  Negative for cough, chest tightness, shortness of breath and wheezing.    Cardiovascular:  Negative for chest pain and palpitations.   Gastrointestinal:  Negative for abdominal pain.   Neurological:  Negative for dizziness, light-headedness and headaches.     Medical History Reviewed by  "provider this encounter:       Past Medical History   Past Medical History:   Diagnosis Date    2019 novel coronavirus disease (COVID-19) 05/30/2020    x 2 2020 and 2022    Abnormal Pap smear of cervix     Arthritis     BRCA1 negative 2015    BRCA2 negative 2015    Fractures 2021    Gastric ulcer     Hyperlipidemia     Hypertension     Low back pain     Right trigger finger     long and ring    TB lung, latent     Varicella      Past Surgical History:   Procedure Laterality Date    ANKLE FRACTURE SURGERY Right 03/01/2021    \"ankle fx\"    ANKLE SURGERY  2021    APPENDECTOMY      CATARACT EXTRACTION Bilateral     COLONOSCOPY      FOOT SURGERY Right     HERNIA REPAIR      OK TENDON SHEATH INCISION Right 07/11/2023    Procedure: RELEASE TRIGGER FINGER LONG FINGER AND RING FINGER;  Surgeon: Brittaney Montoya MD;  Location[facility];  Service: Orthopedics     Family History   Problem Relation Age of Onset    Ovarian cancer Mother 50    Diabetes Father     Thyroid cancer Sister 47    No Known Problems Sister     No Known Problems Daughter     No Known Problems Son     No Known Problems Son     No Known Problems Son     Cancer Maternal Grandmother         unknown type or age    No Known Problems Paternal Grandmother     No Known Problems Maternal Aunt     Breast cancer Maternal Uncle         50's     Current Outpatient Medications on File Prior to Visit   Medication Sig Dispense Refill    alendronate (Fosamax) 70 mg tablet Take 1 tablet (70 mg total) by mouth every 7 days 12 tablet 3    atorvastatin (LIPITOR) 20 mg tablet take 1 tablet by mouth once daily 90 tablet 1    Blood Glucose Monitoring Suppl (OneTouch Verio Reflect) w/Device KIT Check blood sugars once daily. Please substitute with appropriate alternative as covered by patient's insurance. Dx: E11.65 1 kit 0    glucose blood (OneTouch Verio) test strip Check blood sugars once daily. Please substitute with appropriate alternative as covered by patient's " insurance. Dx: E11.65 100 each 3    hydroCHLOROthiazide 25 mg tablet take 1 tablet by mouth once daily 100 tablet 1    ibuprofen (MOTRIN) 800 mg tablet Take 1 tablet (800 mg total) by mouth 2 (two) times a day with meals 60 tablet 2    losartan (COZAAR) 50 mg tablet Take 1 tablet (50 mg total) by mouth daily 90 tablet 1    OneTouch Delica Lancets 33G MISC Check blood sugars once daily. Please substitute with appropriate alternative as covered by patient's insurance. Dx: E11.65 100 each 3    Pyridoxine HCl (VITAMIN B6 PO) Take by mouth      valACYclovir (VALTREX) 500 mg tablet Take 1 tablet (500 mg total) by mouth daily 30 tablet 5    [DISCONTINUED] methylPREDNISolone 4 MG tablet therapy pack Use as directed on package 1 each 0     No current facility-administered medications on file prior to visit.     Allergies   Allergen Reactions    Kiwi Extract - Food Allergy Hives    Other      strawberry    Pineapple - Food Allergy Hives      Current Outpatient Medications on File Prior to Visit   Medication Sig Dispense Refill    alendronate (Fosamax) 70 mg tablet Take 1 tablet (70 mg total) by mouth every 7 days 12 tablet 3    atorvastatin (LIPITOR) 20 mg tablet take 1 tablet by mouth once daily 90 tablet 1    Blood Glucose Monitoring Suppl (OneTouch Verio Reflect) w/Device KIT Check blood sugars once daily. Please substitute with appropriate alternative as covered by patient's insurance. Dx: E11.65 1 kit 0    glucose blood (OneTouch Verio) test strip Check blood sugars once daily. Please substitute with appropriate alternative as covered by patient's insurance. Dx: E11.65 100 each 3    hydroCHLOROthiazide 25 mg tablet take 1 tablet by mouth once daily 100 tablet 1    ibuprofen (MOTRIN) 800 mg tablet Take 1 tablet (800 mg total) by mouth 2 (two) times a day with meals 60 tablet 2    losartan (COZAAR) 50 mg tablet Take 1 tablet (50 mg total) by mouth daily 90 tablet 1    OneTouch Delica Lancets 33G MISC Check blood sugars  "once daily. Please substitute with appropriate alternative as covered by patient's insurance. Dx: E11.65 100 each 3    Pyridoxine HCl (VITAMIN B6 PO) Take by mouth      valACYclovir (VALTREX) 500 mg tablet Take 1 tablet (500 mg total) by mouth daily 30 tablet 5    [DISCONTINUED] methylPREDNISolone 4 MG tablet therapy pack Use as directed on package 1 each 0     No current facility-administered medications on file prior to visit.      Social History     Tobacco Use    Smoking status: Never    Smokeless tobacco: Never   Vaping Use    Vaping status: Never Used   Substance and Sexual Activity    Alcohol use: Not Currently     Comment: rare    Drug use: No    Sexual activity: Yes     Partners: Female, Male     Birth control/protection: None     Objective     /80   Pulse 68   Temp (!) 96.7 °F (35.9 °C)   Ht 5' 5\" (1.651 m)   Wt 74.4 kg (164 lb)   SpO2 99%   BMI 27.29 kg/m²     Physical Exam  Vitals reviewed.   Constitutional:       General: She is not in acute distress.     Appearance: Normal appearance. She is not ill-appearing or diaphoretic.   HENT:      Head: Normocephalic and atraumatic.      Right Ear: External ear normal.      Left Ear: External ear normal.      Nose: Nose normal.      Mouth/Throat:      Mouth: Mucous membranes are moist.   Eyes:      General:         Right eye: No discharge.         Left eye: No discharge.      Conjunctiva/sclera: Conjunctivae normal.   Cardiovascular:      Rate and Rhythm: Normal rate and regular rhythm.      Pulses: Normal pulses.      Heart sounds: Normal heart sounds. No murmur heard.  Pulmonary:      Effort: Pulmonary effort is normal. No respiratory distress.      Breath sounds: Normal breath sounds. No wheezing, rhonchi or rales.   Musculoskeletal:         General: Normal range of motion.      Cervical back: Normal range of motion.      Right lower leg: No edema.      Left lower leg: No edema.   Skin:     General: Skin is warm.   Neurological:      General: No " focal deficit present.      Mental Status: She is alert.      Gait: Gait normal.   Psychiatric:         Mood and Affect: Mood normal.       Administrative Statements

## 2024-07-25 NOTE — ASSESSMENT & PLAN NOTE
- BP WNL today at 122/80 mmHg  - remains well controlled on current losartan 50 mg QD and HCTZ 25 mg QD  - update CBC and CMP before next visit

## 2024-08-06 ENCOUNTER — OFFICE VISIT (OUTPATIENT)
Dept: OBGYN CLINIC | Facility: CLINIC | Age: 59
End: 2024-08-06
Payer: COMMERCIAL

## 2024-08-06 VITALS
DIASTOLIC BLOOD PRESSURE: 84 MMHG | OXYGEN SATURATION: 98 % | SYSTOLIC BLOOD PRESSURE: 124 MMHG | HEART RATE: 68 BPM | BODY MASS INDEX: 27.32 KG/M2 | HEIGHT: 65 IN | RESPIRATION RATE: 18 BRPM | TEMPERATURE: 98.1 F | WEIGHT: 164 LBS

## 2024-08-06 DIAGNOSIS — M17.11 PRIMARY OSTEOARTHRITIS OF RIGHT KNEE: Primary | ICD-10-CM

## 2024-08-06 PROCEDURE — 99213 OFFICE O/P EST LOW 20 MIN: CPT | Performed by: FAMILY MEDICINE

## 2024-08-06 NOTE — PROGRESS NOTES
Subjective:    Chief Complaint   Patient presents with    Right Knee - Follow-up, Clicking, Swelling, Pain     Occasionally was worse last week        Marina Land is a 59 y.o. female presenting today for a 3-week follow-up visit for right knee pain.  Patient has been treated for right knee osteoarthritis with Visco injection therapy performed approximately 3 weeks ago.  She states that the injection therapy has helped a bit as she does not have as much severe pain in regards to her right knee however since returning back to work this past week she has noticed that walking up and down steps have been pretty painful.  She does report occasional numbness and tingling and was referred for an MRI of the back and consultation with spine pain however is nervous with doing injection therapy for the lumbar back    The following portions were reviewed and updated as needed: allergies, current medications, past medical history, past social history, past surgical history and problem list.    Review of Systems   Constitutional: Negative for fever.   HENT: Negative for dental problem and headaches.    Eyes: Negative for vision loss.   Respiratory: Negative for cough and shortness of breath.    Cardiovascular: Negative for leg swelling and palpitations.   Gastrointestinal: Negative for constipation and diarrhea.   Genitourinary: Negative for bladder incontinence and difficulty urinating.   Musculoskeletal: Negative for back pain and difficulty walking.   Skin: Negative for rash and ulcer.   Neurological: Negative for dizziness and headaches.   Hem/Lymph/Immuno: Negative for blood clots. Does not bruise/bleed easily.   Psychiatric/Behavioral: Negative for confusion.         Objective:  General: no acute distress, non toxic, AAO x3   Skin: no skin changes, no rashes, no wounds or laceration  Vasculature: normal cap refill, no LE edema, normal popliteal and dorsalis pedis pulse  Neurologic:   Musculoskeletal: Right KNEE  EXAM  Gait: limping gait negative, able to weight bear without difficulty  Inspection: No gross deformity, no redness or warmth   Effusion: negative   Medial joint line TTP: +  Lateral joint line TTP: +  ROM: Full flexion and extension  [facility]'s: negative,   Instability to varus/valgus stress: negative  Anterior Drawer: negative   Lachman's test: negative  Posterior Drawer: negative      Imaging:       Assessment/Plan:  1. Primary osteoarthritis of right knee  Clinical impression is that the patient is symptomatic from right knee osteoarthritis which seems to be of exacerbated following return to work activity.  We discussed treatment options for knee osteoarthritis, I discussed that it is still early in regards to the Visco injections and may take upwards to 6 weeks to see the effect of this injection therapy.  Discussed with patient that we can repeat Visco injection therapy every 6 months and can consider a corticosteroid injection in between if she is having recurrence of pain.  Finally surgical intervention was discussed however patient declines.  She will return in about 3 months if symptoms recur.  Encouraged patient to keep her appointment to complete an MRI of the lumbar spine and spine pain referral

## 2024-08-06 NOTE — LETTER
August 6, 2024     Patient: Marina Land  YOB: 1965  Date of Visit: 8/6/2024      To Whom it May Concern:    Marina Land is under my professional care. Marina was seen in my office on 8/6/2024. can return to work without restriction as of today's date    If you have any questions or concerns, please don't hesitate to call.         Sincerely,          Sumit Smith DO        CC: No Recipients

## 2024-08-22 ENCOUNTER — TELEPHONE (OUTPATIENT)
Age: 59
End: 2024-08-22

## 2024-08-22 NOTE — TELEPHONE ENCOUNTER
Pt would like her TB results printed. She needs it for an employer. She said must have her name and  on it. She will be in today or tomorrow for it. Thanks

## 2024-09-11 DIAGNOSIS — I10 ESSENTIAL HYPERTENSION: ICD-10-CM

## 2024-09-11 RX ORDER — LOSARTAN POTASSIUM 50 MG/1
50 TABLET ORAL DAILY
Qty: 90 TABLET | Refills: 1 | Status: SHIPPED | OUTPATIENT
Start: 2024-09-11

## 2024-09-29 ENCOUNTER — OFFICE VISIT (OUTPATIENT)
Dept: URGENT CARE | Facility: CLINIC | Age: 59
End: 2024-09-29
Payer: COMMERCIAL

## 2024-09-29 VITALS
BODY MASS INDEX: 27.32 KG/M2 | HEIGHT: 65 IN | HEART RATE: 83 BPM | SYSTOLIC BLOOD PRESSURE: 130 MMHG | OXYGEN SATURATION: 98 % | RESPIRATION RATE: 19 BRPM | DIASTOLIC BLOOD PRESSURE: 90 MMHG | WEIGHT: 164 LBS | TEMPERATURE: 98 F

## 2024-09-29 DIAGNOSIS — S29.012A STRAIN OF THORACIC BACK REGION: Primary | ICD-10-CM

## 2024-09-29 PROCEDURE — 99284 EMERGENCY DEPT VISIT MOD MDM: CPT | Performed by: PHYSICIAN ASSISTANT

## 2024-09-29 PROCEDURE — G0383 LEV 4 HOSP TYPE B ED VISIT: HCPCS | Performed by: PHYSICIAN ASSISTANT

## 2024-09-29 RX ORDER — CYCLOBENZAPRINE HCL 5 MG
5 TABLET ORAL 3 TIMES DAILY PRN
Qty: 20 TABLET | Refills: 0 | Status: SHIPPED | OUTPATIENT
Start: 2024-09-29

## 2024-09-29 NOTE — PROGRESS NOTES
[facility]        NAME: Marina Land is a 59 y.o. female  : 1965    MRN: [id number]  DATE: 2024  TIME: 1:39 PM      Assessment and Plan     Strain of thoracic back region [S29.012A]  1. Strain of thoracic back region  cyclobenzaprine (FLEXERIL) 5 mg tablet          Note:   Likely strain of thoracic spine - Rx muscle relaxants to help with pain/spasms   Patient to continue with ibuprofen as needed for pain as well     Patient Instructions   There are no Patient Instructions on file for this visit.     Follow up with primary care provider.   Go to ER if symptoms worsen.    Chief Complaint     Chief Complaint   Patient presents with    Back Pain     Back pain since Friday, he hurts in the upper mid part, Advil. Hard to move or sit.          History of Present Illness     Patient presents with upper back pain x 2 days. She states the day before this started, she was doing rotation movements at work. She has been taking ibuprofen without relief.         Review of Systems     Review of Systems   Constitutional:  Negative for chills, fatigue and fever.   HENT:  Negative for congestion, ear pain, postnasal drip, rhinorrhea, sinus pressure, sinus pain and sore throat.    Eyes:  Negative for pain and visual disturbance.   Respiratory:  Negative for cough, chest tightness and shortness of breath.    Cardiovascular:  Negative for chest pain and palpitations.   Gastrointestinal:  Negative for abdominal pain, diarrhea, nausea and vomiting.   Genitourinary:  Negative for dysuria and hematuria.   Musculoskeletal:  Positive for back pain. Negative for arthralgias and myalgias.   Skin:  Negative for rash.   Neurological:  Negative for dizziness, seizures, syncope, numbness and headaches.   All other systems reviewed and are negative.        Current Medications       Current Outpatient Medications:     alendronate (Fosamax) 70 mg tablet, Take 1 tablet (70 mg total) by mouth every 7 days, Disp: 12  tablet, Rfl: 3    atorvastatin (LIPITOR) 20 mg tablet, take 1 tablet by mouth once daily, Disp: 90 tablet, Rfl: 1    Blood Glucose Monitoring Suppl (OneTouch Verio Reflect) w/Device KIT, Check blood sugars once daily. Please substitute with appropriate alternative as covered by patient's insurance. Dx: E11.65, Disp: 1 kit, Rfl: 0    cyclobenzaprine (FLEXERIL) 5 mg tablet, Take 1 tablet (5 mg total) by mouth 3 (three) times a day as needed for muscle spasms, Disp: 20 tablet, Rfl: 0    glucose blood (OneTouch Verio) test strip, Check blood sugars once daily. Please substitute with appropriate alternative as covered by patient's insurance. Dx: E11.65, Disp: 100 each, Rfl: 3    hydroCHLOROthiazide 25 mg tablet, take 1 tablet by mouth once daily, Disp: 100 tablet, Rfl: 1    ibuprofen (MOTRIN) 800 mg tablet, Take 1 tablet (800 mg total) by mouth 2 (two) times a day with meals, Disp: 60 tablet, Rfl: 2    losartan (COZAAR) 50 mg tablet, take 1 tablet by mouth once daily, Disp: 90 tablet, Rfl: 1    OneTouch Delica Lancets 33G MISC, Check blood sugars once daily. Please substitute with appropriate alternative as covered by patient's insurance. Dx: E11.65, Disp: 100 each, Rfl: 3    Pyridoxine HCl (VITAMIN B6 PO), Take by mouth, Disp: , Rfl:     valACYclovir (VALTREX) 500 mg tablet, Take 1 tablet (500 mg total) by mouth daily, Disp: 30 tablet, Rfl: 5    Current Allergies     Allergies as of 09/29/2024 - Reviewed 09/29/2024   Allergen Reaction Noted    Kiwi extract - food allergy Hives 11/11/2022    Other  04/10/2019    Pineapple - food allergy Hives 08/30/2017              The following portions of the patient's history were reviewed and updated as appropriate: allergies, current medications, past family history, past medical history, past social history, past surgical history, and problem list.     Past Medical History:   Diagnosis Date    2019 novel coronavirus disease (COVID-19) 05/30/2020    x 2 2020 and 2022    Abnormal  "Pap smear of cervix     Arthritis     BRCA1 negative 2015    BRCA2 negative 2015    Fractures 2021    Gastric ulcer     Hyperlipidemia     Hypertension     Low back pain     Right trigger finger     long and ring    TB lung, latent     Varicella        Past Surgical History:   Procedure Laterality Date    ANKLE FRACTURE SURGERY Right 03/01/2021    \"ankle fx\"    ANKLE SURGERY  2021    APPENDECTOMY      CATARACT EXTRACTION Bilateral     COLONOSCOPY      FOOT SURGERY Right     HERNIA REPAIR      KS TENDON SHEATH INCISION Right 07/11/2023    Procedure: RELEASE TRIGGER FINGER LONG FINGER AND RING FINGER;  Surgeon: Brittaney Montoya MD;  Location: [facility] MAIN OR;  Service: Orthopedics       Family History   Problem Relation Age of Onset    Ovarian cancer Mother 50    Diabetes Father     Thyroid cancer Sister 47    No Known Problems Sister     No Known Problems Daughter     No Known Problems Son     No Known Problems Son     No Known Problems Son     Cancer Maternal Grandmother         unknown type or age    No Known Problems Paternal Grandmother     No Known Problems Maternal Aunt     Breast cancer Maternal Uncle         50's         Medications have been verified.        Objective     /90   Pulse 83   Temp 98 °F (36.7 °C)   Resp 19   Ht 5' 5\" (1.651 m)   Wt 74.4 kg (164 lb)   SpO2 98%   BMI 27.29 kg/m²   No LMP recorded. Patient is postmenopausal.         Physical Exam     Physical Exam  Vitals and nursing note reviewed.   Constitutional:       Appearance: Normal appearance. She is normal weight.   HENT:      Head: Normocephalic and atraumatic.   Cardiovascular:      Rate and Rhythm: Normal rate and regular rhythm.      Heart sounds: Normal heart sounds.   Pulmonary:      Effort: Pulmonary effort is normal.      Breath sounds: Normal breath sounds.   Musculoskeletal:      Thoracic back: Spasms present. No swelling, lacerations, tenderness or bony tenderness. Decreased range of motion.   Skin:     General: " Skin is warm and dry.   Neurological:      General: No focal deficit present.      Mental Status: She is alert and oriented to person, place, and time.   Psychiatric:         Mood and Affect: Mood normal.         Behavior: Behavior normal.

## 2024-09-29 NOTE — LETTER
September 29, 2024     Patient: Marina Land   YOB: 1965   Date of Visit: 9/29/2024       To Whom it May Concern:    Marina Land was seen in my clinic on 9/29/2024. She may return to work on 10/1/2024 .    If you have any questions or concerns, please don't hesitate to call.         Sincerely,          Ida Gaytan PA-C        CC: No Recipients

## 2024-10-07 ENCOUNTER — OFFICE VISIT (OUTPATIENT)
Dept: FAMILY MEDICINE CLINIC | Facility: CLINIC | Age: 59
End: 2024-10-07
Payer: COMMERCIAL

## 2024-10-07 ENCOUNTER — APPOINTMENT (OUTPATIENT)
Dept: RADIOLOGY | Facility: CLINIC | Age: 59
End: 2024-10-07
Payer: COMMERCIAL

## 2024-10-07 VITALS
OXYGEN SATURATION: 98 % | HEART RATE: 95 BPM | TEMPERATURE: 98.7 F | DIASTOLIC BLOOD PRESSURE: 87 MMHG | WEIGHT: 167 LBS | SYSTOLIC BLOOD PRESSURE: 128 MMHG | BODY MASS INDEX: 27.82 KG/M2 | HEIGHT: 65 IN

## 2024-10-07 DIAGNOSIS — F41.0 PANIC ATTACK: ICD-10-CM

## 2024-10-07 DIAGNOSIS — M25.50 POLYARTHRALGIA: Primary | ICD-10-CM

## 2024-10-07 DIAGNOSIS — R21 RASH AND NONSPECIFIC SKIN ERUPTION: ICD-10-CM

## 2024-10-07 DIAGNOSIS — M25.532 LEFT WRIST PAIN: ICD-10-CM

## 2024-10-07 DIAGNOSIS — R20.2 PARESTHESIA: ICD-10-CM

## 2024-10-07 PROBLEM — R73.01 IMPAIRED FASTING GLUCOSE: Status: RESOLVED | Noted: 2022-12-08 | Resolved: 2024-10-07

## 2024-10-07 PROBLEM — R07.89 OTHER CHEST PAIN: Status: RESOLVED | Noted: 2023-06-09 | Resolved: 2024-10-07

## 2024-10-07 PROBLEM — R06.02 SHORTNESS OF BREATH: Status: RESOLVED | Noted: 2022-12-16 | Resolved: 2024-10-07

## 2024-10-07 PROCEDURE — 73110 X-RAY EXAM OF WRIST: CPT

## 2024-10-07 PROCEDURE — 99214 OFFICE O/P EST MOD 30 MIN: CPT

## 2024-10-07 RX ORDER — MELOXICAM 7.5 MG/1
7.5 TABLET ORAL DAILY PRN
Qty: 30 TABLET | Refills: 0 | Status: SHIPPED | OUTPATIENT
Start: 2024-10-07

## 2024-10-07 RX ORDER — HYDROXYZINE HYDROCHLORIDE 25 MG/1
25 TABLET, FILM COATED ORAL EVERY 6 HOURS PRN
Qty: 30 TABLET | Refills: 0 | Status: SHIPPED | OUTPATIENT
Start: 2024-10-07 | End: 2024-10-11

## 2024-10-07 RX ORDER — TRIAMCINOLONE ACETONIDE 1 MG/G
CREAM TOPICAL 2 TIMES DAILY
Qty: 15 G | Refills: 0 | Status: SHIPPED | OUTPATIENT
Start: 2024-10-07

## 2024-10-07 NOTE — PROGRESS NOTES
Ambulatory Visit  Name: Marina Land      : 1965      MRN: [id number]  Encounter Provider: Yadi Portillo PA-C  Encounter Date: 10/7/2024   Encounter department: [facility]    Assessment & Plan  Polyarthralgia  - widespread joint pain, ongoing but recently worsened   - ordered autoimmune testing and lyme test for further evaluation and to rule out underlying cause of symptoms   - will trial meloxicam 7.5 mg QD PRN for joint pain, advised not to take ibuprofen with it and discussed side effects   - will make further treatment recommendation once lab results are received     Orders:    C-reactive protein; Future    Sedimentation rate, automated; Future    RF Screen w/ Reflex to Titer; Future    Cyclic citrul peptide antibody, IgG; Future    Lyme Total AB W Reflex to IGM/IGG; Future    BERNARDINO Screen w/ Reflex to Titer/Pattern; Future    meloxicam (MOBIC) 7.5 mg tablet; Take 1 tablet (7.5 mg total) by mouth daily as needed for moderate pain    Paresthesia  - intermittent numbness of face when touched, not present otherwise   - exam today unremarkable -- all cranial nerves intact with no skin changes appreciated  - unclear etiology -- will check vitamin B12, folate, lyme, and BERNARDINO for further evaluation of symptoms  - potential anxiety component given worsening anxiety around the same time symptoms started?    Orders:    Vitamin B12; Future    Lyme Total AB W Reflex to IGM/IGG; Future    Folate; Future    BERNARDINO Screen w/ Reflex to Titer/Pattern; Future    Rash and nonspecific skin eruption  - intermittent rash on right side, going on for years  - on exam, dry patch of skin on right side -- appears to be form of eczema  - will trial kenalog cream PRN, if persists recommend seeing dermatology for further evaluation; pt reports she has referral to dermatology already     Orders:    triamcinolone (KENALOG) 0.1 % cream; Apply topically 2 (two) times a day    Panic attack  - intermittent panic  "attacks  - not interested in daily medication at this time  - therefore prescribed hydroxyzine to take PRN, discussed side effects of drowsiness    Orders:    hydrOXYzine HCL (ATARAX) 25 mg tablet; Take 1 tablet (25 mg total) by mouth every 6 (six) hours as needed for anxiety    Left wrist pain  - ordered XR left wrist for further evaluation, suspect potential arthritis  - see plan for polyarthralgia above     Orders:    XR wrist 3+ vw left; Future       History of Present Illness       CC: arthritis, intermittent rash, anxiety, numbness of face    Numbness of face: reports for the last 3 weeks sometimes when she touches her face it feels \"numb\". Reports it does not happen all the time, just sometimes. She has not been able to identify anything that triggers it -- denies any new supplements or medications. No pain of the face. Not numb unless she touches it.     Joint pain: patient has been dealing with widespread joint pain x years -- however recently worse within the last few months. Reports she takes max dose of ibuprofen which helps but pain still present. Worse is her hands and her knees -- reports her left wrist has been getting progressively more painful recently, hard to bend it now.     Rash: intermittent rash on right side, happens about twice a year for the last few years. No pain or itching. Goes away within a few weeks on it's own. Has not been able to identify anything that triggers it.     Anxiety: recent anxiety attacks -- previously was on daily medication for anxiety/depression however stopped in 2020. Reports she'd like to take something as needed for the panic attacks but not something daily. No thoughts of harming self or others.         History obtained from : patient  Review of Systems   Constitutional:  Negative for chills, diaphoresis and fever.   Respiratory:  Negative for cough, chest tightness, shortness of breath and wheezing.    Cardiovascular:  Negative for chest pain and palpitations. " "  Gastrointestinal:  Negative for abdominal pain, nausea and vomiting.   Musculoskeletal:  Positive for arthralgias and myalgias.   Neurological:  Positive for numbness. Negative for dizziness, light-headedness and headaches.   Psychiatric/Behavioral:  Positive for sleep disturbance. Negative for self-injury and suicidal ideas.      Medical History Reviewed by provider this encounter:       Past Medical History   Past Medical History:   Diagnosis Date    2019 novel coronavirus disease (COVID-19) 05/30/2020    x 2 2020 and 2022    Abnormal Pap smear of cervix     Arthritis     BRCA1 negative 2015    BRCA2 negative 2015    Fractures 2021    Gastric ulcer     Hyperlipidemia     Hypertension     Low back pain     Right trigger finger     long and ring    TB lung, latent     Varicella      Past Surgical History:   Procedure Laterality Date    ANKLE FRACTURE SURGERY Right 03/01/2021    \"ankle fx\"    ANKLE SURGERY  2021    APPENDECTOMY      CATARACT EXTRACTION Bilateral     COLONOSCOPY      FOOT SURGERY Right     HERNIA REPAIR      IL TENDON SHEATH INCISION Right 07/11/2023    Procedure: RELEASE TRIGGER FINGER LONG FINGER AND RING FINGER;  Surgeon: Brittaney Montoya MD;  Location: [facility] MAIN OR;  Service: Orthopedics     Family History   Problem Relation Age of Onset    Ovarian cancer Mother 50    Diabetes Father     Thyroid cancer Sister 47    No Known Problems Sister     No Known Problems Daughter     No Known Problems Son     No Known Problems Son     No Known Problems Son     Cancer Maternal Grandmother         unknown type or age    No Known Problems Paternal Grandmother     No Known Problems Maternal Aunt     Breast cancer Maternal Uncle         50's     Current Outpatient Medications on File Prior to Visit   Medication Sig Dispense Refill    alendronate (Fosamax) 70 mg tablet Take 1 tablet (70 mg total) by mouth every 7 days 12 tablet 3    atorvastatin (LIPITOR) 20 mg tablet take 1 tablet by mouth once daily 90 " tablet 1    Blood Glucose Monitoring Suppl (OneTouch Verio Reflect) w/Device KIT Check blood sugars once daily. Please substitute with appropriate alternative as covered by patient's insurance. Dx: E11.65 1 kit 0    cyclobenzaprine (FLEXERIL) 5 mg tablet Take 1 tablet (5 mg total) by mouth 3 (three) times a day as needed for muscle spasms 20 tablet 0    glucose blood (OneTouch Verio) test strip Check blood sugars once daily. Please substitute with appropriate alternative as covered by patient's insurance. Dx: E11.65 100 each 3    hydroCHLOROthiazide 25 mg tablet take 1 tablet by mouth once daily 100 tablet 1    ibuprofen (MOTRIN) 800 mg tablet Take 1 tablet (800 mg total) by mouth 2 (two) times a day with meals 60 tablet 2    losartan (COZAAR) 50 mg tablet take 1 tablet by mouth once daily 90 tablet 1    OneTouch Delica Lancets 33G MISC Check blood sugars once daily. Please substitute with appropriate alternative as covered by patient's insurance. Dx: E11.65 100 each 3    Pyridoxine HCl (VITAMIN B6 PO) Take by mouth      valACYclovir (VALTREX) 500 mg tablet Take 1 tablet (500 mg total) by mouth daily 30 tablet 5     No current facility-administered medications on file prior to visit.     Allergies   Allergen Reactions    Kiwi Extract - Food Allergy Hives    Other      strawberry    Pineapple - Food Allergy Hives      Current Outpatient Medications on File Prior to Visit   Medication Sig Dispense Refill    alendronate (Fosamax) 70 mg tablet Take 1 tablet (70 mg total) by mouth every 7 days 12 tablet 3    atorvastatin (LIPITOR) 20 mg tablet take 1 tablet by mouth once daily 90 tablet 1    Blood Glucose Monitoring Suppl (OneTouch Verio Reflect) w/Device KIT Check blood sugars once daily. Please substitute with appropriate alternative as covered by patient's insurance. Dx: E11.65 1 kit 0    cyclobenzaprine (FLEXERIL) 5 mg tablet Take 1 tablet (5 mg total) by mouth 3 (three) times a day as needed for muscle spasms 20  "tablet 0    glucose blood (OneTouch Verio) test strip Check blood sugars once daily. Please substitute with appropriate alternative as covered by patient's insurance. Dx: E11.65 100 each 3    hydroCHLOROthiazide 25 mg tablet take 1 tablet by mouth once daily 100 tablet 1    ibuprofen (MOTRIN) 800 mg tablet Take 1 tablet (800 mg total) by mouth 2 (two) times a day with meals 60 tablet 2    losartan (COZAAR) 50 mg tablet take 1 tablet by mouth once daily 90 tablet 1    OneTouch Delica Lancets 33G MISC Check blood sugars once daily. Please substitute with appropriate alternative as covered by patient's insurance. Dx: E11.65 100 each 3    Pyridoxine HCl (VITAMIN B6 PO) Take by mouth      valACYclovir (VALTREX) 500 mg tablet Take 1 tablet (500 mg total) by mouth daily 30 tablet 5     No current facility-administered medications on file prior to visit.      Social History     Tobacco Use    Smoking status: Never    Smokeless tobacco: Never   Vaping Use    Vaping status: Never Used   Substance and Sexual Activity    Alcohol use: Not Currently     Comment: rare    Drug use: No    Sexual activity: Yes     Partners: Female, Male     Birth control/protection: None         Objective     /87   Pulse 95   Temp 98.7 °F (37.1 °C)   Ht 5' 5\" (1.651 m)   Wt 75.8 kg (167 lb)   SpO2 98%   BMI 27.79 kg/m²     Physical Exam  Vitals reviewed.   Constitutional:       General: She is not in acute distress.     Appearance: Normal appearance. She is not ill-appearing or diaphoretic.   HENT:      Head: Normocephalic and atraumatic.      Right Ear: External ear normal.      Left Ear: External ear normal.      Nose: Nose normal.      Mouth/Throat:      Mouth: Mucous membranes are moist.   Eyes:      General: No visual field deficit.        Right eye: No discharge.         Left eye: No discharge.      Extraocular Movements: Extraocular movements intact.      Conjunctiva/sclera: Conjunctivae normal.      Pupils: Pupils are equal, " round, and reactive to light.   Cardiovascular:      Rate and Rhythm: Normal rate and regular rhythm.      Pulses: Normal pulses.      Heart sounds: Normal heart sounds. No murmur heard.  Pulmonary:      Effort: Pulmonary effort is normal. No respiratory distress.      Breath sounds: Normal breath sounds. No wheezing, rhonchi or rales.   Musculoskeletal:      Left wrist: No swelling, tenderness or bony tenderness. Decreased range of motion (due to pain/stiffness). Normal pulse.      Cervical back: Normal range of motion.      Right lower leg: No edema.      Left lower leg: No edema.   Skin:     General: Skin is warm.          Neurological:      General: No focal deficit present.      Mental Status: She is alert and oriented to person, place, and time.      Cranial Nerves: Cranial nerves 2-12 are intact. No cranial nerve deficit, dysarthria or facial asymmetry.      Sensory: Sensation is intact. No sensory deficit.      Gait: Gait normal.   Psychiatric:         Mood and Affect: Mood normal.         Behavior: Behavior normal.

## 2024-10-10 ENCOUNTER — APPOINTMENT (OUTPATIENT)
Dept: LAB | Facility: CLINIC | Age: 59
End: 2024-10-10
Payer: COMMERCIAL

## 2024-10-10 DIAGNOSIS — M25.50 POLYARTHRALGIA: ICD-10-CM

## 2024-10-10 DIAGNOSIS — F41.0 PANIC ATTACK: ICD-10-CM

## 2024-10-10 DIAGNOSIS — R20.2 PARESTHESIA: ICD-10-CM

## 2024-10-10 LAB
ANA SER QL IA: NEGATIVE
B BURGDOR IGG+IGM SER QL IA: NEGATIVE
CRP SERPL QL: 4.6 MG/L
ERYTHROCYTE [SEDIMENTATION RATE] IN BLOOD: 23 MM/HOUR (ref 0–29)
FOLATE SERPL-MCNC: 20.6 NG/ML
VIT B12 SERPL-MCNC: 622 PG/ML (ref 180–914)

## 2024-10-10 PROCEDURE — 86430 RHEUMATOID FACTOR TEST QUAL: CPT

## 2024-10-10 PROCEDURE — 36415 COLL VENOUS BLD VENIPUNCTURE: CPT

## 2024-10-10 PROCEDURE — 82746 ASSAY OF FOLIC ACID SERUM: CPT

## 2024-10-10 PROCEDURE — 85652 RBC SED RATE AUTOMATED: CPT

## 2024-10-10 PROCEDURE — 86618 LYME DISEASE ANTIBODY: CPT

## 2024-10-10 PROCEDURE — 86200 CCP ANTIBODY: CPT

## 2024-10-10 PROCEDURE — 86140 C-REACTIVE PROTEIN: CPT

## 2024-10-10 PROCEDURE — 82607 VITAMIN B-12: CPT

## 2024-10-10 PROCEDURE — 86038 ANTINUCLEAR ANTIBODIES: CPT

## 2024-10-11 ENCOUNTER — APPOINTMENT (EMERGENCY)
Dept: RADIOLOGY | Facility: HOSPITAL | Age: 59
End: 2024-10-11
Payer: COMMERCIAL

## 2024-10-11 ENCOUNTER — HOSPITAL ENCOUNTER (EMERGENCY)
Facility: HOSPITAL | Age: 59
Discharge: HOME | End: 2024-10-11
Attending: EMERGENCY MEDICINE
Payer: COMMERCIAL

## 2024-10-11 ENCOUNTER — APPOINTMENT (EMERGENCY)
Dept: CT IMAGING | Facility: HOSPITAL | Age: 59
End: 2024-10-11
Payer: COMMERCIAL

## 2024-10-11 VITALS
TEMPERATURE: 97.9 F | RESPIRATION RATE: 22 BRPM | DIASTOLIC BLOOD PRESSURE: 98 MMHG | HEART RATE: 74 BPM | BODY MASS INDEX: 27.7 KG/M2 | OXYGEN SATURATION: 98 % | SYSTOLIC BLOOD PRESSURE: 159 MMHG | WEIGHT: 166.45 LBS

## 2024-10-11 DIAGNOSIS — R20.0 FACIAL NUMBNESS: ICD-10-CM

## 2024-10-11 DIAGNOSIS — R20.2 PARESTHESIA: Primary | ICD-10-CM

## 2024-10-11 DIAGNOSIS — R51.9 HEADACHE: Primary | ICD-10-CM

## 2024-10-11 LAB
ALBUMIN SERPL BCG-MCNC: 4.5 G/DL (ref 3.5–5)
ALP SERPL-CCNC: 69 U/L (ref 34–104)
ALT SERPL W P-5'-P-CCNC: 28 U/L (ref 7–52)
ANION GAP SERPL CALCULATED.3IONS-SCNC: 8 MMOL/L (ref 4–13)
AST SERPL W P-5'-P-CCNC: 24 U/L (ref 13–39)
BASOPHILS # BLD AUTO: 0.04 THOUSANDS/ΜL (ref 0–0.1)
BASOPHILS NFR BLD AUTO: 1 % (ref 0–1)
BILIRUB SERPL-MCNC: 0.56 MG/DL (ref 0.2–1)
BUN SERPL-MCNC: 16 MG/DL (ref 5–25)
CALCIUM SERPL-MCNC: 9.7 MG/DL (ref 8.4–10.2)
CARDIAC TROPONIN I PNL SERPL HS: <2 NG/L
CARDIAC TROPONIN I PNL SERPL HS: <2 NG/L
CCP AB SER IA-ACNC: 0.6
CHLORIDE SERPL-SCNC: 107 MMOL/L (ref 96–108)
CO2 SERPL-SCNC: 25 MMOL/L (ref 21–32)
CREAT SERPL-MCNC: 0.56 MG/DL (ref 0.6–1.3)
CRP SERPL QL: 5 MG/L
EOSINOPHIL # BLD AUTO: 0.21 THOUSAND/ΜL (ref 0–0.61)
EOSINOPHIL NFR BLD AUTO: 5 % (ref 0–6)
ERYTHROCYTE [DISTWIDTH] IN BLOOD BY AUTOMATED COUNT: 12.6 % (ref 11.6–15.1)
ERYTHROCYTE [SEDIMENTATION RATE] IN BLOOD: 29 MM/HOUR (ref 0–29)
GFR SERPL CREATININE-BSD FRML MDRD: 101 ML/MIN/1.73SQ M
GLUCOSE SERPL-MCNC: 96 MG/DL (ref 65–140)
HCT VFR BLD AUTO: 40.9 % (ref 34.8–46.1)
HGB BLD-MCNC: 13.3 G/DL (ref 11.5–15.4)
IMM GRANULOCYTES # BLD AUTO: 0.01 THOUSAND/UL (ref 0–0.2)
IMM GRANULOCYTES NFR BLD AUTO: 0 % (ref 0–2)
LYMPHOCYTES # BLD AUTO: 1.69 THOUSANDS/ΜL (ref 0.6–4.47)
LYMPHOCYTES NFR BLD AUTO: 40 % (ref 14–44)
MCH RBC QN AUTO: 29.8 PG (ref 26.8–34.3)
MCHC RBC AUTO-ENTMCNC: 32.5 G/DL (ref 31.4–37.4)
MCV RBC AUTO: 92 FL (ref 82–98)
MONOCYTES # BLD AUTO: 0.42 THOUSAND/ΜL (ref 0.17–1.22)
MONOCYTES NFR BLD AUTO: 10 % (ref 4–12)
NEUTROPHILS # BLD AUTO: 1.89 THOUSANDS/ΜL (ref 1.85–7.62)
NEUTS SEG NFR BLD AUTO: 44 % (ref 43–75)
NRBC BLD AUTO-RTO: 0 /100 WBCS
PLATELET # BLD AUTO: 225 THOUSANDS/UL (ref 149–390)
PMV BLD AUTO: 9.7 FL (ref 8.9–12.7)
POTASSIUM SERPL-SCNC: 4.1 MMOL/L (ref 3.5–5.3)
PROT SERPL-MCNC: 7.3 G/DL (ref 6.4–8.4)
RBC # BLD AUTO: 4.46 MILLION/UL (ref 3.81–5.12)
RHEUMATOID FACT SER QL LA: NEGATIVE
SODIUM SERPL-SCNC: 140 MMOL/L (ref 135–147)
TSH SERPL DL<=0.05 MIU/L-ACNC: 1.22 UIU/ML (ref 0.45–4.5)
WBC # BLD AUTO: 4.26 THOUSAND/UL (ref 4.31–10.16)

## 2024-10-11 PROCEDURE — 70450 CT HEAD/BRAIN W/O DYE: CPT

## 2024-10-11 PROCEDURE — 80053 COMPREHEN METABOLIC PANEL: CPT | Performed by: EMERGENCY MEDICINE

## 2024-10-11 PROCEDURE — 96375 TX/PRO/DX INJ NEW DRUG ADDON: CPT

## 2024-10-11 PROCEDURE — 99284 EMERGENCY DEPT VISIT MOD MDM: CPT | Performed by: EMERGENCY MEDICINE

## 2024-10-11 PROCEDURE — 93005 ELECTROCARDIOGRAM TRACING: CPT

## 2024-10-11 PROCEDURE — 96366 THER/PROPH/DIAG IV INF ADDON: CPT

## 2024-10-11 PROCEDURE — 99284 EMERGENCY DEPT VISIT MOD MDM: CPT

## 2024-10-11 PROCEDURE — 84484 ASSAY OF TROPONIN QUANT: CPT | Performed by: EMERGENCY MEDICINE

## 2024-10-11 PROCEDURE — 96365 THER/PROPH/DIAG IV INF INIT: CPT

## 2024-10-11 PROCEDURE — 85025 COMPLETE CBC W/AUTO DIFF WBC: CPT | Performed by: EMERGENCY MEDICINE

## 2024-10-11 PROCEDURE — 84443 ASSAY THYROID STIM HORMONE: CPT | Performed by: EMERGENCY MEDICINE

## 2024-10-11 PROCEDURE — 71045 X-RAY EXAM CHEST 1 VIEW: CPT

## 2024-10-11 PROCEDURE — 86140 C-REACTIVE PROTEIN: CPT | Performed by: EMERGENCY MEDICINE

## 2024-10-11 PROCEDURE — 85652 RBC SED RATE AUTOMATED: CPT | Performed by: EMERGENCY MEDICINE

## 2024-10-11 PROCEDURE — 36415 COLL VENOUS BLD VENIPUNCTURE: CPT | Performed by: EMERGENCY MEDICINE

## 2024-10-11 RX ORDER — KETOROLAC TROMETHAMINE 30 MG/ML
15 INJECTION, SOLUTION INTRAMUSCULAR; INTRAVENOUS ONCE
Status: COMPLETED | OUTPATIENT
Start: 2024-10-11 | End: 2024-10-11

## 2024-10-11 RX ORDER — DIPHENHYDRAMINE HYDROCHLORIDE 50 MG/ML
25 INJECTION INTRAMUSCULAR; INTRAVENOUS ONCE
Status: COMPLETED | OUTPATIENT
Start: 2024-10-11 | End: 2024-10-11

## 2024-10-11 RX ORDER — MAGNESIUM SULFATE HEPTAHYDRATE 40 MG/ML
2 INJECTION, SOLUTION INTRAVENOUS ONCE
Status: COMPLETED | OUTPATIENT
Start: 2024-10-11 | End: 2024-10-11

## 2024-10-11 RX ORDER — HYDROXYZINE HYDROCHLORIDE 25 MG/1
TABLET, FILM COATED ORAL
Qty: 30 TABLET | Refills: 0 | Status: SHIPPED | OUTPATIENT
Start: 2024-10-11

## 2024-10-11 RX ORDER — METOCLOPRAMIDE HYDROCHLORIDE 5 MG/ML
10 INJECTION INTRAMUSCULAR; INTRAVENOUS ONCE
Status: COMPLETED | OUTPATIENT
Start: 2024-10-11 | End: 2024-10-11

## 2024-10-11 RX ADMIN — MAGNESIUM SULFATE HEPTAHYDRATE 2 G: 40 INJECTION, SOLUTION INTRAVENOUS at 21:42

## 2024-10-11 RX ADMIN — METOCLOPRAMIDE 10 MG: 5 INJECTION, SOLUTION INTRAMUSCULAR; INTRAVENOUS at 21:39

## 2024-10-11 RX ADMIN — SODIUM CHLORIDE 1000 ML: 0.9 INJECTION, SOLUTION INTRAVENOUS at 21:39

## 2024-10-11 RX ADMIN — DIPHENHYDRAMINE HYDROCHLORIDE 25 MG: 50 INJECTION, SOLUTION INTRAMUSCULAR; INTRAVENOUS at 21:39

## 2024-10-11 RX ADMIN — KETOROLAC TROMETHAMINE 15 MG: 30 INJECTION, SOLUTION INTRAMUSCULAR at 21:39

## 2024-10-12 NOTE — ED PROVIDER NOTES
Time reflects when diagnosis was documented in both MDM as applicable and the Disposition within this note       Time User Action Codes Description Comment    10/11/2024 10:41 PM Mayito Flaherty Add [R51.9] Headache           ED Disposition       ED Disposition   Discharge    Condition   Stable    Date/Time   Fri Oct 11, 2024 10:41 PM    Comment   Marina Land discharge to home/self care.                   Assessment & Plan       Medical Decision Making  59-year-old female presents emergency department for evaluation of headache with some paresthesias, she does have some pain over the temple, noted slightly elevated CRP, will recheck this here, will check CT head give migraine cocktail and reassess.      CRP is not significantly changed, low enough that my suspicion for temporal arteritis is low.  Patient's symptoms improved after migraine cocktail CT head is negative will discharge    Amount and/or Complexity of Data Reviewed  Labs: ordered.  Radiology: ordered.    Risk  Prescription drug management.             Medications   sodium chloride 0.9 % bolus 1,000 mL (1,000 mL Intravenous New Bag 10/11/24 2139)   ketorolac (TORADOL) injection 15 mg (15 mg Intravenous Given 10/11/24 2139)   metoclopramide (REGLAN) injection 10 mg (10 mg Intravenous Given 10/11/24 2139)   diphenhydrAMINE (BENADRYL) injection 25 mg (25 mg Intravenous Given 10/11/24 2139)   magnesium sulfate 2 g/50 mL IVPB (premix) 2 g (2 g Intravenous New Bag 10/11/24 2142)       ED Risk Strat Scores                           SBIRT 22yo+      Flowsheet Row Most Recent Value   Initial Alcohol Screen: US AUDIT-C     1. How often do you have a drink containing alcohol? 0 Filed at: 10/11/2024 1659   2. How many drinks containing alcohol do you have on a typical day you are drinking?  0 Filed at: 10/11/2024 1659   3a. Male UNDER 65: How often do you have five or more drinks on one occasion? 0 Filed at: 10/11/2024 1659   3b. FEMALE Any Age, or MALE 65+: How  "often do you have 4 or more drinks on one occassion? 0 Filed at: 10/11/2024 1659   Audit-C Score 0 Filed at: 10/11/2024 1659   JANNETH: How many times in the past year have you...    Used an illegal drug or used a prescription medication for non-medical reasons? Never Filed at: 10/11/2024 1659                            History of Present Illness       Chief Complaint   Patient presents with    Numbness     Pt presents to the ED with c/o of facial numbness, L sided headache, and feeling like she might \"pass out\" at times since last week. The patient was seen by her pcp on Monday to get some blood work done. Pt is A/O x4 and has no c/o of weakness or numbness anywhere else in her body.        Past Medical History:   Diagnosis Date    2019 novel coronavirus disease (COVID-19) 05/30/2020    x 2 2020 and 2022    Abnormal Pap smear of cervix     Arthritis     BRCA1 negative 2015    BRCA2 negative 2015    Fractures 2021    Gastric ulcer     Hyperlipidemia     Hypertension     Low back pain     Right trigger finger     long and ring    TB lung, latent     Varicella       Past Surgical History:   Procedure Laterality Date    ANKLE FRACTURE SURGERY Right 03/01/2021    \"ankle fx\"    ANKLE SURGERY  2021    APPENDECTOMY      CATARACT EXTRACTION Bilateral     COLONOSCOPY      FOOT SURGERY Right     HERNIA REPAIR      KS TENDON SHEATH INCISION Right 07/11/2023    Procedure: RELEASE TRIGGER FINGER LONG FINGER AND RING FINGER;  Surgeon: Brittaney Montoya MD;  Location: [facility] MAIN OR;  Service: Orthopedics      Family History   Problem Relation Age of Onset    Ovarian cancer Mother 50    Diabetes Father     Thyroid cancer Sister 47    No Known Problems Sister     No Known Problems Daughter     No Known Problems Son     No Known Problems Son     No Known Problems Son     Cancer Maternal Grandmother         unknown type or age    No Known Problems Paternal Grandmother     No Known Problems Maternal Aunt     Breast cancer Maternal " Uncle         50's      Social History     Tobacco Use    Smoking status: Never    Smokeless tobacco: Never   Vaping Use    Vaping status: Never Used   Substance Use Topics    Alcohol use: Not Currently     Comment: rare    Drug use: No      E-Cigarette/Vaping    E-Cigarette Use Never User       E-Cigarette/Vaping Substances    Nicotine No     THC No     CBD No     Flavoring No     Other No     Unknown No       I have reviewed and agree with the history as documented.     59-year-old female presents emergency department for evaluation of headache.  Patient had headache and left-sided facial numbness which has been intermittent for the past several days.  Today she had a pain that she felt behind her eye that shot to the back of her head.  She has had no visual changes numbness weakness tingling balance or gait instability nausea vomiting fevers chills or neck pain.        Review of Systems   Constitutional:  Negative for appetite change, chills, fatigue and fever.   HENT:  Negative for sneezing and sore throat.    Eyes:  Negative for visual disturbance.   Respiratory:  Negative for cough, choking, chest tightness, shortness of breath and wheezing.    Cardiovascular:  Negative for chest pain and palpitations.   Gastrointestinal:  Negative for abdominal pain, constipation, diarrhea, nausea and vomiting.   Genitourinary:  Negative for difficulty urinating and dysuria.   Neurological:  Positive for headaches. Negative for dizziness, weakness, light-headedness and numbness.   All other systems reviewed and are negative.          Objective       ED Triage Vitals   Temperature Pulse Blood Pressure Respirations SpO2 Patient Position - Orthostatic VS   10/11/24 1657 10/11/24 1657 10/11/24 1657 10/11/24 1657 10/11/24 1657 10/11/24 1657   97.9 °F (36.6 °C) 83 (!) 177/105 18 100 % Sitting      Temp Source Heart Rate Source BP Location FiO2 (%) Pain Score    10/11/24 1657 10/11/24 1657 10/11/24 1657 -- 10/11/24 1730    Temporal  Monitor Left arm  3      Vitals      Date and Time Temp Pulse SpO2 Resp BP Pain Score FACES Pain Rating User   10/11/24 2139 -- -- -- -- -- 5 -- NW   10/11/24 2045 -- 74 98 % 22 159/98 -- -- GB   10/11/24 1830 -- 83 99 % 22 141/96 3 -- NW   10/11/24 1730 -- 75 99 % 22 158/110 3 -- NW   10/11/24 1657 97.9 °F (36.6 °C) 83 100 % 18 177/105 -- -- BS            Physical Exam  Vitals and nursing note reviewed.   Constitutional:       General: She is not in acute distress.     Appearance: She is well-developed. She is not diaphoretic.   HENT:      Head: Normocephalic and atraumatic.   Eyes:      Pupils: Pupils are equal, round, and reactive to light.   Neck:      Vascular: No JVD.      Trachea: No tracheal deviation.   Cardiovascular:      Rate and Rhythm: Normal rate and regular rhythm.      Heart sounds: Normal heart sounds. No murmur heard.     No friction rub. No gallop.   Pulmonary:      Effort: Pulmonary effort is normal. No respiratory distress.      Breath sounds: Normal breath sounds. No wheezing or rales.   Abdominal:      General: Bowel sounds are normal. There is no distension.      Palpations: Abdomen is soft.      Tenderness: There is no abdominal tenderness. There is no guarding or rebound.   Skin:     General: Skin is warm and dry.      Coloration: Skin is not pale.   Neurological:      Mental Status: She is alert and oriented to person, place, and time.      Cranial Nerves: No cranial nerve deficit.      Motor: No abnormal muscle tone.   Psychiatric:         Behavior: Behavior normal.         Results Reviewed       Procedure Component Value Units Date/Time    HS Troponin I 2hr [[id number]] Collected: 10/11/24 2001    Lab Status: Final result Specimen: Blood from Arm, Left Updated: 10/11/24 2036     hs TnI 2hr <2 ng/L      Delta 2hr hsTnI --    C-reactive protein [[id number]]  (Abnormal) Collected: 10/11/24 1744    Lab Status: Final result Specimen: Blood from Arm, Left Updated: 10/11/24 1911     CRP 5.0 mg/L      Sedimentation rate, automated [[id number]]  (Normal) Collected: 10/11/24 1744    Lab Status: Final result Specimen: Blood from Arm, Left Updated: 10/11/24 1900     Sed Rate 29 mm/hour     TSH [[id number]]  (Normal) Collected: 10/11/24 1744    Lab Status: Final result Specimen: Blood from Arm, Left Updated: 10/11/24 1828     TSH 3RD GENERATON 1.216 uIU/mL     HS Troponin 0hr (reflex protocol) [[id number]]  (Normal) Collected: 10/11/24 1744    Lab Status: Final result Specimen: Blood from Arm, Left Updated: 10/11/24 1819     hs TnI 0hr <2 ng/L     Comprehensive metabolic panel [[id number]]  (Abnormal) Collected: 10/11/24 1744    Lab Status: Final result Specimen: Blood from Arm, Left Updated: 10/11/24 1816     Sodium 140 mmol/L      Potassium 4.1 mmol/L      Chloride 107 mmol/L      CO2 25 mmol/L      ANION GAP 8 mmol/L      BUN 16 mg/dL      Creatinine 0.56 mg/dL      Glucose 96 mg/dL      Calcium 9.7 mg/dL      AST 24 U/L      ALT 28 U/L      Alkaline Phosphatase 69 U/L      Total Protein 7.3 g/dL      Albumin 4.5 g/dL      Total Bilirubin 0.56 mg/dL      eGFR 101 ml/min/1.73sq m     Narrative:      National Kid[employer] guidelines for Chronic Kidney Disease (CKD):     Stage 1 with normal or high GFR (GFR > 90 mL/min/1.73 square meters)    Stage 2 Mild CKD (GFR = 60-89 mL/min/1.73 square meters)    Stage 3A Moderate CKD (GFR = 45-59 mL/min/1.73 square meters)    Stage 3B Moderate CKD (GFR = 30-44 mL/min/1.73 square meters)    Stage 4 Severe CKD (GFR = 15-29 mL/min/1.73 square meters)    Stage 5 End Stage CKD (GFR <15 mL/min/1.73 square meters)  Note: GFR calculation is accurate only with a steady state creatinine    CBC and differential [[id number]]  (Abnormal) Collected: 10/11/24 1744    Lab Status: Final result Specimen: Blood from Arm, Left Updated: 10/11/24 1752     WBC 4.26 Thousand/uL      RBC 4.46 Million/uL      Hemoglobin 13.3 g/dL      Hematocrit 40.9 %      MCV 92 fL      MCH 29.8 pg      MCHC  32.5 g/dL      RDW 12.6 %      MPV 9.7 fL      Platelets 225 Thousands/uL      nRBC 0 /100 WBCs      Segmented % 44 %      Immature Grans % 0 %      Lymphocytes % 40 %      Monocytes % 10 %      Eosinophils Relative 5 %      Basophils Relative 1 %      Absolute Neutrophils 1.89 Thousands/µL      Absolute Immature Grans 0.01 Thousand/uL      Absolute Lymphocytes 1.69 Thousands/µL      Absolute Monocytes 0.42 Thousand/µL      Eosinophils Absolute 0.21 Thousand/µL      Basophils Absolute 0.04 Thousands/µL             CT head without contrast   Final Interpretation by Spencer Cornejo MD (10/11 2025)      No acute intracranial abnormality.                  Workstation performed: [id number]         XR chest 1 view portable    (Results Pending)       Procedures    ED Medication and Procedure Management   Prior to Admission Medications   Prescriptions Last Dose Informant Patient Reported? Taking?   Blood Glucose Monitoring Suppl (OneTouch Verio Reflect) w/Device KIT  Self No No   Sig: Check blood sugars once daily. Please substitute with appropriate alternative as covered by patient's insurance. Dx: E11.65   OneTouch Delica Lancets 33G MISC  Self No No   Sig: Check blood sugars once daily. Please substitute with appropriate alternative as covered by patient's insurance. Dx: E11.65   Pyridoxine HCl (VITAMIN B6 PO)  Self Yes No   Sig: Take by mouth   alendronate (Fosamax) 70 mg tablet  Self No No   Sig: Take 1 tablet (70 mg total) by mouth every 7 days   atorvastatin (LIPITOR) 20 mg tablet  Self No No   Sig: take 1 tablet by mouth once daily   cyclobenzaprine (FLEXERIL) 5 mg tablet   No No   Sig: Take 1 tablet (5 mg total) by mouth 3 (three) times a day as needed for muscle spasms   glucose blood (OneTouch Verio) test strip  Self No No   Sig: Check blood sugars once daily. Please substitute with appropriate alternative as covered by patient's insurance. Dx: E11.65   hydrOXYzine HCL (ATARAX) 25 mg tablet   No No   Sig: take 1  tablet by mouth every 6 hours if needed for for anxiety   hydroCHLOROthiazide 25 mg tablet  Self No No   Sig: take 1 tablet by mouth once daily   ibuprofen (MOTRIN) 800 mg tablet  Self No No   Sig: Take 1 tablet (800 mg total) by mouth 2 (two) times a day with meals   losartan (COZAAR) 50 mg tablet   No No   Sig: take 1 tablet by mouth once daily   meloxicam (MOBIC) 7.5 mg tablet   No No   Sig: Take 1 tablet (7.5 mg total) by mouth daily as needed for moderate pain   triamcinolone (KENALOG) 0.1 % cream   No No   Sig: Apply topically 2 (two) times a day   valACYclovir (VALTREX) 500 mg tablet  Self No No   Sig: Take 1 tablet (500 mg total) by mouth daily      Facility-Administered Medications: None     Discharge Medication List as of 10/11/2024 10:41 PM        CONTINUE these medications which have NOT CHANGED    Details   alendronate (Fosamax) 70 mg tablet Take 1 tablet (70 mg total) by mouth every 7 days, Starting Tue 1/2/2024, Normal      atorvastatin (LIPITOR) 20 mg tablet take 1 tablet by mouth once daily, Starting Tue 6/18/2024, Normal      Blood Glucose Monitoring Suppl (OneTouch Verio Reflect) w/Device KIT Check blood sugars once daily. Please substitute with appropriate alternative as covered by patient's insurance. Dx: E11.65, Normal      cyclobenzaprine (FLEXERIL) 5 mg tablet Take 1 tablet (5 mg total) by mouth 3 (three) times a day as needed for muscle spasms, Starting Sun 9/29/2024, Normal      glucose blood (OneTouch Verio) test strip Check blood sugars once daily. Please substitute with appropriate alternative as covered by patient's insurance. Dx: E11.65, Normal      hydroCHLOROthiazide 25 mg tablet take 1 tablet by mouth once daily, Starting Sun 7/14/2024, Normal      hydrOXYzine HCL (ATARAX) 25 mg tablet take 1 tablet by mouth every 6 hours if needed for for anxiety, Normal      ibuprofen (MOTRIN) 800 mg tablet Take 1 tablet (800 mg total) by mouth 2 (two) times a day with meals, Starting Wed  3/1/2023, Normal      losartan (COZAAR) 50 mg tablet take 1 tablet by mouth once daily, Starting Wed 9/11/2024, Normal      meloxicam (MOBIC) 7.5 mg tablet Take 1 tablet (7.5 mg total) by mouth daily as needed for moderate pain, Starting Mon 10/7/2024, Normal      OneTouch Delica Lancets 33G MISC Check blood sugars once daily. Please substitute with appropriate alternative as covered by patient's insurance. Dx: E11.65, Normal      Pyridoxine HCl (VITAMIN B6 PO) Take by mouth, Historical Med      triamcinolone (KENALOG) 0.1 % cream Apply topically 2 (two) times a day, Starting Mon 10/7/2024, Normal      valACYclovir (VALTREX) 500 mg tablet Take 1 tablet (500 mg total) by mouth daily, Starting Thu 7/11/2024, Until Tue 1/7/2025, Normal           No discharge procedures on file.  ED SEPSIS DOCUMENTATION   Time reflects when diagnosis was documented in both MDM as applicable and the Disposition within this note       Time User Action Codes Description Comment    10/11/2024 10:41 PM Mayito Flaherty Add [R51.9] Headache                  Mayito Flaherty MD  10/12/24 0556

## 2024-10-15 LAB
ATRIAL RATE: 72 BPM
P AXIS: 69 DEGREES
PR INTERVAL: 176 MS
QRS AXIS: 78 DEGREES
QRSD INTERVAL: 72 MS
QT INTERVAL: 388 MS
QTC INTERVAL: 424 MS
T WAVE AXIS: 72 DEGREES
VENTRICULAR RATE: 72 BPM

## 2024-10-15 PROCEDURE — 93010 ELECTROCARDIOGRAM REPORT: CPT | Performed by: INTERNAL MEDICINE

## 2024-10-17 DIAGNOSIS — I10 ESSENTIAL HYPERTENSION: ICD-10-CM

## 2024-10-18 RX ORDER — LOSARTAN POTASSIUM 50 MG/1
50 TABLET ORAL DAILY
Qty: 90 TABLET | Refills: 0 | Status: SHIPPED | OUTPATIENT
Start: 2024-10-18

## 2024-10-23 DIAGNOSIS — I10 ESSENTIAL HYPERTENSION: ICD-10-CM

## 2024-10-24 RX ORDER — HYDROCHLOROTHIAZIDE 25 MG/1
25 TABLET ORAL DAILY
Qty: 100 TABLET | Refills: 1 | Status: SHIPPED | OUTPATIENT
Start: 2024-10-24

## 2024-11-02 DIAGNOSIS — M25.50 POLYARTHRALGIA: ICD-10-CM

## 2024-11-04 ENCOUNTER — TELEPHONE (OUTPATIENT)
Dept: OBGYN CLINIC | Facility: CLINIC | Age: 59
End: 2024-11-04

## 2024-11-04 RX ORDER — MELOXICAM 7.5 MG/1
TABLET ORAL
Qty: 30 TABLET | Refills: 0 | Status: SHIPPED | OUTPATIENT
Start: 2024-11-04

## 2024-11-06 ENCOUNTER — APPOINTMENT (OUTPATIENT)
Dept: RADIOLOGY | Facility: CLINIC | Age: 59
End: 2024-11-06
Payer: COMMERCIAL

## 2024-11-06 ENCOUNTER — OFFICE VISIT (OUTPATIENT)
Dept: OBGYN CLINIC | Facility: CLINIC | Age: 59
End: 2024-11-06
Payer: COMMERCIAL

## 2024-11-06 VITALS
SYSTOLIC BLOOD PRESSURE: 116 MMHG | BODY MASS INDEX: 27.16 KG/M2 | TEMPERATURE: 98.3 F | DIASTOLIC BLOOD PRESSURE: 83 MMHG | RESPIRATION RATE: 18 BRPM | HEIGHT: 65 IN | HEART RATE: 70 BPM | WEIGHT: 163 LBS | OXYGEN SATURATION: 98 %

## 2024-11-06 DIAGNOSIS — G89.29 CHRONIC PAIN OF LEFT KNEE: ICD-10-CM

## 2024-11-06 DIAGNOSIS — M17.11 PRIMARY OSTEOARTHRITIS OF RIGHT KNEE: Primary | ICD-10-CM

## 2024-11-06 DIAGNOSIS — M25.562 CHRONIC PAIN OF LEFT KNEE: ICD-10-CM

## 2024-11-06 DIAGNOSIS — M17.12 PRIMARY OSTEOARTHRITIS OF LEFT KNEE: ICD-10-CM

## 2024-11-06 PROCEDURE — 73562 X-RAY EXAM OF KNEE 3: CPT

## 2024-11-06 PROCEDURE — 99213 OFFICE O/P EST LOW 20 MIN: CPT | Performed by: FAMILY MEDICINE

## 2024-11-06 NOTE — PROGRESS NOTES
Subjective:    Chief Complaint   Patient presents with    Right Knee - Follow-up       Marina Land is a 59 y.o. female presenting today for a follow-up visit for right knee osteoarthritis.  She states that her pain symptoms have been well-managed following Visco injection therapy approximately 4 months ago.  She does report ongoing left knee pain that has been present for several months without any inciting injury.      The following portions were reviewed and updated as needed: allergies, current medications, past medical history, past social history, past surgical history and problem list.    Review of Systems   Constitutional: Negative for fever.   HENT: Negative for dental problem and headaches.    Eyes: Negative for vision loss.   Respiratory: Negative for cough and shortness of breath.    Cardiovascular: Negative for leg swelling and palpitations.   Gastrointestinal: Negative for constipation and diarrhea.   Genitourinary: Negative for bladder incontinence and difficulty urinating.   Musculoskeletal: Negative for back pain and difficulty walking.   Skin: Negative for rash and ulcer.   Neurological: Negative for dizziness and headaches.   Hem/Lymph/Immuno: Negative for blood clots. Does not bruise/bleed easily.   Psychiatric/Behavioral: Negative for confusion.         Objective:  General: no acute distress, non toxic, AAO x3   Skin: no skin changes, no rashes, no wounds or laceration  Vasculature: normal cap refill, no LE edema, normal popliteal and dorsalis pedis pulse  Neurologic:   Musculoskeletal: bilater KNEE EXAM  Gait: limping gait negative, able to weight bear without difficulty  Inspection: No gross deformity, no redness or warmth   Effusion: negative   Medial joint line TTP: +  Lateral joint line TTP: negative  ROM: Full flexion and extension  [facility]'s: negative,   Instability to varus/valgus stress: negative  Anterior Drawer: negative   Lachman's test: negative  Posterior Drawer:  negative        Imaging:       Assessment/Plan:  1. Primary osteoarthritis of right knee      2. Chronic pain of left knee    - XR knee 3 vw left non injury; Future      Radiographs of the left knee were obtained at today's office visit.  Degenerative changes were noted in the left knee joint, no acute fracture or dislocation.  Follow-up on official radiology report.  We discussed treatment options available for left knee osteoarthritis.  Patient has had significant relief with Visco injection therapy for the right knee.  I believe she would be a good candidate for Visco injection for bilateral knee.      Hyaluronic acid injection therapy was ordered as patient has ongoing knee pain and stiffness which is affecting daily function.  Examination reveals notable crepitus limitation with range of motion and pain is rated about a 6 out of 10 in severity.  Patient has trialed other conservative treatment options in the form of , NSAIDs and physical therapy without relief.  I believe patient would be a good candidate to consider viscosupplementation

## 2024-11-29 ENCOUNTER — OFFICE VISIT (OUTPATIENT)
Dept: FAMILY MEDICINE CLINIC | Facility: CLINIC | Age: 59
End: 2024-11-29
Payer: COMMERCIAL

## 2024-11-29 VITALS
TEMPERATURE: 97.2 F | HEART RATE: 71 BPM | DIASTOLIC BLOOD PRESSURE: 80 MMHG | WEIGHT: 161 LBS | SYSTOLIC BLOOD PRESSURE: 124 MMHG | HEIGHT: 65 IN | OXYGEN SATURATION: 99 % | BODY MASS INDEX: 26.82 KG/M2

## 2024-11-29 DIAGNOSIS — Z00.00 HEALTH MAINTENANCE EXAMINATION: ICD-10-CM

## 2024-11-29 DIAGNOSIS — S99.921A INJURY OF TOE ON RIGHT FOOT, INITIAL ENCOUNTER: Primary | ICD-10-CM

## 2024-11-29 DIAGNOSIS — Z00.00 ANNUAL PHYSICAL EXAM: ICD-10-CM

## 2024-11-29 DIAGNOSIS — Z12.31 ENCOUNTER FOR SCREENING MAMMOGRAM FOR BREAST CANCER: ICD-10-CM

## 2024-11-29 PROBLEM — R79.82 ELEVATED C-REACTIVE PROTEIN (CRP): Status: RESOLVED | Noted: 2020-09-03 | Resolved: 2024-11-29

## 2024-11-29 PROBLEM — M54.50 ACUTE BILATERAL LOW BACK PAIN WITHOUT SCIATICA: Status: RESOLVED | Noted: 2022-11-07 | Resolved: 2024-11-29

## 2024-11-29 PROCEDURE — 99213 OFFICE O/P EST LOW 20 MIN: CPT | Performed by: FAMILY MEDICINE

## 2024-11-29 PROCEDURE — 99396 PREV VISIT EST AGE 40-64: CPT | Performed by: FAMILY MEDICINE

## 2024-11-29 NOTE — PROGRESS NOTES
Adult Annual Physical  Name: Marina Land      : 1965      MRN: [id number]  Encounter Provider: Sudhir Sánchez MD  Encounter Date: 2024   Encounter department: [facility]    Assessment & Plan  Injury of toe on right foot, initial encounter    Orders:    XR toe right great min 2 view; Future    Encounter for screening mammogram for breast cancer    Orders:    Mammo screening bilateral w 3d and cad; Future    Health maintenance examination         Annual physical exam           Immunizations and preventive care screenings were discussed with patient today. Appropriate education was printed on patient's after visit summary.    Counseling:           History of Present Illness     Adult Annual Physical:  Patient presents for annual physical. Patient is also here for a yearly physical, has right toe pain otherwise no complaints..     Diet and Physical Activity:  - Diet/Nutrition: well balanced diet, limited junk food, high fat diet, poor diet, portion control and frequent junk food.  - Exercise: walking.    Depression Screening:  - PHQ-2 Score: 0    General Health:  - Sleep: sleeps well.  - Hearing: normal hearing right ear.  - Vision: no vision problems.  - Dental: regular dental visits.    /GYN Health:  - Follows with GYN: yes.   - Menopause: postmenopausal.   - History of STDs: no    Review of Systems   Constitutional:  Negative for activity change, appetite change, fatigue and fever.   HENT:  Negative for congestion and ear discharge.    Respiratory:  Negative for cough and shortness of breath.    Cardiovascular:  Negative for chest pain and palpitations.   Gastrointestinal:  Negative for diarrhea and nausea.   Musculoskeletal:  Negative for arthralgias and back pain.   Skin:  Negative for color change and rash.   Neurological:  Negative for dizziness and headaches.   Psychiatric/Behavioral:  Negative for agitation and behavioral problems.      Pertinent Medical History  "    Medical History Reviewed by provider this encounter:  Meds  Problems     .  Past Medical History   Past Medical History:   Diagnosis Date    2019 novel coronavirus disease (COVID-19) 05/30/2020    x 2 2020 and 2022    Abnormal Pap smear of cervix     Arthritis     BRCA1 negative 2015    BRCA2 negative 2015    Fractures 2021    Gastric ulcer     Hyperlipidemia     Hypertension     Low back pain     Right trigger finger     long and ring    TB lung, latent     Varicella      Past Surgical History:   Procedure Laterality Date    ANKLE FRACTURE SURGERY Right 03/01/2021    \"ankle fx\"    ANKLE SURGERY  2021    APPENDECTOMY      CATARACT EXTRACTION Bilateral     COLONOSCOPY      FOOT SURGERY Right     HERNIA REPAIR      NV TENDON SHEATH INCISION Right 07/11/2023    Procedure: RELEASE TRIGGER FINGER LONG FINGER AND RING FINGER;  Surgeon: Brittaney Montoya MD;  Location: [facility];  Service: Orthopedics     Family History   Problem Relation Age of Onset    Ovarian cancer Mother 50    Diabetes Father     Thyroid cancer Sister 47    No Known Problems Sister     No Known Problems Daughter     No Known Problems Son     No Known Problems Son     No Known Problems Son     Cancer Maternal Grandmother         unknown type or age    No Known Problems Paternal Grandmother     No Known Problems Maternal Aunt     Breast cancer Maternal Uncle         50's      reports that she has never smoked. She has never used smokeless tobacco. She reports that she does not currently use alcohol. She reports that she does not use drugs.  Current Outpatient Medications on File Prior to Visit   Medication Sig Dispense Refill    alendronate (Fosamax) 70 mg tablet Take 1 tablet (70 mg total) by mouth every 7 days 12 tablet 3    atorvastatin (LIPITOR) 20 mg tablet take 1 tablet by mouth once daily 90 tablet 1    Blood Glucose Monitoring Suppl (OneTouch Verio Reflect) w/Device KIT Check blood sugars once daily. Please substitute with " appropriate alternative as covered by patient's insurance. Dx: E11.65 1 kit 0    cyclobenzaprine (FLEXERIL) 5 mg tablet Take 1 tablet (5 mg total) by mouth 3 (three) times a day as needed for muscle spasms 20 tablet 0    glucose blood (OneTouch Verio) test strip Check blood sugars once daily. Please substitute with appropriate alternative as covered by patient's insurance. Dx: E11.65 100 each 3    hydroCHLOROthiazide 25 mg tablet Take 1 tablet (25 mg total) by mouth daily 100 tablet 1    hydrOXYzine HCL (ATARAX) 25 mg tablet take 1 tablet by mouth every 6 hours if needed for for anxiety 30 tablet 0    losartan (COZAAR) 50 mg tablet Take 1 tablet (50 mg total) by mouth daily 90 tablet 0    meloxicam (MOBIC) 7.5 mg tablet take 1 tablet by mouth once daily if needed for moderate pain (Patient taking differently: Take 7.5 mg by mouth daily as needed) 30 tablet 0    OneTouch Delica Lancets 33G MISC Check blood sugars once daily. Please substitute with appropriate alternative as covered by patient's insurance. Dx: E11.65 100 each 3    Pyridoxine HCl (VITAMIN B6 PO) Take by mouth      triamcinolone (KENALOG) 0.1 % cream Apply topically 2 (two) times a day 15 g 0    valACYclovir (VALTREX) 500 mg tablet Take 1 tablet (500 mg total) by mouth daily 30 tablet 5     No current facility-administered medications on file prior to visit.     Allergies   Allergen Reactions    Kiwi Extract - Food Allergy Hives    Other      strawberry    Pineapple - Food Allergy Hives      Current Outpatient Medications on File Prior to Visit   Medication Sig Dispense Refill    alendronate (Fosamax) 70 mg tablet Take 1 tablet (70 mg total) by mouth every 7 days 12 tablet 3    atorvastatin (LIPITOR) 20 mg tablet take 1 tablet by mouth once daily 90 tablet 1    Blood Glucose Monitoring Suppl (OneTouch Verio Reflect) w/Device KIT Check blood sugars once daily. Please substitute with appropriate alternative as covered by patient's insurance. Dx: E11.65 1  "kit 0    cyclobenzaprine (FLEXERIL) 5 mg tablet Take 1 tablet (5 mg total) by mouth 3 (three) times a day as needed for muscle spasms 20 tablet 0    glucose blood (OneTouch Verio) test strip Check blood sugars once daily. Please substitute with appropriate alternative as covered by patient's insurance. Dx: E11.65 100 each 3    hydroCHLOROthiazide 25 mg tablet Take 1 tablet (25 mg total) by mouth daily 100 tablet 1    hydrOXYzine HCL (ATARAX) 25 mg tablet take 1 tablet by mouth every 6 hours if needed for for anxiety 30 tablet 0    losartan (COZAAR) 50 mg tablet Take 1 tablet (50 mg total) by mouth daily 90 tablet 0    meloxicam (MOBIC) 7.5 mg tablet take 1 tablet by mouth once daily if needed for moderate pain (Patient taking differently: Take 7.5 mg by mouth daily as needed) 30 tablet 0    OneTouch Delica Lancets 33G MISC Check blood sugars once daily. Please substitute with appropriate alternative as covered by patient's insurance. Dx: E11.65 100 each 3    Pyridoxine HCl (VITAMIN B6 PO) Take by mouth      triamcinolone (KENALOG) 0.1 % cream Apply topically 2 (two) times a day 15 g 0    valACYclovir (VALTREX) 500 mg tablet Take 1 tablet (500 mg total) by mouth daily 30 tablet 5     No current facility-administered medications on file prior to visit.      Social History     Tobacco Use    Smoking status: Never    Smokeless tobacco: Never   Vaping Use    Vaping status: Never Used   Substance and Sexual Activity    Alcohol use: Not Currently     Comment: rare    Drug use: No    Sexual activity: Yes     Partners: Female, Male     Birth control/protection: None       Objective   /80 (BP Location: Left arm, Patient Position: Sitting, Cuff Size: Large)   Pulse 71   Temp (!) 97.2 °F (36.2 °C)   Ht 5' 5\" (1.651 m)   Wt 73 kg (161 lb)   SpO2 99%   BMI 26.79 kg/m²     Physical Exam  Vitals and nursing note reviewed.   Constitutional:       General: She is not in acute distress.     Appearance: She is " well-developed.   HENT:      Head: Normocephalic and atraumatic.   Eyes:      Conjunctiva/sclera: Conjunctivae normal.   Cardiovascular:      Rate and Rhythm: Normal rate and regular rhythm.      Heart sounds: No murmur heard.  Pulmonary:      Effort: Pulmonary effort is normal. No respiratory distress.      Breath sounds: Normal breath sounds.   Abdominal:      Palpations: Abdomen is soft.      Tenderness: There is no abdominal tenderness.   Musculoskeletal:         General: No swelling.      Cervical back: Neck supple.   Skin:     General: Skin is warm and dry.      Capillary Refill: Capillary refill takes less than 2 seconds.   Neurological:      Mental Status: She is alert.   Psychiatric:         Mood and Affect: Mood normal.

## 2024-11-29 NOTE — PATIENT INSTRUCTIONS
"Patient Education     Routine physical for adults   The Basics   Written by the doctors and editors at [facility]   What is a physical? -- A physical is a routine visit, or \"check-up,\" with your doctor. You might also hear it called a \"wellness visit\" or \"preventive visit.\"  During each visit, the doctor will:   Ask about your physical and mental health   Ask about your habits, behaviors, and lifestyle   Do an exam   Give you vaccines if needed   Talk to you about any medicines you take   Give advice about your health   Answer your questions  Getting regular check-ups is an important part of taking care of your health. It can help your doctor find and treat any problems you have. But it's also important for preventing health problems.  A routine physical is different from a \"sick visit.\" A sick visit is when you see a doctor because of a health concern or problem. Since physicals are scheduled ahead of time, you can think about what you want to ask the doctor.  How often should I get a physical? -- It depends on your age and health. In general, for people age 21 years and older:   If you are younger than 50 years, you might be able to get a physical every 3 years.   If you are 50 years or older, your doctor might recommend a physical every year.  If you have an ongoing health condition, like diabetes or high blood pressure, your doctor will probably want to see you more often.  What happens during a physical? -- In general, each visit will include:   Physical exam - The doctor or nurse will check your height, weight, heart rate, and blood pressure. They will also look at your eyes and ears. They will ask about how you are feeling and whether you have any symptoms that bother you.   Medicines - It's a good idea to bring a list of all the medicines you take to each doctor visit. Your doctor will talk to you about your medicines and answer any questions. Tell them if you are having any side effects that bother you. You " "should also tell them if you are having trouble paying for any of your medicines.   Habits and behaviors - This includes:   Your diet   Your exercise habits   Whether you smoke, drink alcohol, or use drugs   Whether you are sexually active   Whether you feel safe at home  Your doctor will talk to you about things you can do to improve your health and lower your risk of health problems. They will also offer help and support. For example, if you want to quit smoking, they can give you advice and might prescribe medicines. If you want to improve your diet or get more physical activity, they can help you with this, too.   Lab tests, if needed - The tests you get will depend on your age and situation. For example, your doctor might want to check your:   Cholesterol   Blood sugar   Iron level   Vaccines - The recommended vaccines will depend on your age, health, and what vaccines you already had. Vaccines are very important because they can prevent certain serious or deadly infections.   Discussion of screening - \"Screening\" means checking for diseases or other health problems before they cause symptoms. Your doctor can recommend screening based on your age, risk, and preferences. This might include tests to check for:   Cancer, such as breast, prostate, cervical, ovarian, colorectal, prostate, lung, or skin cancer   Sexually transmitted infections, such as chlamydia and gonorrhea   Mental health conditions like depression and anxiety  Your doctor will talk to you about the different types of screening tests. They can help you decide which screenings to have. They can also explain what the results might mean.   Answering questions - The physical is a good time to ask the doctor or nurse questions about your health. If needed, they can refer you to other doctors or specialists, too.  Adults older than 65 years often need other care, too. As you get older, your doctor will talk to you about:   How to prevent falling at " home   Hearing or vision tests   Memory testing   How to take your medicines safely   Making sure that you have the help and support you need at home  All topics are updated as new evidence becomes available and our peer review process is complete.  This topic retrieved from [employer] on: May 02, 2024.  Topic 130383 Version 1.0  Release: 32.4.3 - C32.122  © 2024 UpToDate, Inc. and/or its affiliates. All rights reserved.  Consumer Information Use and Disclaimer   Disclaimer: This generalized information is a limited summary of diagnosis, treatment, and/or medication information. It is not meant to be comprehensive and should be used as a tool to help the user understand and/or assess potential diagnostic and treatment options. It does NOT include all information about conditions, treatments, medications, side effects, or risks that may apply to a specific patient. It is not intended to be medical advice or a substitute for the medical advice, diagnosis, or treatment of a health care provider based on the health care provider's examination and assessment of a patient's specific and unique circumstances. Patients must speak with a health care provider for complete information about their health, medical questions, and treatment options, including any risks or benefits regarding use of medications. This information does not endorse any treatments or medications as safe, effective, or approved for treating a specific patient. UpToDate, Inc. and its affiliates disclaim any warranty or liability relating to this information or the use thereof.The use of this information is governed by the Terms of Use, available at https://www.[employer].com/en/know/clinical-effectiveness-terms. 2024© UpToDate, Inc. and its affiliates and/or licensors. All rights reserved.  Copyright   © 2024 UpToDate, Inc. and/or its affiliates. All rights reserved.

## 2024-11-29 NOTE — PROGRESS NOTES
"Name: Marina Land      : 1965      MRN: [id number]  Encounter Provider: Sudhir Sánchez MD  Encounter Date: 2024   Encounter department: [facility]  :  Assessment & Plan  Injury of toe on right foot, initial encounter  Recommend ibuprofen or tylenol as needed  Orders:    XR toe right great min 2 view; Future    Encounter for screening mammogram for breast cancer    Orders:    Mammo screening bilateral w 3d and cad; Future    Health maintenance examination  See health maintenance note    Follow up in 1 year or as needed            History of Present Illness     Patient is here due to right toe pain that started almost 2 weeks ago. She fell while trying to go to [employer]. She twisted her left ankle and stubbed her right toe. She has tenderness associated with it and is unable to have the toe completely flat down.      Review of Systems   Constitutional:  Negative for activity change, appetite change, fatigue and fever.   HENT:  Negative for congestion and ear discharge.    Respiratory:  Negative for cough and shortness of breath.    Cardiovascular:  Negative for chest pain and palpitations.   Gastrointestinal:  Negative for diarrhea and nausea.   Musculoskeletal:  Positive for arthralgias and myalgias. Negative for back pain.   Skin:  Negative for color change and rash.   Neurological:  Negative for dizziness and headaches.   Psychiatric/Behavioral:  Negative for agitation and behavioral problems.           Objective   /80 (BP Location: Left arm, Patient Position: Sitting, Cuff Size: Large)   Pulse 71   Temp (!) 97.2 °F (36.2 °C)   Ht 5' 5\" (1.651 m)   Wt 73 kg (161 lb)   SpO2 99%   BMI 26.79 kg/m²      Physical Exam  Vitals and nursing note reviewed.   Constitutional:       General: She is not in acute distress.     Appearance: She is well-developed.   HENT:      Head: Normocephalic and atraumatic.   Eyes:      Conjunctiva/sclera: Conjunctivae normal. "   Cardiovascular:      Rate and Rhythm: Normal rate and regular rhythm.      Heart sounds: No murmur heard.  Pulmonary:      Effort: Pulmonary effort is normal. No respiratory distress.      Breath sounds: Normal breath sounds.   Abdominal:      Palpations: Abdomen is soft.      Tenderness: There is no abdominal tenderness.   Musculoskeletal:         General: Tenderness present. No swelling.      Cervical back: Neck supple.      Comments: Right toe tenderness  Tenderness on ROM   Skin:     General: Skin is warm and dry.      Capillary Refill: Capillary refill takes less than 2 seconds.   Neurological:      Mental Status: She is alert.   Psychiatric:         Mood and Affect: Mood normal.

## 2024-11-30 ENCOUNTER — APPOINTMENT (OUTPATIENT)
Dept: RADIOLOGY | Facility: CLINIC | Age: 59
End: 2024-11-30
Payer: COMMERCIAL

## 2024-11-30 DIAGNOSIS — M81.0 AGE-RELATED OSTEOPOROSIS WITHOUT CURRENT PATHOLOGICAL FRACTURE: ICD-10-CM

## 2024-11-30 DIAGNOSIS — S99.921A INJURY OF TOE ON RIGHT FOOT, INITIAL ENCOUNTER: ICD-10-CM

## 2024-11-30 PROCEDURE — 73660 X-RAY EXAM OF TOE(S): CPT

## 2024-12-01 DIAGNOSIS — M25.50 POLYARTHRALGIA: ICD-10-CM

## 2024-12-02 RX ORDER — ALENDRONATE SODIUM 70 MG/1
TABLET ORAL
Qty: 12 TABLET | Refills: 1 | Status: SHIPPED | OUTPATIENT
Start: 2024-12-02 | End: 2024-12-06 | Stop reason: SDUPTHER

## 2024-12-03 ENCOUNTER — RESULTS FOLLOW-UP (OUTPATIENT)
Dept: FAMILY MEDICINE CLINIC | Facility: CLINIC | Age: 59
End: 2024-12-03

## 2024-12-03 RX ORDER — MELOXICAM 7.5 MG/1
TABLET ORAL
Qty: 30 TABLET | Refills: 5 | Status: SHIPPED | OUTPATIENT
Start: 2024-12-03

## 2024-12-04 DIAGNOSIS — Z11.1 SCREENING-PULMONARY TB: Primary | ICD-10-CM

## 2024-12-06 DIAGNOSIS — M81.0 AGE-RELATED OSTEOPOROSIS WITHOUT CURRENT PATHOLOGICAL FRACTURE: ICD-10-CM

## 2024-12-06 RX ORDER — ALENDRONATE SODIUM 70 MG/1
70 TABLET ORAL
Qty: 12 TABLET | Refills: 0 | Status: SHIPPED | OUTPATIENT
Start: 2024-12-06

## 2024-12-29 PROBLEM — Z00.00 HEALTH MAINTENANCE EXAMINATION: Status: RESOLVED | Noted: 2024-11-29 | Resolved: 2024-12-29

## 2024-12-31 DIAGNOSIS — E78.2 MIXED HYPERLIPIDEMIA: ICD-10-CM

## 2024-12-31 RX ORDER — ATORVASTATIN CALCIUM 20 MG/1
20 TABLET, FILM COATED ORAL DAILY
Qty: 30 TABLET | Refills: 0 | Status: SHIPPED | OUTPATIENT
Start: 2024-12-31

## 2025-01-12 DIAGNOSIS — I10 ESSENTIAL HYPERTENSION: ICD-10-CM

## 2025-01-13 RX ORDER — LOSARTAN POTASSIUM 50 MG/1
50 TABLET ORAL DAILY
Qty: 90 TABLET | Refills: 1 | Status: SHIPPED | OUTPATIENT
Start: 2025-01-13

## 2025-01-17 ENCOUNTER — PROCEDURE VISIT (OUTPATIENT)
Dept: OBGYN CLINIC | Facility: CLINIC | Age: 60
End: 2025-01-17
Payer: COMMERCIAL

## 2025-01-17 DIAGNOSIS — M17.11 PRIMARY OSTEOARTHRITIS OF RIGHT KNEE: Primary | ICD-10-CM

## 2025-01-17 DIAGNOSIS — M17.12 PRIMARY OSTEOARTHRITIS OF LEFT KNEE: ICD-10-CM

## 2025-01-17 PROCEDURE — 20610 DRAIN/INJ JOINT/BURSA W/O US: CPT | Performed by: FAMILY MEDICINE

## 2025-01-17 RX ORDER — HYALURONATE SODIUM 10 MG/ML
20 SYRINGE (ML) INTRAARTICULAR
Status: COMPLETED | OUTPATIENT
Start: 2025-01-17 | End: 2025-01-17

## 2025-01-17 RX ADMIN — Medication 20 MG: at 08:30

## 2025-01-17 NOTE — PROGRESS NOTES
Large joint arthrocentesis: bilateral knee  Universal Protocol:  Consent: Verbal consent obtained.  Risks and benefits: risks, benefits and alternatives were discussed  Consent given by: patient  Patient identity confirmed: verbally with patient  Supporting Documentation  Indications: pain   Procedure Details  Location: knee - bilateral knee  Preparation: Patient was prepped and draped in the usual sterile fashion  Needle size: 22 G  Ultrasound guidance: no  Approach: anterolateral    Medications (Right): 20 mg Sodium Hyaluronate (Viscosup) 20 MG/2MLMedications (Left): 20 mg Sodium Hyaluronate (Viscosup) 20 MG/2ML   Patient tolerance: patient tolerated the procedure well with no immediate complications

## 2025-01-24 ENCOUNTER — RESULTS FOLLOW-UP (OUTPATIENT)
Dept: FAMILY MEDICINE CLINIC | Facility: CLINIC | Age: 60
End: 2025-01-24

## 2025-01-24 ENCOUNTER — APPOINTMENT (OUTPATIENT)
Dept: LAB | Facility: CLINIC | Age: 60
End: 2025-01-24
Payer: COMMERCIAL

## 2025-01-24 ENCOUNTER — PROCEDURE VISIT (OUTPATIENT)
Dept: OBGYN CLINIC | Facility: CLINIC | Age: 60
End: 2025-01-24
Payer: COMMERCIAL

## 2025-01-24 VITALS — HEIGHT: 65 IN | BODY MASS INDEX: 26.66 KG/M2 | WEIGHT: 160 LBS

## 2025-01-24 DIAGNOSIS — Z13.220 LIPID SCREENING: ICD-10-CM

## 2025-01-24 DIAGNOSIS — M17.12 PRIMARY OSTEOARTHRITIS OF LEFT KNEE: ICD-10-CM

## 2025-01-24 DIAGNOSIS — I10 ESSENTIAL HYPERTENSION: ICD-10-CM

## 2025-01-24 DIAGNOSIS — Z11.1 SCREENING-PULMONARY TB: ICD-10-CM

## 2025-01-24 DIAGNOSIS — M17.11 PRIMARY OSTEOARTHRITIS OF RIGHT KNEE: Primary | ICD-10-CM

## 2025-01-24 LAB
ALBUMIN SERPL BCG-MCNC: 4.1 G/DL (ref 3.5–5)
ALP SERPL-CCNC: 65 U/L (ref 34–104)
ALT SERPL W P-5'-P-CCNC: 28 U/L (ref 7–52)
ANION GAP SERPL CALCULATED.3IONS-SCNC: 6 MMOL/L (ref 4–13)
AST SERPL W P-5'-P-CCNC: 24 U/L (ref 13–39)
BASOPHILS # BLD AUTO: 0.04 THOUSANDS/ΜL (ref 0–0.1)
BASOPHILS NFR BLD AUTO: 1 % (ref 0–1)
BILIRUB SERPL-MCNC: 0.61 MG/DL (ref 0.2–1)
BUN SERPL-MCNC: 19 MG/DL (ref 5–25)
CALCIUM SERPL-MCNC: 9.3 MG/DL (ref 8.4–10.2)
CHLORIDE SERPL-SCNC: 108 MMOL/L (ref 96–108)
CHOLEST SERPL-MCNC: 219 MG/DL (ref ?–200)
CO2 SERPL-SCNC: 26 MMOL/L (ref 21–32)
CREAT SERPL-MCNC: 0.61 MG/DL (ref 0.6–1.3)
EOSINOPHIL # BLD AUTO: 0.21 THOUSAND/ΜL (ref 0–0.61)
EOSINOPHIL NFR BLD AUTO: 5 % (ref 0–6)
ERYTHROCYTE [DISTWIDTH] IN BLOOD BY AUTOMATED COUNT: 12.9 % (ref 11.6–15.1)
GFR SERPL CREATININE-BSD FRML MDRD: 98 ML/MIN/1.73SQ M
GLUCOSE P FAST SERPL-MCNC: 90 MG/DL (ref 65–99)
HCT VFR BLD AUTO: 35.9 % (ref 34.8–46.1)
HDLC SERPL-MCNC: 85 MG/DL
HGB BLD-MCNC: 11.6 G/DL (ref 11.5–15.4)
IMM GRANULOCYTES # BLD AUTO: 0.01 THOUSAND/UL (ref 0–0.2)
IMM GRANULOCYTES NFR BLD AUTO: 0 % (ref 0–2)
LDLC SERPL CALC-MCNC: 125 MG/DL (ref 0–100)
LYMPHOCYTES # BLD AUTO: 1.8 THOUSANDS/ΜL (ref 0.6–4.47)
LYMPHOCYTES NFR BLD AUTO: 39 % (ref 14–44)
MCH RBC QN AUTO: 29.9 PG (ref 26.8–34.3)
MCHC RBC AUTO-ENTMCNC: 32.3 G/DL (ref 31.4–37.4)
MCV RBC AUTO: 93 FL (ref 82–98)
MONOCYTES # BLD AUTO: 0.4 THOUSAND/ΜL (ref 0.17–1.22)
MONOCYTES NFR BLD AUTO: 9 % (ref 4–12)
NEUTROPHILS # BLD AUTO: 2.17 THOUSANDS/ΜL (ref 1.85–7.62)
NEUTS SEG NFR BLD AUTO: 46 % (ref 43–75)
NONHDLC SERPL-MCNC: 134 MG/DL
NRBC BLD AUTO-RTO: 0 /100 WBCS
PLATELET # BLD AUTO: 212 THOUSANDS/UL (ref 149–390)
PMV BLD AUTO: 10.2 FL (ref 8.9–12.7)
POTASSIUM SERPL-SCNC: 3.6 MMOL/L (ref 3.5–5.3)
PROT SERPL-MCNC: 6.6 G/DL (ref 6.4–8.4)
RBC # BLD AUTO: 3.88 MILLION/UL (ref 3.81–5.12)
SODIUM SERPL-SCNC: 140 MMOL/L (ref 135–147)
TRIGL SERPL-MCNC: 46 MG/DL (ref ?–150)
WBC # BLD AUTO: 4.63 THOUSAND/UL (ref 4.31–10.16)

## 2025-01-24 PROCEDURE — 85025 COMPLETE CBC W/AUTO DIFF WBC: CPT

## 2025-01-24 PROCEDURE — 36415 COLL VENOUS BLD VENIPUNCTURE: CPT

## 2025-01-24 PROCEDURE — 86480 TB TEST CELL IMMUN MEASURE: CPT

## 2025-01-24 PROCEDURE — 80053 COMPREHEN METABOLIC PANEL: CPT

## 2025-01-24 PROCEDURE — 20610 DRAIN/INJ JOINT/BURSA W/O US: CPT | Performed by: FAMILY MEDICINE

## 2025-01-24 PROCEDURE — 80061 LIPID PANEL: CPT

## 2025-01-24 RX ORDER — HYALURONATE SODIUM 10 MG/ML
20 SYRINGE (ML) INTRAARTICULAR
Status: COMPLETED | OUTPATIENT
Start: 2025-01-24 | End: 2025-01-24

## 2025-01-24 RX ADMIN — Medication 20 MG: at 08:30

## 2025-01-26 ENCOUNTER — RESULTS FOLLOW-UP (OUTPATIENT)
Dept: FAMILY MEDICINE CLINIC | Facility: CLINIC | Age: 60
End: 2025-01-26

## 2025-01-31 ENCOUNTER — PROCEDURE VISIT (OUTPATIENT)
Dept: OBGYN CLINIC | Facility: CLINIC | Age: 60
End: 2025-01-31
Payer: COMMERCIAL

## 2025-01-31 VITALS — BODY MASS INDEX: 26.66 KG/M2 | WEIGHT: 160 LBS | HEIGHT: 65 IN

## 2025-01-31 DIAGNOSIS — M17.12 PRIMARY OSTEOARTHRITIS OF LEFT KNEE: ICD-10-CM

## 2025-01-31 DIAGNOSIS — M17.11 PRIMARY OSTEOARTHRITIS OF RIGHT KNEE: Primary | ICD-10-CM

## 2025-01-31 PROCEDURE — 20610 DRAIN/INJ JOINT/BURSA W/O US: CPT | Performed by: FAMILY MEDICINE

## 2025-01-31 RX ORDER — HYALURONATE SODIUM 10 MG/ML
20 SYRINGE (ML) INTRAARTICULAR
Status: COMPLETED | OUTPATIENT
Start: 2025-01-31 | End: 2025-01-31

## 2025-01-31 RX ADMIN — Medication 20 MG: at 08:30

## 2025-01-31 NOTE — PROGRESS NOTES
Large joint arthrocentesis: bilateral knee  Universal Protocol:  Consent: Verbal consent obtained.  Risks and benefits: risks, benefits and alternatives were discussed  Consent given by: patient  Patient identity confirmed: verbally with patient  Supporting Documentation  Indications: pain   Procedure Details  Location: knee - bilateral knee  Needle size: 22 G  Ultrasound guidance: no  Approach: anterolateral    Medications (Right): 20 mg Sodium Hyaluronate (Viscosup) 20 MG/2MLMedications (Left): 20 mg Sodium Hyaluronate (Viscosup) 20 MG/2ML   Patient tolerance: patient tolerated the procedure well with no immediate complications

## 2025-02-18 NOTE — PATIENT INSTRUCTIONS
Patient Education     Lowering Your Risk of Breast Cancer   About this topic   Breast cancer is a serious illness. Breast cancer is when abnormal cells grow and divide more quickly in your breast. These cells form a growth or tumor. The abnormal cells may enter nearby tissue and spread to other parts of the body. It is the type of cancer most often seen in women. Men can have breast cancer, but it is a rare condition.  General   Some things in your life may increase your risk of breast cancer. You may not be able to change some of these. Others you can control.  You are more likely to get breast cancer if you:  Have a mother, sister, or daughter who has had breast cancer  Have used hormones for menopause for more than 5 years  Have had radiation therapy to the breast or chest in the past  Are overweight or do not exercise  Had your first menstrual period before you were 10 years old  Went through menopause after age 54  Have never been pregnant or had your first child after age 34  Have had breast cancer before  Drink alcohol in any form  Have dense breasts  Are older in age  There is no certain way to prevent breast cancer. There are things you can do to lower your chances of having breast cancer.  Keep a healthy weight. Lose weight if you are overweight. Being overweight raises your chances of having breast cancer.  Eat a healthy diet to maintain a healthy weight, such as more fruits, vegetables, and lean cuts of meat. Decrease the amount of saturated fat in your diet.  Exercise. Being active helps you keep a healthy weight.  Limit your alcohol intake or do not drink alcohol. The more alcohol you drink, the higher your risk.  Do not smoke cigarettes. Smoking can increase your risk of many types of cancer.  Breastfeed your baby. This may help protect you. The longer you breastfeed, the more protection you have.  Talk with your doctor about:  Limiting or stopping hormone therapy.  Taking certain drugs to prevent  breast cancer. For women at high risk of having breast cancer, there are a few drugs that may lower your risk.  Surgery to prevent you from having breast cancer if you are very high risk.  When do I need to call the doctor?   Changes in your breasts  A lump or area in your breast that feels different  Discharge from your nipple  Skin on your breast is dimpled or indented  You have questions or concerns about your breasts  Helpful tips   Talk to your doctor about the best kind of breast cancer screening for you.  If you want to do self breast exams, have your doctor show you the right way to do them.  Tell your doctor of any abnormal finding.  Last Reviewed Date   2021-10-04  Consumer Information Use and Disclaimer   This generalized information is a limited summary of diagnosis, treatment, and/or medication information. It is not meant to be comprehensive and should be used as a tool to help the user understand and/or assess potential diagnostic and treatment options. It does NOT include all information about conditions, treatments, medications, side effects, or risks that may apply to a specific patient. It is not intended to be medical advice or a substitute for the medical advice, diagnosis, or treatment of a health care provider based on the health care provider's examination and assessment of a patient’s specific and unique circumstances. Patients must speak with a health care provider for complete information about their health, medical questions, and treatment options, including any risks or benefits regarding use of medications. This information does not endorse any treatments or medications as safe, effective, or approved for treating a specific patient. Up[employer] Inc[employer] and its affiliates disclaim any warranty or liability relating to this information or the use thereof. The use of this information is governed by the Terms of Use, available at  https://www.woltersDegree Controlsuwer.com/en/know/clinical-effectiveness-terms   Copyright   Copyright © 2024 UpTo[employer], Inc. and its affiliates and/or licensors. All rights reserved.       Perineal Hygiene      Your vaginal naturally takes care of its self, it is a self washing system, the less you mess the healthier it will be     No soaps or feminine wash to the vulva, these products can cause dermitis, bacterial infections and other vulvar problems.   Use only water to cleanse, or water with Dove or Dove Sensitive Skin Bar soap if necessary.    Avoid the use of washcloths, exfoliating cathryn's, netted scrubbers, loofa's, use your hands only to cleanse the vulvar tissues    No scented lotions or products are advised in or near your vulva.    Use coconut oil in its solid form for moisture if needed.  No douching this may cause imbalance in your vaginal PH and further issues.    If you wear panty liners, you may apply a thin coating of Vaseline, A&D ointment or coconut oil to the vulvar tissues as a skin barrier     Cotton underware, loose fitting clothing  Only perfume-free, dye-free laundry detergent, use a second rinse cycle   Avoid fabric softeners/dryer sheets.       Your partner should avoid the same products as well.       Over the counter probiotic to restore vaginal cecile may be helpful as well, take daily.       You may also look into Boric Acid vaginal suppositories to restore vaginal PH balance for up to 2 weeks as directed on the box. You may not use these if you are pregnant      For vaginal dryness:      You may use:     Coconut oil in solid form, not liquid (organic, pure, unscented) as needed for moisture or lubrication. ( Do not use if allergic)       Replens moisture restore external comfort gel daily ( use as directed on the box)        Replens long lasting vaginal moisturizer  ( use as directed on the box)     May try [employer] Software vulvar moisturizer ( found on Amazon )    May try Revaree vaginal inserts        For  Vaginal Lubrication:          You may use:     Coconut oil in solid form, not liquid (organic, pure, unscented) as a lubricant or another scent-free lubricant (Astroglide, Uberlube) if needed.  Do not use coconut oil or silicone if using a condom as this may break down the integrity of the condom and cause an unplanned pregnancy              Do not use coconut oil if allergic               Replens silky smooth lubricant, premium silicone based lubricant for intercourse. ( use as directed, a small amount will provide an enhanced natural feeling)     Any premium over the counter vaginal lubricant water or silicone based. Silicone based will have more staying power.        For Vulvar irritation/itching:        You may use Hydrocortisone 1 % over the counter to external vulvar tissues 2 x daily for 5-7 days to help with irriation and itch relief.  Patient Education     Pelvic Floor Exercises   About this topic   The pelvic floor consists of muscles and strong bands of tissues which support all of the organs in your pelvis. Some of these organs are the bladder and the small and large bowel as well as the womb and the prostate. If the muscles and tissues get weak, your organs may drop. This can lead to other problems. Your urine may leak when you laugh, sneeze, or cough. You may not be able to drain the bladder fully. You may have less problems if you do exercises to strengthen your pelvic floor and abdominal muscles.  Kegel exercises help make the muscles in the pelvic floor stronger. Anyone can do them. It is also important to make your abdominal muscles stronger. In order for these exercises to work, you must be consistent when doing them.  General   Before starting with a program, ask your doctor if you are healthy enough to do these exercises. Your doctor may have you work with a  or physical therapist to make a safe exercise program to meet your needs.  Strengthening Exercises   Kegel exercises keep your  pelvic muscles firm and strong. You can do these in many different positions. Start by lying down with your knees bent and feet on the bed. Squeeze the pelvic muscles as if you are trying to stop the flow of urine. Hold these muscles for a count of 3, and then slowly relax them for a count of 3. Try to work up to squeezing for a count of 10 and slowly relaxing for a count of 10. Increase the muscle squeeze until you get to 10. When relaxing, slowly relax to a count of 10.  Breathe out when you are squeezing and breathe in when you are relaxing. Your goal is to try to do 10 Kegels 3 or more times each day. Take time to rest between sets. Be sure to only contract your pelvic floor muscles, not your buttocks, thighs, or abdominal muscles.  Pelvic floor contractions ? There are a few ways to feel the pelvic muscles contract.  When you are passing urine, try suddenly stopping your flow of urine. Do not do this on a regular basis, but only to feel what the contraction feels like. Doing this while passing urine can lead to other problems.  Put a finger into the vagina or rectum. Contract the muscles around your finger as if you were trying to stop the flow of urine or stop the passing of gas.  Place two chairs without arms about 2 inches (5 cm) apart. Sit so you have one butt cheek on each chair. Now, try chiquis your pelvic floor muscles. This will help you keep from using other muscles.  Pelvic tilts ? Lie on your back with your knees bent and feet flat on the floor. Tighten your stomach muscles and press your lower back down to the floor. Try doing Kegels with this exercise when your back is flattened. Hold 3 to 5 seconds. Relax.  Straight leg raises lying down ? Lie on your back with one leg straight. Bend your other knee so the foot is flat on the bed. Keeping your leg straight, lift the leg up to the level of your other knee. Lower it back down. Repeat with the other leg.  Hip lifts ? Lie on your back with your  knees bent and feet flat on the floor. Tighten your stomach muscles and lift your buttocks off the floor. Try doing Kegels when up in this position. Hold 3 to 5 seconds. Relax.  Abdominal bracing ? Do this exercise in different positions: Lying down, sitting, and standing. Tighten your stomach muscles. While keeping the stomach muscles tight, tighten your pelvic floor muscles. Now, forcefully laugh or cough to see if you were able to prevent urine from leaking.  Abdominal crunches ? Lie on your back with both knees bent. Keep your feet flat on the floor. Place your hands in one of these positions. Try starting with the first position since it is the easiest. As you get better, use the other positions to make it harder.  Crunches with arms at sides.  Crunches with arms across chest.  Crunches with arms behind head. Be careful not to interlock your fingers behind your neck or head while doing crunches. This may add tension to your neck and cause strain.  Look at the ceiling. Tighten your belly muscles and lift your shoulders and upper back off the floor. Breathe out while you are doing this. Lower your shoulders to the floor. Breathe in while you are doing this. Relax your belly muscles all the way before starting another crunch.             What will the results be?   Less leakage of urine when you cough, sneeze, laugh, or run  Fewer strong urges to pass urine  Fewer trips to the bathroom each day  Less risk of organs, such as the uterus or bladder, dropping into the vagina  Faster recovery after childbirth or prostate surgery  Stronger core muscles  Increased sensitivity during sex  Helpful tips   You can also try doing a different kind of Kegels. Do 5 quick, strong pelvic floor contractions. Sometimes, if you have an urge to pass urine but are not near a bathroom, you can do this kind of Kegel to calm the urge.  Stay active and work out to keep your muscles strong and flexible.  Keep a healthy weight to avoid  putting too much stress on your spine. Eat a healthy diet to keep your muscles healthy.  Be sure you do not hold your breath when exercising. This can raise your blood pressure. If you tend to hold your breath, try counting out loud when exercising. If any exercise bothers you, stop right away.  Try walking or cycling at an easy pace for a few minutes to warm up your muscles. Do this again after exercising.  Doing exercises before a meal may be a good way to get into a routine. A good time to do these exercises is each time you are stopped at a stop light while driving.  Exercise may be slightly uncomfortable, but you should not have sharp pains. If you do get sharp pains, stop what you are doing. If the sharp pains continue, call your doctor.  Last Reviewed Date   2021-03-31  Consumer Information Use and Disclaimer   This generalized information is a limited summary of diagnosis, treatment, and/or medication information. It is not meant to be comprehensive and should be used as a tool to help the user understand and/or assess potential diagnostic and treatment options. It does NOT include all information about conditions, treatments, medications, side effects, or risks that may apply to a specific patient. It is not intended to be medical advice or a substitute for the medical advice, diagnosis, or treatment of a health care provider based on the health care provider's examination and assessment of a patient’s specific and unique circumstances. Patients must speak with a health care provider for complete information about their health, medical questions, and treatment options, including any risks or benefits regarding use of medications. This information does not endorse any treatments or medications as safe, effective, or approved for treating a specific patient. UpToDate, Inc. and its affiliates disclaim any warranty or liability relating to this information or the use thereof. The use of this information is  "governed by the Terms of Use, available at https://www.Zoc[employer]uwer.com/en/know/clinical-effectiveness-terms   Copyright   Copyright © 2024 UpToDate, Inc. and its affiliates and/or licensors. All rights reserved.  Patient Education     Menopause   The Basics   Written by the doctors and editors at [facility]   What is menopause? -- Menopause is the time in life when monthly periods naturally stop. At this time, the ovaries stop releasing eggs and stop making the hormones estrogen and progesterone.  Menopause usually occurs between the ages of 45 and 55. The average age is 51.  Menopause does not happen all at once. It is a \"transition\" that takes years. It can cause symptoms that are difficult for a lot of people. But there are treatments that can help.  How do I know if I am going through menopause? -- You might wonder about menopause when your periods start to change. If you are going through menopause, you might:   Have periods more or less often than usual (for example, every 5 to 6 weeks instead of every 4)   Have shorter periods than before   Skip 1 or more periods   Have symptoms like hot flashes  If you have had a hysterectomy (surgery to remove your uterus), but you still have your ovaries, it might be tough to tell when you are going through menopause. Still, you can have menopause symptoms even if you no longer have a uterus.  If your ovaries were removed before the usual age of menopause, you had what doctors call \"surgical menopause.\" That just means that you went through it early, because your ovaries were removed.  What are the symptoms of menopause? -- Some people go through menopause without symptoms. But most have 1 or more of these symptoms:   Hot flashes - Hot flashes feel like a wave of heat that starts in your chest and face and then moves through your body. Hot flashes usually start happening before you stop having periods.   Night sweats - When hot flashes happen during sleep, they are called " "\"night sweats.\" They can make it hard to get a good night's sleep.   Sleep problems - During the transition to menopause, some people have trouble falling or staying asleep. This can happen even if night sweats are not a problem.   Vaginal dryness - Menopause can cause the vagina and tissues near the vagina to become dry and thin. This usually starts a few years after menopause. It can be uncomfortable or make sex painful.   Depression - During the transition to menopause, many people start having symptoms of depression or anxiety. This is more likely if you have had depression before. Depression symptoms include:   Sadness   Losing interest in doing things   Sleeping too much or too little   Trouble concentrating or remembering things - This might be caused by lack of sleep that often happens at menopause, or by the lack of estrogen. Some experts suspect that estrogen is important for good brain function.   Headaches - If you get migraine headaches related to your period, these might get worse during menopause.   Joint pain - Some people have joint aches or pains during and after menopause.  Many of these symptoms get better after menopause. But some people continue to have hot flashes, even for years afterwards.  Should I see a doctor or nurse? -- It depends. If your periods start changing and you are 45 or older, you do not need to see your doctor for this reason alone. But you should tell them if you have symptoms that bother you.  For example, see your doctor if you cannot sleep because of night sweats, if it is hard to work because of your hot flashes, or if you feel sad or down and don't seem to enjoy things anymore. If your regular doctor cannot recommend treatments that can help, you might consider looking for a doctor who is a specialist in menopause or women's health. They might have more information about ways to relieve symptoms.  You should also see a doctor or nurse if you:   Have your period more " "often than every 3 weeks   Have very heavy bleeding during your period   Have bleeding or \"spotting\" between periods   Have been through menopause (have gone 12 months without a period) and start bleeding again, even if it's just a spot of blood  Is there a test for menopause? -- There is a test that can help tell if you are going through menopause. But doctors usually use this only in people who are too young to be in menopause or who have special circumstances.  Can I still get pregnant? -- As long as you are still having periods, even if they do not happen often, it is possible to get pregnant. If you have sex and do not want to get pregnant, use some form of birth control.  If you have not had a period for a full year, it is probably safe to say you have been through menopause and can no longer get pregnant.  How are the symptoms of menopause treated? -- There are treatments that can help relieve symptoms.  Treatments for hot flashes include:   Hormone therapy - The hormone estrogen is the most effective treatment for menopause symptoms. Most people need to take estrogen with another hormone, called progesterone. People who have had a hysterectomy (surgery to remove the uterus) can take estrogen by itself. Experts think that these hormones are effective and safe for most people.  If you want to take hormones, ask your doctor or nurse if it is an option for you. You should not take hormones if you have had breast cancer, a heart attack, a stroke, or a blood clot.   Antidepressants - Some types of antidepressants can ease hot flashes and depression. Even if you do not have depression, these medicines can still help with hot flashes. They are often used by people who have had breast cancer and cannot take estrogen.   Anti-seizure medicine - One of the medicines used to prevent seizures seems to help some people with hot flashes, even if they do not have seizures.  Treatments for vaginal dryness include:   Vaginal " "estrogen - Vaginal estrogen is any form of estrogen that goes directly into the vagina. It comes in creams, tablets, or a flexible ring. Vaginal estrogen comes in small doses that don't increase the levels of estrogen in other parts of the body very much.   Other medicines - In most cases, doctors recommend vaginal estrogen. That's because there is more evidence that it helps with vaginal dryness compared with other medicines. But if you do not want to use vaginal estrogen, your doctor can suggest other options. For example:   You might choose to use a vaginal moisturizer several times a week. Vaginal moisturizers (sample brand names: Replens, K-Y SILK-E) do not contain hormones. They help keep the vagina moist all of the time. You can also add a lubricant (sample brand names: Astroglide, K-Y Jelly) when you have sex.   In some cases, doctors might suggest another medicine. For example, there is a tablet that you insert into the vagina once a day. There is also a pill that might help some people who cannot use vaginal products.  Some doctors are not used to prescribing hormone therapy for menopause. If you feel that you are not getting the help you need, you might choose to talk to a different doctor who is an expert in menopause treatment. You can ask your doctor or nurse to refer you to someone.  Can I do anything on my own to reduce the symptoms of menopause? -- Yes. There are some steps you can try (table 1). But ask your doctor before you take any \"natural remedies,\" especially if you have a history of breast cancer. Things like herbs and supplements are not proven to help, and in some cases, could harm you.  What can I do to protect my bones? -- You can:   Take calcium and vitamin D supplements.   Be active (physical activity helps keep bones strong).   Ask your doctor when you should start having bone density tests.  If needed, your doctor can prescribe medicines to help keep your bones strong.  All topics " are updated as new evidence becomes available and our peer review process is complete.  This topic retrieved from [employer] on: Feb 26, 2024.  Topic 95187 Version 11.0  Release: 32.2.4 - C32.56  © 2024 UpToDate, Inc. and/or its affiliates. All rights reserved.  table 1: Ways to cope with menopause symptoms  Symptom  What you can do    Hot flashes and night sweats Dress in layers so you can take off clothes if you get hot.    Keep your home at a comfortable temperature. Avoid hot drinks, such as coffee and tea.    Put a cold, wet washcloth against your neck during hot flashes.    Quit smoking, if you smoke. (Smoking makes hot flashes worse.) If you are having trouble quitting, your doctor or nurse can help.   Vaginal dryness Use a vaginal moisturizer a few times a week (sample brand names: Replens, K-Y SILK-E).    Use a lubricant before sex (sample brand names: Astroglide, K-Y Jelly).   Sleep problems Try to go to sleep and get up at the same time every day, even when you don't sleep well.    Avoid caffeine in the afternoon, and limit alcohol.   Depression Try to stay active. Exercise can help your mood.    Seek support from other people going through menopause.   Graphic 62141 Version 4.0  Consumer Information Use and Disclaimer   Disclaimer: This generalized information is a limited summary of diagnosis, treatment, and/or medication information. It is not meant to be comprehensive and should be used as a tool to help the user understand and/or assess potential diagnostic and treatment options. It does NOT include all information about conditions, treatments, medications, side effects, or risks that may apply to a specific patient. It is not intended to be medical advice or a substitute for the medical advice, diagnosis, or treatment of a health care provider based on the health care provider's examination and assessment of a patient's specific and unique circumstances. Patients must speak with a health care provider  "for complete information about their health, medical questions, and treatment options, including any risks or benefits regarding use of medications. This information does not endorse any treatments or medications as safe, effective, or approved for treating a specific patient. UpToDate, Inc. and its affiliates disclaim any warranty or liability relating to this information or the use thereof.The use of this information is governed by the Terms of Use, available at https://www.Roundboxuwer.com/en/know/clinical-effectiveness-terms. 2024© UpToDate, Inc. and its affiliates and/or licensors. All rights reserved.  Copyright   © 2024 UpToDate, Inc. and/or its affiliates. All rights reserved.  Patient Education     Vaginal dryness   The Basics   Written by the doctors and editors at [employer]   What is vaginal dryness? -- Vaginal dryness is when the vagina and vulva get dry, thin, and inflamed. (The vulva is the area around the vagina.) This can be uncomfortable or make sex painful. It happens when your body does not make enough of a hormone called estrogen. This is often related to menopause (when you stop having a monthly period). It can also happen if your ovaries were removed, if you take certain medicines, or if you are breastfeeding.  Medical terms for vaginal dryness include \"vaginal atrophy,\" \"vulvovaginal atrophy,\" and \"atrophic vaginitis.\" If your symptoms are related to menopause, your doctor might also use the term \"genitourinary syndrome of menopause,\" or \"GSM.\"  What other symptoms can happen? -- Some people have other symptoms in addition to dryness. These symptoms can include:   Vaginal burning or irritation   Making less lubrication (wetness) during sex   Pain during sex   Bleeding when something touches or rubs the vagina, such as after sex. (If you have this symptom, see a doctor.)   Discharge or fluid from the vagina   Urinary problems, such as having to urinate often, having pain with urination, or " "leaking urine. (If you have these symptoms, see a doctor.)  Should I see a doctor or nurse? -- See your doctor or nurse if you have vaginal dryness or related symptoms that bother you.  Some people never tell their doctor that they are having symptoms. Often, they are embarrassed or think that the symptoms are a normal part of aging. But vaginal dryness is a common medical issue, and there are treatments that can help.  Is there anything I can do on my own to feel better? -- Yes. Some people feel better if they use lubricants before sex and use a vaginal moisturizer several times a week. Vaginal moisturizers (sample brand names: Replens, K-Y SILK-E) are not the same as lubricants. They help keep the vagina moist all of the time, not just during sex.  You should also practice good hygiene for the vagina and vulva. This means avoiding bubble baths, douching, and using scented soaps or lotions in your genital area. These can cause dryness or irritation.  Can I still have sex? -- Yes, if it is not uncomfortable. Regular sexual activity, with a partner or by yourself, can help to keep the tissues in your vagina more elastic. But if sex is uncomfortable or painful, you should see a doctor.  How is vaginal dryness treated? -- The most effective treatment for vaginal dryness is the hormone estrogen. In this situation, doctors recommend \"vaginal estrogen.\" This means any form of estrogen that goes directly into the vagina. It comes in creams, tablets, or a flexible ring. Vaginal estrogen comes in small doses, so it does not increase the levels of estrogen in other parts of the body very much.  Estrogen also comes in higher doses. These forms come as a pill, skin patch, or different vaginal ring. These are sometimes called \"hormone therapy.\" Vaginal estrogen is better for treating symptoms of vaginal dryness. But if you have other menopause symptoms, such as hot flashes or night sweats, higher-dose estrogen might be an " "option. Your doctor or nurse can talk to you about this. There are different risks and benefits, and some people cannot safely take high doses of hormones.  Besides estrogen, medicines that can treat vaginal dryness include:   Ospemifene (brand name: Osphena) - This is similar to estrogen, but is not estrogen. It comes as a pill that you take once a day. It can cause hot flashes.   Prasterone - This is also called \"DHEA.\" It is a medicine that you put into your vagina once a day. It comes as a \"suppository.\" A suppository is similar to a tablet or pill but goes directly into the vagina.  Other treatments are also available, but are not used as often.  What problems should I watch for? -- Call your doctor or nurse for advice if:   Your symptoms are not getting better with treatment or are getting worse.   You have signs of a urinary tract infection - These might include a fever of 100.4°F (38°C) or higher, chills, pain or burning when urinating, or blood in your urine.  All topics are updated as new evidence becomes available and our peer review process is complete.  This topic retrieved from [employer] on: Feb 26, 2024.  Topic 94180 Version 15.0  Release: 32.2.4 - C32.56  © 2024 UpToDate, Inc. and/or its affiliates. All rights reserved.  Consumer Information Use and Disclaimer   Disclaimer: This generalized information is a limited summary of diagnosis, treatment, and/or medication information. It is not meant to be comprehensive and should be used as a tool to help the user understand and/or assess potential diagnostic and treatment options. It does NOT include all information about conditions, treatments, medications, side effects, or risks that may apply to a specific patient. It is not intended to be medical advice or a substitute for the medical advice, diagnosis, or treatment of a health care provider based on the health care provider's examination and assessment of a patient's specific and unique circumstances. " "Patients must speak with a health care provider for complete information about their health, medical questions, and treatment options, including any risks or benefits regarding use of medications. This information does not endorse any treatments or medications as safe, effective, or approved for treating a specific patient. UpToDate, Inc. and its affiliates disclaim any warranty or liability relating to this information or the use thereof.The use of this information is governed by the Terms of Use, available at https://www.[employer].com/en/know/clinical-effectiveness-terms. 2024© UpToDate, Inc. and its affiliates and/or licensors. All rights reserved.  Copyright   © 2024 UpToDate, Inc. and/or its affiliates. All rights reserved.  Patient Education     Dyspareunia (painful sex)   The Basics   Written by the doctors and editors at [employer]   What is dyspareunia? -- Dyspareunia is pain that happens just before, during, or after sex. It can happen in males and females, but is more common in females.  In females, pain can affect the vulva, which is the area around the opening of the vagina. Or the pain can be inside the vagina or in the lower belly (figure 1).  Males can have pain in the penis, testicles, belly, and sometimes the rectum.  What causes dyspareunia? -- There are many possible causes.  In females, common causes include:   Childbirth - Sex can be painful for several weeks or months after giving birth.   Endometriosis - In this condition, tissue that normally grows inside of the uterus grows outside of it. This can cause pain in the belly during sex.   Vaginal dryness - This can be caused by:   Menopause - This is the time in life when you stop having monthly periods. At menopause, the vagina and tissues around it can get dry and thin. This can make sex hurt.   Not being aroused or \"excited\" before sex   Conditions that cause long-lasting pain in the vulva, bladder, or pelvis - These can include:   " "\"Vulvodynia\" - This is pain in the vulva.   \"Interstitial cystitis/bladder pain syndrome\" - This causes bladder pain and other symptoms.   \"Chronic pelvic pain\" - This is pain in the area below the belly button that lasts 6 months or longer.   An infection in the vagina or bladder   Skin problems around the vagina   Bad feelings about a partner or relationship - Feeling bad about your partner or about yourself can make sex painful.   A painful experience in the past - This could be a past experience of sex or a medical exam that hurt. It could even be pain from using a tampon.   Birth control pills - Some people who take birth control pills start having pain during sex.  In males, common causes include:   Infections - These can include:   An infection in the prostate - The prostate is a gland that makes some of the fluid that is released during sex. Infections in other parts of the body can also make sex hurt.   An infection spread through sex, such as gonorrhea   Skin problems   Bad feelings about a partner or relationship - Feeling bad about a partner or about yourself can make sex painful.  Should I see a doctor or nurse? -- Yes. If sex is painful, see your doctor or nurse. Some people feel embarrassed talking about it, but your doctor or nurse can help you.  Will I need tests? -- Your doctor or nurse will decide which tests you should have based on your age, other symptoms, and individual situation. They will do an exam and ask you about your symptoms.  Some common tests doctors use to find the cause of dyspareunia:   Urine tests - These can look for a bladder infection.   Tests on a sample of fluid from the vagina - These can look for an infection in the vagina or cervix.  How is dyspareunia treated? -- Treatments for females include:   Antibiotics or antifungal medicines - These can help if the pain is caused by an infection in the vagina or bladder.   Creams or gels to keep the vagina moist - These include:   " "Vaginal lubricants, which are used during sex   Vaginal moisturizers, which are used several times a week   A prescription cream to treat vaginal dryness or a skin condition   Gels or ointments to numb the vagina before and after sex.   Physical therapy to loosen the muscles around the vagina.   Counseling - This can help if pain is caused by bad feelings about sex, a relationship, or yourself.   Surgery - In a few cases, pain is caused by a growth inside of the body. Doctors might do surgery to take out the growth.  Treatments for males include:   Medicines to treat infection or other conditions that cause pain - These can include antibiotics and other medicines.   Treatment for skin problems   Counseling - This can help if pain could be caused by feeling bad about sex, a relationship, or yourself.  All topics are updated as new evidence becomes available and our peer review process is complete.  This topic retrieved from [employer] on: Feb 26, 2024.  Topic 96138 Version 10.0  Release: 32.2.4 - C32.56  © 2024 UpToDate, Inc. and/or its affiliates. All rights reserved.  figure 1: Female reproductive anatomy     The internal organs that make up the female reproductive system are located in the lower belly. These include the uterus, fallopian tubes, ovaries, cervix, and vagina.  The uterus has an inner lining, called the \"endometrium,\" and a thicker outer layer, called the \"myometrium.\"  Graphic 90603 Version 8.0  Consumer Information Use and Disclaimer   Disclaimer: This generalized information is a limited summary of diagnosis, treatment, and/or medication information. It is not meant to be comprehensive and should be used as a tool to help the user understand and/or assess potential diagnostic and treatment options. It does NOT include all information about conditions, treatments, medications, side effects, or risks that may apply to a specific patient. It is not intended to be medical advice or a substitute for the " medical advice, diagnosis, or treatment of a health care provider based on the health care provider's examination and assessment of a patient's specific and unique circumstances. Patients must speak with a health care provider for complete information about their health, medical questions, and treatment options, including any risks or benefits regarding use of medications. This information does not endorse any treatments or medications as safe, effective, or approved for treating a specific patient. UpToDate, Inc. and its affiliates disclaim any warranty or liability relating to this information or the use thereof.The use of this information is governed by the Terms of Use, available at https://www.[employer].com/en/know/clinical-effectiveness-terms. 2024© UpToDate, Inc. and its affiliates and/or licensors. All rights reserved.  Copyright   © 2024 UpToDate, Inc. and/or its affiliates. All rights reserved.

## 2025-02-18 NOTE — PROGRESS NOTES
Diagnoses and all orders for this visit:    Encounter for gynecological examination without abnormal finding  -     Liquid-based pap, screening    Screen for STD (sexually transmitted disease)  -     Hepatitis B surface antigen; Future  -     Hepatitis C antibody; Future  -     HIV 1/2 AG/AB w Reflex [facility] for 2 yr old and above  -     RPR-Syphilis Screening (Total Syphilis IGG/IGM); Future    Screening for STDs (sexually transmitted diseases)  -     Chlamydia/GC amplified DNA by PCR    Encounter for screening mammogram for malignant neoplasm of breast  -     Mammo screening bilateral w 3d and cad; Future        Advised to call with any Post Menopausal bleeding.   Calcium/ Vit D dietary requirements discussed,   Weight bearing exercises minium of 150 mins/weekly advised.   Kegel exercises advised daily, see after visit summary for instructions and recommendations  Reviewed perineal hygiene and vaginal dryness post menopause  SBE and yearly mammography advised.  ASCCP guidelines reviewed. Will discontinue PAP's according to recommendations. Condoms encouraged with all sexual activity to prevent STI's.   Health maintenance encouraged with PMD, remain current with Colonoscopy, Dexa scan, and recommended vaccines.  Advised to call with any issues, all concerns & questions addressed.   See after visit summary for further information and recommendations to the above mentioned subjects which we may or may not have covered in detail during your visit     F/U annually or Biannual if Medicare       Health Maintenance:    Last PAP: 11/17/2023 Neg/Neg , 11/11/2022 Negative / HPV other positive HPV 16& 18 were Negative , Colposcopy 2/21/22 Neg   Next PAP Due: collected today , 2nd yearly after colpo    Last Mammogram:02/27/2024  Life time Razia García % 5.04, Density B scattered areas of fibroglandular density , Bi-Rads 2 Benign  Next Mammogram: Order given    Last Colonoscopy:09/19/2020 RTO 5 years     Last DEXA: 12/27/2023  "RTO 2 years     Subjective    CC: Yearly Exam     Pleasant 60 y.o. , female   postmenopausal here for annual exam.   GYN hx includes: 4 vaginal deliveries, abnormal Pap, BRCA 1 & 2 Neg   Family Hx:   Mother - Ovarian cancer (D),Maternal uncle - Breast cancer , Maternal cousin  Foot cancer  ( alive lost her foot )  Pt had genetic testing done which was Neg per patient   She reports no new changes in her health. Medically stable     Reports history of abnormal pap smears.  Denies abnormal Mammograms   Reports hx of postmenopausal bleeding  With ultrasound follow-up patient declined EMB at that time.  Wanted to watch and wait.  Reports no further episodes of postmenopausal bleeding   Denies issues with vasomotor symptoms  Denies pelvic pain   Denies vaginal issues.  She was prescribed estrace cream in , She is no longer using estrace cream, denies any further dryness or pain with intercourse   Denies symptoms of pelvic organ prolapse, urinary, or fecal incontinence.    Is sexually active. Monogamous relationship.   Denies dyspareunia   Denies STI concerns. requests STD testing   Denies intimate partner violence    Working  as a Medical assistant , hope to retire at 61 kiara       Family History   Problem Relation Age of Onset    Ovarian cancer Mother 50    Diabetes Father     Thyroid cancer Sister 47    No Known Problems Sister     No Known Problems Daughter     No Known Problems Son     No Known Problems Son     No Known Problems Son     Cancer Maternal Grandmother         unknown type or age    No Known Problems Paternal Grandmother     No Known Problems Maternal Aunt     Breast cancer Maternal Uncle         50's       Vitals:    25 0819   BP: 122/78   BP Location: Left arm   Patient Position: Sitting   Cuff Size: Large   Weight: 73.5 kg (162 lb)   Height: 5' 5\" (1.651 m)     Body mass index is 26.96 kg/m².    Review of Systems     Constitutional: Negative for chills, fatigue, fever, headaches, visual " disturbances, and unexpected weight change.   Respiratory: Negative for cough, & shortness of breath.  Cardiovascular: Negative for chest pain. .    Gastrointestinal: Negative for Abd pain, nausea & vomiting, constipation and diarrhea.   Genitourinary: Negative for difficulty urinating, dysuria, hematuria, , unusual vaginal bleeding or discharge.   Skin: Negative skin changes    Physical Exam     Constitutional: Alert & Oriented x3, well-developed and well-nourished. No distress.   HENT: Atraumatic, Normocephalic, Conjunctivae clear  Neck: Normal range of motion. Neck supple. No thyromegaly, mass, nodules or tenderness  Pulmonary: Effort normal.   Abdominal: Soft. No tenderness or masses  Musculoskeletal: Normal ROM  Skin: Warm & Dry  Psychological: Normal mood, thought content, behavior & judgement     Breasts:   Right: tissue soft without masses, tenderness, skin changes or nipple discharge. No areas of erythema or pain. No subclavicular, axillary, pectoral adenopathy  Left:  tissue soft without masses, tenderness, skin changes or nipple discharge. No areas of erythema or pain. No subclavicular, axillary, pectoral adenopathy    Pelvic exam was performed with patient supine, lithotomy position.     Exam is consistent with  atrophic changes.  Vulva/ Vestibule: Right: Negative rash, tenderness, lesion or injury                               Left: Negative rash, tenderness, lesion or injury   Urethral meatus: Negative for  tenderness, inflammation or discharge.   Uterus: not deviated, enlarged, fixed or tender.   Cervix: No CMT, no discharge or friability.   Right adnexa: no mass, no tenderness and no fullness.  Left adnexa: no mass, no tenderness and no fullness.   Vagina: Consistent with  atrophic changes. No erythema, tenderness, masses, or foreign body in the vagina. No signs of injury around the vagina. No unusual vaginal discharge   Perineum without lesions, signs of injury, erythema or swelling.  Inguinal  Canal:        Right: No inguinal adenopathy or hernia present.        Left: No inguinal adenopathy or hernia present.     OBGyn Exam

## 2025-02-19 ENCOUNTER — ANNUAL EXAM (OUTPATIENT)
Dept: OBGYN CLINIC | Facility: CLINIC | Age: 60
End: 2025-02-19
Payer: COMMERCIAL

## 2025-02-19 VITALS
WEIGHT: 162 LBS | BODY MASS INDEX: 26.99 KG/M2 | SYSTOLIC BLOOD PRESSURE: 122 MMHG | HEIGHT: 65 IN | DIASTOLIC BLOOD PRESSURE: 78 MMHG

## 2025-02-19 DIAGNOSIS — Z12.31 ENCOUNTER FOR SCREENING MAMMOGRAM FOR MALIGNANT NEOPLASM OF BREAST: ICD-10-CM

## 2025-02-19 DIAGNOSIS — Z11.3 SCREENING FOR STDS (SEXUALLY TRANSMITTED DISEASES): ICD-10-CM

## 2025-02-19 DIAGNOSIS — Z11.3 SCREEN FOR STD (SEXUALLY TRANSMITTED DISEASE): ICD-10-CM

## 2025-02-19 DIAGNOSIS — Z01.419 ENCOUNTER FOR GYNECOLOGICAL EXAMINATION WITHOUT ABNORMAL FINDING: Primary | ICD-10-CM

## 2025-02-19 PROCEDURE — G0145 SCR C/V CYTO,THINLAYER,RESCR: HCPCS | Performed by: STUDENT IN AN ORGANIZED HEALTH CARE EDUCATION/TRAINING PROGRAM

## 2025-02-19 PROCEDURE — 99396 PREV VISIT EST AGE 40-64: CPT | Performed by: OBSTETRICS & GYNECOLOGY

## 2025-02-19 PROCEDURE — 87491 CHLMYD TRACH DNA AMP PROBE: CPT | Performed by: OBSTETRICS & GYNECOLOGY

## 2025-02-19 PROCEDURE — G0476 HPV COMBO ASSAY CA SCREEN: HCPCS | Performed by: OBSTETRICS & GYNECOLOGY

## 2025-02-19 PROCEDURE — 87591 N.GONORRHOEAE DNA AMP PROB: CPT | Performed by: OBSTETRICS & GYNECOLOGY

## 2025-02-19 NOTE — ADDENDUM NOTE
Addended by: BELLA ADAMSON on: 2/19/2025 11:08 AM     Modules accepted: Orders, Level of Service

## 2025-02-20 ENCOUNTER — APPOINTMENT (OUTPATIENT)
Dept: LAB | Facility: CLINIC | Age: 60
End: 2025-02-20
Payer: COMMERCIAL

## 2025-02-20 DIAGNOSIS — Z11.3 SCREEN FOR STD (SEXUALLY TRANSMITTED DISEASE): ICD-10-CM

## 2025-02-20 LAB
HPV HR 12 DNA CVX QL NAA+PROBE: NEGATIVE
HPV16 DNA CVX QL NAA+PROBE: NEGATIVE
HPV18 DNA CVX QL NAA+PROBE: NEGATIVE

## 2025-02-20 PROCEDURE — 86780 TREPONEMA PALLIDUM: CPT

## 2025-02-20 PROCEDURE — 86803 HEPATITIS C AB TEST: CPT

## 2025-02-20 PROCEDURE — 87340 HEPATITIS B SURFACE AG IA: CPT

## 2025-02-20 PROCEDURE — 87389 HIV-1 AG W/HIV-1&-2 AB AG IA: CPT | Performed by: OBSTETRICS & GYNECOLOGY

## 2025-02-20 PROCEDURE — 36415 COLL VENOUS BLD VENIPUNCTURE: CPT

## 2025-02-21 ENCOUNTER — RESULTS FOLLOW-UP (OUTPATIENT)
Dept: OBGYN CLINIC | Facility: CLINIC | Age: 60
End: 2025-02-21

## 2025-02-21 LAB
C TRACH DNA SPEC QL NAA+PROBE: NEGATIVE
HBV SURFACE AG SER QL: NORMAL
HCV AB SER QL: NORMAL
HIV 1+2 AB+HIV1 P24 AG SERPL QL IA: NORMAL
N GONORRHOEA DNA SPEC QL NAA+PROBE: NEGATIVE
TREPONEMA PALLIDUM IGG+IGM AB [PRESENCE] IN SERUM OR PLASMA BY IMMUNOASSAY: NORMAL

## 2025-02-26 LAB
LAB AP GYN PRIMARY INTERPRETATION: NORMAL
Lab: NORMAL

## 2025-03-10 ENCOUNTER — TELEPHONE (OUTPATIENT)
Age: 60
End: 2025-03-10

## 2025-03-10 DIAGNOSIS — R76.12 POSITIVE QUANTIFERON-TB GOLD TEST: Primary | ICD-10-CM

## 2025-03-13 ENCOUNTER — RESULTS FOLLOW-UP (OUTPATIENT)
Dept: FAMILY MEDICINE CLINIC | Facility: CLINIC | Age: 60
End: 2025-03-13

## 2025-03-13 ENCOUNTER — APPOINTMENT (OUTPATIENT)
Dept: RADIOLOGY | Facility: CLINIC | Age: 60
End: 2025-03-13
Payer: COMMERCIAL

## 2025-03-13 DIAGNOSIS — R76.12 POSITIVE QUANTIFERON-TB GOLD TEST: ICD-10-CM

## 2025-03-13 PROCEDURE — 71046 X-RAY EXAM CHEST 2 VIEWS: CPT

## 2025-03-14 ENCOUNTER — TELEPHONE (OUTPATIENT)
Age: 60
End: 2025-03-14

## 2025-03-14 NOTE — TELEPHONE ENCOUNTER
Patient called in to have results of Xray and Tb labs emailed to her, I spoke with the office and this will be emailed for her.

## 2025-03-24 DIAGNOSIS — I10 ESSENTIAL HYPERTENSION: ICD-10-CM

## 2025-03-25 RX ORDER — LOSARTAN POTASSIUM 50 MG/1
50 TABLET ORAL DAILY
Qty: 90 TABLET | Refills: 0 | OUTPATIENT
Start: 2025-03-25

## 2025-03-25 RX ORDER — HYDROCHLOROTHIAZIDE 25 MG/1
25 TABLET ORAL DAILY
Qty: 90 TABLET | Refills: 1 | Status: SHIPPED | OUTPATIENT
Start: 2025-03-25

## 2025-03-28 ENCOUNTER — TELEPHONE (OUTPATIENT)
Age: 60
End: 2025-03-28

## 2025-03-28 DIAGNOSIS — I10 ESSENTIAL HYPERTENSION: ICD-10-CM

## 2025-03-28 DIAGNOSIS — M81.0 AGE-RELATED OSTEOPOROSIS WITHOUT CURRENT PATHOLOGICAL FRACTURE: ICD-10-CM

## 2025-03-28 RX ORDER — LOSARTAN POTASSIUM 50 MG/1
50 TABLET ORAL DAILY
Qty: 90 TABLET | Refills: 1 | Status: SHIPPED | OUTPATIENT
Start: 2025-03-28

## 2025-03-28 RX ORDER — LOSARTAN POTASSIUM 50 MG/1
50 TABLET ORAL DAILY
Qty: 90 TABLET | Refills: 1 | Status: CANCELLED | OUTPATIENT
Start: 2025-03-28

## 2025-03-28 NOTE — TELEPHONE ENCOUNTER
Patient is not able to get her refill done at [facility] they will not fill it. She is out of the medication losartan (COZAAR) 50 mg tablet. Could you please send a new script to [facility] PHONE [phone]    [facility] is new due to her insurance she needed to change.    Please let patient know when the medications are sent. Her # [phone]

## 2025-03-30 RX ORDER — ALENDRONATE SODIUM 70 MG/1
70 TABLET ORAL
Qty: 12 TABLET | Refills: 0 | Status: SHIPPED | OUTPATIENT
Start: 2025-03-30 | End: 2025-04-11

## 2025-04-11 DIAGNOSIS — M81.0 AGE-RELATED OSTEOPOROSIS WITHOUT CURRENT PATHOLOGICAL FRACTURE: ICD-10-CM

## 2025-04-11 RX ORDER — ALENDRONATE SODIUM 70 MG/1
TABLET ORAL
Qty: 12 TABLET | Refills: 0 | Status: SHIPPED | OUTPATIENT
Start: 2025-04-11

## 2025-04-25 ENCOUNTER — OFFICE VISIT (OUTPATIENT)
Dept: URGENT CARE | Facility: CLINIC | Age: 60
End: 2025-04-25
Payer: COMMERCIAL

## 2025-04-25 ENCOUNTER — APPOINTMENT (OUTPATIENT)
Dept: RADIOLOGY | Facility: CLINIC | Age: 60
End: 2025-04-25
Attending: PHYSICIAN ASSISTANT
Payer: COMMERCIAL

## 2025-04-25 VITALS
WEIGHT: 158 LBS | HEART RATE: 66 BPM | RESPIRATION RATE: 20 BRPM | DIASTOLIC BLOOD PRESSURE: 88 MMHG | SYSTOLIC BLOOD PRESSURE: 134 MMHG | OXYGEN SATURATION: 97 % | TEMPERATURE: 97.6 F | BODY MASS INDEX: 26.29 KG/M2

## 2025-04-25 DIAGNOSIS — M54.42 LEFT-SIDED LOW BACK PAIN WITH LEFT-SIDED SCIATICA, UNSPECIFIED CHRONICITY: ICD-10-CM

## 2025-04-25 DIAGNOSIS — M54.42 LEFT-SIDED LOW BACK PAIN WITH LEFT-SIDED SCIATICA, UNSPECIFIED CHRONICITY: Primary | ICD-10-CM

## 2025-04-25 PROCEDURE — G0383 LEV 4 HOSP TYPE B ED VISIT: HCPCS | Performed by: PHYSICIAN ASSISTANT

## 2025-04-25 PROCEDURE — 72110 X-RAY EXAM L-2 SPINE 4/>VWS: CPT

## 2025-04-25 RX ORDER — METHOCARBAMOL 500 MG/1
500 TABLET, FILM COATED ORAL 3 TIMES DAILY
Qty: 12 TABLET | Refills: 0 | Status: SHIPPED | OUTPATIENT
Start: 2025-04-25

## 2025-04-25 RX ORDER — PREDNISONE 20 MG/1
TABLET ORAL
Qty: 11 TABLET | Refills: 0 | Status: SHIPPED | OUTPATIENT
Start: 2025-04-25 | End: 2025-05-04

## 2025-04-25 NOTE — PROGRESS NOTES
[facility]        NAME: Marina Land is a 60 y.o. female  : 1965    MRN: [id number]  DATE: 2025  TIME: 10:24 AM    Assessment and Plan   Left-sided low back pain with left-sided sciatica, unspecified chronicity [M54.42]  1. Left-sided low back pain with left-sided sciatica, unspecified chronicity  XR spine lumbar minimum 4 views non injury    Ambulatory referral to Spine & Pain Management    methocarbamol (ROBAXIN) 500 mg tablet    predniSONE 20 mg tablet            Patient Instructions   Lumbar x-rays unremarkable for acute pathology.  Referral placed to spine management again.  Patient to take prednisone and Robaxin as prescribed.  If tests have been performed at [facility], our office will contact you with results if changes need to be made to the care plan discussed with you at the visit.  You can review your full results on [facility]  Follow up with PCP in 3-5 days.  Proceed to  ER if symptoms worsen.    Chief Complaint     Chief Complaint   Patient presents with    Leg Pain     Left leg pain starting in low back and shooting pain down leg. Started 3 days ago. Hx of sciatic pain in past as well as arthritis         History of Present Illness       Leg Pain       This is a 60-year-old female here complaining of left lower back pain for the last 3 days.  Patient notes she has a history of left lower back pain with left sciatica.  She does note that she has having sciatica down the back of her left leg.  She denies any specific injury or trauma however she notes she has been moving recently and thinks she may have tweaked her back.  She notes sitting still she rates her pain a 6 out of 10.  She been taking Tylenol ibuprofen without relief.  She denies any numbness or tingling down her leg, weakness, loss of bowel or bladder function, saddle anesthesia, fever, shortness of breath or chest pain.  Patient was previously referred to spine management last year and did not  follow-up with them.  Review of Systems   Review of Systems   Respiratory:  Negative for shortness of breath.    Cardiovascular:  Negative for chest pain.   Gastrointestinal:  Negative for diarrhea and vomiting.   Musculoskeletal:  Positive for back pain.   Neurological:  Negative for weakness.         Current Medications       Current Outpatient Medications:     alendronate (FOSAMAX) 70 mg tablet, take 1 tablet by mouth every 7 days IN THE MORNING 30 MINUTES before FIRST FOOD, BEVERAGE, OR MEDICATION with 6 to 8 ounce(s) OF WATER DO NOT LIE DOWN EAT OR DRINK for 30 MINUTES after TAKING MUST REMAIN UPRIGHT, Disp: 12 tablet, Rfl: 0    Blood Glucose Monitoring Suppl (OneTouch Verio Reflect) w/Device KIT, Check blood sugars once daily. Please substitute with appropriate alternative as covered by patient's insurance. Dx: E11.65, Disp: 1 kit, Rfl: 0    glucose blood (OneTouch Verio) test strip, Check blood sugars once daily. Please substitute with appropriate alternative as covered by patient's insurance. Dx: E11.65, Disp: 100 each, Rfl: 3    hydroCHLOROthiazide 25 mg tablet, Take 1 tablet (25 mg total) by mouth daily, Disp: 90 tablet, Rfl: 1    losartan (COZAAR) 50 mg tablet, Take 1 tablet (50 mg total) by mouth daily, Disp: 90 tablet, Rfl: 1    meloxicam (MOBIC) 7.5 mg tablet, take 1 tablet by mouth once daily if needed for moderate pain, Disp: 30 tablet, Rfl: 5    methocarbamol (ROBAXIN) 500 mg tablet, Take 1 tablet (500 mg total) by mouth 3 (three) times a day, Disp: 12 tablet, Rfl: 0    OneTouch Delica Lancets 33G MISC, Check blood sugars once daily. Please substitute with appropriate alternative as covered by patient's insurance. Dx: E11.65, Disp: 100 each, Rfl: 3    predniSONE 20 mg tablet, Take 2 tablets (40 mg total) by mouth daily for 3 days, THEN 1 tablet (20 mg total) daily for 3 days, THEN 0.5 tablets (10 mg total) daily for 3 days., Disp: 11 tablet, Rfl: 0    Pyridoxine HCl (VITAMIN B6 PO), Take by mouth,  "Disp: , Rfl:     triamcinolone (KENALOG) 0.1 % cream, Apply topically 2 (two) times a day, Disp: 15 g, Rfl: 0    atorvastatin (LIPITOR) 20 mg tablet, take 1 tablet by mouth once daily (Patient not taking: Reported on 4/25/2025), Disp: 30 tablet, Rfl: 0    cyclobenzaprine (FLEXERIL) 5 mg tablet, Take 1 tablet (5 mg total) by mouth 3 (three) times a day as needed for muscle spasms (Patient not taking: Reported on 4/25/2025), Disp: 20 tablet, Rfl: 0    hydrOXYzine HCL (ATARAX) 25 mg tablet, take 1 tablet by mouth every 6 hours if needed for for anxiety (Patient not taking: Reported on 4/25/2025), Disp: 30 tablet, Rfl: 0    valACYclovir (VALTREX) 500 mg tablet, Take 1 tablet (500 mg total) by mouth daily (Patient taking differently: Take 500 mg by mouth daily as needed), Disp: 30 tablet, Rfl: 5    Current Allergies     Allergies as of 04/25/2025 - Reviewed 04/25/2025   Allergen Reaction Noted    Kiwi extract - food allergy Hives 11/11/2022    Other  04/10/2019    Pineapple - food allergy Hives 08/30/2017            The following portions of the patient's history were reviewed and updated as appropriate: allergies, current medications, past family history, past medical history, past social history, past surgical history and problem list.     Past Medical History:   Diagnosis Date    2019 novel coronavirus disease (COVID-19) 05/30/2020    x 2 2020 and 2022    Abnormal Pap smear of cervix     Arthritis     BRCA1 negative 2015    BRCA2 negative 2015    Fractures 2021    Gastric ulcer     Hyperlipidemia     Hypertension     Low back pain     Right trigger finger     long and ring    TB lung, latent     Varicella        Past Surgical History:   Procedure Laterality Date    ANKLE FRACTURE SURGERY Right 03/01/2021    \"ankle fx\"    ANKLE SURGERY  2021    APPENDECTOMY      CATARACT EXTRACTION Bilateral     COLONOSCOPY      FOOT SURGERY Right     HERNIA REPAIR      TX TENDON SHEATH INCISION Right 07/11/2023    Procedure: RELEASE " TRIGGER FINGER LONG FINGER AND RING FINGER;  Surgeon: Brittaney Montoya MD;  Location: [facility] OR;  Service: Orthopedics       Family History   Problem Relation Age of Onset    Ovarian cancer Mother 50    Diabetes Father     Thyroid cancer Sister 47    No Known Problems Sister     No Known Problems Daughter     No Known Problems Son     No Known Problems Son     No Known Problems Son     Cancer Maternal Grandmother         unknown type or age    No Known Problems Paternal Grandmother     No Known Problems Maternal Aunt     Breast cancer Maternal Uncle         50's         Medications have been verified.        Objective   /88 (Patient Position: Sitting, Cuff Size: Large)   Pulse 66   Temp 97.6 °F (36.4 °C)   Resp 20   Wt 71.7 kg (158 lb)   SpO2 97%   BMI 26.29 kg/m²        Physical Exam     Physical Exam  Vitals and nursing note reviewed.   Constitutional:       General: She is not in acute distress.     Appearance: Normal appearance. She is not ill-appearing, toxic-appearing or diaphoretic.   Cardiovascular:      Rate and Rhythm: Normal rate and regular rhythm.   Pulmonary:      Effort: Pulmonary effort is normal.      Breath sounds: Normal breath sounds.   Musculoskeletal:      Lumbar back: Tenderness present. No swelling, deformity, spasms or bony tenderness. Decreased range of motion. Negative right straight leg raise test and negative left straight leg raise test.      Comments: Patient has decreased movement with left lateral bending.  She has normal flexion and extension and left lateral bending.  Patient has pain to palpation of the left paraspinal muscles.  There is no pain along the spine itself or the right paraspinal muscles.   Neurological:      Mental Status: She is alert.

## 2025-06-02 ENCOUNTER — TELEPHONE (OUTPATIENT)
Dept: PAIN MEDICINE | Facility: CLINIC | Age: 60
End: 2025-06-02

## 2025-06-02 ENCOUNTER — OFFICE VISIT (OUTPATIENT)
Dept: PAIN MEDICINE | Facility: CLINIC | Age: 60
End: 2025-06-02
Payer: COMMERCIAL

## 2025-06-02 VITALS — WEIGHT: 158 LBS | HEIGHT: 65 IN | BODY MASS INDEX: 26.33 KG/M2

## 2025-06-02 DIAGNOSIS — G56.02 LEFT CARPAL TUNNEL SYNDROME: ICD-10-CM

## 2025-06-02 DIAGNOSIS — M54.2 NECK PAIN: Primary | ICD-10-CM

## 2025-06-02 DIAGNOSIS — M62.838 MUSCLE SPASMS OF NECK: ICD-10-CM

## 2025-06-02 PROCEDURE — 99214 OFFICE O/P EST MOD 30 MIN: CPT

## 2025-06-02 RX ORDER — METHYLPREDNISOLONE 4 MG/1
TABLET ORAL
Qty: 1 EACH | Refills: 0 | Status: SHIPPED | OUTPATIENT
Start: 2025-06-02 | End: 2025-06-10 | Stop reason: ALTCHOICE

## 2025-06-02 RX ORDER — CYCLOBENZAPRINE HCL 5 MG
5 TABLET ORAL 3 TIMES DAILY PRN
Qty: 30 TABLET | Refills: 0 | Status: SHIPPED | OUTPATIENT
Start: 2025-06-02

## 2025-06-02 NOTE — PROGRESS NOTES
Name: Marina Land      : 1965      MRN: [id number]  Encounter Provider: MARVEL Bautista  Encounter Date: 2025   Encounter department: [facility] [place]  :  Assessment & Plan  Neck pain  Left carpal tunnel syndrome  Muscle spasms of neck    Orders:  •  Ambulatory referral to Physical Therapy; Future  •  methylPREDNISolone 4 MG tablet therapy pack; Use as directed on package  •  Ambulatory referral to Orthopedic Surgery; Future  •  cyclobenzaprine (FLEXERIL) 5 mg tablet; Take 1 tablet (5 mg total) by mouth 3 (three) times a day as needed for muscle spasms      Patient returns to the office stating this morning she woke up and her left wrist was swollen and painful.  Patient also states she has some neck tenderness and ongoing right knee pain.  Patient is using Advil and took 600 mg this morning and states that her pain has improved but she is concerned about using Advil daily.  Patient does have tenderness to palpation in the bilateral trapezius muscle, her pain is worse with flexion I recommend that she attend physical therapy for her neck.  I will also provide the patient a muscle relaxer cyclobenzaprine 5 mg tablet patient may take 1 tablet up to 3 times per day as needed for muscle spasms.  Patient's left wrist has a positive Phalen's and Tinel's sign.  Advised the patient that she would benefit revisiting her hand specialist Dr. Barros for evaluation of left carpal tunnel syndrome.  I will provide the patient a Medrol dose steroid pack to help reduce her inflammation while waiting to be reevaluated by Dr. Barros.    My impressions and treatment recommendations were discussed in detail with the patient who verbalized understanding and had no further questions.  Discharge instructions were provided. I personally saw and examined the patient and I agree with the above discussed plan of care.    History of Present Illness {?Quick Links Encounters * My Last Note *  "Last Note in Specialty * Snapshot * Since Last Visit * History :74795}    Marina Land is a 60 y.o. female who presents for a follow up office visit in regards to Arm Pain (Left wrist ) and Neck Pain (Left side ) At today's visit patient states that their pain symptoms are worse with a pain score of 8/10 on the verbal numeric pain scale.  The patient's pain is worse in the morning.  The patient's pain is intermittent in nature.  And the quality of the patient's pain is described as dull-aching, throbbing, cramping, and pins-and-needles.  The patient's pain is located in the neck, left wrist, and right knee.  Patient has been using over-the-counter NSAIDs as needed for pain.    Review of Systems   Respiratory:  Negative for shortness of breath.    Cardiovascular:  Negative for chest pain.   Gastrointestinal:  Negative for constipation, diarrhea, nausea and vomiting.   Musculoskeletal:  Positive for gait problem. Negative for arthralgias, joint swelling and myalgias.   Skin:  Negative for rash.   Neurological:  Negative for dizziness, seizures and weakness.   All other systems reviewed and are negative.      Medical History Reviewed by provider this encounter:  Tobacco  Allergies  Meds  Problems  Med Hx  Surg Hx  Fam Hx     .       Objective {?Quick Links Trend Vitals * Enter New Vitals * Results Review * Timeline (Adult) * Labs * Imaging * Cardiology * Procedures * Lung Cancer Screening * Surgical eConsent :06090}  Ht 5' 5\" (1.651 m)   Wt 71.7 kg (158 lb)   BMI 26.29 kg/m²      Pain Score:   8    Constitutional:normal, well developed, well nourished, alert, in no distress and non-toxic and no overt pain behavior.  Eyes:anicteric  HEENT:grossly intact  Neck:Tenderness to palpation bilateral trapezius, pain with flexion  Pulmonary:even and unlabored  Cardiovascular:No edema or pitting edema present  Skin:Normal without rashes or lesions and well hydrated  Psychiatric:Mood and affect " appropriate  Neurologic:Cranial Nerves II-XII grossly intact  Musculoskeletal: normal gait, left wrist: Positive Phalen's and Tinel sign    This document was created using speech voice recognition software.   Grammatical errors, random word insertions, pronoun errors, and incomplete sentences are an occasional consequence of this system due to software limitations, ambient noise, and hardware issues.   Any formal questions or concerns about content, text, or information contained within the body of this dictation should be directly addressed to the provider for clarification.

## 2025-06-02 NOTE — PATIENT INSTRUCTIONS
Muscle Spasm   WHAT YOU NEED TO KNOW:   A muscle spasm is a sudden contraction of any muscle or group of muscles. A muscle cramp is a painful muscle spasm. Muscle cramps commonly occur after intense exercise or during pregnancy. They may also be caused by certain medications, dehydration, low calcium or magnesium levels, or another medical condition.   DISCHARGE INSTRUCTIONS:   Medicines:  You may need the following:  NSAIDs  help decrease swelling and pain or fever. This medicine is available with or without a doctor's order. NSAIDs can cause stomach bleeding or kidney problems in certain people. If you take blood thinner medicine, always ask your healthcare provider if NSAIDs are safe for you. Always read the medicine label and follow directions.    Take your medicine as directed.  Contact your healthcare provider if you think your medicine is not helping or if you have side effects. Tell him of her if you are allergic to any medicine. Keep a list of the medicines, vitamins, and herbs you take. Include the amounts, and when and why you take them. Bring the list or the pill bottles to follow-up visits. Carry your medicine list with you in case of an emergency.    Follow up with your healthcare provider as directed:  You may need other tests or treatment. You may also be referred to a physical therapist or other specialist. Write down your questions so you remember to ask them during your visits.   Self-care:   Stretch  your muscle to help relieve the cramp. It may be helpful to keep your muscle in the stretched position until the cramp is gone.     Apply heat  to help decrease pain and muscle spasms. Apply heat on the area for 20 to 30 minutes every 2 hours for as many days as directed.     Apply ice  to help decrease swelling and pain. Ice may also help prevent tissue damage. Use an ice pack, or put crushed ice in a plastic bag. Cover it with a towel and place it on your muscle for 15 to 20 minutes every hour or  as directed.    Drink more liquids  to help prevent muscle cramps caused by dehydration. Sports drinks may help replace electrolytes you lose through sweat during exercise. Ask your healthcare provider how much liquid to drink each day and which liquids are best for you.     Eat healthy foods , such as fruits, vegetables, whole grains, low-fat dairy products, and lean proteins (meat, beans, and fish). If you are pregnant, ask your healthcare provider about foods that are high in magnesium and sodium. They may help to relieve cramps during pregnancy.     Massage your muscle  to help relieve the cramp.     Take frequent deep breaths  until the cramp feels better. Lie down while you take the deep breaths so you do not get dizzy or lightheaded.    Contact your healthcare provider if:   You have signs of dehydration, such as a headache, dark yellow urine, dry eyes or mouth, or a fast heartbeat.     You have questions or concerns about your condition or care.    Return to the emergency department if:   You have warmth, swelling, or redness in the cramping muscle.     You have frequent or unrelieved muscle cramps in several different muscles.     You have muscle cramps with numbness, tingling, and burning in your hands and feet.    © Copyright [employer] / [employer] 2022 Information is for End User's use only and may not be sold, redistributed or otherwise used for commercial purposes. All illustrations and images included in CareNotes® are the copyrighted property of [employer] or [employer]  The above information is an  only. It is not intended as medical advice for individual conditions or treatments. Talk to your doctor, nurse or pharmacist before following any medical regimen to see if it is safe and effective for you.

## 2025-06-10 ENCOUNTER — OFFICE VISIT (OUTPATIENT)
Dept: URGENT CARE | Facility: CLINIC | Age: 60
End: 2025-06-10
Payer: COMMERCIAL

## 2025-06-10 ENCOUNTER — OFFICE VISIT (OUTPATIENT)
Dept: OBGYN CLINIC | Facility: CLINIC | Age: 60
End: 2025-06-10
Payer: COMMERCIAL

## 2025-06-10 VITALS
WEIGHT: 155 LBS | SYSTOLIC BLOOD PRESSURE: 126 MMHG | HEART RATE: 85 BPM | TEMPERATURE: 97.5 F | RESPIRATION RATE: 18 BRPM | DIASTOLIC BLOOD PRESSURE: 78 MMHG | OXYGEN SATURATION: 99 % | BODY MASS INDEX: 25.79 KG/M2

## 2025-06-10 VITALS — WEIGHT: 156 LBS | BODY MASS INDEX: 25.99 KG/M2 | HEIGHT: 65 IN

## 2025-06-10 DIAGNOSIS — R10.13 EPIGASTRIC ABDOMINAL PAIN: Primary | ICD-10-CM

## 2025-06-10 DIAGNOSIS — G56.02 CARPAL TUNNEL SYNDROME ON LEFT: Primary | ICD-10-CM

## 2025-06-10 PROCEDURE — G0382 LEV 3 HOSP TYPE B ED VISIT: HCPCS | Performed by: PHYSICIAN ASSISTANT

## 2025-06-10 PROCEDURE — 99213 OFFICE O/P EST LOW 20 MIN: CPT | Performed by: FAMILY MEDICINE

## 2025-06-10 RX ORDER — LIDOCAINE HYDROCHLORIDE 20 MG/ML
10 SOLUTION OROPHARYNGEAL ONCE
Status: COMPLETED | OUTPATIENT
Start: 2025-06-10 | End: 2025-06-10

## 2025-06-10 RX ORDER — MAGNESIUM HYDROXIDE/ALUMINUM HYDROXICE/SIMETHICONE 120; 1200; 1200 MG/30ML; MG/30ML; MG/30ML
30 SUSPENSION ORAL ONCE
Status: COMPLETED | OUTPATIENT
Start: 2025-06-10 | End: 2025-06-10

## 2025-06-10 RX ORDER — OMEPRAZOLE 20 MG/1
20 CAPSULE, DELAYED RELEASE ORAL DAILY
Qty: 90 CAPSULE | Refills: 2 | Status: SHIPPED | OUTPATIENT
Start: 2025-06-10 | End: 2025-06-14

## 2025-06-10 RX ADMIN — MAGNESIUM HYDROXIDE/ALUMINUM HYDROXICE/SIMETHICONE 30 ML: 120; 1200; 1200 SUSPENSION ORAL at 14:21

## 2025-06-10 RX ADMIN — LIDOCAINE HYDROCHLORIDE 10 ML: 20 SOLUTION OROPHARYNGEAL at 14:21

## 2025-06-10 NOTE — PROGRESS NOTES
Patient Name: Marina Land      : 1965      MRN: [id number]  Encounter Provider: Roxanne Ramirez PA-C  Encounter Date: Daiana 10, 2025  Encounter department: [facility]    Assessment & Plan  Epigastric abdominal pain  GI cocktail given today in office to help patient with her pain at this time.    I would recommend that she start a daily antacid medication like omeprazole.    Advised that she should follow-up with GI for further evaluation and workup of her symptoms.  She may need a repeat EGD.  Orders:    Lidocaine Viscous HCl (XYLOCAINE) 2 % mucosal solution 10 mL    aluminum-magnesium hydroxide-simethicone (MAALOX) oral suspension 30 mL    omeprazole (PriLOSEC) 20 mg delayed release capsule; Take 1 capsule (20 mg total) by mouth daily    Ambulatory Referral to Gastroenterology; Future        Patient Instructions:   Patient Instructions   Follow up with PCP in 3-5 days.  Proceed to  ER if symptoms worsen.    If tests are performed, our office will contact you with results only if changes need to made to the care plan discussed with you at the visit. [facility]     Subjective   Chief Complaint   Patient presents with    Peptic Ulcer Disease     Pt here for PUD has had previous EGD and has had a couple bumps and has had pain in stomach piercing to the back and has started since she's been going back to night shift         Marina Land is a 60 y.o.female  Patient presents for evaluation of a flareup of peptic ulcer disease.  She states she has been diagnosed with it in the past through previous EGD.  She states that lately she has been having pain in her stomach that is like a sharp piercing pain.  It radiates to her back.  He states this started ever since she went back to night shift because she is drinking coffee at later times to stay awake.    Abdominal Pain  This is a recurrent problem. The onset quality is sudden. The  problem occurs constantly. The problem has been unchanged. The pain is located in the epigastric region. The pain is mild. The quality of the pain is sharp. The abdominal pain radiates to the back. Pertinent negatives include no anorexia, belching, constipation, diarrhea, dysuria, hematuria, myalgias, nausea or vomiting. Exacerbated by: Coffee and acidic foods. The pain is relieved by Nothing. She has tried nothing for the symptoms. Her past medical history is significant for PUD.       Review of Systems   Gastrointestinal:  Positive for abdominal pain. Negative for anorexia, constipation, diarrhea, nausea and vomiting.   Genitourinary:  Negative for dysuria and hematuria.   Musculoskeletal:  Negative for myalgias.   All other systems reviewed and are negative.      Current Medications[1]  Allergies as of 06/10/2025 - Reviewed 06/10/2025   Allergen Reaction Noted    Kiwi extract - food allergy Hives 11/11/2022    Other  04/10/2019    Pineapple - food allergy Hives 08/30/2017     Past Medical History[2]  Past Surgical History[3]  Family History[4]     Objective   /78   Pulse 85   Temp 97.5 °F (36.4 °C) (Tympanic)   Resp 18   Wt 70.3 kg (155 lb)   SpO2 99%   BMI 25.79 kg/m²      Physical Exam  Vitals and nursing note reviewed.   Constitutional:       Appearance: Normal appearance.     Cardiovascular:      Rate and Rhythm: Normal rate and regular rhythm.   Pulmonary:      Effort: Pulmonary effort is normal.      Breath sounds: Normal breath sounds.   Abdominal:      General: Abdomen is flat. Bowel sounds are normal.      Palpations: Abdomen is soft.      Tenderness: There is abdominal tenderness in the epigastric area.     Skin:     General: Skin is warm and dry.      Capillary Refill: Capillary refill takes less than 2 seconds.     Neurological:      General: No focal deficit present.      Mental Status: She is alert and oriented to person, place, and time.     Psychiatric:         Mood and Affect: Mood  normal.         Behavior: Behavior normal.            [1]   Current Outpatient Medications:     alendronate (FOSAMAX) 70 mg tablet, take 1 tablet by mouth every 7 days IN THE MORNING 30 MINUTES before FIRST FOOD, BEVERAGE, OR MEDICATION with 6 to 8 ounce(s) OF WATER DO NOT LIE DOWN EAT OR DRINK for 30 MINUTES after TAKING MUST REMAIN UPRIGHT, Disp: 12 tablet, Rfl: 0    Blood Glucose Monitoring Suppl (OneTouch Verio Reflect) w/Device KIT, Check blood sugars once daily. Please substitute with appropriate alternative as covered by patient's insurance. Dx: E11.65, Disp: 1 kit, Rfl: 0    cyclobenzaprine (FLEXERIL) 5 mg tablet, Take 1 tablet (5 mg total) by mouth 3 (three) times a day as needed for muscle spasms, Disp: 30 tablet, Rfl: 0    glucose blood (OneTouch Verio) test strip, Check blood sugars once daily. Please substitute with appropriate alternative as covered by patient's insurance. Dx: E11.65, Disp: 100 each, Rfl: 3    hydroCHLOROthiazide 25 mg tablet, Take 1 tablet (25 mg total) by mouth daily, Disp: 90 tablet, Rfl: 1    losartan (COZAAR) 50 mg tablet, Take 1 tablet (50 mg total) by mouth daily, Disp: 90 tablet, Rfl: 1    omeprazole (PriLOSEC) 20 mg delayed release capsule, Take 1 capsule (20 mg total) by mouth daily, Disp: 90 capsule, Rfl: 2    OneTouch Delica Lancets 33G MISC, Check blood sugars once daily. Please substitute with appropriate alternative as covered by patient's insurance. Dx: E11.65, Disp: 100 each, Rfl: 3    Pyridoxine HCl (VITAMIN B6 PO), Take by mouth, Disp: , Rfl:     triamcinolone (KENALOG) 0.1 % cream, Apply topically 2 (two) times a day, Disp: 15 g, Rfl: 0    atorvastatin (LIPITOR) 20 mg tablet, take 1 tablet by mouth once daily (Patient not taking: Reported on 4/25/2025), Disp: 30 tablet, Rfl: 0    hydrOXYzine HCL (ATARAX) 25 mg tablet, take 1 tablet by mouth every 6 hours if needed for for anxiety (Patient not taking: No sig reported), Disp: 30 tablet, Rfl: 0    meloxicam (MOBIC) 7.5  "mg tablet, take 1 tablet by mouth once daily if needed for moderate pain (Patient not taking: No sig reported), Disp: 30 tablet, Rfl: 5    methylPREDNISolone 4 MG tablet therapy pack, Use as directed on package (Patient not taking: Reported on 6/10/2025), Disp: 1 each, Rfl: 0    valACYclovir (VALTREX) 500 mg tablet, Take 1 tablet (500 mg total) by mouth daily (Patient taking differently: Take 500 mg by mouth daily as needed), Disp: 30 tablet, Rfl: 5  No current facility-administered medications for this visit.  [2]   Past Medical History:  Diagnosis Date    2019 novel coronavirus disease (COVID-19) 05/30/2020    x 2 2020 and 2022    Abnormal Pap smear of cervix     Arthritis     BRCA1 negative 2015    BRCA2 negative 2015    Fractures 2021    Gastric ulcer     Hyperlipidemia     Hypertension     Low back pain     Right trigger finger     long and ring    TB lung, latent     Varicella    [3]   Past Surgical History:  Procedure Laterality Date    ANKLE FRACTURE SURGERY Right 03/01/2021    \"ankle fx\"    ANKLE SURGERY  2021    APPENDECTOMY      CATARACT EXTRACTION Bilateral     COLONOSCOPY      FOOT SURGERY Right     HERNIA REPAIR      FL TENDON SHEATH INCISION Right 07/11/2023    Procedure: RELEASE TRIGGER FINGER LONG FINGER AND RING FINGER;  Surgeon: Brittaney Montoya MD;  [facility];  Service: Orthopedics   [4]   Family History  Problem Relation Name Age of Onset    Ovarian cancer Mother  50    Diabetes Father Wilmer Samina     Thyroid cancer Sister  47    No Known Problems Sister      No Known Problems Daughter      No Known Problems Son      No Known Problems Son      No Known Problems Son      Cancer Maternal Grandmother Mother , grandmother         unknown type or age    No Known Problems Paternal Grandmother      No Known Problems Maternal Aunt      Breast cancer Maternal Uncle          50's     "

## 2025-06-10 NOTE — LETTER
Daiana 10, 2025     Patient: Marina Land   YOB: 1965   Date of Visit: 6/10/2025       To Whom it May Concern:    Marina Land was seen in my clinic on 6/10/2025. She may return to work on 6/11/2025.    If you have any questions or concerns, please don't hesitate to call.         Sincerely,          Roxanne Ramirez PA-C        CC: No Recipients

## 2025-06-10 NOTE — PROGRESS NOTES
Name: Marina Land      : 1965      MRN: [id number]  Encounter Provider: Sumit Smith DO  Encounter Date: 6/10/2025   Encounter department: [facility]  :  Assessment & Plan  Carpal tunnel syndrome on left  Unclear whether patient's symptoms were arising secondary to carpal tunnel syndrome exacerbation versus cervical radiculopathy exacerbation.  She has had prior EMG study completed which confirmed presence of both cervical radiculopathy and carpal tunnel syndrome.  She does not have any symptoms at today's office visit after completion of the p.o. steroid course.  We discussed having patient begin with nighttime cock up wrist splinting to help prevent recurrence of carpal tunnel component.  She was advised that she can return in the future if symptoms are exacerbated in the left wrist at which time a cortisone injection can be provided for the left carpal tunnel.  She does have established care with pain management and I encouraged her to continue follow-up care for symptoms related to the neck.  She will follow-up as needed if symptoms recur.  Orders:    Cock Up Wrist Splint        History of Present Illness   HPI  Marina Land is a 60 y.o. female who presents today for evaluation visit for left arm numbness and tingling.  Patient was seen by pain management specialist about 1 week ago and was evaluated and diagnosed with suspected carpal tunnel syndrome.  Patient was referred to discuss symptoms associated with carpal tunnel at today's office visit.  Patient has had prior EMG study dated in 2019 which confirmed mild median nerve compression and cervical radiculopathy of the C6 level.  Patient states with the onset of her current symptoms she had been experiencing pain traveling from the left shoulder into the left hand.  She was started on steroid course by pain management and states that this alleviated the entirety of her symptoms.  She does not have  "any paresthesias symptoms or pain at today's office visit.    History obtained from: patient    Review of Systems  Pertinent Medical History               Objective   Ht 5' 5\" (1.651 m)   Wt 70.8 kg (156 lb)   BMI 25.96 kg/m²      Physical Exam  Constitutional:       General: She is not in acute distress.    Eyes:      Extraocular Movements: Extraocular movements intact.      Pupils: Pupils are equal, round, and reactive to light.       Musculoskeletal:      Right wrist: Normal.     Neurological:      General: No focal deficit present.      Mental Status: She is alert and oriented to person, place, and time.             Administrative Statements   I have spent a total time of 20 minutes in caring for this patient on the day of the visit/encounter including Impressions, Counseling / Coordination of care, Documenting in the medical record, Reviewing/placing orders in the medical record (including tests, medications, and/or procedures), and Obtaining or reviewing history  .  "

## 2025-06-10 NOTE — ASSESSMENT & PLAN NOTE
Unclear whether patient's symptoms were arising secondary to carpal tunnel syndrome exacerbation versus cervical radiculopathy exacerbation.  She has had prior EMG study completed which confirmed presence of both cervical radiculopathy and carpal tunnel syndrome.  She does not have any symptoms at today's office visit after completion of the p.o. steroid course.  We discussed having patient begin with nighttime cock up wrist splinting to help prevent recurrence of carpal tunnel component.  She was advised that she can return in the future if symptoms are exacerbated in the left wrist at which time a cortisone injection can be provided for the left carpal tunnel.  She does have established care with pain management and I encouraged her to continue follow-up care for symptoms related to the neck.  She will follow-up as needed if symptoms recur.  Orders:    Cock Up Wrist Splint

## 2025-06-12 ENCOUNTER — NURSE TRIAGE (OUTPATIENT)
Age: 60
End: 2025-06-12

## 2025-06-12 ENCOUNTER — TELEPHONE (OUTPATIENT)
Dept: FAMILY MEDICINE CLINIC | Facility: CLINIC | Age: 60
End: 2025-06-12

## 2025-06-12 ENCOUNTER — APPOINTMENT (EMERGENCY)
Dept: CT IMAGING | Facility: HOSPITAL | Age: 60
DRG: 392 | End: 2025-06-12
Payer: COMMERCIAL

## 2025-06-12 ENCOUNTER — APPOINTMENT (EMERGENCY)
Dept: RADIOLOGY | Facility: HOSPITAL | Age: 60
DRG: 392 | End: 2025-06-12
Payer: COMMERCIAL

## 2025-06-12 ENCOUNTER — HOSPITAL ENCOUNTER (INPATIENT)
Facility: HOSPITAL | Age: 60
LOS: 2 days | Discharge: HOME | DRG: 392 | End: 2025-06-14
Attending: EMERGENCY MEDICINE
Payer: COMMERCIAL

## 2025-06-12 DIAGNOSIS — R10.9 ABDOMINAL PAIN: Primary | ICD-10-CM

## 2025-06-12 DIAGNOSIS — R11.2 NAUSEA AND VOMITING: ICD-10-CM

## 2025-06-12 PROBLEM — M81.0 OSTEOPOROSIS: Status: ACTIVE | Noted: 2025-06-12

## 2025-06-12 PROBLEM — R10.13 EPIGASTRIC PAIN: Status: ACTIVE | Noted: 2025-06-12

## 2025-06-12 PROBLEM — K21.9 GERD (GASTROESOPHAGEAL REFLUX DISEASE): Status: ACTIVE | Noted: 2025-06-12

## 2025-06-12 LAB
ALBUMIN SERPL BCG-MCNC: 4 G/DL (ref 3.5–5)
ALP SERPL-CCNC: 70 U/L (ref 34–104)
ALT SERPL W P-5'-P-CCNC: 15 U/L (ref 7–52)
ANION GAP SERPL CALCULATED.3IONS-SCNC: 7 MMOL/L (ref 4–13)
AST SERPL W P-5'-P-CCNC: 20 U/L (ref 13–39)
ATRIAL RATE: 81 BPM
BASOPHILS # BLD AUTO: 0.06 THOUSANDS/ÂΜL (ref 0–0.1)
BASOPHILS NFR BLD AUTO: 1 % (ref 0–1)
BILIRUB SERPL-MCNC: 0.66 MG/DL (ref 0.2–1)
BUN SERPL-MCNC: 16 MG/DL (ref 5–25)
CALCIUM SERPL-MCNC: 9.3 MG/DL (ref 8.4–10.2)
CHLORIDE SERPL-SCNC: 109 MMOL/L (ref 96–108)
CO2 SERPL-SCNC: 23 MMOL/L (ref 21–32)
CREAT SERPL-MCNC: 0.61 MG/DL (ref 0.6–1.3)
EOSINOPHIL # BLD AUTO: 0.13 THOUSAND/ÂΜL (ref 0–0.61)
EOSINOPHIL NFR BLD AUTO: 2 % (ref 0–6)
ERYTHROCYTE [DISTWIDTH] IN BLOOD BY AUTOMATED COUNT: 12.8 % (ref 11.6–15.1)
GFR SERPL CREATININE-BSD FRML MDRD: 98 ML/MIN/1.73SQ M
GLUCOSE SERPL-MCNC: 89 MG/DL (ref 65–140)
HCT VFR BLD AUTO: 42 % (ref 34.8–46.1)
HGB BLD-MCNC: 13.9 G/DL (ref 11.5–15.4)
IMM GRANULOCYTES # BLD AUTO: 0.02 THOUSAND/UL (ref 0–0.2)
IMM GRANULOCYTES NFR BLD AUTO: 0 % (ref 0–2)
LIPASE SERPL-CCNC: 24 U/L (ref 11–82)
LYMPHOCYTES # BLD AUTO: 2.15 THOUSANDS/ÂΜL (ref 0.6–4.47)
LYMPHOCYTES NFR BLD AUTO: 38 % (ref 14–44)
MCH RBC QN AUTO: 29.4 PG (ref 26.8–34.3)
MCHC RBC AUTO-ENTMCNC: 33.1 G/DL (ref 31.4–37.4)
MCV RBC AUTO: 89 FL (ref 82–98)
MONOCYTES # BLD AUTO: 0.43 THOUSAND/ÂΜL (ref 0.17–1.22)
MONOCYTES NFR BLD AUTO: 8 % (ref 4–12)
NEUTROPHILS # BLD AUTO: 2.89 THOUSANDS/ÂΜL (ref 1.85–7.62)
NEUTS SEG NFR BLD AUTO: 51 % (ref 43–75)
NRBC BLD AUTO-RTO: 0 /100 WBCS
P AXIS: 61 DEGREES
PLATELET # BLD AUTO: 254 THOUSANDS/UL (ref 149–390)
PMV BLD AUTO: 9.4 FL (ref 8.9–12.7)
POTASSIUM SERPL-SCNC: 4 MMOL/L (ref 3.5–5.3)
PR INTERVAL: 164 MS
PROT SERPL-MCNC: 7.1 G/DL (ref 6.4–8.4)
QRS AXIS: 66 DEGREES
QRSD INTERVAL: 70 MS
QT INTERVAL: 364 MS
QTC INTERVAL: 422 MS
RBC # BLD AUTO: 4.73 MILLION/UL (ref 3.81–5.12)
SODIUM SERPL-SCNC: 139 MMOL/L (ref 135–147)
T WAVE AXIS: 67 DEGREES
VENTRICULAR RATE: 81 BPM
WBC # BLD AUTO: 5.68 THOUSAND/UL (ref 4.31–10.16)

## 2025-06-12 PROCEDURE — 93010 ELECTROCARDIOGRAM REPORT: CPT | Performed by: INTERNAL MEDICINE

## 2025-06-12 PROCEDURE — 99223 1ST HOSP IP/OBS HIGH 75: CPT

## 2025-06-12 PROCEDURE — 83690 ASSAY OF LIPASE: CPT | Performed by: EMERGENCY MEDICINE

## 2025-06-12 PROCEDURE — 96375 TX/PRO/DX INJ NEW DRUG ADDON: CPT

## 2025-06-12 PROCEDURE — 99285 EMERGENCY DEPT VISIT HI MDM: CPT

## 2025-06-12 PROCEDURE — 85025 COMPLETE CBC W/AUTO DIFF WBC: CPT | Performed by: EMERGENCY MEDICINE

## 2025-06-12 PROCEDURE — 71045 X-RAY EXAM CHEST 1 VIEW: CPT

## 2025-06-12 PROCEDURE — 36415 COLL VENOUS BLD VENIPUNCTURE: CPT | Performed by: EMERGENCY MEDICINE

## 2025-06-12 PROCEDURE — 74177 CT ABD & PELVIS W/CONTRAST: CPT

## 2025-06-12 PROCEDURE — 93005 ELECTROCARDIOGRAM TRACING: CPT

## 2025-06-12 PROCEDURE — 96374 THER/PROPH/DIAG INJ IV PUSH: CPT

## 2025-06-12 PROCEDURE — 99285 EMERGENCY DEPT VISIT HI MDM: CPT | Performed by: EMERGENCY MEDICINE

## 2025-06-12 PROCEDURE — 80053 COMPREHEN METABOLIC PANEL: CPT | Performed by: EMERGENCY MEDICINE

## 2025-06-12 RX ORDER — SODIUM CHLORIDE 9 MG/ML
125 INJECTION, SOLUTION INTRAVENOUS CONTINUOUS
Status: DISCONTINUED | OUTPATIENT
Start: 2025-06-12 | End: 2025-06-12

## 2025-06-12 RX ORDER — ATORVASTATIN CALCIUM 20 MG/1
20 TABLET, FILM COATED ORAL DAILY
Status: DISCONTINUED | OUTPATIENT
Start: 2025-06-13 | End: 2025-06-14 | Stop reason: HOSPADM

## 2025-06-12 RX ORDER — ACETAMINOPHEN 325 MG/1
650 TABLET ORAL EVERY 6 HOURS PRN
Status: DISCONTINUED | OUTPATIENT
Start: 2025-06-12 | End: 2025-06-14 | Stop reason: HOSPADM

## 2025-06-12 RX ORDER — ONDANSETRON 2 MG/ML
4 INJECTION INTRAMUSCULAR; INTRAVENOUS ONCE
Status: COMPLETED | OUTPATIENT
Start: 2025-06-12 | End: 2025-06-12

## 2025-06-12 RX ORDER — LOSARTAN POTASSIUM 50 MG/1
50 TABLET ORAL DAILY
Status: DISCONTINUED | OUTPATIENT
Start: 2025-06-13 | End: 2025-06-14 | Stop reason: HOSPADM

## 2025-06-12 RX ORDER — SODIUM CHLORIDE, SODIUM LACTATE, POTASSIUM CHLORIDE, CALCIUM CHLORIDE 600; 310; 30; 20 MG/100ML; MG/100ML; MG/100ML; MG/100ML
100 INJECTION, SOLUTION INTRAVENOUS CONTINUOUS
Status: DISCONTINUED | OUTPATIENT
Start: 2025-06-12 | End: 2025-06-14 | Stop reason: HOSPADM

## 2025-06-12 RX ORDER — SUCRALFATE 1 G/1
1 TABLET ORAL ONCE
Status: COMPLETED | OUTPATIENT
Start: 2025-06-12 | End: 2025-06-12

## 2025-06-12 RX ORDER — PANTOPRAZOLE SODIUM 40 MG/1
40 TABLET, DELAYED RELEASE ORAL
Status: DISCONTINUED | OUTPATIENT
Start: 2025-06-13 | End: 2025-06-13

## 2025-06-12 RX ORDER — HYDROCHLOROTHIAZIDE 25 MG/1
25 TABLET ORAL DAILY
Status: DISCONTINUED | OUTPATIENT
Start: 2025-06-13 | End: 2025-06-14 | Stop reason: HOSPADM

## 2025-06-12 RX ORDER — OXYCODONE HYDROCHLORIDE 5 MG/1
5 TABLET ORAL EVERY 6 HOURS PRN
Refills: 0 | Status: DISCONTINUED | OUTPATIENT
Start: 2025-06-12 | End: 2025-06-13

## 2025-06-12 RX ORDER — MORPHINE SULFATE 4 MG/ML
4 INJECTION, SOLUTION INTRAMUSCULAR; INTRAVENOUS ONCE
Status: COMPLETED | OUTPATIENT
Start: 2025-06-12 | End: 2025-06-12

## 2025-06-12 RX ORDER — HYDROXYZINE HYDROCHLORIDE 25 MG/1
25 TABLET, FILM COATED ORAL 3 TIMES DAILY PRN
Status: DISCONTINUED | OUTPATIENT
Start: 2025-06-12 | End: 2025-06-14 | Stop reason: HOSPADM

## 2025-06-12 RX ORDER — HYDROMORPHONE HCL/PF 1 MG/ML
0.5 SYRINGE (ML) INJECTION EVERY 4 HOURS PRN
Refills: 0 | Status: DISCONTINUED | OUTPATIENT
Start: 2025-06-12 | End: 2025-06-13

## 2025-06-12 RX ORDER — HEPARIN SODIUM 5000 [USP'U]/ML
5000 INJECTION, SOLUTION INTRAVENOUS; SUBCUTANEOUS EVERY 8 HOURS SCHEDULED
Status: DISCONTINUED | OUTPATIENT
Start: 2025-06-12 | End: 2025-06-14 | Stop reason: HOSPADM

## 2025-06-12 RX ADMIN — HEPARIN SODIUM 5000 UNITS: 5000 INJECTION INTRAVENOUS; SUBCUTANEOUS at 16:27

## 2025-06-12 RX ADMIN — MORPHINE SULFATE 4 MG: 4 INJECTION INTRAVENOUS at 13:26

## 2025-06-12 RX ADMIN — SODIUM CHLORIDE, SODIUM LACTATE, POTASSIUM CHLORIDE, AND CALCIUM CHLORIDE 100 ML/HR: .6; .31; .03; .02 INJECTION, SOLUTION INTRAVENOUS at 16:27

## 2025-06-12 RX ADMIN — SUCRALFATE 1 G: 1 TABLET ORAL at 13:21

## 2025-06-12 RX ADMIN — IOHEXOL 100 ML: 350 INJECTION, SOLUTION INTRAVENOUS at 14:01

## 2025-06-12 RX ADMIN — ACETAMINOPHEN 650 MG: 325 TABLET ORAL at 18:52

## 2025-06-12 RX ADMIN — ONDANSETRON 4 MG: 2 INJECTION INTRAMUSCULAR; INTRAVENOUS at 13:24

## 2025-06-12 NOTE — ASSESSMENT & PLAN NOTE
Patient has epigastric pain radiating to the back and worsened with diet and meals has any fevers chills diarrhea.  No nausea no vomiting  Good diet no loss of appetite  CT scan reveals no bowel wall thickening or obstruction  Mild diffuse intrahepatic and extrahepatic bile duct dilatation and mild pancreatic ductal dilatation  Patient not currently in pain at this time  Possible passed gallbladder stone?  Plan:  Will get a GI consult for tomorrow continue diet for now  May need to get an MRCP in the hospital will defer to GI  N.p.o. tomorrow just in case  Advise follow-up will defer to GI if we still need MRCP  Will place pain meds as needed and nausea meds as needed

## 2025-06-12 NOTE — PLAN OF CARE
Problem: PAIN - ADULT  Goal: Verbalizes/displays adequate comfort level or baseline comfort level  Description: Interventions:  - Encourage patient to monitor pain and request assistance  - Assess pain using appropriate pain scale  - Administer analgesics as ordered based on type and severity of pain and evaluate response  - Implement non-pharmacological measures as appropriate and evaluate response  - Consider cultural and social influences on pain and pain management  - Notify physician/advanced practitioner if interventions unsuccessful or patient reports new pain  - Educate patient/family on pain management process including their role and importance of  reporting pain   - Provide non-pharmacologic/complimentary pain relief interventions  Outcome: Progressing     Problem: INFECTION - ADULT  Goal: Absence or prevention of progression during hospitalization  Description: INTERVENTIONS:  - Assess and monitor for signs and symptoms of infection  - Monitor lab/diagnostic results  - Monitor all insertion sites, i.e. indwelling lines, tubes, and drains  - Monitor endotracheal if appropriate and nasal secretions for changes in amount and color  - [place] appropriate cooling/warming therapies per order  - Administer medications as ordered  - Instruct and encourage patient and family to use good hand hygiene technique  - Identify and instruct in appropriate isolation precautions for identified infection/condition  Outcome: Progressing     Problem: SAFETY ADULT  Goal: Patient will remain free of falls  Description: INTERVENTIONS:  - Educate patient/family on patient safety including physical limitations  - Instruct patient to call for assistance with activity   - Consider consulting OT/PT to assist with strengthening/mobility based on AM PAC & JH-HLM score  - Consult OT/PT to assist with strengthening/mobility   - Keep Call bell within reach  - Keep bed low and locked with side rails adjusted as appropriate  - Keep  care items and personal belongings within reach  - Initiate and maintain comfort rounds  - Make Fall Risk Sign visible to staff  - Offer Toileting every 2 Hours, in advance of need  - Initiate/Maintain bed alarm  - Obtain necessary fall risk management equipment:   - Apply yellow socks and bracelet for high fall risk patients  - Consider moving patient to room near nurses station  Outcome: Progressing  Goal: Maintain or return to baseline ADL function  Description: INTERVENTIONS:  -  Assess patient's ability to carry out ADLs; assess patient's baseline for ADL function and identify physical deficits which impact ability to perform ADLs (bathing, care of mouth/teeth, toileting, grooming, dressing, etc.)  - Assess/evaluate cause of self-care deficits   - Assess range of motion  - Assess patient's mobility; develop plan if impaired  - Assess patient's need for assistive devices and provide as appropriate  - Encourage maximum independence but intervene and supervise when necessary  - Involve family in performance of ADLs  - Assess for home care needs following discharge   - Consider OT consult to assist with ADL evaluation and planning for discharge  - Provide patient education as appropriate  - Monitor functional capacity and physical performance, use of AM PAC & JH-HLM   - Monitor gait, balance and fatigue with ambulation    Outcome: Progressing  Goal: Maintains/Returns to pre admission functional level  Description: INTERVENTIONS:  - Perform AM-PAC 6 Click Basic Mobility/ Daily Activity assessment daily.  - Set and communicate daily mobility goal to care team and patient/family/caregiver.   - Collaborate with rehabilitation services on mobility goals if consulted  - Perform Range of Motion 3 times a day.  - Reposition patient every 2 hours.  - Dangle patient 3 times a day  - Stand patient 3 times a day  - Ambulate patient 3 times a day  - Out of bed to chair 3 times a day   - Out of bed for meals 3 times a day  - Out  of bed for toileting  - Record patient progress and toleration of activity level   Outcome: Progressing     Problem: DISCHARGE PLANNING  Goal: Discharge to home or other facility with appropriate resources  Description: INTERVENTIONS:  - Identify barriers to discharge w/patient and caregiver  - Arrange for needed discharge resources and transportation as appropriate  - Identify discharge learning needs (meds, wound care, etc.)  - Arrange for interpretive services to assist at discharge as needed  - Refer to Case Management Department for coordinating discharge planning if the patient needs post-hospital services based on physician/advanced practitioner order or complex needs related to functional status, cognitive ability, or social support system  Outcome: Progressing     Problem: Knowledge Deficit  Goal: Patient/family/caregiver demonstrates understanding of disease process, treatment plan, medications, and discharge instructions  Description: Complete learning assessment and assess knowledge base.  Interventions:  - Provide teaching at level of understanding  - Provide teaching via preferred learning methods  Outcome: Progressing     Problem: CARDIOVASCULAR - ADULT  Goal: Maintains optimal cardiac output and hemodynamic stability  Description: INTERVENTIONS:  - Monitor I/O, vital signs and rhythm  - Monitor for S/S and trends of decreased cardiac output  - Administer and titrate ordered vasoactive medications to optimize hemodynamic stability  - Assess quality of pulses, skin color and temperature  - Assess for signs of decreased coronary artery perfusion  - Instruct patient to report change in severity of symptoms  Outcome: Progressing  Goal: Absence of cardiac dysrhythmias or at baseline rhythm  Description: INTERVENTIONS:  - Continuous cardiac monitoring, vital signs, obtain 12 lead EKG if ordered  - Administer antiarrhythmic and heart rate control medications as ordered  - Monitor electrolytes and administer  replacement therapy as ordered  Outcome: Progressing

## 2025-06-12 NOTE — ED PROVIDER NOTES
Time reflects when diagnosis was documented in both MDM as applicable and the Disposition within this note       Time User Action Codes Description Comment    6/12/2025  2:52 PM Kiara Xiong Add [R10.9] Abdominal pain     6/12/2025  2:58 PM Kiara Xiong Add [R11.2] Nausea and vomiting           ED Disposition       ED Disposition   Admit    Condition   Stable    Date/Time   Thu Jun 12, 2025  2:58 PM    Comment   Case was discussed with JU, SLIM and the patient's admission status was agreed to be Admission Status: inpatient status to the hospitalist service.               Assessment & Plan       Medical Decision Making  Patient is a pleasant 60-year-old female with past medical history of hypertension who presents to the emergency department with persistent abdominal pain.  She was seen at urgent care on Tuesday for severe epigastric pain with nausea and vomiting.  No improvement since then.  Pain now radiating to her back.  Concern for gastritis, PUD, cholelithiasis, cholecystitis, pancreatitis.  She was seen at urgent care and given medications for presumed reflux without improvement.  Denies any chest pain or anginal type equivalent symptoms.    Amount and/or Complexity of Data Reviewed  External Data Reviewed: notes.     Details: Seen at  and treated for GERD. Recommended GI follow up  Reported hx of PUD per  documentation  Labs: ordered.  Radiology: ordered and independent interpretation performed.     Details: No consolidation or infiltrate. No free air under the diaphragm.    No free air appreciated on CT, ?thickened lining of stomach  ECG/medicine tests: independent interpretation performed.     Details: NSR @ 81, normal axis, normal intervals, no acute ischemia  Discussion of management or test interpretation with external provider(s): Case reviewed w/ GI due to need for possible ERCP vs MRCP based on abnormal CT scan and persistently worsening pain.     Risk  Prescription drug  management.  Decision regarding hospitalization.             Medications   lactated ringers infusion (has no administration in time range)   acetaminophen (TYLENOL) tablet 650 mg (has no administration in time range)   oxyCODONE (ROXICODONE) IR tablet 5 mg (has no administration in time range)   HYDROmorphone (DILAUDID) injection 0.5 mg (has no administration in time range)   heparin (porcine) subcutaneous injection 5,000 Units (has no administration in time range)   losartan (COZAAR) tablet 50 mg (has no administration in time range)   hydroCHLOROthiazide tablet 25 mg (has no administration in time range)   pantoprazole (PROTONIX) EC tablet 40 mg (has no administration in time range)   hydrOXYzine HCL (ATARAX) tablet 25 mg (has no administration in time range)   ondansetron (ZOFRAN) injection 4 mg (4 mg Intravenous Given 6/12/25 1324)   morphine injection 4 mg (4 mg Intravenous Given 6/12/25 1326)   sucralfate (CARAFATE) tablet 1 g (1 g Oral Given 6/12/25 1321)   iohexol (OMNIPAQUE) 350 MG/ML injection (MULTI-DOSE) 100 mL (100 mL Intravenous Given 6/12/25 1401)       ED Risk Strat Scores                    No data recorded        SBIRT 22yo+      Flowsheet Row Most Recent Value   Initial Alcohol Screen: US AUDIT-C     1. How often do you have a drink containing alcohol? 0 Filed at: 06/12/2025 1333   2. How many drinks containing alcohol do you have on a typical day you are drinking?  0 Filed at: 06/12/2025 1333   3b. FEMALE Any Age, or MALE 65+: How often do you have 4 or more drinks on one occassion? 0 Filed at: 06/12/2025 1333   Audit-C Score 0 Filed at: 06/12/2025 1333   JANNETH: How many times in the past year have you...    Used an illegal drug or used a prescription medication for non-medical reasons? Never Filed at: 06/12/2025 1333                            History of Present Illness       Chief Complaint   Patient presents with    Abdominal Pain     States abd pain started Sunday, took OTC medication with no  relief. States she was seen at  and they gave meds for reflux denies CP        Past Medical History[1]   Past Surgical History[2]   Family History[3]   Social History[4]   E-Cigarette/Vaping    E-Cigarette Use Never User       E-Cigarette/Vaping Substances    Nicotine No     THC No     CBD No     Flavoring No     Other No     Unknown No       I have reviewed and agree with the history as documented.     Patient is a 60 yoF who presents with worsening epigastric abdominal pain despite treatment for GERD          Review of Systems   Gastrointestinal:  Positive for abdominal pain and nausea.           Objective       ED Triage Vitals   Temperature Pulse Blood Pressure Respirations SpO2 Patient Position - Orthostatic VS   06/12/25 1258 06/12/25 1258 06/12/25 1258 06/12/25 1258 06/12/25 1258 06/12/25 1330   98.6 °F (37 °C) 92 150/84 18 100 % Sitting      Temp Source Heart Rate Source BP Location FiO2 (%) Pain Score    06/12/25 1552 06/12/25 1330 06/12/25 1330 -- 06/12/25 1326    Oral Monitor Right arm  6      Vitals      Date and Time Temp Pulse SpO2 Resp BP Pain Score FACES Pain Rating User   06/12/25 2203 98 °F (36.7 °C) 75 98 % -- 124/80 -- -- DII   06/12/25 1952 -- -- -- -- -- 4 -- BES   06/12/25 1852 -- -- -- -- -- 4 -- CR   06/12/25 1552 -- -- -- 16 -- 3 -- CR   06/12/25 1552 97.9 °F (36.6 °C) 67 100 % -- 139/78 -- -- DII   06/12/25 1530 -- -- -- -- 132/90 -- -- SA   06/12/25 1330 -- 78 99 % 20 131/86 -- -- BH   06/12/25 1326 -- -- -- -- -- 6 -- TS   06/12/25 1258 98.6 °F (37 °C) 92 100 % 18 150/84 -- -- AM            Physical Exam  Vitals and nursing note reviewed.   Constitutional:       General: She is not in acute distress.     Appearance: Normal appearance.   HENT:      Head: Normocephalic and atraumatic.      Right Ear: External ear normal.      Left Ear: External ear normal.      Nose: Nose normal.     Cardiovascular:      Rate and Rhythm: Normal rate and regular rhythm.   Pulmonary:      Effort: Pulmonary  effort is normal.      Breath sounds: Normal breath sounds.   Abdominal:      General: There is no distension.      Palpations: Abdomen is soft.      Tenderness: There is abdominal tenderness in the epigastric area. There is no rebound.     Musculoskeletal:      Right lower leg: No edema.      Left lower leg: No edema.     Skin:     General: Skin is warm and dry.     Neurological:      General: No focal deficit present.      Mental Status: She is alert and oriented to person, place, and time. Mental status is at baseline.     Psychiatric:         Behavior: Behavior normal.         Results Reviewed       Procedure Component Value Units Date/Time    CMP [[id number]]  (Abnormal) Collected: 06/12/25 1321    Lab Status: Final result Specimen: Blood from Arm, Left Updated: 06/12/25 1352     Sodium 139 mmol/L      Potassium 4.0 mmol/L      Chloride 109 mmol/L      CO2 23 mmol/L      ANION GAP 7 mmol/L      BUN 16 mg/dL      Creatinine 0.61 mg/dL      Glucose 89 mg/dL      Calcium 9.3 mg/dL      AST 20 U/L      ALT 15 U/L      Alkaline Phosphatase 70 U/L      Total Protein 7.1 g/dL      Albumin 4.0 g/dL      Total Bilirubin 0.66 mg/dL      eGFR 98 ml/min/1.73sq m     Narrative:      National Kid[employer] guidelines for Chronic Kidney Disease (CKD):     Stage 1 with normal or high GFR (GFR > 90 mL/min/1.73 square meters)    Stage 2 Mild CKD (GFR = 60-89 mL/min/1.73 square meters)    Stage 3A Moderate CKD (GFR = 45-59 mL/min/1.73 square meters)    Stage 3B Moderate CKD (GFR = 30-44 mL/min/1.73 square meters)    Stage 4 Severe CKD (GFR = 15-29 mL/min/1.73 square meters)    Stage 5 End Stage CKD (GFR <15 mL/min/1.73 square meters)  Note: GFR calculation is accurate only with a steady state creatinine    Lipase [[id number]]  (Normal) Collected: 06/12/25 1321    Lab Status: Final result Specimen: Blood from Arm, Left Updated: 06/12/25 1352     Lipase 24 u/L     CBC and differential [[id number]] Collected: 06/12/25 1321     Lab Status: Final result Specimen: Blood from Arm, Left Updated: 06/12/25 1333     WBC 5.68 Thousand/uL      RBC 4.73 Million/uL      Hemoglobin 13.9 g/dL      Hematocrit 42.0 %      MCV 89 fL      MCH 29.4 pg      MCHC 33.1 g/dL      RDW 12.8 %      MPV 9.4 fL      Platelets 254 Thousands/uL      nRBC 0 /100 WBCs      Segmented % 51 %      Immature Grans % 0 %      Lymphocytes % 38 %      Monocytes % 8 %      Eosinophils Relative 2 %      Basophils Relative 1 %      Absolute Neutrophils 2.89 Thousands/µL      Absolute Immature Grans 0.02 Thousand/uL      Absolute Lymphocytes 2.15 Thousands/µL      Absolute Monocytes 0.43 Thousand/µL      Eosinophils Absolute 0.13 Thousand/µL      Basophils Absolute 0.06 Thousands/µL             CT abdomen pelvis with contrast   Final Interpretation by Adam Monae MD (06/12 6732)      1.  No bowel wall thickening or bowel obstruction. Normal appendix. Trace free fluid involving the pelvis.   2.  Mild diffuse intrahepatic and extrahepatic biliary ductal dilatation, and mild pancreatic ductal dilatation, without obvious obstructing stone or mass identified. Consider further evaluation with MRCP without and with contrast.   3.  Nonobstructing right renal calculus.   4.  Uterine fibroids.   5.  Multilevel lumbar spondylosis, worst at L2-L3, where there is a disc bulge with superimposed central disc extrusion, contributing to moderate narrowing of the thecal sac.         Workstation performed: [id number]         X-ray chest 1 view portable   Final Interpretation by Shay Borges MD (06/12 9289)      No acute cardiopulmonary disease.            Workstation performed: [id number]             Procedures    ED Medication and Procedure Management   Prior to Admission Medications   Prescriptions Last Dose Informant Patient Reported? Taking?   Blood Glucose Monitoring Suppl (OneTouch Verio Reflect) w/Device KIT  Self No No   Sig: Check blood sugars once daily. Please substitute with  appropriate alternative as covered by patient's insurance. Dx: E11.65   OneTouch Delica Lancets 33G MISC  Self No No   Sig: Check blood sugars once daily. Please substitute with appropriate alternative as covered by patient's insurance. Dx: E11.65   Pyridoxine HCl (VITAMIN B6 PO) Not Taking Self Yes No   Sig: Take by mouth   Patient not taking: Reported on 6/12/2025   alendronate (FOSAMAX) 70 mg tablet  Self No No   Sig: take 1 tablet by mouth every 7 days IN THE MORNING 30 MINUTES before FIRST FOOD, BEVERAGE, OR MEDICATION with 6 to 8 ounce(s) OF WATER DO NOT LIE DOWN EAT OR DRINK for 30 MINUTES after TAKING MUST REMAIN UPRIGHT   atorvastatin (LIPITOR) 20 mg tablet  Self No No   Sig: take 1 tablet by mouth once daily   cyclobenzaprine (FLEXERIL) 5 mg tablet  Self No No   Sig: Take 1 tablet (5 mg total) by mouth 3 (three) times a day as needed for muscle spasms   glucose blood (OneTouch Verio) test strip  Self No No   Sig: Check blood sugars once daily. Please substitute with appropriate alternative as covered by patient's insurance. Dx: E11.65   hydrOXYzine HCL (ATARAX) 25 mg tablet  Self No No   Sig: take 1 tablet by mouth every 6 hours if needed for for anxiety   hydroCHLOROthiazide 25 mg tablet  Self No No   Sig: Take 1 tablet (25 mg total) by mouth daily   losartan (COZAAR) 50 mg tablet  Self No No   Sig: Take 1 tablet (50 mg total) by mouth daily   meloxicam (MOBIC) 7.5 mg tablet  Self No No   Sig: take 1 tablet by mouth once daily if needed for moderate pain   omeprazole (PriLOSEC) 20 mg delayed release capsule  Self No No   Sig: Take 1 capsule (20 mg total) by mouth daily   triamcinolone (KENALOG) 0.1 % cream  Self No No   Sig: Apply topically 2 (two) times a day   valACYclovir (VALTREX) 500 mg tablet  Self No No   Sig: Take 1 tablet (500 mg total) by mouth daily   Patient taking differently: Take 500 mg by mouth daily as needed      Facility-Administered Medications: None     Current Discharge Medication  List        CONTINUE these medications which have NOT CHANGED    Details   alendronate (FOSAMAX) 70 mg tablet take 1 tablet by mouth every 7 days IN THE MORNING 30 MINUTES before FIRST FOOD, BEVERAGE, OR MEDICATION with 6 to 8 ounce(s) OF WATER DO NOT LIE DOWN EAT OR DRINK for 30 MINUTES after TAKING MUST REMAIN UPRIGHT  Qty: 12 tablet, Refills: 0    Associated Diagnoses: Age-related osteoporosis without current pathological fracture      atorvastatin (LIPITOR) 20 mg tablet take 1 tablet by mouth once daily  Qty: 30 tablet, Refills: 0    Associated Diagnoses: Mixed hyperlipidemia      Blood Glucose Monitoring Suppl (OneTouch Verio Reflect) w/Device KIT Check blood sugars once daily. Please substitute with appropriate alternative as covered by patient's insurance. Dx: E11.65  Qty: 1 kit, Refills: 0    Comments: Please substitute with appropriate alternative as covered by patient's insurance  Associated Diagnoses: Hypoglycemia      cyclobenzaprine (FLEXERIL) 5 mg tablet Take 1 tablet (5 mg total) by mouth 3 (three) times a day as needed for muscle spasms  Qty: 30 tablet, Refills: 0    Associated Diagnoses: Neck pain; Muscle spasms of neck      glucose blood (OneTouch Verio) test strip Check blood sugars once daily. Please substitute with appropriate alternative as covered by patient's insurance. Dx: E11.65  Qty: 100 each, Refills: 3    Comments: Please substitute with appropriate alternative as covered by patient's insurance  Associated Diagnoses: Hypoglycemia      hydroCHLOROthiazide 25 mg tablet Take 1 tablet (25 mg total) by mouth daily  Qty: 90 tablet, Refills: 1    Associated Diagnoses: Essential hypertension      hydrOXYzine HCL (ATARAX) 25 mg tablet take 1 tablet by mouth every 6 hours if needed for for anxiety  Qty: 30 tablet, Refills: 0    Associated Diagnoses: Panic attack      losartan (COZAAR) 50 mg tablet Take 1 tablet (50 mg total) by mouth daily  Qty: 90 tablet, Refills: 1    Associated Diagnoses:  Essential hypertension      meloxicam (MOBIC) 7.5 mg tablet take 1 tablet by mouth once daily if needed for moderate pain  Qty: 30 tablet, Refills: 5    Associated Diagnoses: Polyarthralgia      omeprazole (PriLOSEC) 20 mg delayed release capsule Take 1 capsule (20 mg total) by mouth daily  Qty: 90 capsule, Refills: 2    Associated Diagnoses: Epigastric abdominal pain      OneTouch Delica Lancets 33G MISC Check blood sugars once daily. Please substitute with appropriate alternative as covered by patient's insurance. Dx: E11.65  Qty: 100 each, Refills: 3    Comments: Please substitute with appropriate alternative as covered by patient's insurance  Associated Diagnoses: Hypoglycemia      Pyridoxine HCl (VITAMIN B6 PO) Take by mouth      triamcinolone (KENALOG) 0.1 % cream Apply topically 2 (two) times a day  Qty: 15 g, Refills: 0    Associated Diagnoses: Rash and nonspecific skin eruption      valACYclovir (VALTREX) 500 mg tablet Take 1 tablet (500 mg total) by mouth daily  Qty: 30 tablet, Refills: 5    Associated Diagnoses: Recurrent herpes labialis           No discharge procedures on file.  ED SEPSIS DOCUMENTATION   Time reflects when diagnosis was documented in both MDM as applicable and the Disposition within this note       Time User Action Codes Description Comment    6/12/2025  2:52 PM Kiara Xiong Add [R10.9] Abdominal pain     6/12/2025  2:58 PM Kiara Xiong Add [R11.2] Nausea and vomiting                      [1]   Past Medical History:  Diagnosis Date    2019 novel coronavirus disease (COVID-19) 05/30/2020    x 2 2020 and 2022    Abnormal Pap smear of cervix     Arthritis     BRCA1 negative 2015    BRCA2 negative 2015    Fractures 2021    Gastric ulcer     Hyperlipidemia     Hypertension 2014    Low back pain     Right trigger finger     long and ring    Shingles     TB lung, latent     Varicella 1991   [2]   Past Surgical History:  Procedure Laterality Date    ANKLE FRACTURE SURGERY Right  "03/01/2021    \"ankle fx\"    ANKLE SURGERY  2021    APPENDECTOMY  1998    CATARACT EXTRACTION Bilateral     COLONOSCOPY      EYE SURGERY      FOOT SURGERY Right     FRACTURE SURGERY      HERNIA REPAIR      MN TENDON SHEATH INCISION Right 07/11/2023    Procedure: RELEASE TRIGGER FINGER LONG FINGER AND RING FINGER;  Surgeon: Brittaney Montoya MD;  Location: [facility] MAIN OR;  Service: Orthopedics   [3]   Family History  Problem Relation Name Age of Onset    Ovarian cancer Mother Deidre Cooper 50    Diabetes Father Wilmer Midgley     Thyroid cancer Sister  47    No Known Problems Sister      No Known Problems Daughter      No Known Problems Son      No Known Problems Son      No Known Problems Son      Cancer Maternal Grandmother Mother , grandmother         unknown type or age    No Known Problems Paternal Grandmother      No Known Problems Maternal Aunt      Breast cancer Maternal Uncle Wilmer Cooper         50's   [4]   Social History  Tobacco Use    Smoking status: Never    Smokeless tobacco: Never   Vaping Use    Vaping status: Never Used   Substance Use Topics    Alcohol use: Not Currently     Comment: rare    Drug use: No        Kiara Xiong MD  06/12/25 [id number]    "

## 2025-06-12 NOTE — TELEPHONE ENCOUNTER
KADEI:  Pt called crying saying that she has severe abdominal pain rating a 10 on the pain scale of 10 being worst. Pain is radiating to her back, she can not drink or eat anything. She wanted you to see her earlier then her 2:00 appt but I sent her to the ER due to severity of pain.

## 2025-06-12 NOTE — H&P
H&P - Hospitalist   Name: Marina Land 60 y.o. female I MRN: [id number]  Unit/Bed#: ED 14 I Date of Admission: 6/12/2025   Date of Service: 6/12/2025 I Hospital Day: 0     Assessment & Plan  Epigastric pain  Patient has epigastric pain radiating to the back and worsened with diet and meals has any fevers chills diarrhea.  No nausea no vomiting  Good diet no loss of appetite  CT scan reveals no bowel wall thickening or obstruction  Mild diffuse intrahepatic and extrahepatic bile duct dilatation and mild pancreatic ductal dilatation  Patient not currently in pain at this time  Possible passed gallbladder stone?  Plan:  Will get a GI consult for tomorrow continue diet for now  May need to get an MRCP in the hospital will defer to GI  N.p.o. tomorrow just in case  Advise follow-up will defer to GI if we still need MRCP  Will place pain meds as needed and nausea meds as needed    Essential hypertension  CW hztz 25 mg OD  Losartan 50 mg OD  Dyslipidemia  Cw atorvastati  Osteoporosis  On alendronate  GERD (gastroesophageal reflux disease)  On omeprazole 20 mg daily  Will cw pantoprazole 40 mg      VTE Pharmacologic Prophylaxis:   Moderate Risk (Score 3-4) - Pharmacological DVT Prophylaxis Ordered: heparin.  Code Status: Full code    Discussion with family: Updated  () at bedside.    Anticipated Length of Stay: Patient will be admitted on an inpatient basis with an anticipated length of stay of greater than 2 midnights secondary to Abdominal pain and biliary dilatation.    History of Present Illness   Chief Complaint: Dilated biliary tract abdominal pain    Marina Land is a 60 y.o. female with a PMH of HPN, hld, gerd who presents with nominal pain that started over the weekend which is worse with eating meals.  And radiated to the back.  No fevers no chills no diarrhea.  Denies nausea and vomiting at this time  By the time I had seen her looks like pain had resolved. Denies GI  bleeding    .    Review of Systems   Constitutional:  Negative for chills and fever.   HENT:  Negative for ear pain and sore throat.    Eyes:  Negative for pain and visual disturbance.   Respiratory:  Negative for cough and shortness of breath.    Cardiovascular:  Negative for chest pain and palpitations.   Gastrointestinal:  Positive for abdominal pain. Negative for vomiting.   Genitourinary:  Negative for dysuria and hematuria.   Musculoskeletal:  Negative for arthralgias and back pain.   Skin:  Negative for color change and rash.   Neurological:  Negative for seizures and syncope.   All other systems reviewed and are negative.      Historical Information   Past Medical History[1]  Past Surgical History[2]  Social History[3]  E-Cigarette/Vaping    E-Cigarette Use Never User      E-Cigarette/Vaping Substances    Nicotine No     THC No     CBD No     Flavoring No     Other No     Unknown No      Family history non-contributory    Meds/Allergies   I have reviewed home medications with patient personally.  Prior to Admission medications    Medication Sig Start Date End Date Taking? Authorizing Provider   alendronate (FOSAMAX) 70 mg tablet take 1 tablet by mouth every 7 days IN THE MORNING 30 MINUTES before FIRST FOOD, BEVERAGE, OR MEDICATION with 6 to 8 ounce(s) OF WATER DO NOT LIE DOWN EAT OR DRINK for 30 MINUTES after TAKING MUST REMAIN UPRIGHT 4/11/25   Sudhir Sánchez MD   atorvastatin (LIPITOR) 20 mg tablet take 1 tablet by mouth once daily 12/31/24   Yadi Portillo PA-C   Blood Glucose Monitoring Suppl (OneTouch Verio Reflect) w/Device KIT Check blood sugars once daily. Please substitute with appropriate alternative as covered by patient's insurance. Dx: E11.65 3/28/24   Yadi Portillo PA-C   cyclobenzaprine (FLEXERIL) 5 mg tablet Take 1 tablet (5 mg total) by mouth 3 (three) times a day as needed for muscle spasms 6/2/25   MARVEL Bautista   glucose blood (OneTouch Verio) test strip Check blood sugars once  daily. Please substitute with appropriate alternative as covered by patient's insurance. Dx: E11.65 3/28/24   Yadi Portillo PA-C   hydroCHLOROthiazide 25 mg tablet Take 1 tablet (25 mg total) by mouth daily 3/25/25   Yadi Portillo PA-C   hydrOXYzine HCL (ATARAX) 25 mg tablet take 1 tablet by mouth every 6 hours if needed for for anxiety 10/11/24   Yadi Portillo PA-C   losartan (COZAAR) 50 mg tablet Take 1 tablet (50 mg total) by mouth daily 3/28/25   Nabor Jacob PA-C   meloxicam (MOBIC) 7.5 mg tablet take 1 tablet by mouth once daily if needed for moderate pain 12/3/24   Yadi Portillo PA-C   omeprazole (PriLOSEC) 20 mg delayed release capsule Take 1 capsule (20 mg total) by mouth daily 6/10/25   Roxanne Ramirez PA-C   OneTouch Delica Lancets 33G MISC Check blood sugars once daily. Please substitute with appropriate alternative as covered by patient's insurance. Dx: E11.65 3/28/24   Yadi Portillo PA-C   Pyridoxine HCl (VITAMIN B6 PO) Take by mouth    Historical Provider, MD   triamcinolone (KENALOG) 0.1 % cream Apply topically 2 (two) times a day 10/7/24   Yadi Portillo PA-C   valACYclovir (VALTREX) 500 mg tablet Take 1 tablet (500 mg total) by mouth daily  Patient taking differently: Take 500 mg by mouth daily as needed 7/11/24 1/31/25  Yadi Portillo PA-C     Allergies   Allergen Reactions    Kiwi Extract - Food Allergy Hives    Other      strawberry    Pineapple - Food Allergy Hives       Objective :  Temp:  [98.6 °F (37 °C)] 98.6 °F (37 °C)  HR:  [78-92] 78  BP: (131-150)/(84-86) 131/86  Resp:  [18-20] 20  SpO2:  [99 %-100 %] 99 %  O2 Device: None (Room air)    Physical Exam  Vitals and nursing note reviewed.   Constitutional:       General: She is not in acute distress.     Appearance: She is well-developed.   HENT:      Head: Normocephalic and atraumatic.     Eyes:      Conjunctiva/sclera: Conjunctivae normal.       Cardiovascular:      Rate and Rhythm: Normal rate and regular rhythm.       Heart sounds: No murmur heard.  Pulmonary:      Effort: Pulmonary effort is normal. No respiratory distress.      Breath sounds: Normal breath sounds.   Abdominal:      Palpations: Abdomen is soft.      Tenderness: There is no abdominal tenderness.     Musculoskeletal:         General: No swelling.      Cervical back: Neck supple.     Skin:     General: Skin is warm and dry.      Capillary Refill: Capillary refill takes less than 2 seconds.     Neurological:      Mental Status: She is alert.     Psychiatric:         Mood and Affect: Mood normal.          Lines/Drains:            Lab Results: I have reviewed the following results:  Results from last 7 days   Lab Units 06/12/25  1321   WBC Thousand/uL 5.68   HEMOGLOBIN g/dL 13.9   HEMATOCRIT % 42.0   PLATELETS Thousands/uL 254   SEGS PCT % 51   LYMPHO PCT % 38   MONO PCT % 8   EOS PCT % 2     Results from last 7 days   Lab Units 06/12/25  1321   SODIUM mmol/L 139   POTASSIUM mmol/L 4.0   CHLORIDE mmol/L 109*   CO2 mmol/L 23   BUN mg/dL 16   CREATININE mg/dL 0.61   ANION GAP mmol/L 7   CALCIUM mg/dL 9.3   ALBUMIN g/dL 4.0   TOTAL BILIRUBIN mg/dL 0.66   ALK PHOS U/L 70   ALT U/L 15   AST U/L 20   GLUCOSE RANDOM mg/dL 89             Lab Results   Component Value Date    HGBA1C 5.8 (H) 07/24/2024    HGBA1C 5.7 (H) 11/03/2023    HGBA1C 5.6 02/24/2023           Imaging Results Review: I personally reviewed the following image studies/reports in PACS and discussed pertinent findings with Radiology: CT abdomen/pelvis. My interpretation of the radiology images/reports is: Dilated biliary tract.  Other Study Results Review: EKG was reviewed.     Administrative Statements   I have spent a total time of 80 minutes in caring for this patient on the day of the visit/encounter including Diagnostic results, Prognosis, Risks and benefits of tx options, Instructions for management, Patient and family education, Importance of tx compliance, Risk factor reductions, Impressions,  "Counseling / Coordination of care, Documenting in the medical record, Reviewing/placing orders in the medical record (including tests, medications, and/or procedures), Obtaining or reviewing history  , and Communicating with other healthcare professionals .    ** Please Note: This note has been constructed using a voice recognition system. **         [1]   Past Medical History:  Diagnosis Date    2019 novel coronavirus disease (COVID-19) 05/30/2020    x 2 2020 and 2022    Abnormal Pap smear of cervix     Arthritis     BRCA1 negative 2015    BRCA2 negative 2015    Fractures 2021    Gastric ulcer     Hyperlipidemia     Hypertension 2014    Low back pain     Right trigger finger     long and ring    Shingles     TB lung, latent     Varicella 1991   [2]   Past Surgical History:  Procedure Laterality Date    ANKLE FRACTURE SURGERY Right 03/01/2021    \"ankle fx\"    ANKLE SURGERY  2021    APPENDECTOMY  1998    CATARACT EXTRACTION Bilateral     COLONOSCOPY      EYE SURGERY      FOOT SURGERY Right     FRACTURE SURGERY      HERNIA REPAIR      GA TENDON SHEATH INCISION Right 07/11/2023    Procedure: RELEASE TRIGGER FINGER LONG FINGER AND RING FINGER;  Surgeon: Brittaney Montoya MD;  Location: [facility] MAIN OR;  Service: Orthopedics   [3]   Social History  Tobacco Use    Smoking status: Never    Smokeless tobacco: Never   Vaping Use    Vaping status: Never Used   Substance and Sexual Activity    Alcohol use: Not Currently     Comment: rare    Drug use: No    Sexual activity: Yes     Partners: Male     Birth control/protection: None     "

## 2025-06-12 NOTE — TELEPHONE ENCOUNTER
It looks like pt was agreeable to go to ER per Christophe's message and is on her way now which I agree with given current symptoms.

## 2025-06-12 NOTE — LETTER
[address] [address]  No information on file.    June 14, 2025     Patient: Marina Land   YOB: 1965   Date of Visit: 6/12/2025       To Whom it May Concern:    Marina Land is under my professional care. She was seen in the hospital from 6/12/2025 to 06/14/25. She may return to work on 6/16/25 without limitations.    If you have any questions or concerns, please don't hesitate to call.         Sincerely,          Edu Shaikh MD

## 2025-06-12 NOTE — TELEPHONE ENCOUNTER
"REASON FOR CONVERSATION: Abdominal Pain    SYMPTOMS: Severe 10/10 abdominal pain. Radiates into her back from sternum area. States nothing makes it better, has been constant since Monday. No vomiting but is nauseated and states unable to drink or eat.    OTHER HEALTH INFORMATION: Patient states Prilosec and antacid are not helping. Has not had this pain before, was seen in  on Tuesday.     PROTOCOL DISPOSITION: Go to ED Now    CARE ADVICE PROVIDED: Advised ED, patient refused, per symptoms warm called office to have patient further assisted in recommendation.     PRACTICE FOLLOW-UP: Warm transferred to [facility] to further assist.        Reason for Disposition   SEVERE abdominal pain (e.g., excruciating)    Answer Assessment - Initial Assessment Questions  1. LOCATION: \"Where does it hurt?\"       Upper abdomen mid chest   2. RADIATION: \"Does the pain shoot anywhere else?\" (e.g., chest, back)     Radiates into her back   3. ONSET: \"When did the pain begin?\" (e.g., minutes, hours or days ago)       Monday she was seen in Urgent Care Tuesday   4. SUDDEN: \"Gradual or sudden onset?\"      Gradual onset, getting worse   5. PATTERN \"Does the pain come and go, or is it constant?\"      Constant   6. SEVERITY: \"How bad is the pain?\"  (e.g., Scale 1-10; mild, moderate, or severe)      10/10 severe   7. RECURRENT SYMPTOM: \"Have you ever had this type of stomach pain before?\" If Yes, ask: \"When was the last time?\" and \"What happened that time?\"       No new symptom from Monday   8. AGGRAVATING FACTORS: \"Does anything seem to cause this pain?\" (e.g., foods, stress, alcohol)      Nothing makes it better. Has tried prescribed Prilosec from  and antacid with no relief.   9. CARDIAC SYMPTOMS: \"Do you have any of the following symptoms: chest pain, difficulty breathing, sweating, nausea?\"      No Cardiac symptoms   10. OTHER SYMPTOMS: \"Do you have any other symptoms?\" (e.g., back pain, diarrhea, fever, urination pain, vomiting)  Back " pain, no fever or vomiting.    Protocols used: Abdominal Pain - Upper-Adult-OH

## 2025-06-13 ENCOUNTER — APPOINTMENT (INPATIENT)
Dept: ULTRASOUND IMAGING | Facility: HOSPITAL | Age: 60
DRG: 392 | End: 2025-06-13
Payer: COMMERCIAL

## 2025-06-13 ENCOUNTER — APPOINTMENT (INPATIENT)
Dept: MRI IMAGING | Facility: HOSPITAL | Age: 60
DRG: 392 | End: 2025-06-13
Payer: COMMERCIAL

## 2025-06-13 PROBLEM — K83.8 DILATED CBD, ACQUIRED: Status: ACTIVE | Noted: 2025-06-13

## 2025-06-13 LAB
ALBUMIN SERPL BCG-MCNC: 3.3 G/DL (ref 3.5–5)
ALP SERPL-CCNC: 57 U/L (ref 34–104)
ALT SERPL W P-5'-P-CCNC: 13 U/L (ref 7–52)
ANION GAP SERPL CALCULATED.3IONS-SCNC: 3 MMOL/L (ref 4–13)
AST SERPL W P-5'-P-CCNC: 13 U/L (ref 13–39)
BASOPHILS # BLD AUTO: 0.05 THOUSANDS/ÂΜL (ref 0–0.1)
BASOPHILS NFR BLD AUTO: 1 % (ref 0–1)
BILIRUB SERPL-MCNC: 0.54 MG/DL (ref 0.2–1)
BUN SERPL-MCNC: 24 MG/DL (ref 5–25)
CALCIUM ALBUM COR SERPL-MCNC: 9.9 MG/DL (ref 8.3–10.1)
CALCIUM SERPL-MCNC: 9.3 MG/DL (ref 8.4–10.2)
CHLORIDE SERPL-SCNC: 108 MMOL/L (ref 96–108)
CO2 SERPL-SCNC: 28 MMOL/L (ref 21–32)
CREAT SERPL-MCNC: 0.7 MG/DL (ref 0.6–1.3)
EOSINOPHIL # BLD AUTO: 0.15 THOUSAND/ÂΜL (ref 0–0.61)
EOSINOPHIL NFR BLD AUTO: 3 % (ref 0–6)
ERYTHROCYTE [DISTWIDTH] IN BLOOD BY AUTOMATED COUNT: 12.9 % (ref 11.6–15.1)
GFR SERPL CREATININE-BSD FRML MDRD: 93 ML/MIN/1.73SQ M
GLUCOSE SERPL-MCNC: 92 MG/DL (ref 65–140)
HCT VFR BLD AUTO: 38.6 % (ref 34.8–46.1)
HGB BLD-MCNC: 12.2 G/DL (ref 11.5–15.4)
IMM GRANULOCYTES # BLD AUTO: 0.02 THOUSAND/UL (ref 0–0.2)
IMM GRANULOCYTES NFR BLD AUTO: 0 % (ref 0–2)
LYMPHOCYTES # BLD AUTO: 1.69 THOUSANDS/ÂΜL (ref 0.6–4.47)
LYMPHOCYTES NFR BLD AUTO: 36 % (ref 14–44)
MAGNESIUM SERPL-MCNC: 1.9 MG/DL (ref 1.9–2.7)
MCH RBC QN AUTO: 29.1 PG (ref 26.8–34.3)
MCHC RBC AUTO-ENTMCNC: 31.6 G/DL (ref 31.4–37.4)
MCV RBC AUTO: 92 FL (ref 82–98)
MONOCYTES # BLD AUTO: 0.41 THOUSAND/ÂΜL (ref 0.17–1.22)
MONOCYTES NFR BLD AUTO: 9 % (ref 4–12)
NEUTROPHILS # BLD AUTO: 2.43 THOUSANDS/ÂΜL (ref 1.85–7.62)
NEUTS SEG NFR BLD AUTO: 51 % (ref 43–75)
NRBC BLD AUTO-RTO: 0 /100 WBCS
PLATELET # BLD AUTO: 206 THOUSANDS/UL (ref 149–390)
PMV BLD AUTO: 9.6 FL (ref 8.9–12.7)
POTASSIUM SERPL-SCNC: 4.4 MMOL/L (ref 3.5–5.3)
PROT SERPL-MCNC: 6 G/DL (ref 6.4–8.4)
RBC # BLD AUTO: 4.19 MILLION/UL (ref 3.81–5.12)
SODIUM SERPL-SCNC: 139 MMOL/L (ref 135–147)
WBC # BLD AUTO: 4.75 THOUSAND/UL (ref 4.31–10.16)

## 2025-06-13 PROCEDURE — 80053 COMPREHEN METABOLIC PANEL: CPT

## 2025-06-13 PROCEDURE — 83735 ASSAY OF MAGNESIUM: CPT

## 2025-06-13 PROCEDURE — 74181 MRI ABDOMEN W/O CONTRAST: CPT

## 2025-06-13 PROCEDURE — 85025 COMPLETE CBC W/AUTO DIFF WBC: CPT

## 2025-06-13 PROCEDURE — 99232 SBSQ HOSP IP/OBS MODERATE 35: CPT

## 2025-06-13 RX ORDER — PANTOPRAZOLE SODIUM 40 MG/1
40 TABLET, DELAYED RELEASE ORAL
Status: DISCONTINUED | OUTPATIENT
Start: 2025-06-13 | End: 2025-06-14 | Stop reason: HOSPADM

## 2025-06-13 RX ORDER — POLYETHYLENE GLYCOL 3350 17 G/17G
17 POWDER, FOR SOLUTION ORAL ONCE
Status: DISCONTINUED | OUTPATIENT
Start: 2025-06-14 | End: 2025-06-14 | Stop reason: HOSPADM

## 2025-06-13 RX ORDER — HYDROMORPHONE HCL IN WATER/PF 6 MG/30 ML
0.2 PATIENT CONTROLLED ANALGESIA SYRINGE INTRAVENOUS EVERY 4 HOURS PRN
Status: DISCONTINUED | OUTPATIENT
Start: 2025-06-13 | End: 2025-06-14 | Stop reason: HOSPADM

## 2025-06-13 RX ORDER — OXYCODONE HYDROCHLORIDE 5 MG/1
5 TABLET ORAL EVERY 4 HOURS PRN
Refills: 0 | Status: DISCONTINUED | OUTPATIENT
Start: 2025-06-13 | End: 2025-06-14 | Stop reason: HOSPADM

## 2025-06-13 RX ORDER — METOCLOPRAMIDE HYDROCHLORIDE 5 MG/ML
10 INJECTION INTRAMUSCULAR; INTRAVENOUS ONCE
Status: COMPLETED | OUTPATIENT
Start: 2025-06-13 | End: 2025-06-13

## 2025-06-13 RX ORDER — SUCRALFATE 1 G/1
1 TABLET ORAL
Status: DISCONTINUED | OUTPATIENT
Start: 2025-06-13 | End: 2025-06-14 | Stop reason: HOSPADM

## 2025-06-13 RX ORDER — SODIUM CHLORIDE, SODIUM LACTATE, POTASSIUM CHLORIDE, CALCIUM CHLORIDE 600; 310; 30; 20 MG/100ML; MG/100ML; MG/100ML; MG/100ML
125 INJECTION, SOLUTION INTRAVENOUS CONTINUOUS
Status: DISCONTINUED | OUTPATIENT
Start: 2025-06-13 | End: 2025-06-14

## 2025-06-13 RX ORDER — ONDANSETRON 2 MG/ML
4 INJECTION INTRAMUSCULAR; INTRAVENOUS EVERY 8 HOURS PRN
Status: DISCONTINUED | OUTPATIENT
Start: 2025-06-13 | End: 2025-06-14 | Stop reason: HOSPADM

## 2025-06-13 RX ORDER — MAGNESIUM HYDROXIDE/ALUMINUM HYDROXICE/SIMETHICONE 120; 1200; 1200 MG/30ML; MG/30ML; MG/30ML
30 SUSPENSION ORAL EVERY 4 HOURS PRN
Status: DISCONTINUED | OUTPATIENT
Start: 2025-06-13 | End: 2025-06-14 | Stop reason: HOSPADM

## 2025-06-13 RX ADMIN — HEPARIN SODIUM 5000 UNITS: 5000 INJECTION INTRAVENOUS; SUBCUTANEOUS at 21:22

## 2025-06-13 RX ADMIN — PANTOPRAZOLE SODIUM 40 MG: 40 TABLET, DELAYED RELEASE ORAL at 05:48

## 2025-06-13 RX ADMIN — SODIUM CHLORIDE, SODIUM LACTATE, POTASSIUM CHLORIDE, AND CALCIUM CHLORIDE 100 ML/HR: .6; .31; .03; .02 INJECTION, SOLUTION INTRAVENOUS at 10:01

## 2025-06-13 RX ADMIN — SODIUM CHLORIDE, SODIUM LACTATE, POTASSIUM CHLORIDE, AND CALCIUM CHLORIDE 125 ML/HR: .6; .31; .03; .02 INJECTION, SOLUTION INTRAVENOUS at 13:57

## 2025-06-13 RX ADMIN — ATORVASTATIN CALCIUM 20 MG: 20 TABLET, FILM COATED ORAL at 10:20

## 2025-06-13 RX ADMIN — ONDANSETRON 4 MG: 2 INJECTION INTRAMUSCULAR; INTRAVENOUS at 09:58

## 2025-06-13 RX ADMIN — HEPARIN SODIUM 5000 UNITS: 5000 INJECTION INTRAVENOUS; SUBCUTANEOUS at 05:48

## 2025-06-13 RX ADMIN — SUCRALFATE 1 G: 1 TABLET ORAL at 15:16

## 2025-06-13 RX ADMIN — SUCRALFATE 1 G: 1 TABLET ORAL at 21:21

## 2025-06-13 RX ADMIN — METOCLOPRAMIDE 10 MG: 5 INJECTION, SOLUTION INTRAMUSCULAR; INTRAVENOUS at 14:45

## 2025-06-13 RX ADMIN — SUCRALFATE 1 G: 1 TABLET ORAL at 12:39

## 2025-06-13 RX ADMIN — HYDROMORPHONE HYDROCHLORIDE 0.5 MG: 1 INJECTION, SOLUTION INTRAMUSCULAR; INTRAVENOUS; SUBCUTANEOUS at 07:22

## 2025-06-13 RX ADMIN — HYDROCHLOROTHIAZIDE 25 MG: 25 TABLET ORAL at 10:21

## 2025-06-13 RX ADMIN — HEPARIN SODIUM 5000 UNITS: 5000 INJECTION INTRAVENOUS; SUBCUTANEOUS at 13:56

## 2025-06-13 RX ADMIN — PANTOPRAZOLE SODIUM 40 MG: 40 TABLET, DELAYED RELEASE ORAL at 15:16

## 2025-06-13 RX ADMIN — LOSARTAN POTASSIUM 50 MG: 50 TABLET, FILM COATED ORAL at 10:20

## 2025-06-13 RX ADMIN — SODIUM CHLORIDE, SODIUM LACTATE, POTASSIUM CHLORIDE, AND CALCIUM CHLORIDE 100 ML/HR: .6; .31; .03; .02 INJECTION, SOLUTION INTRAVENOUS at 02:31

## 2025-06-13 RX ADMIN — SODIUM CHLORIDE, SODIUM LACTATE, POTASSIUM CHLORIDE, AND CALCIUM CHLORIDE 125 ML/HR: .6; .31; .03; .02 INJECTION, SOLUTION INTRAVENOUS at 19:53

## 2025-06-13 NOTE — ASSESSMENT & PLAN NOTE
Patient has epigastric pain radiating to the back and worsened with diet and meals has any fevers chills diarrhea.  No nausea no vomiting  CT AP w contrast: Mild diffuse intrahepatic and extrahepatic bile duct dilatation and mild pancreatic ductal dilatation  Fortunately no SIRS criteria.  GI consulted, appreciate recommendations.  MRCP 6/13: mild CBD and main pancreatic duct dilatation with tapering towards ampulla. Minimal central intrahepatic biliary dilatation, no choledocolithiasis/stricture/mass noted.  US RUQ pending  Plan for EGD 6/14. Likely will need EUS in OP setting based on MRI findings.  ADAT per GI.  PRN pain/antiemetics.

## 2025-06-13 NOTE — ASSESSMENT & PLAN NOTE
Patient is strong history of peptic ulcer disease 20 years ago.  Risk factors for peptic ulcer disease currently include recent prednisone usage and ibuprofen 3 times a day for osteoarthritis related pain and swelling.  Fortunately, her hemoglobin is stable.  -Plan for EGD tomorrow to investigate  -PPI twice daily, Carafate course  -Soft diet as tolerated today, n.p.o. after midnight

## 2025-06-13 NOTE — CONSULTS
"Consultation - Gastroenterology   Name: Marina Land 60 y.o. female I MRN: [id number]  Unit/Bed#: 2 E 284-01 I Date of Admission: 6/12/2025   Date of Service: 6/13/2025 I Hospital Day: 1   Inpatient consult to gastroenterology  Consult performed by: Maria T Stuart PA-C  Consult ordered by: Kiara Xiong MD        Physician Requesting Evaluation: Nilo Colin MD   Reason for Evaluation / Principal Problem: Epigastric pain    Assessment & Plan  Epigastric pain  Patient is strong history of peptic ulcer disease 20 years ago.  Risk factors for peptic ulcer disease currently include recent prednisone usage and ibuprofen 3 times a day for osteoarthritis related pain and swelling.  Fortunately, her hemoglobin is stable.  -Plan for EGD tomorrow to investigate  -PPI twice daily, Carafate course  -Soft diet as tolerated today, n.p.o. after midnight  Dilated cbd, acquired  Noted on CT imaging, follow-up MRCP was performed without contrast unfortunately with the following findings:\"Mild common bile duct and main pancreatic duct dilatation with tapering towards the ampulla. Minimal central intrahepatic biliary dilatation. No choledocholithiasis, biliary stricture or suspicious mass on this noncontrast study.\"  -Will consider outpatient EUS especially if endoscopy is unrevealing, discussed with patient  I have discussed the above management plan in detail with the primary service.  Patient agrees with plan.  Patient will be seen by Dr. Foster, all medical decisions made with Dr. Foster.    History of Present Illness   HPI:  Marina Land is a 60 y.o. female with history of peptic ulcer disease 20 years ago, osteoarthritis, osteoporosis, GERD and hypertension.  Patient presented to the emergency room for acute onset epigastric pain radiating to her upper abdomen and back.  Denies signs of GI bleeding.  Reports that 20 years ago she was diagnosed with 3 gastric ulcers, had a follow-up endoscopy afterwards " revealing that the ulcers had healed.  When asked about NSAID use, patient reports that she takes 3 ibuprofen pills daily for osteoarthritis related pain.  Reports that recently she just finished a prednisone course 1 week ago for joint swelling.    On admission, CT abdomen without bowel wall thickening or signs of obstruction.  Mild diffuse intra and extrahepatic biliary ductal elevation was noted.  A follow-up MRI was performed revealing similar findings, but unfortunately MRCP was done without contrast.  Hemoglobin remains within normal range at 12.2.    Patient's last colonoscopy was performed by Dr. Angelo in September 2020 revealing 3 rectal polyps.  She was recommended a follow-up colonoscopy in 2025.  Review of Systems   Constitutional:  Positive for appetite change. Negative for chills, diaphoresis, fatigue and unexpected weight change.   HENT:  Negative for sore throat and trouble swallowing.    Eyes:  Negative for discharge and redness.   Respiratory:  Negative for cough, shortness of breath and wheezing.    Cardiovascular:  Negative for chest pain and palpitations.   Gastrointestinal:  Positive for abdominal pain and nausea. Negative for anal bleeding, blood in stool, constipation, diarrhea, rectal pain and vomiting.   Endocrine: Negative for cold intolerance and heat intolerance.   Musculoskeletal:  Positive for arthralgias and joint swelling. Negative for myalgias.   Skin:  Negative for pallor and rash.   Neurological:  Negative for dizziness, tremors, weakness, light-headedness, numbness and headaches.   Hematological:  Negative for adenopathy. Does not bruise/bleed easily.   Psychiatric/Behavioral:  Negative for behavioral problems, confusion, dysphoric mood and sleep disturbance. The patient is not nervous/anxious.          Objective :  Temp:  [97.9 °F (36.6 °C)-98.6 °F (37 °C)] 98 °F (36.7 °C)  HR:  [59-92] 59  BP: (124-150)/(76-90) 127/76  Resp:  [16-20] 18  SpO2:  [96 %-100 %] 98 %  O2 Device:  None (Room air)    Physical Exam  Constitutional:       General: She is not in acute distress.     Appearance: She is well-developed. She is not diaphoretic.   HENT:      Head: Normocephalic and atraumatic.     Eyes:      General: No scleral icterus.    Neck:      Thyroid: No thyromegaly.      Trachea: No tracheal deviation.   Pulmonary:      Effort: Pulmonary effort is normal.   Abdominal:      General: Bowel sounds are normal. There is no distension.      Palpations: Abdomen is soft. Abdomen is not rigid. There is no mass.      Tenderness: There is no abdominal tenderness. There is no guarding or rebound.     Musculoskeletal:      Cervical back: Neck supple.   Lymphadenopathy:      Cervical: No cervical adenopathy.     Skin:     General: Skin is warm and dry.      Findings: No erythema or rash.     Neurological:      Mental Status: She is alert and oriented to person, place, and time.     Psychiatric:         Behavior: Behavior normal.         Lab Results: I have reviewed the following results:    Imaging Results Review: I reviewed radiology reports from this admission including: CT abdomen/pelvis, MRI abdomen/MRCP, and procedure reports.

## 2025-06-13 NOTE — UTILIZATION REVIEW
Initial Clinical Review    Admission: Date/Time/Statement:   Admission Orders (From admission, onward)       Ordered        06/12/25 1459  INPATIENT ADMISSION  Once                          Orders Placed This Encounter   Procedures    INPATIENT ADMISSION     Standing Status:   Standing     Number of Occurrences:   1     Level of Care:   Med Surg [16]     Estimated length of stay:   More than 2 Midnights     Certification:   I certify that inpatient services are medically necessary for this patient for a duration of greater than two midnights. See H&P and MD Progress Notes for additional information about the patient's course of treatment.     ED Arrival Information       Expected   -    Arrival   6/12/2025 12:53    Acuity   Urgent              Means of arrival   Walk-In    Escorted by   Self    Service   Hospitalist    Admission type   Emergency              Arrival complaint   chest pain             Chief Complaint   Patient presents with    Abdominal Pain     States abd pain started Sunday, took OTC medication with no relief. States she was seen at  and they gave meds for reflux denies CP        Initial Presentation: 60 y.o. female with a PMH of HPN, hld, gerd presented to the ED from home w/ abdominal pain.  Pt reports abdominal pain started over the weekend, worse w/ eating, radiating to the back.   In the ED, CT scan reveals no bowel wall thickening or obstruction. Mild diffuse intrahepatic and extrahepatic bile duct dilatation and mild pancreatic ductal dilatation.   Given IV Zofran. IV Morphine, po Carafate.    Admit as Inpatient for evaluation and treatment of epigastric pain.  Plan: cont diet for now. NPO for tomorrow. GI consulted. Pain and nausea meds as needed. Cont pantoprazole 40 mg.     Anticipated Length of Stay/Certification Statement: Patient will be admitted on an inpatient basis with an anticipated length of stay of greater than 2 midnights secondary to Abdominal pain and biliary dilatation.  "    Date: 06/13   Day 2:   Pt w/ waxing/waning pain. Nausea post dilaudid administration. No diarrhea/constipation. Abdominal tenderness present on exam.  MRCP 6/13: mild CBD and main pancreatic duct dilatation with tapering towards ampulla. Minimal central intrahepatic biliary dilatation, no choledocolithiasis/stricture/mass noted.  US RUQ pending. Plan for EGD 6/14. Likely will need EUS in OP setting based on MRI findings.     GI Consult: Epigastric pain, h/o PUD 20 years ago. Hgb stable. Dilated CBD on CT imaging, f/u MRCP showed :\"Mild common bile duct and main pancreatic duct dilatation with tapering towards the ampulla. Minimal central intrahepatic biliary dilatation. No choledocholithiasis, biliary stricture or suspicious mass on this noncontrast study.\"   Plan: Plan for EGD tomorrow to investigate. PPI twice daily, Carafate course. Soft diet as tolerated today, n.p.o. after midnight      ED Treatment-Medication Administration from 06/12/2025 1253 to 06/12/2025 1548         Date/Time Order Dose Route Action     06/12/2025 1324 ondansetron (ZOFRAN) injection 4 mg 4 mg Intravenous Given     06/12/2025 1326 morphine injection 4 mg 4 mg Intravenous Given     06/12/2025 1321 sucralfate (CARAFATE) tablet 1 g 1 g Oral Given     06/12/2025 1401 iohexol (OMNIPAQUE) 350 MG/ML injection (MULTI-DOSE) 100 mL 100 mL Intravenous Given            Scheduled Medications:  atorvastatin, 20 mg, Oral, Daily  heparin (porcine), 5,000 Units, Subcutaneous, Q8H MINDY  hydroCHLOROthiazide, 25 mg, Oral, Daily  losartan, 50 mg, Oral, Daily  pantoprazole, 40 mg, Oral, Early Morning      Continuous IV Infusions:  lactated ringers, 100 mL/hr, Intravenous, Continuous      PRN Meds:  acetaminophen, 650 mg, Oral, Q6H PRN 06/12 x 1  HYDROmorphone, 0.2 mg, Intravenous, Q4H PRN  HYDROmorphone, 0.5 mg, Intravenous, Q4H PRN 06/13 x 1  hydrOXYzine HCL, 25 mg, Oral, TID PRN  ondansetron, 4 mg, Intravenous, Q8H PRN 06/13 x 1  oxyCODONE, 5 mg, Oral, Q4H " PRN  oxyCODONE, 2.5 mg, Oral, Q6H PRN      ED Triage Vitals   Temperature Pulse Respirations Blood Pressure SpO2 Pain Score   06/12/25 1258 06/12/25 1258 06/12/25 1258 06/12/25 1258 06/12/25 1258 06/12/25 1326   98.6 °F (37 °C) 92 18 150/84 100 % 6     Weight (last 2 days)       Date/Time Weight    06/12/25 15:52:24 73.5 (161.99)    06/12/25 1258 73.5 (162)            Vital Signs (last 3 days)       Date/Time Temp Pulse Resp BP MAP (mmHg) SpO2 O2 Device Patient Position - Orthostatic VS Pain    06/13/25 07:26:26 98 °F (36.7 °C) 68 18 134/86 102 96 % -- -- --    06/13/25 0722 -- -- -- -- -- -- None (Room air) -- 4 06/13/25 07:21:10 -- 61 -- 134/86 102 98 % -- -- --    06/12/25 22:03:36 98 °F (36.7 °C) 75 16 124/80 95 98 % -- -- --    06/12/25 1952 -- -- -- -- -- -- None (Room air) -- 4 06/12/25 1852 -- -- -- -- -- -- -- -- 4 06/12/25 15:52:24 97.9 °F (36.6 °C) 67 16 139/78 98 100 % -- Lying 3    06/12/25 1530 -- -- -- 132/90 108 -- -- -- --    06/12/25 1330 -- 78 20 131/86 102 99 % None (Room air) Sitting --    06/12/25 1326 -- -- -- -- -- -- -- -- 6 06/12/25 1258 98.6 °F (37 °C) 92 18 150/84 -- 100 % -- -- --              Pertinent Labs/Diagnostic Test Results:   Radiology:  CT abdomen pelvis with contrast   Final Interpretation by Adam Monae MD (06/12 1419)      1.  No bowel wall thickening or bowel obstruction. Normal appendix. Trace free fluid involving the pelvis.   2.  Mild diffuse intrahepatic and extrahepatic biliary ductal dilatation, and mild pancreatic ductal dilatation, without obvious obstructing stone or mass identified. Consider further evaluation with MRCP without and with contrast.   3.  Nonobstructing right renal calculus.   4.  Uterine fibroids.   5.  Multilevel lumbar spondylosis, worst at L2-L3, where there is a disc bulge with superimposed central disc extrusion, contributing to moderate narrowing of the thecal sac.         Workstation performed: [id number]         X-ray chest 1  view portable   Final Interpretation by Shay Borges MD (06/12 1627)      No acute cardiopulmonary disease.            Workstation performed: [id number]         US right upper quadrant    (Results Pending)   MRI abdomen wo contrast and mrcp    (Results Pending)     Cardiology:  ECG 12 lead   Final Result by Jay Hayes MD (06/12 1347)   Normal sinus rhythm   Normal ECG   When compared with ECG of 11-Oct-2024 17:33,   No significant change was found   Confirmed by Jay Hyaes ([id number]) on 6/12/2025 1:47:36 PM        GI:  No orders to display           Results from last 7 days   Lab Units 06/13/25  0519 06/12/25  1321   WBC Thousand/uL 4.75 5.68   HEMOGLOBIN g/dL 12.2 13.9   HEMATOCRIT % 38.6 42.0   PLATELETS Thousands/uL 206 254   TOTAL NEUT ABS Thousands/µL 2.43 2.89         Results from last 7 days   Lab Units 06/13/25  0519 06/12/25  1321   SODIUM mmol/L 139 139   POTASSIUM mmol/L 4.4 4.0   CHLORIDE mmol/L 108 109*   CO2 mmol/L 28 23   ANION GAP mmol/L 3* 7   BUN mg/dL 24 16   CREATININE mg/dL 0.70 0.61   EGFR ml/min/1.73sq m 93 98   CALCIUM mg/dL 9.3 9.3   MAGNESIUM mg/dL 1.9  --      Results from last 7 days   Lab Units 06/13/25  0519 06/12/25  1321   AST U/L 13 20   ALT U/L 13 15   ALK PHOS U/L 57 70   TOTAL PROTEIN g/dL 6.0* 7.1   ALBUMIN g/dL 3.3* 4.0   TOTAL BILIRUBIN mg/dL 0.54 0.66         Results from last 7 days   Lab Units 06/13/25  0519 06/12/25  1321   GLUCOSE RANDOM mg/dL 92 89               Results from last 7 days   Lab Units 06/12/25  1321   LIPASE u/L 24         Past Medical History[1]  Present on Admission:   Essential hypertension   Dyslipidemia      Admitting Diagnosis: Chest pain [R07.9]  Abdominal pain [R10.9]  Nausea and vomiting [R11.2]  Age/Sex: 60 y.o. female    Network Utilization Review Department  ATTENTION: Please call with any questions or concerns to [phone] and carefully listen to the prompts so that you are directed to the right person. All voicemails are  confidential.   For Discharge needs, contact Care Management DC Support Team at [phone] opt. 2  Send all requests for admission clinical reviews, approved or denied determinations and any other requests to dedicated fax number below belonging to the campus where the patient is receiving treatment. List of dedicated fax numbers for the Facilities:  FACILITY NAME UR FAX NUMBER   ADMISSION DENIALS (Administrative/Medical Necessity) [phone]   DISCHARGE SUPPORT TEAM (NETWORK) [phone]   PARENT CHILD HEALTH (Maternity/NICU/Pediatrics) [phone]   [facility] [phone]   [facility] [phone]   [facility] [phone]   [facility] [phone]   [facility] [phone]   [facility] [phone]   [facility] [phone]   [facility] [phone]   [facility] [phone]   [facility] [phone]   [facility] [phone]   [facility] [phone]              [1]   Past Medical History:  Diagnosis Date    2019 novel coronavirus disease (COVID-19) 05/30/2020    x 2 2020 and 2022    Abnormal Pap smear of cervix     Arthritis     BRCA1 negative 2015    BRCA2 negative 2015    Fractures 2021    Gastric ulcer     Hyperlipidemia     Hypertension 2014    Low back pain     Right trigger finger     long and ring    Shingles     TB lung, latent     Varicella 1991

## 2025-06-13 NOTE — CASE MANAGEMENT
Case Management Assessment & Discharge Planning Note    Patient name Marina Land  Location [address] [address]01 MRN [id number]  : 1965 Date 2025       Current Admission Date: 2025  Current Admission Diagnosis:Epigastric pain   Patient Active Problem List    Diagnosis Date Noted    Dilated cbd, acquired 2025    Osteoporosis 2025    GERD (gastroesophageal reflux disease) 2025    Epigastric pain 2025    Carpal tunnel syndrome on left 06/10/2025    Injury of toe on right foot 2024    Prediabetes 2024    Trigger middle finger of right hand 2023    Trigger ring finger of right hand 2023    Positive QuantiFERON-TB Gold test 2023    TB lung, latent 2023    Primary osteoarthritis involving multiple joints 2023    Family history of ovarian cancer 2022    Cortical age-related cataract of both eyes 2022    Closed nondisplaced fracture of lateral malleolus of right fibula 2021    ASCUS with positive high risk HPV cervical 2020    Post-menopausal 2020    Varicose veins of both lower extremities with pain 2020    Cervicalgia 2019    Calcific tendonitis of right shoulder 04/10/2019    Sciatica, right side 2019    Essential hypertension 10/10/2018    Snoring 10/10/2018    Dyslipidemia 2016    Lumbar radiculopathy 2015    Migraine headache 2014      LOS (days): 1  Geometric Mean LOS (GMLOS) (days):   Days to GMLOS:     OBJECTIVE:    Risk of Unplanned Readmission Score: 6.99         Current admission status: Inpatient       Preferred Pharmacy:   [id number] [place] - [address]  [address]  [place] [address]  Phone: [phone] Fax: [phone]    Primary Care Provider: Yadi Portillo PA-C    Primary Insurance: AETNA  Secondary Insurance:     ASSESSMENT:  Active Health Care Proxies    There are no active Health Care Proxies on file.        Advance Directives  Does patient have a Health Care POA?: No  Was patient offered paperwork?: Yes (provided)  Does patient have Advance Directives?: No  Was patient offered paperwork?: Yes (provided)  Primary Contact: Harish Land (Spouse)  [phone] (Mobile)    s.o.         Readmission Root Cause  30 Day Readmission: No    Patient Information  Admitted from:: Home  Mental Status: Alert  During Assessment patient was accompanied by: Not accompanied during assessment  Assessment information provided by:: Patient  Primary Caregiver: Self  Support Systems: Spouse/significant other, Son  County of Residence: [place]  What [place] do you [place] in[place]  Home entry access options. Select all that apply.: Stairs  Number of steps to enter home.: 5  Do the steps have railings?: Yes  Type of Current Residence: 2 story home  Upon entering residence, is there a bedroom on the main floor (no further steps)?: No  A bedroom is located on the following floor levels of residence (select all that apply):: 2nd Floor  Upon entering residence, is there a bathroom on the main floor (no further steps)?: No  Indicate which floors of current residence have a bathroom (select all the apply):: 2nd Floor  Number of steps to 2nd floor from main floor: One Flight  Living Arrangements: Lives w/ Spouse/significant other, Lives w/ Son  Is patient a ?: No    Activities of Daily Living Prior to Admission  Functional Status: Independent  Completes ADLs independently?: Yes  Ambulates independently?: Yes  Does patient use assisted devices?: No  Does patient currently own DME?: Yes  What DME does the patient currently own?: Straight Cane, Walker, Other (Comment) (raised toilet seat, built in shower chair)  Does patient have a history of Outpatient Therapy (PT/OT)?: Yes  Does the patient have a history of Short-Term Rehab?: No  Does patient have a history of HHC?: No  Does patient currently have HHC?: No         Patient Information  Continued  Income Source: Employed  Does patient have prescription coverage?: Yes  Can the patient afford their medications and any related supplies (such as glucometers or test strips)?: Yes  Does patient receive dialysis treatments?: No  Does patient have a history of substance abuse?: No  Does patient have a history of Mental Health Diagnosis?: Yes (anxiety and depression in past, 8565-9315;  took meds, saw counselor;  stopped tx in 2020)  Has patient received inpatient treatment related to mental health in the last 2 years?: No         Means of Transportation  Means of Transport to Appts:: Drives Self      Social Determinants of Health (SDOH)      Flowsheet Row Most Recent Value   Housing Stability    In the last 12 months, was there a time when you were not able to pay the mortgage or rent on time? N   In the past 12 months, how many times have you moved where you were living? 1   At any time in the past 12 months, were you homeless or living in a shelter (including now)? N   Transportation Needs    In the past 12 months, has lack of transportation kept you from medical appointments or from getting medications? no   In the past 12 months, has lack of transportation kept you from meetings, work, or from getting things needed for daily living? No   Food Insecurity    Within the past 12 months, you worried that your food would run out before you got the money to buy more. Never true   Within the past 12 months, the food you bought just didn't last and you didn't have money to get more. Never true   Utilities    In the past 12 months has the electric, gas, oil, or water company threatened to shut off services in your home? No            DISCHARGE DETAILS:    Discharge planning discussed with:: pt  Freedom of Choice: Yes  Comments - Freedom of Choice: Met w pt at bedside; introduced self and explained role of CM, including arranging DME, STR, home health, out pt tx.  Advised pt that CM will make appropriate  referrals based on treatment team recommendations and MD orders, and she can consider recommended plan of care and choose from available vendors.  CM contacted family/caregiver?: No- see comments (pt alert and oriented adult)  Were Treatment Team discharge recommendations reviewed with patient/caregiver?:  (none at present)          Contacts  Patient Contacts: Harish Land (Spouse)  [phone] (Mobile)  Relationship to Patient:: Family    Requested Home Health Care         Is the patient interested in HHC at discharge?: No    DME Referral Provided  Referral made for DME?: No    Other Referral/Resources/Interventions Provided:  Referral Comments: Chart reviewed and pt discussed at [facility] AM rounds today.  IV dilaudid, IV zofran, IV reglan, IVF at 100> 125, GI cs.  For EGD;  NPO.  Anticipated d/c in 24-48 hrs.  No post acute needs identified by tx team.  CM will continue to follow during hospital stay.         Treatment Team Recommendation: Home  Expected Discharge Disposition: Home or Self Care  Additional Discharge Dispositions: Home or Self Care  Transport at Discharge : Family

## 2025-06-13 NOTE — PLAN OF CARE
Problem: PAIN - ADULT  Goal: Verbalizes/displays adequate comfort level or baseline comfort level  Description: Interventions:  - Encourage patient to monitor pain and request assistance  - Assess pain using appropriate pain scale  - Administer analgesics as ordered based on type and severity of pain and evaluate response  - Implement non-pharmacological measures as appropriate and evaluate response  - Consider cultural and social influences on pain and pain management  - Notify physician/advanced practitioner if interventions unsuccessful or patient reports new pain  - Educate patient/family on pain management process including their role and importance of  reporting pain   - Provide non-pharmacologic/complimentary pain relief interventions  Outcome: Progressing     Problem: SAFETY ADULT  Goal: Patient will remain free of falls  Description: INTERVENTIONS:  - Educate patient/family on patient safety including physical limitations  - Instruct patient to call for assistance with activity   - Consider consulting OT/PT to assist with strengthening/mobility based on AM PAC & JH-HLM score  - Consult OT/PT to assist with strengthening/mobility   - Keep Call bell within reach  - Keep bed low and locked with side rails adjusted as appropriate  - Keep care items and personal belongings within reach  - Initiate and maintain comfort rounds  - Make Fall Risk Sign visible to staff  - Offer Toileting every 2 Hours, in advance of need  - Initiate/Maintain alarm  - Obtain necessary fall risk management equipment:   - Apply yellow socks and bracelet for high fall risk patients  - Consider moving patient to room near nurses station  Outcome: Progressing  Goal: Maintain or return to baseline ADL function  Description: INTERVENTIONS:  -  Assess patient's ability to carry out ADLs; assess patient's baseline for ADL function and identify physical deficits which impact ability to perform ADLs (bathing, care of mouth/teeth, toileting,  grooming, dressing, etc.)  - Assess/evaluate cause of self-care deficits   - Assess range of motion  - Assess patient's mobility; develop plan if impaired  - Assess patient's need for assistive devices and provide as appropriate  - Encourage maximum independence but intervene and supervise when necessary  - Involve family in performance of ADLs  - Assess for home care needs following discharge   - Consider OT consult to assist with ADL evaluation and planning for discharge  - Provide patient education as appropriate  - Monitor functional capacity and physical performance, use of AM PAC & -HLM   - Monitor gait, balance and fatigue with ambulation    Outcome: Progressing  Goal: Maintains/Returns to pre admission functional level  Description: INTERVENTIONS:  - Perform AM-PAC 6 Click Basic Mobility/ Daily Activity assessment daily.  - Set and communicate daily mobility goal to care team and patient/family/caregiver.   - Collaborate with rehabilitation services on mobility goals if consulted  - Perform Range of Motion 2 times a day.  - Reposition patient every 2 hours.  - Dangle patient 2 times a day  - Stand patient 2 times a day  - Ambulate patient 2 times a day  - Out of bed to chair 2 times a day   - Out of bed for meals 2 times a day  - Out of bed for toileting  - Record patient progress and toleration of activity level   Outcome: Progressing

## 2025-06-13 NOTE — ASSESSMENT & PLAN NOTE
"Noted on CT imaging, follow-up MRCP was performed without contrast unfortunately with the following findings:\"Mild common bile duct and main pancreatic duct dilatation with tapering towards the ampulla. Minimal central intrahepatic biliary dilatation. No choledocholithiasis, biliary stricture or suspicious mass on this noncontrast study.\"  -Will consider outpatient EUS especially if endoscopy is unrevealing, discussed with patient  "

## 2025-06-13 NOTE — PROGRESS NOTES
Progress Note - Hospitalist   Name: Marina Land 60 y.o. female I MRN: [id number]  Unit/Bed#: 2 E 284-01 I Date of Admission: 6/12/2025   Date of Service: 6/13/2025 I Hospital Day: 1    Assessment & Plan  Epigastric pain  Patient has epigastric pain radiating to the back and worsened with diet and meals has any fevers chills diarrhea.  No nausea no vomiting  CT AP w contrast: Mild diffuse intrahepatic and extrahepatic bile duct dilatation and mild pancreatic ductal dilatation  Fortunately no SIRS criteria.  GI consulted, appreciate recommendations.  MRCP 6/13: mild CBD and main pancreatic duct dilatation with tapering towards ampulla. Minimal central intrahepatic biliary dilatation, no choledocolithiasis/stricture/mass noted.  US RUQ pending  Plan for EGD 6/14. Likely will need EUS in OP setting based on MRI findings.  ADAT per GI.  PRN pain/antiemetics.  Essential hypertension  CW hztz 25 mg OD  Losartan 50 mg OD  Dyslipidemia  Cw atorvastatin  Osteoporosis  On alendronate  GERD (gastroesophageal reflux disease)  PTA omeprazole 20 mg daily  Will sub for pantoprazole 40 mg    VTE Pharmacologic Prophylaxis:   Moderate Risk (Score 3-4) - Pharmacological DVT Prophylaxis Ordered: heparin.    Mobility:   Basic Mobility Inpatient Raw Score: 24  JH-HLM Goal: 8: Walk 250 feet or more  JH-HLM Achieved: 7: Walk 25 feet or more  JH-HLM Goal NOT achieved. Continue with multidisciplinary rounding and encourage appropriate mobility to improve upon JH-HLM goals.    Patient Centered Rounds: I performed bedside rounds with nursing staff today.   Discussions with Specialists or Other Care Team Provider: CM, GI    Education and Discussions with Family / Patient: Updated  () via phone.    Current Length of Stay: 1 day(s)  Current Patient Status: Inpatient   Certification Statement: The patient will continue to require additional inpatient hospital stay due to pending MRCP/US RUQ  Discharge Plan: Anticipate  discharge in 24-48 hrs to home.    Code Status: Level 1 - Full Code    Subjective   Patient with waxing/waning pain. Nausea post dilaudid administration. No diarrhea/constipation    Objective :  Temp:  [97.9 °F (36.6 °C)-98.6 °F (37 °C)] 98 °F (36.7 °C)  HR:  [59-92] 59  BP: (124-150)/(76-90) 127/76  Resp:  [16-20] 18  SpO2:  [96 %-100 %] 98 %  O2 Device: None (Room air)    Body mass index is 26.96 kg/m².     Input and Output Summary (last 24 hours):     Intake/Output Summary (Last 24 hours) at 6/13/2025 1026  Last data filed at 6/12/2025 1900  Gross per 24 hour   Intake 480 ml   Output --   Net 480 ml       Physical Exam  Vitals reviewed.   Constitutional:       General: She is not in acute distress.     Appearance: Normal appearance. She is not toxic-appearing.   HENT:      Head: Normocephalic and atraumatic.     Eyes:      General: No scleral icterus.      Cardiovascular:      Rate and Rhythm: Normal rate and regular rhythm.      Heart sounds: Normal heart sounds.   Pulmonary:      Effort: Pulmonary effort is normal. No respiratory distress.      Breath sounds: Normal breath sounds. No stridor.   Abdominal:      General: Abdomen is flat. Bowel sounds are normal. There is no distension.      Palpations: Abdomen is soft.      Tenderness: There is abdominal tenderness (epigastric). There is no guarding or rebound.     Musculoskeletal:         General: Normal range of motion.      Cervical back: Normal range of motion.      Right lower leg: No edema.      Left lower leg: No edema.     Skin:     Coloration: Skin is not jaundiced or pale.     Neurological:      General: No focal deficit present.      Mental Status: She is alert and oriented to person, place, and time. Mental status is at baseline.                     Lab Results: I have reviewed the following results:   Results from last 7 days   Lab Units 06/13/25  0519   WBC Thousand/uL 4.75   HEMOGLOBIN g/dL 12.2   HEMATOCRIT % 38.6   PLATELETS Thousands/uL 206    SEGS PCT % 51   LYMPHO PCT % 36   MONO PCT % 9   EOS PCT % 3     Results from last 7 days   Lab Units 06/13/25  0519   SODIUM mmol/L 139   POTASSIUM mmol/L 4.4   CHLORIDE mmol/L 108   CO2 mmol/L 28   BUN mg/dL 24   CREATININE mg/dL 0.70   ANION GAP mmol/L 3*   CALCIUM mg/dL 9.3   ALBUMIN g/dL 3.3*   TOTAL BILIRUBIN mg/dL 0.54   ALK PHOS U/L 57   ALT U/L 13   AST U/L 13   GLUCOSE RANDOM mg/dL 92                       Recent Cultures (last 7 days):         Imaging Results Review: I reviewed radiology reports from this admission including: CT abdomen/pelvis.  Other Study Results Review: No additional pertinent studies reviewed.    Last 24 Hours Medication List:     Current Facility-Administered Medications:     acetaminophen (TYLENOL) tablet 650 mg, Q6H PRN    atorvastatin (LIPITOR) tablet 20 mg, Daily    heparin (porcine) subcutaneous injection 5,000 Units, Q8H MINDY    hydroCHLOROthiazide tablet 25 mg, Daily    HYDROmorphone HCl (DILAUDID) injection 0.2 mg, Q4H PRN    hydrOXYzine HCL (ATARAX) tablet 25 mg, TID PRN    lactated ringers infusion, Continuous, Last Rate: 100 mL/hr (06/13/25 1001)    losartan (COZAAR) tablet 50 mg, Daily    ondansetron (ZOFRAN) injection 4 mg, Q8H PRN    oxyCODONE (ROXICODONE) IR tablet 5 mg, Q4H PRN    oxyCODONE (ROXICODONE) split tablet 2.5 mg, Q6H PRN    pantoprazole (PROTONIX) EC tablet 40 mg, Early Morning    Administrative Statements   Today, Patient Was Seen By: Joann Mckeon PA-C    **Please Note: This note may have been constructed using a voice recognition system.**

## 2025-06-13 NOTE — PLAN OF CARE
Problem: PAIN - ADULT  Goal: Verbalizes/displays adequate comfort level or baseline comfort level  Description: Interventions:  - Encourage patient to monitor pain and request assistance  - Assess pain using appropriate pain scale  - Administer analgesics as ordered based on type and severity of pain and evaluate response  - Implement non-pharmacological measures as appropriate and evaluate response  - Consider cultural and social influences on pain and pain management  - Notify physician/advanced practitioner if interventions unsuccessful or patient reports new pain  - Educate patient/family on pain management process including their role and importance of  reporting pain   - Provide non-pharmacologic/complimentary pain relief interventions  Outcome: Progressing     Problem: INFECTION - ADULT  Goal: Absence or prevention of progression during hospitalization  Description: INTERVENTIONS:  - Assess and monitor for signs and symptoms of infection  - Monitor lab/diagnostic results  - Monitor all insertion sites, i.e. indwelling lines, tubes, and drains  - Monitor endotracheal if appropriate and nasal secretions for changes in amount and color  - Louise appropriate cooling/warming therapies per order  - Administer medications as ordered  - Instruct and encourage patient and family to use good hand hygiene technique  - Identify and instruct in appropriate isolation precautions for identified infection/condition  Outcome: Progressing     Problem: SAFETY ADULT  Goal: Patient will remain free of falls  Description: INTERVENTIONS:  - Educate patient/family on patient safety including physical limitations  - Instruct patient to call for assistance with activity   - Consider consulting OT/PT to assist with strengthening/mobility based on AM PAC & JH-HLM score  - Consult OT/PT to assist with strengthening/mobility   - Keep Call bell within reach  - Keep bed low and locked with side rails adjusted as appropriate  - Keep  care items and personal belongings within reach  - Initiate and maintain comfort rounds  - Make Fall Risk Sign visible to staff  - Offer Toileting every 2 Hours, in advance of need  - Initiate/Maintain bed alarm  - Obtain necessary fall risk management equipment: nonskid socks  - Apply yellow socks and bracelet for high fall risk patients  - Consider moving patient to room near nurses station  Outcome: Progressing  Goal: Maintain or return to baseline ADL function  Description: INTERVENTIONS:  -  Assess patient's ability to carry out ADLs; assess patient's baseline for ADL function and identify physical deficits which impact ability to perform ADLs (bathing, care of mouth/teeth, toileting, grooming, dressing, etc.)  - Assess/evaluate cause of self-care deficits   - Assess range of motion  - Assess patient's mobility; develop plan if impaired  - Assess patient's need for assistive devices and provide as appropriate  - Encourage maximum independence but intervene and supervise when necessary  - Involve family in performance of ADLs  - Assess for home care needs following discharge   - Consider OT consult to assist with ADL evaluation and planning for discharge  - Provide patient education as appropriate  - Monitor functional capacity and physical performance, use of AM PAC & -HLM   - Monitor gait, balance and fatigue with ambulation    Outcome: Progressing  Goal: Maintains/Returns to pre admission functional level  Description: INTERVENTIONS:  - Perform AM-PAC 6 Click Basic Mobility/ Daily Activity assessment daily.  - Set and communicate daily mobility goal to care team and patient/family/caregiver.   - Collaborate with rehabilitation services on mobility goals if consulted  - Perform Range of Motion 4 times a day.  - Reposition patient every 4 hours.  - Dangle patient 4 times a day  - Stand patient 4 times a day  - Ambulate patient 4 times a day  - Out of bed to chair 4 times a day   - Out of bed for meals 4 times  a day  - Out of bed for toileting  - Record patient progress and toleration of activity level   Outcome: Progressing     Problem: DISCHARGE PLANNING  Goal: Discharge to home or other facility with appropriate resources  Description: INTERVENTIONS:  - Identify barriers to discharge w/patient and caregiver  - Arrange for needed discharge resources and transportation as appropriate  - Identify discharge learning needs (meds, wound care, etc.)  - Arrange for interpretive services to assist at discharge as needed  - Refer to Case Management Department for coordinating discharge planning if the patient needs post-hospital services based on physician/advanced practitioner order or complex needs related to functional status, cognitive ability, or social support system  Outcome: Progressing     Problem: Knowledge Deficit  Goal: Patient/family/caregiver demonstrates understanding of disease process, treatment plan, medications, and discharge instructions  Description: Complete learning assessment and assess knowledge base.  Interventions:  - Provide teaching at level of understanding  - Provide teaching via preferred learning methods  Outcome: Progressing     Problem: CARDIOVASCULAR - ADULT  Goal: Maintains optimal cardiac output and hemodynamic stability  Description: INTERVENTIONS:  - Monitor I/O, vital signs and rhythm  - Monitor for S/S and trends of decreased cardiac output  - Administer and titrate ordered vasoactive medications to optimize hemodynamic stability  - Assess quality of pulses, skin color and temperature  - Assess for signs of decreased coronary artery perfusion  - Instruct patient to report change in severity of symptoms  Outcome: Progressing  Goal: Absence of cardiac dysrhythmias or at baseline rhythm  Description: INTERVENTIONS:  - Continuous cardiac monitoring, vital signs, obtain 12 lead EKG if ordered  - Administer antiarrhythmic and heart rate control medications as ordered  - Monitor electrolytes  and administer replacement therapy as ordered  Outcome: Progressing

## 2025-06-14 ENCOUNTER — ANESTHESIA (INPATIENT)
Dept: GASTROENTEROLOGY | Facility: HOSPITAL | Age: 60
DRG: 392 | End: 2025-06-14
Payer: COMMERCIAL

## 2025-06-14 ENCOUNTER — ANESTHESIA EVENT (INPATIENT)
Dept: GASTROENTEROLOGY | Facility: HOSPITAL | Age: 60
DRG: 392 | End: 2025-06-14
Payer: COMMERCIAL

## 2025-06-14 ENCOUNTER — APPOINTMENT (INPATIENT)
Dept: GASTROENTEROLOGY | Facility: HOSPITAL | Age: 60
DRG: 392 | End: 2025-06-14
Attending: PHYSICIAN ASSISTANT
Payer: COMMERCIAL

## 2025-06-14 VITALS
HEIGHT: 65 IN | DIASTOLIC BLOOD PRESSURE: 78 MMHG | BODY MASS INDEX: 26.99 KG/M2 | RESPIRATION RATE: 18 BRPM | WEIGHT: 161.99 LBS | HEART RATE: 55 BPM | OXYGEN SATURATION: 97 % | SYSTOLIC BLOOD PRESSURE: 123 MMHG | TEMPERATURE: 97.6 F

## 2025-06-14 LAB — GLUCOSE SERPL-MCNC: 134 MG/DL (ref 65–140)

## 2025-06-14 PROCEDURE — 88305 TISSUE EXAM BY PATHOLOGIST: CPT | Performed by: PATHOLOGY

## 2025-06-14 PROCEDURE — 0DB98ZX EXCISION OF DUODENUM, VIA NATURAL OR ARTIFICIAL OPENING ENDOSCOPIC, DIAGNOSTIC: ICD-10-PCS | Performed by: INTERNAL MEDICINE

## 2025-06-14 PROCEDURE — 82948 REAGENT STRIP/BLOOD GLUCOSE: CPT

## 2025-06-14 PROCEDURE — 0DB78ZX EXCISION OF STOMACH, PYLORUS, VIA NATURAL OR ARTIFICIAL OPENING ENDOSCOPIC, DIAGNOSTIC: ICD-10-PCS | Performed by: INTERNAL MEDICINE

## 2025-06-14 PROCEDURE — 99239 HOSP IP/OBS DSCHRG MGMT >30: CPT | Performed by: FAMILY MEDICINE

## 2025-06-14 PROCEDURE — 0DB68ZX EXCISION OF STOMACH, VIA NATURAL OR ARTIFICIAL OPENING ENDOSCOPIC, DIAGNOSTIC: ICD-10-PCS | Performed by: INTERNAL MEDICINE

## 2025-06-14 RX ORDER — LIDOCAINE HYDROCHLORIDE 10 MG/ML
INJECTION, SOLUTION EPIDURAL; INFILTRATION; INTRACAUDAL; PERINEURAL AS NEEDED
Status: DISCONTINUED | OUTPATIENT
Start: 2025-06-14 | End: 2025-06-14

## 2025-06-14 RX ORDER — PANTOPRAZOLE SODIUM 40 MG/1
40 TABLET, DELAYED RELEASE ORAL DAILY
Qty: 30 TABLET | Refills: 0 | Status: SHIPPED | OUTPATIENT
Start: 2025-06-14 | End: 2025-06-24 | Stop reason: SDUPTHER

## 2025-06-14 RX ORDER — PROPOFOL 10 MG/ML
INJECTION, EMULSION INTRAVENOUS AS NEEDED
Status: DISCONTINUED | OUTPATIENT
Start: 2025-06-14 | End: 2025-06-14

## 2025-06-14 RX ORDER — ONDANSETRON 4 MG/1
4 TABLET, FILM COATED ORAL EVERY 8 HOURS PRN
Qty: 20 TABLET | Refills: 0 | Status: SHIPPED | OUTPATIENT
Start: 2025-06-14

## 2025-06-14 RX ADMIN — LOSARTAN POTASSIUM 50 MG: 50 TABLET, FILM COATED ORAL at 08:12

## 2025-06-14 RX ADMIN — PROPOFOL 30 MG: 10 INJECTION, EMULSION INTRAVENOUS at 09:51

## 2025-06-14 RX ADMIN — SUCRALFATE 1 G: 1 TABLET ORAL at 08:13

## 2025-06-14 RX ADMIN — PROPOFOL 150 MG: 10 INJECTION, EMULSION INTRAVENOUS at 09:48

## 2025-06-14 RX ADMIN — LIDOCAINE HYDROCHLORIDE 50 MG: 10 INJECTION, SOLUTION EPIDURAL; INFILTRATION; INTRACAUDAL at 09:48

## 2025-06-14 RX ADMIN — PROPOFOL 20 MG: 10 INJECTION, EMULSION INTRAVENOUS at 09:53

## 2025-06-14 RX ADMIN — ATORVASTATIN CALCIUM 20 MG: 20 TABLET, FILM COATED ORAL at 08:12

## 2025-06-14 RX ADMIN — HEPARIN SODIUM 5000 UNITS: 5000 INJECTION INTRAVENOUS; SUBCUTANEOUS at 06:00

## 2025-06-14 RX ADMIN — PANTOPRAZOLE SODIUM 40 MG: 40 TABLET, DELAYED RELEASE ORAL at 08:12

## 2025-06-14 NOTE — ANESTHESIA PREPROCEDURE EVALUATION
Procedure:  EGD    Relevant Problems   ANESTHESIA (within normal limits)      CARDIO   (+) Essential hypertension   (+) Migraine headache   (+) Varicose veins of both lower extremities with pain      ENDO (within normal limits)      GI/HEPATIC   (+) GERD (gastroesophageal reflux disease)      MUSCULOSKELETAL   (+) Primary osteoarthritis involving multiple joints      NEURO/PSYCH   (+) Migraine headache      PULMONARY (within normal limits)        Physical Exam    Airway     Mallampati score: I  TM Distance: >3 FB  Neck ROM: full  Mouth opening: >= 4 cm      Cardiovascular  Rhythm: regular, Rate: normal, Pulse is palpable.     Dental   No notable dental hx     Pulmonary   Breath sounds clear to auscultation    Neurological    She appears awake, alert and oriented x3.      Other Findings  post-pubertal.      Anesthesia Plan  ASA Score- 2     Anesthesia Type- IV sedation with anesthesia with ASA Monitors.         Additional Monitors:     Airway Plan:            Plan Factors-Exercise tolerance (METS): >4 METS.    Chart reviewed. EKG reviewed.  Existing labs reviewed. Patient summary reviewed.    Patient is not a current smoker.              Induction- intravenous.    Postoperative Plan- .   Monitoring Plan - Monitoring plan - standard ASA monitoring  Post Operative Pain Plan - non-opiod analgesics    Perioperative Resuscitation Plan - Level 1 - Full Code.       Informed Consent- Anesthetic plan and risks discussed with patient.  I personally reviewed this patient with the CRNA. Discussed and agreed on the Anesthesia Plan with the CRNA..      NPO Status:  Vitals Value Taken Time   Date of last liquid 06/14/25 06/14/25 08:57   Time of last liquid 0815 06/14/25 08:57   Date of last solid 06/13/25 06/14/25 08:57   Time of last solid 1900 06/14/25 08:57

## 2025-06-14 NOTE — DISCHARGE SUMMARY
Discharge Summary - Hospitalist   Name: Marina Land 60 y.o. female I MRN: [id number]  Unit/Bed#: 2 E 284-01 I Date of Admission: 6/12/2025   Date of Service: 6/14/2025 I Hospital Day: 2     Assessment & Plan  Epigastric pain  Patient has epigastric pain radiating to the back and worsened with diet and meals has any fevers chills diarrhea.  No nausea no vomiting  CT AP w contrast: Mild diffuse intrahepatic and extrahepatic bile duct dilatation and mild pancreatic ductal dilatation  Fortunately no SIRS criteria.  GI consulted, appreciate recommendations.  MRCP 6/13: mild CBD and main pancreatic duct dilatation with tapering towards ampulla. Minimal central intrahepatic biliary dilatation, no choledocolithiasis/stricture/mass noted.  S/p EGD today 6/14/2025 that reveals LA grade B reflux esophagitis, mild erosive gastritis.  Currently she is tolerating diet well.  Discharge patient with Protonix 40 mg daily for 3 months  Follow-up with gastroenterology outpatient  Essential hypertension  CW hztz 25 mg OD  Losartan 50 mg OD  Dyslipidemia  Cw atorvastatin  Osteoporosis  On alendronate  GERD (gastroesophageal reflux disease)  PTA omeprazole 20 mg daily  Will sub for pantoprazole 40 mg  Dilated cbd, acquired  Follow-up outpatient with GI     Discharging Physician / Practitioner: Edu Shaikh MD  PCP: Yadi Portillo PA-C  Admission Date:   Admission Orders (From admission, onward)       Ordered        06/12/25 1459  INPATIENT ADMISSION  Once                          Discharge Date: 06/14/25    Medical Problems       Resolved Problems  Date Reviewed: 6/2/2025   None         Consultations During Hospital Stay:  Gastroenterology    Procedures Performed:   DATE OF SERVICE:  6/14/25     PHYSICIAN(S):  Attending:   Errol Foster III, MD      Fellow:   No Staff Documented         INDICATION:  Abdominal pain  Nausea and vomiting        POST-OP DIAGNOSIS:  See the impression below.      PREPROCEDURE:  Informed consent was  obtained for the procedure, including sedation.  Risks of perforation, hemorrhage, adverse drug reaction and aspiration were discussed. The patient was placed in the left lateral decubitus position.     Patient was explained about the risks and benefits of the procedure. Risks including but not limited to bleeding, infection, and perforation were explained in detail. Also explained about less than 100% sensitivity with the exam and other alternatives.     PROCEDURE: EGD     DETAILS OF PROCEDURE:   Patient was taken to the procedure room where a time out was performed to confirm correct patient and correct procedure.   The patient underwent monitored anesthesia care, which was administered by an anesthesia professional. The patient's blood pressure, heart rate, level of consciousness, respirations, oxygen, ECG and ETCO2 were monitored throughout the procedure. The scope was introduced through the mouth and advanced to the second part of the duodenum. Retroflexion was performed in the fundus. The patient experienced no blood loss. The procedure was not difficult. The patient tolerated the procedure well. There were no apparent adverse events.         ANESTHESIA INFORMATION:  ASA: II  Anesthesia Type: IV Sedation with Anesthesia     MEDICATIONS:  No administrations occurring from 0944 to 0953 on 06/14/25         FINDINGS:  The upper third of the esophagus, middle third of the esophagus and lower third of the esophagus appeared normal.  Grade B esophagitis with mucosal breaks measuring 5 mm or more not continuous between folds, covering less than 75% of the circumference in the GE junction  2 cm sliding hiatal hernia (type I hiatal hernia) without Robby lesions present, confirmed by retroflexion  Erythematous, friable and granular mucosa in the body of the stomach and antrum; performed cold forceps biopsy  The duodenal bulb, 1st part of the duodenum and 2nd part of the duodenum appeared normal. Performed random biopsy  "using biopsy forceps.        SPECIMENS:  ID Type Source Tests Collected by Time Destination   A :    Duodenum   Errol Foster III, MD 6/14/2025  9:53 AM              IMPRESSION:  LA grade B reflux esophagitis  2 centimeter hiatal hernia  Mild erosive gastritis        RECOMMENDATION:  Await pathology results   Protonix 40 mg daily for 3 months  Reflux precautions  Diet as tolerated     She can go home per [facility] recommendations.  At the present time I will sign off please reconsult as needed             Reason for Admission: Epigastric pain    Hospital Course:     Marina Land is a 60 y.o. female patient who originally presented to the hospital on 6/12/2025 with PMH of HTN, HLD, GERD, presented with epigastric pain worsens with meals radiating to the back.  Patient underwent workup with CT abdomen pelvis, MRI abdomen pelvis.  Patient noted to have intrahepatic and extremity biliary ductal dilatation without obvious obstructing stone or mass identified.  MRCP confirms the resolved.  Patient underwent EGD that revealed esophagitis and gastritis.  Patient treated with Protonix, Carafate.  Currently patient is tolerating diet well.  Patient to discharge home with Protonix 40 mg daily.  Outpatient follow-up with gastroenterology for biopsy result.         Please see above list of diagnoses and related plan for additional information.     Condition at Discharge: good     Discharge Day Visit / Exam:     Subjective: Patient reports epigastric pain has mostly resolved.  No further nausea and vomiting.  Vitals: Blood Pressure: 123/78 (06/14/25 1042)  Pulse: 55 (06/14/25 1042)  Temperature: 97.6 °F (36.4 °C) (06/14/25 0958)  Temp Source: Temporal (06/14/25 0958)  Respirations: 18 (06/14/25 1042)  Height: 5' 5\" (165.1 cm) (06/12/25 1552)  Weight - Scale: 73.5 kg (161 lb 15.9 oz) (06/12/25 1552)  SpO2: 97 % (06/14/25 1042)  Exam:   Physical Exam  Constitutional:       Appearance: She is well-developed.   HENT:      Head: " Normocephalic and atraumatic.   Pulmonary:      Effort: Pulmonary effort is normal. No respiratory distress.      Breath sounds: Normal breath sounds.     Musculoskeletal:      Cervical back: Normal range of motion.     Skin:     General: Skin is warm and dry.            Discharge instructions/Information to patient and family:   See after visit summary for information provided to patient and family.      Provisions for Follow-Up Care:  See after visit summary for information related to follow-up care and any pertinent home health orders.      Disposition:     Home       Planned Readmission: no     Discharge Statement:  I spent 45 minutes discharging the patient. This time was spent on the day of discharge. I had direct contact with the patient on the day of discharge. Greater than 50% of the total time was spent examining patient, answering all patient questions, arranging and discussing plan of care with patient as well as directly providing post-discharge instructions.  Additional time then spent on discharge activities.    Discharge Medications:  See after visit summary for reconciled discharge medications provided to patient and family.      ** Please Note: This note has been constructed using a voice recognition system **

## 2025-06-14 NOTE — ASSESSMENT & PLAN NOTE
Patient has epigastric pain radiating to the back and worsened with diet and meals has any fevers chills diarrhea.  No nausea no vomiting  CT AP w contrast: Mild diffuse intrahepatic and extrahepatic bile duct dilatation and mild pancreatic ductal dilatation  Fortunately no SIRS criteria.  GI consulted, appreciate recommendations.  MRCP 6/13: mild CBD and main pancreatic duct dilatation with tapering towards ampulla. Minimal central intrahepatic biliary dilatation, no choledocolithiasis/stricture/mass noted.  S/p EGD today 6/14/2025 that reveals LA grade B reflux esophagitis, mild erosive gastritis.  Currently she is tolerating diet well.  Discharge patient with Protonix 40 mg daily for 3 months  Follow-up with gastroenterology outpatient

## 2025-06-14 NOTE — ANESTHESIA POSTPROCEDURE EVALUATION
Post-Op Assessment Note    CV Status:  Stable  Pain Score: 0    Pain management: adequate       Mental Status:  Sleepy   Hydration Status:  Stable   PONV Controlled:  None   Airway Patency:  Patent     Post Op Vitals Reviewed: Yes    No anethesia notable event occurred.    Staff: CRNA           Last Filed PACU Vitals:  Vitals Value Taken Time   Temp 97.3    Pulse 64    BP 90/50    Resp 16    SpO2 98

## 2025-06-14 NOTE — ANESTHESIA POSTPROCEDURE EVALUATION
Post-Op Assessment Note    CV Status:  Stable  Pain Score: 0    Pain management: adequate       Mental Status:  Alert and awake   Hydration Status:  Euvolemic   PONV Controlled:  Controlled   Airway Patency:  Patent     Post Op Vitals Reviewed: Yes    No anethesia notable event occurred.    Staff: Anesthesiologist           Last Filed PACU Vitals:  Vitals Value Taken Time   Temp 97.6 °F (36.4 °C) 06/14/25 09:58   Pulse 73 06/14/25 10:18   /64 06/14/25 10:18   Resp 16 06/14/25 10:18   SpO2 97 % 06/14/25 10:18       Modified Herbert:     Vitals Value Taken Time   Activity 2 06/14/25 10:18   Respiration 2 06/14/25 10:18   Circulation 2 06/14/25 10:18   Consciousness 2 06/14/25 10:18   Oxygen Saturation 2 06/14/25 10:18     Modified Herbert Score: 10

## 2025-06-16 ENCOUNTER — TRANSITIONAL CARE MANAGEMENT (OUTPATIENT)
Dept: FAMILY MEDICINE CLINIC | Facility: CLINIC | Age: 60
End: 2025-06-16

## 2025-06-16 ENCOUNTER — TELEPHONE (OUTPATIENT)
Dept: FAMILY MEDICINE CLINIC | Facility: CLINIC | Age: 60
End: 2025-06-16

## 2025-06-16 NOTE — TELEPHONE ENCOUNTER
Please make sure to schedule for TCM. Thanks!     ----- Message from Edu Shaikh MD sent at 6/14/2025 12:40 PM EDT -----  Thank you for allowing us to participate in the care of your patient, Marina Land, who was hospitalized from 6/12/2025 through 6/14/2025 with the admitting diagnosis of epigastric pain.  EGD shows esophagitis, gastritis, pending bx result. Medication Changes:Sent Rx for protonix If you have any additional questions or would like to discuss further, please feel free to contact me.Curt Rabago. [facility] Internal Medicine, [phone]

## 2025-06-16 NOTE — UTILIZATION REVIEW
NOTIFICATION OF ADMISSION DISCHARGE   This is a Notification of Discharge from [facility]. Please be advised that this patient has been discharge from our facility. Below you will find the admission and discharge date and time including the patient’s disposition.   UTILIZATION REVIEW CONTACT:  Utilization Review Assistants  Network Utilization Review Department  Phone: [phone] x carefully listen to the prompts. All voicemails are confidential.  Email: [email or web address]     ADMISSION INFORMATION  PRESENTATION DATE: 6/12/2025  1:04 PM  OBERVATION ADMISSION DATE: N/A  INPATIENT ADMISSION DATE: 6/12/25  2:59 PM   DISCHARGE DATE: 6/14/2025  2:51 PM   DISPOSITION:Home/Self Care    Network Utilization Review Department  ATTENTION: Please call with any questions or concerns to [phone] and carefully listen to the prompts so that you are directed to the right person. All voicemails are confidential.   For Discharge needs, contact Care Management DC Support Team at [phone] opt. 2  Send all requests for admission clinical reviews, approved or denied determinations and any other requests to dedicated fax number below belonging to the campus where the patient is receiving treatment. List of dedicated fax numbers for the Facilities:  FACILITY NAME UR FAX NUMBER   ADMISSION DENIALS (Administrative/Medical Necessity) [phone]   DISCHARGE SUPPORT TEAM ([facility]) [phone]   PARENT CHILD HEALTH (Maternity/NICU/Pediatrics) [phone]   [facility] [phone]   [facility] [phone]   [facility] [phone]   [facility] [phone]   [facility] [phone]   [facility] [phone]   [facility] [phone]   [facility] [phone]   [facility]  [facility] [phone]   [facility] [phone]   [facility] [phone]   [facility] [phone]

## 2025-06-20 ENCOUNTER — RESULTS FOLLOW-UP (OUTPATIENT)
Dept: GASTROENTEROLOGY | Facility: CLINIC | Age: 60
End: 2025-06-20

## 2025-06-24 ENCOUNTER — OFFICE VISIT (OUTPATIENT)
Dept: FAMILY MEDICINE CLINIC | Facility: CLINIC | Age: 60
End: 2025-06-24
Payer: COMMERCIAL

## 2025-06-24 VITALS
DIASTOLIC BLOOD PRESSURE: 86 MMHG | TEMPERATURE: 97.5 F | BODY MASS INDEX: 26.33 KG/M2 | WEIGHT: 158 LBS | OXYGEN SATURATION: 98 % | SYSTOLIC BLOOD PRESSURE: 114 MMHG | HEART RATE: 67 BPM | HEIGHT: 65 IN

## 2025-06-24 DIAGNOSIS — K21.9 GASTROESOPHAGEAL REFLUX DISEASE WITHOUT ESOPHAGITIS: ICD-10-CM

## 2025-06-24 DIAGNOSIS — R10.9 ABDOMINAL PAIN: ICD-10-CM

## 2025-06-24 DIAGNOSIS — K83.8 DILATED CBD, ACQUIRED: ICD-10-CM

## 2025-06-24 DIAGNOSIS — K59.03 DRUG-INDUCED CONSTIPATION: ICD-10-CM

## 2025-06-24 DIAGNOSIS — R10.13 EPIGASTRIC PAIN: Primary | ICD-10-CM

## 2025-06-24 DIAGNOSIS — R11.2 NAUSEA AND VOMITING: ICD-10-CM

## 2025-06-24 DIAGNOSIS — I10 ESSENTIAL HYPERTENSION: ICD-10-CM

## 2025-06-24 PROCEDURE — 99495 TRANSJ CARE MGMT MOD F2F 14D: CPT | Performed by: NURSE PRACTITIONER

## 2025-06-24 RX ORDER — PANTOPRAZOLE SODIUM 40 MG/1
40 TABLET, DELAYED RELEASE ORAL DAILY
Qty: 30 TABLET | Refills: 1 | Status: SHIPPED | OUTPATIENT
Start: 2025-06-24 | End: 2025-07-24

## 2025-06-24 RX ORDER — POLYETHYLENE GLYCOL 3350 17 G/17G
17 POWDER, FOR SOLUTION ORAL DAILY
Qty: 510 G | Refills: 5 | Status: SHIPPED | OUTPATIENT
Start: 2025-06-24 | End: 2025-12-21

## 2025-06-24 NOTE — ASSESSMENT & PLAN NOTE
Patient has epigastric pain radiating to the back and worsened with diet and meals has any fevers chills diarrhea.  No nausea no vomiting  CT AP w contrast: Mild diffuse intrahepatic and extrahepatic bile duct dilatation and mild pancreatic ductal dilatation  Fortunately no SIRS criteria.  GI consulted, appreciate recommendations.  MRCP 6/13: mild CBD and main pancreatic duct dilatation with tapering towards ampulla. Minimal central intrahepatic biliary dilatation, no choledocolithiasis/stricture/mass noted.  S/p EGD today 6/14/2025 that reveals LA grade B reflux esophagitis, mild erosive gastritis.  Currently she is tolerating diet well.  Discharge patient with Protonix 40 mg daily for 3 months  Follow-up with gastroenterology outpatient    Orders:    Ambulatory Referral to Gastroenterology; Future

## 2025-06-24 NOTE — PROGRESS NOTES
Transition of Care Visit:  Name: Marina Land      : 1965      MRN: [id number]  Encounter Provider: MARVEL Worthy  Encounter Date: 2025   Encounter department: [facility]    Assessment & Plan  Epigastric pain  Patient has epigastric pain radiating to the back and worsened with diet and meals has any fevers chills diarrhea.  No nausea no vomiting  CT AP w contrast: Mild diffuse intrahepatic and extrahepatic bile duct dilatation and mild pancreatic ductal dilatation  Fortunately no SIRS criteria.  GI consulted, appreciate recommendations.  MRCP : mild CBD and main pancreatic duct dilatation with tapering towards ampulla. Minimal central intrahepatic biliary dilatation, no choledocolithiasis/stricture/mass noted.  S/p EGD today 2025 that reveals LA grade B reflux esophagitis, mild erosive gastritis.  Currently she is tolerating diet well.  Discharge patient with Protonix 40 mg daily for 3 months  Follow-up with gastroenterology outpatient    Orders:    Ambulatory Referral to Gastroenterology; Future    Essential hypertension  CW hztz 25 mg OD  Losartan 50 mg OD         Gastroesophageal reflux disease without esophagitis  PTA omeprazole 20 mg daily  Will sub for pantoprazole 40 mg    Orders:    Ambulatory Referral to Gastroenterology; Future    Dilated cbd, acquired  Follow-up outpatient with GI     Orders:    Ambulatory Referral to Gastroenterology; Future    Abdominal pain    Orders:    pantoprazole (PROTONIX) 40 mg tablet; Take 1 tablet (40 mg total) by mouth daily    Nausea and vomiting    Orders:    pantoprazole (PROTONIX) 40 mg tablet; Take 1 tablet (40 mg total) by mouth daily    Drug-induced constipation    Orders:    polyethylene glycol (GLYCOLAX) 17 GM/SCOOP powder; Take 17 g by mouth daily         History of Present Illness     Transitional Care Management Review:   Marina Land is a 60 y.o. female here for TCM follow up.     During the TCM  phone call patient stated:  TCM Call (since 6/10/2025)       Date and time call was made  6/16/2025  8:37 AM    Hospital care reviewed  Records reviewed    Patient was hospitialized at  [facility]    Date of Admission  06/12/25    Date of discharge  06/14/25    Diagnosis  Epigastric pain          TCM Call (since 6/10/2025)       I have advised the patient to call PCP with any new or worsening symptoms  Rena Butler MA          Hospital Course:      Marina Land is a 60 y.o. female patient who originally presented to the hospital on 6/12/2025 with PMH of HTN, HLD, GERD, presented with epigastric pain worsens with meals radiating to the back.  Patient underwent workup with CT abdomen pelvis, MRI abdomen pelvis.  Patient noted to have intrahepatic and extremity biliary ductal dilatation without obvious obstructing stone or mass identified.  MRCP confirms the resolved.  Patient underwent EGD that revealed esophagitis and gastritis.  Patient treated with Protonix, Carafate.  Currently patient is tolerating diet well.  Patient to discharge home with Protonix 40 mg daily.  Outpatient follow-up with gastroenterology for biopsy result.    6/24/2025 TCM PCP  Patient here today for follow up and reports that she is taking the Pantoprazole 40 mg daily is having constipation and has tried prune juice and stool softeners and not helping she is drinking plenty of water. She is having a BM every three days and having ahard time with having a BM and no blood in the stool. She has no other issues to report       Review of Systems   Constitutional:  Negative for activity change, appetite change, chills, diaphoresis, fatigue, fever and unexpected weight change.   HENT:  Negative for congestion, ear pain, hearing loss, postnasal drip, sinus pressure, sinus pain, sneezing and sore throat.    Eyes:  Negative for pain, redness and visual disturbance.   Respiratory:  Negative for cough and shortness of breath.   "  Cardiovascular:  Negative for chest pain and leg swelling.   Gastrointestinal:  Positive for constipation. Negative for abdominal pain, diarrhea, nausea and vomiting.   Endocrine: Negative for cold intolerance.   Genitourinary: Negative.    Musculoskeletal:  Negative for arthralgias.   Skin: Negative.    Allergic/Immunologic: Negative.    Neurological:  Negative for dizziness and light-headedness.   Psychiatric/Behavioral:  Negative for behavioral problems and dysphoric mood.      Objective   /86 (BP Location: Left arm, Patient Position: Sitting, Cuff Size: Large)   Pulse 67   Temp 97.5 °F (36.4 °C)   Ht 5' 5\" (1.651 m)   Wt 71.7 kg (158 lb)   SpO2 98%   BMI 26.29 kg/m²     Physical Exam  Constitutional:       General: She is not in acute distress.     Appearance: She is well-developed.   HENT:      Head: Normocephalic and atraumatic.     Eyes:      Pupils: Pupils are equal, round, and reactive to light.     Neck:      Thyroid: No thyromegaly.     Cardiovascular:      Rate and Rhythm: Normal rate and regular rhythm.      Heart sounds: Normal heart sounds. No murmur heard.  Pulmonary:      Effort: Pulmonary effort is normal. No respiratory distress.      Breath sounds: Normal breath sounds. No wheezing.   Abdominal:      General: Bowel sounds are normal.      Palpations: Abdomen is soft.     Musculoskeletal:         General: Normal range of motion.      Cervical back: Normal range of motion.     Skin:     General: Skin is warm and dry.     Neurological:      Mental Status: She is alert and oriented to person, place, and time.       Medications have been reviewed by provider in current encounter      "

## 2025-06-24 NOTE — ASSESSMENT & PLAN NOTE
PTA omeprazole 20 mg daily  Will sub for pantoprazole 40 mg    Orders:    Ambulatory Referral to Gastroenterology; Future

## 2025-06-26 ENCOUNTER — OFFICE VISIT (OUTPATIENT)
Dept: GASTROENTEROLOGY | Facility: CLINIC | Age: 60
End: 2025-06-26
Payer: COMMERCIAL

## 2025-06-26 VITALS
HEIGHT: 65 IN | SYSTOLIC BLOOD PRESSURE: 112 MMHG | TEMPERATURE: 97.5 F | HEART RATE: 68 BPM | DIASTOLIC BLOOD PRESSURE: 74 MMHG | BODY MASS INDEX: 26.49 KG/M2 | WEIGHT: 159 LBS | OXYGEN SATURATION: 98 %

## 2025-06-26 DIAGNOSIS — K29.70 GASTRITIS WITHOUT BLEEDING, UNSPECIFIED CHRONICITY, UNSPECIFIED GASTRITIS TYPE: ICD-10-CM

## 2025-06-26 DIAGNOSIS — K86.89 DILATION OF PANCREATIC DUCT: ICD-10-CM

## 2025-06-26 DIAGNOSIS — R10.13 EPIGASTRIC ABDOMINAL PAIN: ICD-10-CM

## 2025-06-26 DIAGNOSIS — K21.00 GASTROESOPHAGEAL REFLUX DISEASE WITH ESOPHAGITIS WITHOUT HEMORRHAGE: Primary | ICD-10-CM

## 2025-06-26 PROCEDURE — 99214 OFFICE O/P EST MOD 30 MIN: CPT | Performed by: PHYSICIAN ASSISTANT

## 2025-06-26 NOTE — TELEPHONE ENCOUNTER
----- Message from Errol Foster MD sent at 6/25/2025  6:59 AM EDT -----  Please review with the patient  ----- Message -----  From: Mychart, Generic  Sent: 6/25/2025   5:00 AM EDT  To: Errol Foster III, MD  Subject: Notification of Unviewed Test Results            ISIDRA REDMOND has not viewed the following results:  - TISSUE EXAM

## 2025-06-26 NOTE — PROGRESS NOTES
Name: Marina Land      : 1965      MRN: [id number]  Encounter Provider: Maria T Puentes PA-C  Encounter Date: 2025   Encounter department: [facility] [place]  :  Assessment & Plan  Gastroesophageal reflux disease with esophagitis without hemorrhage    Gastritis without bleeding, unspecified chronicity, unspecified gastritis type    Dilation of pancreatic duct    Epigastric abdominal pain    Patient was recently in the hospital for epigastric abdominal pain.  MRI/MRCP without IV contrast showed mild main pancreatic duct dilation measuring up to 5 mm with tapering towards the ampulla; no mass evident on the noncontrast study. LFTs normal.  EGD showed grade B esophagitis, 2 cm hiatal hernia, and mild erosive gastritis.  Biopsies were negative for H. pylori and celiac disease.  She is on pantoprazole 40 mg by mouth daily and is feeling better.  She had been taking frequent NSAIDs prior to the hospitalization which she stopped.    Continue Pantoprazole 40mg po daily for 3 months total to heal her esophagitis and gastritis.  GERD modifications.  Avoidance of NSAIDs.  Will also further evaluate the mild pancreatic duct dilation with a MRI of the abdomen with and without contrast/MRCP to ensure no pancreatic neoplasm, etc.      History of Present Illness   HPI  Marina Land is a 60 y.o. female who presents to the office for follow up from her recent hospitalization.  Patient was recently in the hospital for epigastric abdominal pain.  MRI/MRCP without IV contrast showed mild main pancreatic duct dilation measuring up to 5 mm with tapering towards the ampulla; no mass evident on the noncontrast study. LFTs normal.  EGD showed grade B esophagitis, 2 cm hiatal hernia, and mild erosive gastritis.  Biopsies were negative for H. pylori and celiac disease. She is on pantoprazole 40 mg by mouth daily and is feeling better. She had been taking frequent NSAIDs prior to the  hospitalization which she stopped.  No history of pancreatitis.  No family history of pancreatic cancer.  She had a colonoscopy in September 2020 with 3 polyps removed.  Repeat colonoscopy was recommended in 5 years which will be in September.  She reports she will call to schedule her colonoscopy at that time.  History obtained from: patient      Medical History Reviewed by provider this encounter:  Meds     .  Past Medical History   Past Medical History[1]  Past Surgical History[2]  Family History[3]   reports that she has never smoked. She has never used smokeless tobacco. She reports that she does not currently use alcohol. She reports that she does not use drugs.  Current Outpatient Medications   Medication Instructions    alendronate (FOSAMAX) 70 mg tablet take 1 tablet by mouth every 7 days IN THE MORNING 30 MINUTES before FIRST FOOD, BEVERAGE, OR MEDICATION with 6 to 8 ounce(s) OF WATER DO NOT LIE DOWN EAT OR DRINK for 30 MINUTES after TAKING MUST REMAIN UPRIGHT    atorvastatin (LIPITOR) 20 mg, Oral, Daily    Blood Glucose Monitoring Suppl (OneTouch Verio Reflect) w/Device KIT Check blood sugars once daily. Please substitute with appropriate alternative as covered by patient's insurance. Dx: E11.65    cyclobenzaprine (FLEXERIL) 5 mg, Oral, 3 times daily PRN    glucose blood (OneTouch Verio) test strip Check blood sugars once daily. Please substitute with appropriate alternative as covered by patient's insurance. Dx: E11.65    hydroCHLOROthiazide 25 mg, Oral, Daily    losartan (COZAAR) 50 mg, Oral, Daily    meloxicam (MOBIC) 7.5 mg tablet take 1 tablet by mouth once daily if needed for moderate pain    ondansetron (ZOFRAN) 4 mg, Oral, Every 8 hours PRN    OneTouch Delica Lancets 33G MISC Check blood sugars once daily. Please substitute with appropriate alternative as covered by patient's insurance. Dx: E11.65    pantoprazole (PROTONIX) 40 mg, Oral, Daily    polyethylene glycol (GLYCOLAX) 17 g, Oral, Daily     "Pyridoxine HCl (VITAMIN B6 PO) Take by mouth    triamcinolone (KENALOG) 0.1 % cream Topical, 2 times daily    valACYclovir (VALTREX) 500 mg, Oral, Daily   Allergies[4]   Medications Ordered Prior to Encounter[5]   Social History[6]     Objective   /74 (BP Location: Right arm, Patient Position: Sitting, Cuff Size: Standard)   Pulse 68   Temp 97.5 °F (36.4 °C) (Temporal)   Ht 5' 5\" (1.651 m)   Wt 72.1 kg (159 lb)   SpO2 98%   BMI 26.46 kg/m²      Physical Exam  Constitutional:       General: She is not in acute distress.     Appearance: Normal appearance. She is not ill-appearing.   HENT:      Head: Normocephalic and atraumatic.     Cardiovascular:      Rate and Rhythm: Normal rate and regular rhythm.   Pulmonary:      Effort: Pulmonary effort is normal. No respiratory distress.      Breath sounds: Normal breath sounds.     Skin:     General: Skin is warm and dry.     Neurological:      Mental Status: She is alert and oriented to person, place, and time.     Psychiatric:         Mood and Affect: Mood normal.         Behavior: Behavior normal.              [1]   Past Medical History:  Diagnosis Date    2019 novel coronavirus disease (COVID-19) 05/30/2020    x 2 2020 and 2022    Abnormal Pap smear of cervix     Arthritis     BRCA1 negative 2015    BRCA2 negative 2015    Fractures 2021    Gastric ulcer     Hyperlipidemia     Hypertension 2014    Low back pain     Right trigger finger     long and ring    Shingles     TB lung, latent     Varicella 1991   [2]   Past Surgical History:  Procedure Laterality Date    ANKLE FRACTURE SURGERY Right 03/01/2021    \"ankle fx\"    ANKLE SURGERY  2021    APPENDECTOMY  1998    CATARACT EXTRACTION Bilateral     COLONOSCOPY      EYE SURGERY      FOOT SURGERY Right     FRACTURE SURGERY      HERNIA REPAIR      FL TENDON SHEATH INCISION Right 07/11/2023    Procedure: RELEASE TRIGGER FINGER LONG FINGER AND RING FINGER;  Surgeon: Brittaney Montoya MD;  Location: [facility] MAIN OR;  " Service: Orthopedics   [3]   Family History  Problem Relation Name Age of Onset    Ovarian cancer Mother Deidre Cooper 50    Diabetes Father Wilmer Haas     Thyroid cancer Sister  47    No Known Problems Sister      No Known Problems Daughter      No Known Problems Son      No Known Problems Son      No Known Problems Son      Cancer Maternal Grandmother Mother , grandmother         unknown type or age    No Known Problems Paternal Grandmother      No Known Problems Maternal Aunt      Breast cancer Maternal Uncle Wilmer Cooper         50's   [4]   Allergies  Allergen Reactions    Kiwi Extract - Food Allergy Hives    Other      strawberry    Pineapple - Food Allergy Hives   [5]   Current Outpatient Medications on File Prior to Visit   Medication Sig Dispense Refill    alendronate (FOSAMAX) 70 mg tablet take 1 tablet by mouth every 7 days IN THE MORNING 30 MINUTES before FIRST FOOD, BEVERAGE, OR MEDICATION with 6 to 8 ounce(s) OF WATER DO NOT LIE DOWN EAT OR DRINK for 30 MINUTES after TAKING MUST REMAIN UPRIGHT 12 tablet 0    atorvastatin (LIPITOR) 20 mg tablet take 1 tablet by mouth once daily 30 tablet 0    Blood Glucose Monitoring Suppl (OneTouch Verio Reflect) w/Device KIT Check blood sugars once daily. Please substitute with appropriate alternative as covered by patient's insurance. Dx: E11.65 1 kit 0    cyclobenzaprine (FLEXERIL) 5 mg tablet Take 1 tablet (5 mg total) by mouth 3 (three) times a day as needed for muscle spasms 30 tablet 0    glucose blood (OneTouch Verio) test strip Check blood sugars once daily. Please substitute with appropriate alternative as covered by patient's insurance. Dx: E11.65 100 each 3    hydroCHLOROthiazide 25 mg tablet Take 1 tablet (25 mg total) by mouth daily 90 tablet 1    losartan (COZAAR) 50 mg tablet Take 1 tablet (50 mg total) by mouth daily 90 tablet 1    meloxicam (MOBIC) 7.5 mg tablet take 1 tablet by mouth once daily if needed for moderate pain 30 tablet 5     ondansetron (ZOFRAN) 4 mg tablet Take 1 tablet (4 mg total) by mouth every 8 (eight) hours as needed for nausea or vomiting 20 tablet 0    OneTouch Delica Lancets 33G MISC Check blood sugars once daily. Please substitute with appropriate alternative as covered by patient's insurance. Dx: E11.65 100 each 3    pantoprazole (PROTONIX) 40 mg tablet Take 1 tablet (40 mg total) by mouth daily 30 tablet 1    polyethylene glycol (GLYCOLAX) 17 GM/SCOOP powder Take 17 g by mouth daily 510 g 5    Pyridoxine HCl (VITAMIN B6 PO) Take by mouth      triamcinolone (KENALOG) 0.1 % cream Apply topically 2 (two) times a day 15 g 0    valACYclovir (VALTREX) 500 mg tablet Take 1 tablet (500 mg total) by mouth daily (Patient taking differently: Take 500 mg by mouth daily as needed) 30 tablet 5     No current facility-administered medications on file prior to visit.   [6]   Social History  Tobacco Use    Smoking status: Never    Smokeless tobacco: Never   Vaping Use    Vaping status: Never Used   Substance and Sexual Activity    Alcohol use: Not Currently     Comment: rare    Drug use: No    Sexual activity: Yes     Partners: Male     Birth control/protection: None

## 2025-07-15 DIAGNOSIS — M81.0 AGE-RELATED OSTEOPOROSIS WITHOUT CURRENT PATHOLOGICAL FRACTURE: ICD-10-CM

## 2025-07-16 RX ORDER — ALENDRONATE SODIUM 70 MG/1
70 TABLET ORAL WEEKLY
Qty: 4 TABLET | Refills: 0 | Status: SHIPPED | OUTPATIENT
Start: 2025-07-16

## 2025-07-21 ENCOUNTER — TELEPHONE (OUTPATIENT)
Dept: GASTROENTEROLOGY | Facility: CLINIC | Age: 60
End: 2025-07-21

## 2025-07-22 ENCOUNTER — TELEPHONE (OUTPATIENT)
Age: 60
End: 2025-07-22

## 2025-07-22 NOTE — TELEPHONE ENCOUNTER
Phone call from patient stating she got the message to cancel her appointment for tomorrow 7/23/2025 & she will drop of form to complete.

## 2025-07-23 ENCOUNTER — HOSPITAL ENCOUNTER (OUTPATIENT)
Dept: MRI IMAGING | Facility: CLINIC | Age: 60
Discharge: HOME | End: 2025-07-23
Attending: PHYSICIAN ASSISTANT
Payer: COMMERCIAL

## 2025-07-23 DIAGNOSIS — K86.89 DILATION OF PANCREATIC DUCT: ICD-10-CM

## 2025-07-23 PROCEDURE — A9585 GADOBUTROL INJECTION: HCPCS | Performed by: PHYSICIAN ASSISTANT

## 2025-07-23 PROCEDURE — 74183 MRI ABD W/O CNTR FLWD CNTR: CPT

## 2025-07-23 RX ORDER — GADOBUTROL 604.72 MG/ML
7 INJECTION INTRAVENOUS
Status: COMPLETED | OUTPATIENT
Start: 2025-07-23 | End: 2025-07-23

## 2025-07-23 RX ADMIN — GADOBUTROL 7 ML: 604.72 INJECTION INTRAVENOUS at 14:15

## 2025-08-18 ENCOUNTER — TELEPHONE (OUTPATIENT)
Dept: FAMILY MEDICINE CLINIC | Facility: CLINIC | Age: 60
End: 2025-08-18

## (undated) DEVICE — STERILE BETHLEHEM PLASTIC HAND: Brand: CARDINAL HEALTH

## (undated) DEVICE — DISPOSABLE EQUIPMENT COVER: Brand: SMALL TOWEL DRAPE

## (undated) DEVICE — OCCLUSIVE GAUZE STRIP,3% BISMUTH TRIBROMOPHENATE IN PETROLATUM BLEND: Brand: XEROFORM

## (undated) DEVICE — CUFF TOURNIQUET 18 X 4 IN QUICK CONNECT DISP 1 BLADDER

## (undated) DEVICE — GLOVE INDICATOR PI UNDERGLOVE SZ 7 BLUE

## (undated) DEVICE — GAUZE SPONGES,16 PLY: Brand: CURITY

## (undated) DEVICE — STRETCH BANDAGE: Brand: CURITY

## (undated) DEVICE — GLOVE INDICATOR PI UNDERGLOVE SZ 6.5 BLUE

## (undated) DEVICE — IMPERVIOUS STOCKINETTE: Brand: DEROYAL

## (undated) DEVICE — SPONGE PVP SCRUB WING STERILE

## (undated) DEVICE — INTENDED FOR TISSUE SEPARATION, AND OTHER PROCEDURES THAT REQUIRE A SHARP SURGICAL BLADE TO PUNCTURE OR CUT.: Brand: BARD-PARKER ® CARBON RIB-BACK BLADES

## (undated) DEVICE — GLOVE SRG BIOGEL 6.5

## (undated) DEVICE — GLOVE SRG BIOGEL 7

## (undated) DEVICE — MINI BLADE ROUND TIP SHARP ON ONE SIDE

## (undated) DEVICE — SUT PROLENE 4-0 PS-2 18 IN 8682G

## (undated) DEVICE — ACE WRAP 3 IN STERILE